# Patient Record
Sex: MALE | Race: WHITE | Employment: OTHER | URBAN - METROPOLITAN AREA
[De-identification: names, ages, dates, MRNs, and addresses within clinical notes are randomized per-mention and may not be internally consistent; named-entity substitution may affect disease eponyms.]

---

## 2021-01-26 ENCOUNTER — BMR PREADMISSION ASSESSMENT (OUTPATIENT)
Dept: ADMISSIONS | Facility: REHABILITATION | Age: 71
End: 2021-01-26

## 2021-01-28 LAB
EXTERNAL HEMATOCRIT: 35 %
EXTERNAL HEMOGLOBIN: 11.7 G/DL
EXTERNAL PLATELET COUNT: 223 K/ΜL
EXTERNAL WBC: 10.9 G/DL

## 2021-02-01 PROBLEM — G93.41 ACUTE METABOLIC ENCEPHALOPATHY: Status: ACTIVE | Noted: 2021-01-26

## 2021-02-01 PROBLEM — R74.01 TRANSAMINITIS: Status: ACTIVE | Noted: 2021-01-05

## 2021-02-01 PROBLEM — R09.02 HYPOXIA: Status: ACTIVE | Noted: 2021-01-05

## 2021-02-01 PROBLEM — U07.1 PNEUMONIA DUE TO COVID-19 VIRUS: Status: ACTIVE | Noted: 2021-01-16

## 2021-02-01 PROBLEM — J12.82 PNEUMONIA DUE TO COVID-19 VIRUS: Status: ACTIVE | Noted: 2021-01-16

## 2021-02-01 PROBLEM — J80 ARDS (ADULT RESPIRATORY DISTRESS SYNDROME) (CMS/HCC): Status: ACTIVE | Noted: 2021-01-16

## 2021-02-01 LAB
BUN SERPL-MCNC: 14 MG/DL
CHLORIDE SERPL-SCNC: 101 MEQ/L
CO2 SERPL-SCNC: 27 MEQ/L
CREAT SERPL-MCNC: 0.7 MG/DL
GLUCOSE SERPL-MCNC: 101 MG/DL
POTASSIUM SERPL-SCNC: 4 MEQ/L
SODIUM SERPL-SCNC: 134 MEQ/L

## 2021-02-01 RX ORDER — VANCOMYCIN HYDROCHLORIDE 1.25 G/25ML
1.25 INJECTION, POWDER, LYOPHILIZED, FOR SOLUTION INTRAVENOUS EVERY 12 HOURS
COMMUNITY
End: 2021-02-01 | Stop reason: ALTCHOICE

## 2021-02-01 RX ORDER — ASPIRIN 81 MG/1
81 TABLET ORAL DAILY
COMMUNITY

## 2021-02-01 RX ORDER — DOCUSATE SODIUM 100 MG/1
100 CAPSULE, LIQUID FILLED ORAL 2 TIMES DAILY
COMMUNITY

## 2021-02-01 RX ORDER — CEFEPIME HYDROCHLORIDE 1 G/50ML
1 INJECTION, SOLUTION INTRAVENOUS
COMMUNITY
End: 2021-02-01 | Stop reason: ALTCHOICE

## 2021-02-01 RX ORDER — AMLODIPINE BESYLATE 10 MG/1
10 TABLET ORAL DAILY
COMMUNITY
End: 2021-02-24 | Stop reason: HOSPADM

## 2021-02-01 RX ORDER — FAMOTIDINE 20 MG/1
20 TABLET, FILM COATED ORAL 2 TIMES DAILY
COMMUNITY

## 2021-02-01 RX ORDER — HEPARIN SODIUM 5000 [USP'U]/ML
5000 INJECTION, SOLUTION INTRAVENOUS; SUBCUTANEOUS 3 TIMES DAILY
COMMUNITY
End: 2021-02-24 | Stop reason: HOSPADM

## 2021-02-01 NOTE — HOSPITAL COURSE
Patient assessed using modified protocols established during the COVID19 pandemic.  70 y.o. male, I PTA, with PMH of HTN, HLD, and CAD, admitted 1/5/2021 for COVID-19 PNA. Worsening resp status intubated on 1/9- extubated 1/16 to OptiFlow. Transition to [facility] on 1/23.Eval by nephrology while in ICU due to ERNIE likely secondary to ATN. Creatinine has been improving with excellent urine output. Received Decadron and Remdesivir. Dobhoff tube was placed due to dysphagia. New fever of 102.8 1/20 and 1/21. ID was consulted. Bld cx's negative. CT scan of the chest abdomen pelvis was done with results showing increasing pulmonary infiltrates/groundglass opacities and consolidations in his bilateral lower lobes. Started empirically on cefepime and vancomycin to cover hospital-acquired pneumonia. Pt had increased lethargy on 1/22. Pt removed DHT 1/25; required higher O2 afterward - possible aspiration during the process. 1/26 patient continued to have intermittent confusion; ?post ICU delirium. Neurology is following, MRI brain was unremarkable. EEG showed mild slowing but no seizure activity. D/c'd abx after 5 days d/t lethargy. Repeat VFSS performed- now on soft diet with NTL.   Pt is AA&Ox3 with periods of confusion. Tolerates soft diet with NTL, voiding in urinal, O2 @ 2-3L via nc. Participating in therapies with marked improvement, rec's for acute rehab prior to home with SO.    2/4 updates:  Pt with tachycardia 2/1, likely d/t mild dehydration as pt does not like thickened liquids. Gave gentle IVF's overnight, stable now. Will need SLP at [facility] to eval for fluid upgrade. Sitting EOB for 5 minutes with close S, transfers ModAx1-2. VSS, afebrile. O2 @ 2-3L via nc, sat'ing 94%.

## 2021-02-01 NOTE — BMR PREADMISSION NOTE
[facility]  Preadmission Assessment    Patient Name: Denis Green  YOB: 1950    Referral Date: 01/26/21  Evaluation Date: 02/01/21  Referring Facility Admission Date: 01/05/21  Referring Facility: [facility]   ReferringProvider:  [email or web address]    Reason for Referral: Denis Green is a 70 y.o. male whose primary indication for inpatient rehabilitation is Debility.   Pertinent History of Current Functional Problem:  Patient assessed using modified protocols established during the COVID19 pandemic.  70 y.o. male, I PTA, with PMH of HTN, HLD, and CAD, admitted 1/5/2021 for COVID-19 PNA. Worsening resp status intubated on 1/9- extubated 1/16 to OptiFlow. Transition to [facility]NC on 1/23.Eval by nephrology while in ICU due to ERNIE likely secondary to ATN. Creatinine has been improving with excellent urine output. Received Decadron and Remdesivir. Dobhoff tube was placed due to dysphagia. New fever of 102.8 1/20 and 1/21. ID was consulted. Bld cx's negative. CT scan of the chest abdomen pelvis was done with results showing increasing pulmonary infiltrates/groundglass opacities and consolidations in his bilateral lower lobes. Started empirically on cefepime and vancomycin to cover hospital-acquired pneumonia. Pt had increased lethargy on 1/22. Pt removed DHT 1/25; required higher O2 afterward - possible aspiration during the process. 1/26 patient continued to have intermittent confusion; ?post ICU delirium. Neurology is following, MRI brain was unremarkable. EEG showed mild slowing but no seizure activity. D/c'd abx after 5 days d/t lethargy. Repeat VFSS performed- now on soft diet with NTL.   Pt is AA&Ox3 with periods of confusion. Tolerates soft diet with NTL, voiding in urinal, O2 @ 2-3L via nc. Participating in therapies with marked improvement, rec's for acute rehab prior to home with SO.          Active Medical Conditions:  Patient Active Problem List   Diagnosis    • Acute metabolic encephalopathy   • ARDS (adult respiratory distress syndrome) (CMS/HCC)   • Coronary artery disease involving native coronary artery of native heart without angina pectoris   • Hypoxia   • Pneumonia due to COVID-19 virus   • Transaminitis       Active Medications:  Active Medications   Medication Sig Dispense Refill   • amLODIPine (NORVASC) 10 mg tablet Take 10 mg by mouth daily.     • aspirin 81 mg enteric coated tablet Take 81 mg by mouth daily.     • docusate sodium (COLACE) 100 mg capsule Take 100 mg by mouth 2 (two) times a day.     • famotidine (PEPCID) 20 mg tablet Take 20 mg by mouth 2 (two) times a day.     • heparin sodium,porcine (heparin, porcine,) 5,000 unit/mL syringe Inject 5,000 Units under the skin 3 (three) times a day.     • metoprolol succinate XL (TOPROL-XL) 12.5 mg Take 12.5 mg by mouth every 12 (twelve) hours.     No medication comments found.    HISTORY:    Past Medical History:   Diagnosis Date   • Coronary artery disease    • Hypertension    • Lipid disorder      No past surgical history on file.  Tobacco Use as of 1/26/2021     Smoking Status Smoking Start Date Smoking Quit Date Packs/Day Years Used    Never Assessed -- -- -- --    Types Comments Smokeless Tobacco Status Smokeless Tobacco Quit Date Source    -- -- Unknown -- --            Socioeconomic as of 1/26/2021     Marital Status Spouse Name Number of Children Years Education Education Level Preferred Language Ethnicity Race Source    Single -- -- -- -- -- Unknown White Provider    Financial Resource Strain Food Insecurity: Worry Food Insecurity: Inability Transportation Needs: Medical Transportation Needs: Non-medical    -- -- -- -- --             Allergies  Allergies   Allergen Reactions   • Atorvastatin      Other reaction(s): Unknown Reaction            Premorbid Functional Status:   Ambulation: independent  Transferring: independent  Toileting: independent  Bathing: independent  Dressing:  independent  Eating: independent  Communication: understands/communicates without difficulty  Swallowing: swallows foods/liquids without difficulty  Baseline Diet/Method of Nutritional Intake: thin liquids;regular solids  Past History of Dysphagia: No  Assistive Device/Animal Currently Used at Home: none  Prior Level of Function Comment: I PTA, 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           Living Environment:  Lives With: significant other  Living Arrangements: house  Number of Stairs, Main Entrance: 3  Number of Stairs, Within Home, Primary: 12 (12+2 to primary B&B-NO pr on 1st floor)      TEST RESULTS:     Chemistry (Up to last 3 results from the past 720 hours)      02/01    Sodium       134       Potassium       4.0       BUN       14       Creatinine       0.7       Glucose       101       CO2       27       Chloride       101         Hepatic (Up to last 3 results from the past 720 hours)    None      Metabolic (Up to last 3 results from the past 720 hours)    None      Hematologic (Up to last 3 results from the past 720 hours)      01/28    WBC       10.9       Hemoglobin       11.7       Hematocrit       35       Platelets       223         Other (Up to last 3 results from the past 720 hours)    None         Diagnostics  Diagnostics: CT, Doppler, Other  CT Study Details: CT chest with contrast  CT Date: 01/21/21  CT Result: Diffuse bilateral groundglass infiltrate throughout the lungs withsuperimposed dense consolidation at the lung bases with air bronchogramssuggestive of pneumonia, interval progression since 1/7/2021.. No pleuraleffusion.  Doppler Study Details: Inocencio LE dopplers  Doppler Date: 01/13/21  Doppler Result: No evidence of DVT in either femoral/popliteal system.  MRI Study Details: MRI brain wo contrast  MRI Date: 01/28/21  MRI Result: No acute hemorrhage, mass or ischemia identified. Pansinus mucosaldisease with small amount of fluid in the mastoid air cells bilaterally.  Other Study Details: Fluoroscopy  "esophagram speech  Other Date: 01/28/21  Other Result: Abnormal swallowing study characterized by transient penetration andsilent aspiration.    ASSESSMENT:       Vitals: Temp:  [36.7 °C (98.1 °F)] 36.7 °C (98.1 °F)  Heart Rate:  [85] 85  BP: (125)/(73) 125/73         Lines/Drains/Airways:             Risk for Clinical Complications:  Constipation: Moderate  DVT: Moderate  Falls: Moderate         Precautions:  Existing Precautions/Restrictions: fall;aspiration;oxygen therapy device and L/min;modified diet           Current Diet:   Diet: nectar thick liquids, soft solids         Current Functional Status:  Preadmission Current Function     Row Name 02/01/21 1500       Cognition/Psychosocial    Orientation Status (Cognition)  oriented to;person;place    Follows Commands (Cognition)  follows one step commands;over 90% accuracy    Cognitive Function (Cognitive)  safety deficit    Safety Deficit (Cognitive)  insight into deficits/self awareness    Comment, Cognition  periods of confusion \"wifty\"        Motor Speech    Speech Intelligibility (Motor Speech)  WFL       Auditory Comprehension    Follows Commands (Auditory Comprehension)  WFL;1-step command    1 Step, Follows Commands (Auditory Comprehension)  intact;over 90% accuracy    Yes/No Questions (Auditory Comprehension)  WFL;simple/factual questions;biographical/personal questions    Simple/Factual Questions (Auditory Comprehension)  over 90% accuracy       Verbal Expression    Automatic Speech (Verbal Expression)  WFL       Swallowing Recommendations    Diet Consistency Recommendations  nectar thick liquids;soft solids    Comment, Swallowing Recommendations  asp precautions       Basic Activities of Daily Living (BADLs)    Basic Activities of Daily Living  bathing;upper body dressing;lower body dressing       Upper Body Dressing    [place]  moderate assist (50-74% patient effort)       Lower Body Dressing    [place]  maximum assist (25-49% patient effort) "       Bathing    [place]  maximum assist (25-49% patient effort)       Bed to Chair Transfer    [place], Bed to Chair  moderate assist (50-74% patient effort);2 person assist    Assistive Device  walker, front-wheeled       Chair to Bed Transfer    [place], Chair to Bed  moderate assist (50-74% patient effort);2 person assist    Assistive Device  walker, front-wheeled       Sit to Stand Transfer    [place], Sit to Stand Transfer  moderate assist (50-74% patient effort)       Stand to Sit Transfer    [place], Stand to Sit Transfer  moderate assist (50-74% patient effort)       Gait Training    [place], Gait  moderate assist (50-74% patient effort);2 person assist    Assistive Device  walker, front-wheeled    Distance in Feet  5 feet    Gait Pattern Utilized  step-to    Comment  foot block       Stairs Training    [place], Stairs  not tested               Support System:   Designated Primary Caregiver: ANASTASIA Cortez [phone]  Availability, Primary Caregiver: available part time, coverage needed (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, 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 night)  Health, Primary Caregiver: no health issues  Physical Limitations, Primary Caregiver: no physical limitations;can supervise activity and provide some assistance  Health Advocacy: caregiver advocates for patient's health;participates in care planning/decision making;self-advocacy for health;family advocates for patient's health  Caregiver Engagement: advocates for the patient;active learner related to care delivery           Patient/Family Goals:   Patient's Goals For Discharge: take care of myself at home;return to all previous roles/activities;return home  Family Goals For Discharge: patient able to provide self-care with only supervision;patient able to return to all previous activities/roles;patient able to provide self-care independently           Educational Background:      RECOMMENDATIONS / PLAN:       Special Needs:  Spiritual,  Cultural Beliefs, Yazidism Practices, Values that Affect Care: no           Plan:  Identified Referral Needs: occupational therapy, physical therapy, speech language pathology, neuropsychologist, medical consultative services, respiratory therapy  OT Frequency: 5-7 times per week  OT Intensity: 1 hour  PT Frequency: 5-7 times per week  PT Intensity: 1 hour  SLP Frequency: 5-7 times per week  SLP Intensity: 1 hour  Impairments to be addressed: communication, mobility, motor dysfunction, safety, self-care, other (see comments)(swallow)    Medical Necessity Admission Criteria: abnormal labs, orthostasis/unstable blood pressure, unstable blood sugar  Therapy Intensity: Requires, can tolerate and will benefit from 3 hours of therapy at least 5 days per week  Projected Length of Stay (days): 15 days  Patient is willing to participate in rehab program: yes         Expected Level of Function at Discharge:  Expected Functional Improvement: communication;mobility;motor dysfunction;safety;self-care;other (see comments) (swallow)  Self-Care: Setup or clean-up assistance  Sphincter Control: Setup or clean-up assistance  Transfers: Setup or clean-up assistance  Locomotion: Supervision or touching assistance  Communication: Independent  Social Cognition: Independent           Post-Discharge Needs:  Concerns to be Addressed: care coordination/care conferences;discharge planning concerns;caregiver training needed  Anticipated Discharge Disposition: home with home health services;home with assistance  Type of Home Care Services: home PT;home OT;nursing  Equipment Needed After Discharge: other (see comments) (TBD)  Current Discharge Risk: physical impairment

## 2021-02-03 LAB
BUN SERPL-MCNC: 15 MG/DL
CHLORIDE SERPL-SCNC: 102 MEQ/L
CO2 SERPL-SCNC: 26 MEQ/L
CREAT SERPL-MCNC: 0.7 MG/DL
EXTERNAL HEMATOCRIT: 34 %
EXTERNAL HEMOGLOBIN: 11.2 G/DL
EXTERNAL PLATELET COUNT: 275 K/ΜL
EXTERNAL WBC: 15.1 G/DL
GLUCOSE SERPL-MCNC: 106 MG/DL
POTASSIUM SERPL-SCNC: 4 MEQ/L
SODIUM SERPL-SCNC: 135 MEQ/L

## 2021-02-04 ENCOUNTER — HOSPITAL ENCOUNTER (INPATIENT)
Facility: REHABILITATION | Age: 71
LOS: 20 days | Discharge: HOME HEALTH | DRG: 948 | End: 2021-02-24
Attending: PHYSICAL MEDICINE & REHABILITATION | Admitting: PHYSICAL MEDICINE & REHABILITATION
Payer: COMMERCIAL

## 2021-02-04 VITALS
SYSTOLIC BLOOD PRESSURE: 107 MMHG | HEART RATE: 74 BPM | HEIGHT: 68 IN | OXYGEN SATURATION: 92 % | WEIGHT: 185.19 LBS | DIASTOLIC BLOOD PRESSURE: 64 MMHG | TEMPERATURE: 98.1 F | BODY MASS INDEX: 28.07 KG/M2

## 2021-02-04 DIAGNOSIS — R13.10 DYSPHAGIA, UNSPECIFIED TYPE: Primary | ICD-10-CM

## 2021-02-04 DIAGNOSIS — F43.22 ADJUSTMENT DISORDER WITH ANXIETY: ICD-10-CM

## 2021-02-04 PROCEDURE — 87086 URINE CULTURE/COLONY COUNT: CPT | Performed by: INTERNAL MEDICINE

## 2021-02-04 PROCEDURE — 81003 URINALYSIS AUTO W/O SCOPE: CPT | Performed by: PHYSICAL MEDICINE & REHABILITATION

## 2021-02-04 PROCEDURE — 12800000 HC ROOM AND CARE SEMIPRIVATE REHAB

## 2021-02-04 PROCEDURE — 63700000 HC SELF-ADMINISTRABLE DRUG: Performed by: PHYSICAL MEDICINE & REHABILITATION

## 2021-02-04 PROCEDURE — 63600000 HC DRUGS/DETAIL CODE: Performed by: PHYSICAL MEDICINE & REHABILITATION

## 2021-02-04 RX ORDER — ALBUTEROL SULFATE 90 UG/1
2 INHALANT RESPIRATORY (INHALATION) EVERY 6 HOURS PRN
Status: DISCONTINUED | OUTPATIENT
Start: 2021-02-04 | End: 2021-02-15

## 2021-02-04 RX ORDER — POLYETHYLENE GLYCOL 3350 17 G/17G
17 POWDER, FOR SOLUTION ORAL DAILY PRN
Status: DISCONTINUED | OUTPATIENT
Start: 2021-02-04 | End: 2021-02-24 | Stop reason: HOSPADM

## 2021-02-04 RX ORDER — SENNOSIDES 8.6 MG/1
2 TABLET ORAL
Status: DISCONTINUED | OUTPATIENT
Start: 2021-02-05 | End: 2021-02-05

## 2021-02-04 RX ORDER — ACETAMINOPHEN 325 MG/1
650 TABLET ORAL EVERY 4 HOURS PRN
Status: DISCONTINUED | OUTPATIENT
Start: 2021-02-04 | End: 2021-02-24 | Stop reason: HOSPADM

## 2021-02-04 RX ORDER — IBUPROFEN 200 MG
16-32 TABLET ORAL AS NEEDED
Status: DISCONTINUED | OUTPATIENT
Start: 2021-02-04 | End: 2021-02-05

## 2021-02-04 RX ORDER — DEXTROSE 40 %
15-30 GEL (GRAM) ORAL AS NEEDED
Status: DISCONTINUED | OUTPATIENT
Start: 2021-02-04 | End: 2021-02-05

## 2021-02-04 RX ORDER — AMLODIPINE BESYLATE 10 MG/1
10 TABLET ORAL DAILY
Status: DISCONTINUED | OUTPATIENT
Start: 2021-02-05 | End: 2021-02-05

## 2021-02-04 RX ORDER — DEXTROSE 50 % IN WATER (D50W) INTRAVENOUS SYRINGE
25 AS NEEDED
Status: DISCONTINUED | OUTPATIENT
Start: 2021-02-04 | End: 2021-02-05

## 2021-02-04 RX ORDER — GEMFIBROZIL 600 MG/1
600 TABLET, FILM COATED ORAL
Status: DISCONTINUED | OUTPATIENT
Start: 2021-02-05 | End: 2021-02-24 | Stop reason: HOSPADM

## 2021-02-04 RX ORDER — ASPIRIN 81 MG/1
81 TABLET ORAL DAILY
Status: DISCONTINUED | OUTPATIENT
Start: 2021-02-05 | End: 2021-02-24 | Stop reason: HOSPADM

## 2021-02-04 RX ORDER — DOCUSATE SODIUM 100 MG/1
100 CAPSULE, LIQUID FILLED ORAL 2 TIMES DAILY
Status: DISCONTINUED | OUTPATIENT
Start: 2021-02-04 | End: 2021-02-05

## 2021-02-04 RX ORDER — HEPARIN SODIUM 5000 [USP'U]/ML
5000 INJECTION, SOLUTION INTRAVENOUS; SUBCUTANEOUS EVERY 8 HOURS
Status: COMPLETED | OUTPATIENT
Start: 2021-02-04 | End: 2021-02-11

## 2021-02-04 RX ADMIN — METOPROLOL TARTRATE 12.5 MG: 25 TABLET, FILM COATED ORAL at 21:57

## 2021-02-04 RX ADMIN — HEPARIN SODIUM 5000 UNITS: 5000 INJECTION INTRAVENOUS; SUBCUTANEOUS at 21:57

## 2021-02-04 RX ADMIN — DOCUSATE SODIUM 100 MG: 100 CAPSULE, LIQUID FILLED ORAL at 21:57

## 2021-02-04 RX ADMIN — ACETAMINOPHEN 650 MG: 325 TABLET, FILM COATED ORAL at 22:23

## 2021-02-04 RX ADMIN — POLYETHYLENE GLYCOL 3350 17 G: 17 POWDER, FOR SOLUTION ORAL at 21:57

## 2021-02-04 NOTE — BMR PREADMISSION NOTE
[facility]    Patient Name: Denis Green  YOB: 1950    Referral Date: 01/26/21  Evaluation Date: 02/04/21  Referring Facility Admission Date: 01/05/21  Referring Facility[facility]   ReferringProvider:  mian escobedoshaniqua    Reason for Referral: Denis Green is a 70 y.o. male whose primary indication for inpatient rehabilitation is Debility.   Pertinent History of Current Functional Problem:  Patient assessed using modified protocols established during the COVID19 pandemic.  70 y.o. male, I PTA, with PMH of HTN, HLD, and CAD, admitted 1/5/2021 for COVID-19 PNA. Worsening resp status intubated on 1/9- extubated 1/16 to [facility]. Transition to [facility] on 1/23.Eval by nephrology while in ICU due to ERNIE likely secondary to ATN. Creatinine has been improving with excellent urine output. Received Decadron and Remdesivir. Dobhoff tube was placed due to dysphagia. New fever of 102.8 1/20 and 1/21. ID was consulted. Bld cx's negative. CT scan of the chest abdomen pelvis was done with results showing increasing pulmonary infiltrates/groundglass opacities and consolidations in his bilateral lower lobes. Started empirically on cefepime and vancomycin to cover hospital-acquired pneumonia. Pt had increased lethargy on 1/22. Pt removed DHT 1/25; required higher O2 afterward - possible aspiration during the process. 1/26 patient continued to have intermittent confusion; ?post ICU delirium. Neurology is following, MRI brain was unremarkable. EEG showed mild slowing but no seizure activity. D/c'd abx after 5 days d/t lethargy. Repeat VFSS performed- now on soft diet with NTL.   Pt is AA&Ox3 with periods of confusion. Tolerates soft diet with NTL, voiding in urinal, O2 @ 2-3L via nc. Participating in therapies with marked improvement, rec's for acute rehab prior to home with SO.    2/4 updates:  Pt with tachycardia 2/1, likely d/t mild dehydration as pt  does not like thickened liquids. Gave gentle IVF's overnight, stable now. Will need SLP at BMR to eval for fluid upgrade. Sitting EOB for 5 minutes with close S, transfers ModAx1-2. VSS, afebrile. O2 @ 2-3L via nc, sat'ing 94%.            Active Medical Conditions:  Patient Active Problem List   Diagnosis   • Acute metabolic encephalopathy   • ARDS (adult respiratory distress syndrome) (CMS/HCC)   • Coronary artery disease involving native coronary artery of native heart without angina pectoris   • Hypoxia   • Pneumonia due to COVID-19 virus   • Transaminitis       Active Medications:  Active Medications   Medication Sig Dispense Refill   • amLODIPine (NORVASC) 10 mg tablet Take 10 mg by mouth daily.     • aspirin 81 mg enteric coated tablet Take 81 mg by mouth daily.     • docusate sodium (COLACE) 100 mg capsule Take 100 mg by mouth 2 (two) times a day.     • famotidine (PEPCID) 20 mg tablet Take 20 mg by mouth 2 (two) times a day.     • heparin sodium,porcine (heparin, porcine,) 5,000 unit/mL syringe Inject 5,000 Units under the skin 3 (three) times a day.     • metoprolol succinate XL (TOPROL-XL) 12.5 mg Take 12.5 mg by mouth every 12 (twelve) hours.     No medication comments found.    HISTORY:    Past Medical History:   Diagnosis Date   • Coronary artery disease    • Hypertension    • Lipid disorder      No past surgical history on file.  Tobacco Use as of 1/26/2021     Smoking Status Smoking Start Date Smoking Quit Date Packs/Day Years Used    Never Assessed -- -- -- --    Types Comments Smokeless Tobacco Status Smokeless Tobacco Quit Date Source    -- -- Unknown -- --            Socioeconomic as of 1/26/2021     Marital Status Spouse Name Number of Children Years Education Education Level Preferred Language Ethnicity Race Source    Single -- -- -- -- Unknown Unknown White Provider    Financial Resource Strain Food Insecurity: Worry Food Insecurity: Inability Transportation Needs: Medical Transportation Needs:  Non-medical    -- -- -- -- --             Allergies  Allergies   Allergen Reactions   • Atorvastatin      Other reaction(s): Unknown Reaction            Premorbid Functional Status:   Ambulation: independent  Transferring: independent  Toileting: independent  Bathing: independent  Dressing: independent  Eating: independent  Communication: understands/communicates without difficulty  Swallowing: swallows foods/liquids without difficulty  Baseline Diet/Method of Nutritional Intake: thin liquids;regular solids  Past History of Dysphagia: No  Assistive Device/Animal Currently Used at Home: none  Prior Level of Function Comment: I PTA, 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           Living Environment:  Lives With: significant other  Living Arrangements: house  Number of Stairs, Main Entrance: 3  Number of Stairs, Within Home, Primary: 12 (12+2 to primary B&B-NO pr on 1st floor)      TEST RESULTS:     Chemistry (Up to last 3 results from the past 720 hours)      02/01 02/03    Sodium       134          135       Potassium       4.0          4.0       BUN       14          15       Creatinine       0.7          0.7       Glucose       101          106       CO2       27          26       Chloride       101          102         Hepatic (Up to last 3 results from the past 720 hours)    None      Metabolic (Up to last 3 results from the past 720 hours)    None      Hematologic (Up to last 3 results from the past 720 hours)      01/28 02/03    WBC       10.9          15.1       Hemoglobin       11.7          11.2       Hematocrit       35          34       Platelets       223          275         Other (Up to last 3 results from the past 720 hours)    None         Diagnostics  Diagnostics: CT, Doppler, Other  CT Study Details: CT chest with contrast  CT Date: 01/21/21  CT Result: Diffuse bilateral groundglass infiltrate throughout the lungs withsuperimposed dense consolidation at the lung bases with air bronchogramssuggestive of pneumonia,  "interval progression since 1/7/2021.. No pleuraleffusion.  Doppler Study Details: Bilat LE dopplers  Doppler Date: 01/13/21  Doppler Result: No evidence of DVT in either femoral/popliteal system.  MRI Study Details: MRI brain wo contrast  MRI Date: 01/28/21  MRI Result: No acute hemorrhage, mass or ischemia identified. Pansinus mucosaldisease with small amount of fluid in the mastoid air cells bilaterally.  Other Study Details: Fluoroscopy esophagram speech  Other Date: 01/28/21  Other Result: Abnormal swallowing study characterized by transient penetration andsilent aspiration.    ASSESSMENT:       Vitals: Temp:  [36.7 °C (98.1 °F)] 36.7 °C (98.1 °F)  Heart Rate:  [74] 74  BP: (107)/(64) 107/64         Lines/Drains/Airways:             Risk for Clinical Complications:  Constipation: Moderate  DVT: Moderate  Falls: Moderate         Precautions:  No data recorded         Current Diet:   Diet: nectar thick liquids, soft solids         Current Functional Status:  Preadmission Current Function     Row Name 02/01/21 1500       Cognition/Psychosocial    Orientation Status (Cognition)  oriented to;person;place    Follows Commands (Cognition)  follows one step commands;over 90% accuracy    Cognitive Function (Cognitive)  safety deficit    Safety Deficit (Cognitive)  insight into deficits/self awareness    Comment, Cognition  periods of confusion \"wifty\"        Motor Speech    Speech Intelligibility (Motor Speech)  WFL       Auditory Comprehension    Follows Commands (Auditory Comprehension)  WFL;1-step command    1 Step, Follows Commands (Auditory Comprehension)  intact;over 90% accuracy    Yes/No Questions (Auditory Comprehension)  WFL;simple/factual questions;biographical/personal questions    Simple/Factual Questions (Auditory Comprehension)  over 90% accuracy       Verbal Expression    Automatic Speech (Verbal Expression)  WFL       Swallowing Recommendations    Diet Consistency Recommendations  nectar thick " liquids;soft solids    Comment, Swallowing Recommendations  asp precautions       Basic Activities of Daily Living (BADLs)    Basic Activities of Daily Living  bathing;upper body dressing;lower body dressing       Upper Body Dressing    [place]  moderate assist (50-74% patient effort)       Lower Body Dressing    [place]  maximum assist (25-49% patient effort)       Bathing    [place]  maximum assist (25-49% patient effort)       Bed to Chair Transfer    [place], Bed to Chair  moderate assist (50-74% patient effort);2 person assist    Assistive Device  walker, front-wheeled       Chair to Bed Transfer    [place], Chair to Bed  moderate assist (50-74% patient effort);2 person assist    Assistive Device  walker, front-wheeled       Sit to Stand Transfer    [place], Sit to Stand Transfer  moderate assist (50-74% patient effort)       Stand to Sit Transfer    [place], Stand to Sit Transfer  moderate assist (50-74% patient effort)       Gait Training    [place], Gait  moderate assist (50-74% patient effort);2 person assist    Assistive Device  walker, front-wheeled    Distance in Feet  5 feet    Gait Pattern Utilized  step-to    Comment  foot block       Stairs Training    [place], Stairs  not tested    Row Name 02/03/21 0812       Cognition/Psychosocial    Orientation Status (Cognition)  oriented to;person    Follows Commands (Cognition)  follows one step commands;over 90% accuracy    Cognitive Function (Cognitive)  safety deficit    Safety Deficit (Cognitive)  insight into deficits/self awareness    Comment, Cognition  some confusion       Motor Speech    Speech Intelligibility (Motor Speech)  WFL       Auditory Comprehension    Follows Commands (Auditory Comprehension)  WFL;1-step command    1 Step, Follows Commands (Auditory Comprehension)  over 90% accuracy    Yes/No Questions (Auditory Comprehension)  WFL;simple/factual questions;biographical/personal questions     Simple/Factual Questions (Auditory Comprehension)  over 90% accuracy       Verbal Expression    Automatic Speech (Verbal Expression)  WFL       Swallowing Recommendations    Diet Consistency Recommendations  nectar thick liquids;soft solids       Upper Body Dressing    [place]  moderate assist (50-74% patient effort)       Bathing    [place]  maximum assist (25-49% patient effort)       Bed Mobility    [place], Supine to Sit  minimum assist (75% or more patient effort)    Assistive Device (Bed Mobility)  bed rails       Bed to Chair Transfer    [place], Bed to Chair  moderate assist (50-74% patient effort);2 person assist    Assistive Device  walker, front-wheeled       Chair to Bed Transfer    [place], Chair to Bed  moderate assist (50-74% patient effort);2 person assist    Assistive Device  walker, front-wheeled       Balance    Comment, Balance  EOB sitting 5 min with Close S               Support System:   Designated Primary Caregiver: ANASTASIA Cortez [phone]  Availability, Primary Caregiver: available part time, coverage needed (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, 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 night)  Health, Primary Caregiver: no health issues  Physical Limitations, Primary Caregiver: no physical limitations;can supervise activity and provide some assistance  Health Advocacy: caregiver advocates for patient's health;participates in care planning/decision making;self-advocacy for health;family advocates for patient's health  Caregiver Engagement: advocates for the patient;active learner related to care delivery           Patient/Family Goals:   Patient's Goals For Discharge: take care of myself at home;return to all previous roles/activities;return home  Family Goals For Discharge: patient able to provide self-care with only supervision;patient able to return to all previous activities/roles;patient able to provide self-care independently           Educational Background:      RECOMMENDATIONS / PLAN:        Special Needs:  Spiritual, Cultural Beliefs, Amish Practices, Values that Affect Care: no           Plan:  Identified Referral Needs: occupational therapy, physical therapy, speech language pathology, neuropsychologist, medical consultative services, respiratory therapy  OT Frequency: 5-7 times per week  OT Intensity: 1 hour  PT Frequency: 5-7 times per week  PT Intensity: 1 hour  SLP Frequency: 5-7 times per week  SLP Intensity: 1 hour  Impairments to be addressed: communication, mobility, motor dysfunction, safety, self-care, other (see comments)(swallow)    Medical Necessity Admission Criteria: abnormal labs, orthostasis/unstable blood pressure, unstable blood sugar  Therapy Intensity: Requires, can tolerate and will benefit from 3 hours of therapy at least 5 days per week  Projected Length of Stay (days): 15 days  Patient is willing to participate in rehab program: yes         Expected Level of Function at Discharge:  Expected Functional Improvement: communication;mobility;motor dysfunction;safety;self-care;other (see comments) (swallow)  Self-Care: Setup or clean-up assistance  Sphincter Control: Setup or clean-up assistance  Transfers: Setup or clean-up assistance  Locomotion: Supervision or touching assistance  Communication: Independent  Social Cognition: Independent           Post-Discharge Needs:  Concerns to be Addressed: care coordination/care conferences;discharge planning concerns;caregiver training needed  Anticipated Discharge Disposition: home with home health services;home with assistance  Type of Home Care Services: home PT;home OT;nursing  Equipment Needed After Discharge: other (see comments) (TBD)  Current Discharge Risk: physical impairment

## 2021-02-05 ENCOUNTER — APPOINTMENT (INPATIENT)
Dept: SPEECH THERAPY | Facility: REHABILITATION | Age: 71
DRG: 948 | End: 2021-02-05
Payer: COMMERCIAL

## 2021-02-05 ENCOUNTER — APPOINTMENT (OUTPATIENT)
Dept: PSYCHOLOGY | Facility: CLINIC | Age: 71
End: 2021-02-05
Payer: COMMERCIAL

## 2021-02-05 ENCOUNTER — APPOINTMENT (INPATIENT)
Dept: OCCUPATIONAL THERAPY | Facility: REHABILITATION | Age: 71
DRG: 948 | End: 2021-02-05
Payer: COMMERCIAL

## 2021-02-05 ENCOUNTER — APPOINTMENT (INPATIENT)
Dept: PHYSICAL THERAPY | Facility: REHABILITATION | Age: 71
DRG: 948 | End: 2021-02-05
Payer: COMMERCIAL

## 2021-02-05 ENCOUNTER — APPOINTMENT (INPATIENT)
Dept: RADIOLOGY | Facility: REHABILITATION | Age: 71
DRG: 948 | End: 2021-02-05
Attending: INTERNAL MEDICINE
Payer: COMMERCIAL

## 2021-02-05 PROBLEM — J98.11 ATELECTASIS: Status: ACTIVE | Noted: 2021-02-05

## 2021-02-05 PROBLEM — E78.5 DYSLIPIDEMIA: Status: ACTIVE | Noted: 2021-02-05

## 2021-02-05 PROBLEM — I10 ESSENTIAL HYPERTENSION: Status: ACTIVE | Noted: 2021-02-05

## 2021-02-05 PROBLEM — R53.81 PHYSICAL DECONDITIONING: Status: ACTIVE | Noted: 2021-02-05

## 2021-02-05 LAB
ALBUMIN SERPL-MCNC: 2.8 G/DL (ref 3.4–5)
ALP SERPL-CCNC: 124 IU/L (ref 35–126)
ALT SERPL-CCNC: 59 IU/L (ref 16–63)
ANION GAP SERPL CALC-SCNC: 12 MEQ/L (ref 3–15)
AST SERPL-CCNC: 20 IU/L (ref 15–41)
BACTERIA URNS QL MICRO: ABNORMAL /HPF
BASOPHILS # BLD: 0.08 K/UL (ref 0.01–0.1)
BASOPHILS NFR BLD: 1 %
BILIRUB DIRECT SERPL-MCNC: 0.2 MG/DL
BILIRUB SERPL-MCNC: 0.7 MG/DL (ref 0.3–1.2)
BILIRUB UR QL STRIP.AUTO: NEGATIVE MG/DL
BNP SERPL-MCNC: 25 PG/ML
BUN SERPL-MCNC: 15 MG/DL (ref 8–20)
CALCIUM SERPL-MCNC: 8.8 MG/DL (ref 8.9–10.3)
CAOX CRY URNS QL MICRO: ABNORMAL /HPF
CHLORIDE SERPL-SCNC: 97 MEQ/L (ref 98–109)
CHOLEST SERPL-MCNC: 269 MG/DL
CLARITY UR REFRACT.AUTO: ABNORMAL
CO2 SERPL-SCNC: 25 MEQ/L (ref 22–32)
COLOR UR AUTO: YELLOW
CREAT SERPL-MCNC: 0.6 MG/DL (ref 0.8–1.3)
DIFFERENTIAL METHOD BLD: ABNORMAL
EOSINOPHIL # BLD: 0.62 K/UL (ref 0.04–0.54)
EOSINOPHIL NFR BLD: 7.6 %
ERYTHROCYTE [DISTWIDTH] IN BLOOD BY AUTOMATED COUNT: 14 % (ref 11.6–14.4)
GFR SERPL CREATININE-BSD FRML MDRD: >60 ML/MIN/1.73M*2
GLUCOSE SERPL-MCNC: 96 MG/DL (ref 70–99)
GLUCOSE UR STRIP.AUTO-MCNC: NEGATIVE MG/DL
HCT VFR BLDCO AUTO: 37.4 % (ref 40.1–51)
HDLC SERPL-MCNC: 30 MG/DL
HDLC SERPL: 9 {RATIO}
HGB BLD-MCNC: 12 G/DL (ref 13.7–17.5)
HGB UR QL STRIP.AUTO: NEGATIVE
HYALINE CASTS #/AREA URNS LPF: ABNORMAL /LPF
IMM GRANULOCYTES # BLD AUTO: 0.11 K/UL (ref 0–0.08)
IMM GRANULOCYTES NFR BLD AUTO: 1.3 %
KETONES UR STRIP.AUTO-MCNC: NEGATIVE MG/DL
LDLC SERPL CALC-MCNC: 185 MG/DL
LEUKOCYTE ESTERASE UR QL STRIP.AUTO: 1
LYMPHOCYTES # BLD: 1.81 K/UL (ref 1.2–3.5)
LYMPHOCYTES NFR BLD: 22.2 %
MAGNESIUM SERPL-MCNC: 2 MG/DL (ref 1.8–2.5)
MCH RBC QN AUTO: 30.8 PG (ref 28–33.2)
MCHC RBC AUTO-ENTMCNC: 32.1 G/DL (ref 32.2–36.5)
MCV RBC AUTO: 95.9 FL (ref 83–98)
MONOCYTES # BLD: 0.99 K/UL (ref 0.3–1)
MONOCYTES NFR BLD: 12.1 %
NEUTROPHILS # BLD: 4.54 K/UL (ref 1.7–7)
NEUTS SEG NFR BLD: 55.8 %
NITRITE UR QL STRIP.AUTO: NEGATIVE
NONHDLC SERPL-MCNC: 239 MG/DL
NRBC BLD-RTO: 0 %
PDW BLD AUTO: 12 FL (ref 9.4–12.4)
PH UR STRIP.AUTO: 6 [PH]
PLATELET # BLD AUTO: 296 K/UL (ref 150–350)
POTASSIUM SERPL-SCNC: 4.1 MEQ/L (ref 3.6–5.1)
PROT SERPL-MCNC: 6.8 G/DL (ref 6–8.2)
PROT UR QL STRIP.AUTO: NEGATIVE
RBC # BLD AUTO: 3.9 M/UL (ref 4.5–5.8)
RBC #/AREA URNS HPF: ABNORMAL /HPF
SODIUM SERPL-SCNC: 134 MEQ/L (ref 136–144)
SP GR UR REFRACT.AUTO: 1.02
SQUAMOUS URNS QL MICRO: ABNORMAL /HPF
TRIGL SERPL-MCNC: 269 MG/DL (ref 30–149)
UROBILINOGEN UR STRIP-ACNC: 1 EU/DL
WBC # BLD AUTO: 8.15 K/UL (ref 3.8–10.5)
WBC #/AREA URNS HPF: ABNORMAL /HPF

## 2021-02-05 PROCEDURE — 97167 OT EVAL HIGH COMPLEX 60 MIN: CPT | Mod: GO

## 2021-02-05 PROCEDURE — 83880 ASSAY OF NATRIURETIC PEPTIDE: CPT | Performed by: INTERNAL MEDICINE

## 2021-02-05 PROCEDURE — 63700000 HC SELF-ADMINISTRABLE DRUG: Performed by: PHYSICAL MEDICINE & REHABILITATION

## 2021-02-05 PROCEDURE — 63600000 HC DRUGS/DETAIL CODE: Performed by: PHYSICAL MEDICINE & REHABILITATION

## 2021-02-05 PROCEDURE — 36415 COLL VENOUS BLD VENIPUNCTURE: CPT | Performed by: PHYSICAL MEDICINE & REHABILITATION

## 2021-02-05 PROCEDURE — 71046 X-RAY EXAM CHEST 2 VIEWS: CPT

## 2021-02-05 PROCEDURE — 80076 HEPATIC FUNCTION PANEL: CPT | Performed by: PHYSICAL MEDICINE & REHABILITATION

## 2021-02-05 PROCEDURE — 92610 EVALUATE SWALLOWING FUNCTION: CPT | Mod: GN

## 2021-02-05 PROCEDURE — 97530 THERAPEUTIC ACTIVITIES: CPT | Mod: GP

## 2021-02-05 PROCEDURE — 97163 PT EVAL HIGH COMPLEX 45 MIN: CPT

## 2021-02-05 PROCEDURE — 97116 GAIT TRAINING THERAPY: CPT | Mod: GP

## 2021-02-05 PROCEDURE — 83735 ASSAY OF MAGNESIUM: CPT | Performed by: INTERNAL MEDICINE

## 2021-02-05 PROCEDURE — 80048 BASIC METABOLIC PNL TOTAL CA: CPT | Performed by: PHYSICAL MEDICINE & REHABILITATION

## 2021-02-05 PROCEDURE — 63700000 HC SELF-ADMINISTRABLE DRUG: Performed by: INTERNAL MEDICINE

## 2021-02-05 PROCEDURE — 85025 COMPLETE CBC W/AUTO DIFF WBC: CPT | Performed by: PHYSICAL MEDICINE & REHABILITATION

## 2021-02-05 PROCEDURE — 80061 LIPID PANEL: CPT | Performed by: INTERNAL MEDICINE

## 2021-02-05 PROCEDURE — 90791 PSYCH DIAGNOSTIC EVALUATION: CPT | Performed by: PSYCHOLOGIST

## 2021-02-05 PROCEDURE — 12800000 HC ROOM AND CARE SEMIPRIVATE REHAB

## 2021-02-05 RX ORDER — LANOLIN ALCOHOL/MO/W.PET/CERES
400 CREAM (GRAM) TOPICAL 2 TIMES DAILY
Status: DISCONTINUED | OUTPATIENT
Start: 2021-02-05 | End: 2021-02-24 | Stop reason: HOSPADM

## 2021-02-05 RX ORDER — FUROSEMIDE 20 MG/1
20 TABLET ORAL
Status: DISCONTINUED | OUTPATIENT
Start: 2021-02-05 | End: 2021-02-08

## 2021-02-05 RX ORDER — SENNOSIDES 8.6 MG/1
3 TABLET ORAL
Status: DISCONTINUED | OUTPATIENT
Start: 2021-02-06 | End: 2021-02-24 | Stop reason: HOSPADM

## 2021-02-05 RX ORDER — ALBUTEROL SULFATE 90 UG/1
2 INHALANT RESPIRATORY (INHALATION) EVERY 6 HOURS
Status: DISCONTINUED | OUTPATIENT
Start: 2021-02-05 | End: 2021-02-15

## 2021-02-05 RX ORDER — DOCUSATE SODIUM 100 MG/1
100 CAPSULE, LIQUID FILLED ORAL 3 TIMES DAILY
Status: DISCONTINUED | OUTPATIENT
Start: 2021-02-05 | End: 2021-02-24 | Stop reason: HOSPADM

## 2021-02-05 RX ORDER — IBUPROFEN/PSEUDOEPHEDRINE HCL 200MG-30MG
3 TABLET ORAL NIGHTLY
Status: DISCONTINUED | OUTPATIENT
Start: 2021-02-05 | End: 2021-02-24 | Stop reason: HOSPADM

## 2021-02-05 RX ORDER — ADHESIVE BANDAGE
30 BANDAGE TOPICAL 2 TIMES DAILY PRN
Status: DISCONTINUED | OUTPATIENT
Start: 2021-02-05 | End: 2021-02-24 | Stop reason: HOSPADM

## 2021-02-05 RX ORDER — ALUMINUM HYDROXIDE, MAGNESIUM HYDROXIDE, AND SIMETHICONE 1200; 120; 1200 MG/30ML; MG/30ML; MG/30ML
30 SUSPENSION ORAL EVERY 6 HOURS PRN
Status: DISCONTINUED | OUTPATIENT
Start: 2021-02-05 | End: 2021-02-24 | Stop reason: HOSPADM

## 2021-02-05 RX ORDER — GUAIFENESIN 600 MG/1
600 TABLET, EXTENDED RELEASE ORAL 2 TIMES DAILY
Status: DISCONTINUED | OUTPATIENT
Start: 2021-02-05 | End: 2021-02-24 | Stop reason: HOSPADM

## 2021-02-05 RX ORDER — AMLODIPINE BESYLATE 5 MG/1
5 TABLET ORAL EVERY 12 HOURS
Status: DISCONTINUED | OUTPATIENT
Start: 2021-02-06 | End: 2021-02-12

## 2021-02-05 RX ORDER — BISACODYL 10 MG/1
10 SUPPOSITORY RECTAL DAILY PRN
Status: DISCONTINUED | OUTPATIENT
Start: 2021-02-05 | End: 2021-02-24 | Stop reason: HOSPADM

## 2021-02-05 RX ADMIN — ASPIRIN 81 MG: 81 TABLET ORAL at 09:02

## 2021-02-05 RX ADMIN — FUROSEMIDE 20 MG: 20 TABLET ORAL at 13:17

## 2021-02-05 RX ADMIN — METOPROLOL TARTRATE 12.5 MG: 25 TABLET, FILM COATED ORAL at 09:02

## 2021-02-05 RX ADMIN — Medication 400 MG: at 12:10

## 2021-02-05 RX ADMIN — ALBUTEROL SULFATE 2 PUFF: 90 AEROSOL, METERED RESPIRATORY (INHALATION) at 12:45

## 2021-02-05 RX ADMIN — Medication 400 MG: at 23:03

## 2021-02-05 RX ADMIN — GEMFIBROZIL 600 MG: 600 TABLET ORAL at 09:01

## 2021-02-05 RX ADMIN — BISACODYL 10 MG: 10 SUPPOSITORY RECTAL at 18:44

## 2021-02-05 RX ADMIN — ALBUTEROL SULFATE 2 PUFF: 90 AEROSOL, METERED RESPIRATORY (INHALATION) at 23:04

## 2021-02-05 RX ADMIN — SENNOSIDES 2 TABLET: 8.6 TABLET, FILM COATED ORAL at 12:08

## 2021-02-05 RX ADMIN — ALBUTEROL SULFATE 2 PUFF: 90 AEROSOL, METERED RESPIRATORY (INHALATION) at 16:58

## 2021-02-05 RX ADMIN — GUAIFENESIN 600 MG: 600 TABLET ORAL at 23:02

## 2021-02-05 RX ADMIN — DOCUSATE SODIUM 100 MG: 100 CAPSULE, LIQUID FILLED ORAL at 09:02

## 2021-02-05 RX ADMIN — GUAIFENESIN 600 MG: 600 TABLET ORAL at 09:19

## 2021-02-05 RX ADMIN — GEMFIBROZIL 600 MG: 600 TABLET ORAL at 16:57

## 2021-02-05 RX ADMIN — HEPARIN SODIUM 5000 UNITS: 5000 INJECTION INTRAVENOUS; SUBCUTANEOUS at 23:02

## 2021-02-05 RX ADMIN — HEPARIN SODIUM 5000 UNITS: 5000 INJECTION INTRAVENOUS; SUBCUTANEOUS at 05:45

## 2021-02-05 RX ADMIN — DOCUSATE SODIUM 100 MG: 100 CAPSULE, LIQUID FILLED ORAL at 23:03

## 2021-02-05 RX ADMIN — METOPROLOL TARTRATE 12.5 MG: 25 TABLET, FILM COATED ORAL at 23:04

## 2021-02-05 RX ADMIN — AMLODIPINE BESYLATE 10 MG: 10 TABLET ORAL at 09:03

## 2021-02-05 RX ADMIN — Medication 3 MG: at 23:03

## 2021-02-05 RX ADMIN — HEPARIN SODIUM 5000 UNITS: 5000 INJECTION INTRAVENOUS; SUBCUTANEOUS at 13:18

## 2021-02-05 RX ADMIN — ACETAMINOPHEN 650 MG: 325 TABLET, FILM COATED ORAL at 23:03

## 2021-02-05 ASSESSMENT — COGNITIVE AND FUNCTIONAL STATUS - GENERAL
MOOD: HOPEFUL;MOTIVATED
APPEARANCE: WELL GROOMED
THOUGHT_CONTENT: APPROPRIATE
AFFECT: WFL
ORIENTATION: FULLY ORIENTED
SPEECH: REGULAR
RECENT MEMORY: WNL
CONCENTRATION: IMPAIRED, MILD
DELUSIONS: NONE OR AGE APPROPRIATE
PSYCHOMOTOR FUNCTIONING: FATIGUED
APPETITE: NO CHANGE
THOUGHT_PROCESS: WNL
AFFECT: FULL RANGE
IMPULSE CONTROL: INTACT
PERCEPTUAL FUNCTION: NORMAL
LIBIDO: NON-CONTRIBUTORY
SLEEP_WAKE_CYCLE: NO CHANGE
ATTENTION: IMPAIRED, MILD
AROUSAL LEVEL: ALERT
REMOTE MEMORY: WNL
EYE_CONTACT: WNL
EST. PREMORBID INTELLIGENCE: AVERAGE
INSIGHT: INTACT

## 2021-02-05 ASSESSMENT — MINI MENTAL STATE EXAM
WHAT STATE [OR PROVINCE] ARE WE IN?: 1-->CORRECT
WHAT YEAR IS THIS?: 1-->CORRECT
WHAT IS THE NAME OF THIS BUILDING [IN FACILITY]?/WHAT IS THE STREET ADDRESS OF THIS HOUSE [IN HOME]?: 1-->CORRECT
WHAT DAY OF THE WEEK IS THIS?: 1-->CORRECT
WHICH SEASON IS THIS?: 1-->CORRECT
NOW WHAT WERE THE THREE OBJECTS I ASKED YOU TO REMEMBER?: 3-->ALL CORRECT
SAY: PUT THE PAPER DOWN ON THE FLOOR, SCORE IF PAPER IS PLACED BACK ON FLOOR: 3-->ALL CORRECT
WHAT CITY/TOWN ARE WE IN?: 1-->CORRECT
SAY:  READ THE WORDS ON THE PAGE AND THEN DO WHAT IT SAYS.  THEN HAND THE PERSON
THE SHEET WITH CLOSE YOUR EYES ON IT.  IF THE SUBJECT READS AND DOES NOT CLOSE THEIR
EYES, REPEAT UP TO THREE TIMES.  SCORE ONLY IF SUBJECT CLOSES EYES.: 1-->ABLE TO PERFORM
WHAT IS TODAY'S DATE?: 1-->CORRECT
WHAT IS THE NAME OF THIS BUILDING [IN FACILITY]?/WHAT IS THE STREET ADDRESS OF THIS HOUSE [IN HOME]?: 0-->INCORRECT
SPELL THE WORD WORLD. NOW SPELL IT BACKWARDS.: 2-->DL
NOW WHAT WERE THE THREE OBJECTS I ASKED YOU TO REMEMBER?: 3-->ALL CORRECT
PLEASE NAME 2 OBJECTS? (EX. PEN, WATCH): 2-->NAMES THEM BOTH OBJECTS
WHAT MONTH IS THIS?: 1-->CORRECT

## 2021-02-05 NOTE — PLAN OF CARE
Problem: Skin Injury Risk Increased  Goal: Skin Health and Integrity  Outcome: Progressing  Intervention: Promote and Optimize Oral Intake  Flowsheets (Taken 2/5/2021 1157)  Oral Nutrition Promotion:   calorie dense foods provided   calorie dense liquids provided   nutritional therapy counseling provided     Nutrition Interventions/ Recommendations:   1. Diet/Liquid consistency per SLP  2. Will send vanilla Magic Cup BID and nectar juice x2 on all trays  3. Encouraged nutrition for healing and recovery  4. Provided soft/nectars alternative foods list  5. Please obtain admission weight  6. Will monitor po intake, weight, labs and tolerance of supplement

## 2021-02-05 NOTE — PLAN OF CARE
Problem: Rehabilitation (IRF) Plan of Care  Goal: Plan of Care Review  Outcome: Progressing  Flowsheets (Taken 2/5/2021 4366)  Plan of Care Reviewed With: patient  IRF Plan of Care Review: progress ongoing, continue  Outcome Summary: OT IE complete and POC established. Pt. grossly mod A for spongebathing, min A for UB Dressing, D for LB dressing, CL S for oral care. toilet and shower transfer TBA 2* safety considerations. Pt. c dyspnea c minimal effort, SpO2 de sat to 88% upon exertion c increased time to recover. MD and nrsg aware. Pt. would benefit from IP OT to improve performance in BADL tasks and functional transfers, improve activity tolerance/endurance, and improve overall quality of life.

## 2021-02-05 NOTE — H&P
Patient Name: Denis Green  MRN: [id number]  : 1950  Admission Date: 2021    Chief Complaint:    Dysfunction in ambulation, transfers and self-care status post deconditioning, severe Covid 19 pneumonia, ventilator-dependent respiratory failure, dysphagia, multiple medical problems.  (Patient was admitted to [facility] late in the evening on 21. Records reviewed on 21. Patient evaluated on 21 at about 12:45 PM.  Full H&P done on 21. Plan of care discussed with patient.  Discussed with nurse.)    History of Present Illness:    Mr. Denis Green is a 70-year-old right-handed white male with chronic conditions significant for coronary artery disease, hypertension, hyperlipidemia who was admitted to [facility] on 21 due to Covid 19 pneumonia and worsening respiratory status.  He required intubation on 21 and had ventilator-dependent respiratory failure.  He was treated with Decadron and Remdesivir.  He had dysphagia requiring Dobbhoff tube feeds.  He was extubated on 21 to OptiFlow.  On 21 he was transitioned to high flow nasal cannula.  Hospital course was significant for acute renal insufficiency likely secondary to acute tubular necrosis and he was followed by nephrology.  He had subsequent improvement in renal function.  He also had new fever of 102.8°F on 21 and 21.  He was followed by infectious disease.  Blood cultures were negative. CT scan of the chest abdomen pelvis was done with results showing increasing pulmonary infiltrates/groundglass opacities and consolidations in his bilateral lower lobes.  He was treated empirically with Cefepime and Vancomycin to cover hospital-acquired pneumonia.  He was noted to be lethargic on 21.  Subsequently patient removed Dobbhoff tube on 21 and there is question of possible aspiration during that process.  He continued to have intermittent confusion on 21 was felt to be  possible post ICU delirium.  He was followed by neurology.  MRI brain was unremarkable.  EEG showed mild slowing but no seizure activity was noted.  Antibiotics were discontinued after a 5 day course due to lethargy.  Repeat video swallow study was performed for dysphagia and patient was put on a soft diet with nectar thick liquids. On 2/3/21, hemoglobin was 11.2, WBC count 15.1, platelets were 275, BUN was 15, and creatinine was 0.7.  He has been needing assistance for mobility, transfers, self-care postoperatively. I have reviewed the preadmission screening and concur with that information and there are no significant changes from patient’s preadmission screening. He is transferred to [facility] on 2/4/21 for further rehabilitation care.      Past Medical History:    Past Medical History:   Diagnosis Date   • Coronary artery disease    • Hypertension    • Lipid disorder        Past Surgical History:    No past surgical history on file.    Medications:    Current Facility-Administered Medications   Medication Dose Route Frequency   • acetaminophen  650 mg oral q4h PRN   • albuterol HFA  2 puff inhalation q6h PRN   • albuterol HFA  2 puff inhalation q6h   • alum-mag hydroxide-simeth  30 mL oral q6h PRN   • [START ON 2/6/2021] amLODIPine  5 mg oral q12h EBER   • aspirin  81 mg oral Daily   • docusate sodium  100 mg oral BID   • furosemide  20 mg oral Daily (1p)   • gemfibroziL  600 mg oral BID AC   • guaiFENesin  600 mg oral BID   • heparin (porcine)  5,000 Units subcutaneous q8h EBER   • magnesium hydroxide  30 mL oral 2x daily PRN   • magnesium oxide  400 mg oral BID   • melatonin  3 mg oral Nightly   • metoprolol tartrate  12.5 mg oral BID   • polyethylene glycol  17 g oral Daily PRN   • senna  2 tablet oral Daily before lunch       Allergies:    Allergies   Allergen Reactions   • Atorvastatin      Other reaction(s): Unknown Reaction       Family History:    No family history on file.    Social  "History:    The patient is  and lives with his significant other in a 2 level home.  3 steps to enter, bedroom and bathroom on the second level, full flight of stairs to reach up there. 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  He denies smoking, drinks alcohol socially and denies using recreational drugs.    Functional History:    The patient is a right-hand dominant person. He was independent in ADLs and ambulation prior to his hospitalization.    Review Of Systems:    A complete and comprehensive review of systems was performed. The patient denies any chest pain, shortness of breath. He uses glasses. He does not remember when his last bowel movement was.  He reports he is able to void.  We will monitor post void residuals.  He reports some pain in his left eye.  He denies history of any glaucoma.  His left eye does not appear red or tearing at this time.  Otherwise, a complete and comprehensive review of systems is negative.    Physical Examination:    Blood pressure 119/65, pulse (!) 103, temperature 36.6 °C (97.9 °F), temperature source Oral, resp. rate 18, height 1.727 m (5' 8\"), SpO2 (!) 88 %.    Body mass index is 28.16 kg/m².    General Appearance: Not in acute distress  Head/Ear/Nose/Throat: Normocephalic; Atraumatic.  On oxygen by nasal cannula.  Eye: EOMI; PERRL.   Neck: No JVD; No Bruits.   Respiratory: Decreased breath sounds at bases.   Cardiovascular: RRR; Normal S1, S2.   Gastrointestinal: Soft; NT; +BS.   Extremities: Bilateral lower extremity edema noted.    Musculoskeletal: Functional active range of motion in both upper and lower extremities.    Neurological: Awake alert oriented to person, had some difficulty naming the place and correct date.. Speech is fluent. Cranial nerve examination does not reveal any gross facial asymmetry. Strength testing about 4/5 strength in both upper extremities.  Bilateral hip flexion is 3+/5.  Bilateral quadriceps are " 4/5 with the thighs supported.  Bilateral ankle dorsi and plantar flexion are 4/5.  He is grossly able to localize touch and position sense in great toes.  Deep tendon reflexes are hypoactive and symmetric bilaterally.  Plantars are flexor.  Coordination is functional upper extremities.    Behavior/Emotional: Appropriate; Cooperative.   Skin: No obvious rashes noted.        Assessment and Plan:    ASSESSMENT PLAN:  1. 70-year-old right-handed white male with chronic conditions significant for coronary artery disease, hypertension, hyperlipidemia who was admitted to [facility] on 1/5/21 due to Covid 19 pneumonia and worsening respiratory status.  He required intubation on 1/9/21 and had ventilator-dependent respiratory failure.  He was treated with Decadron and Remdesivir.  He had dysphagia requiring Dobbhoff tube feeds.  He was extubated on 1/16/21 to OptiFlow.  On 1/23/21 he was transitioned to high flow nasal cannula.  Hospital course was significant for acute renal insufficiency likely secondary to acute tubular necrosis and he was followed by nephrology.  He had subsequent improvement in renal function.  He also had new fever of 102.8°F on 1/20/21 and 1/21/21.  He was followed by infectious disease.  Blood cultures were negative. CT scan of the chest abdomen pelvis was done with results showing increasing pulmonary infiltrates/groundglass opacities and consolidations in his bilateral lower lobes.  He was treated empirically with Cefepime and Vancomycin to cover hospital-acquired pneumonia.  He was noted to be lethargic on 1/22/21.  Subsequently patient removed Dobbhoff tube on 1/25/21 and there is question of possible aspiration during that process.  He continued to have intermittent confusion on 1/26/21 was felt to be possible post ICU delirium.  He was followed by neurology.  MRI brain was unremarkable.  EEG showed mild slowing but no seizure activity was noted.  Antibiotics were discontinued after  a 5 day course due to lethargy.  Repeat video swallow study was performed for dysphagia and patient was put on a soft diet with nectar thick liquids. On 2/3/21, hemoglobin was 11.2, WBC count 15.1, platelets were 275, BUN was 15, and creatinine was 0.7.  He has been needing assistance for mobility, transfers, self-care postoperatively. He is transferred to [facility] on 2/4/21 for further rehabilitation care.      2. DVT prophylaxis - on subcutaneous Heparin.  On SCDs.    3.  Deconditioning - recent severe Covid 19 pneumonia, ventilator-dependent respiratory failure, dysphagia, multiple medical problems - He had ventilator-dependent respiratory failure.  He was treated with Decadron and Remdesivir.  Continue PT, OT, speech, psychology.  Follow falls precautions, cardiac precautions, aspiration precautions.    4. GI - On Colace, Senokot.  On PRN MiraLAX, MOM, Maalox.     5.  - voiding.  Denies dysuria.  Monitor post void residuals.    6.  Pulmonary - recent severe Covid 19 pneumonia, ventilator-dependent respiratory failure - on Ventolin HFA.  On Mucinex.    7. Pain - on PRN Tylenol.    8. Hematology - monitor hemoglobin, platelets.     9. CVS - history of coronary artery disease and hypertension.  On Norvasc.  On Lopressor.  On Aspirin.  On Lasix.  On Magnesium oxide.    10.  Hyperlipidemia - on Lopid.    11.  Insomnia - on Melatonin.    12.  Dysphagia -on soft diet with nectar thick liquids.  Free water protocol with ice chips per speech therapy.  Speech therapy and nutrition following.    13. Rehabilitation medicine - Continue comprehensive rehabilitation care. Continue PT, OT, speech, psychology.  We will follow falls precautions, cardiac precautions, monitor pulse oximetry in therapy and follow aspiration precautions.      14. Reviewed labs today.    15. Consultations - Dr. Bishop will be consulted from internal medicine standpoint.  Nutrition will be consulted.  Will consult psychology.  Dermal  defense will be consulted.    Estimated Length Of Stay:    Will be about 15 days, and will be adjusted in team meetings depending upon functional status and progress in therapy.     Goals Of This Stay:    Goals of this stay would be to increase his strength; improve his endurance; maximize his independence in transfers, ambulation, self care, keeping him weight-bearing as tolerated on both lower extremities; evaluate the need for assistive devices and adaptive equipment; do patient and family education; do caregiver training as appropriate; monitor him medically and try to control his pain.       Post Admission Physician Evaluation:    Denis Green is admitted to [facility] for comprehensive inpatient rehabilitation for Debility status post deconditioning, severe Covid 19 pneumonia, ventilator-dependent respiratory failure, dysphagia, multiple medical problems with functional deficits in communication;mobility;motor dysfunction;safety;self-care;other (see comments) (swallow). Patient is receiving the following services: occupational therapy;physical therapy;speech language pathology;neuropsychologist;medical consultative services;respiratory therapy, rehabilitation nursing care, recreation therapy, case management evaluation.    Active medical management is required for   Patient Active Problem List   Diagnosis   • Acute metabolic encephalopathy   • ARDS (adult respiratory distress syndrome) (CMS/HCC)   • Coronary artery disease involving native coronary artery of native heart without angina pectoris   • Hypoxia   • Pneumonia due to COVID-19 virus   • Transaminitis   • Essential hypertension   • Dyslipidemia   • Atelectasis       Premorbid Function  Dominant Hand: right  Ambulation: independent  Transferring: independent  Toileting: independent  Bathing: independent  Dressing: independent  Eating: independent  Communication: understands/communicates without difficulty  Swallowing:  swallows foods/liquids without difficulty  Baseline Diet/Method of Nutritional Intake: thin liquids;regular solids  Past History of Dysphagia: No  Assistive Device/Animal Currently Used at Home: none  Prior Level of Function Comment: Wife is a nurse, has 2 sons that live close by       Current Function  Gait  [place], Gait: moderate assist (50-74% patient effort);2 person assist  Assistive Device: walker, front-wheeled  Comment: foot block    Stairs  [place], Stairs: not tested    Wheelchair     Transfers  [place], Roll Left: minimum assist (75% or more patient effort);verbal cues (for trunk)  Verbal Cues (Roll Left): hand placement;safety;technique  [place], Roll Right: minimum assist (75% or more patient effort);verbal cues (for trunk)  Verbal Cues (Roll Right): hand placement;safety;technique  [place], Supine to Sit: minimum assist (75% or more patient effort) (for trunk)  [place], Sit to Supine: minimum assist (75% or more patient effort) (for trunk and LE's)  Assistive Device (Bed Mobility): bed rails;head of bed elevated (HOB at 30 degrees)  [place], Bed to Chair: moderate assist (50-74% patient effort);2 person assist  Assistive Device: walker, front-wheeled  [place], Chair to Bed: moderate assist (50-74% patient effort);2 person assist  Assistive Device: walker, front-wheeled  [place], Sit to Stand Transfer: moderate assist (50-74% patient effort)  Comment: maintain std 10 sec's with ModAx1  [place], Stand to Sit Transfer: moderate assist (50-74% patient effort)       Self Care  [place] Assistance: pulls shirt over head/around back  [place]: dependent (less than 25% patient effort)  Self-Performance: chest;left arm;right arm;abdomen;front perineal area  Adaptive Equipment: none  Setup Assistance: obtain supplies  [place]: moderate assist (50-74% patient effort)  Adaptive Equipment: urinal  Setup Assistance: change pad/brief  Adaptive Equipment:  suction brush    Cognition  Affect/Mental Status (Cognitive): WFL  Orientation Status (Cognition): oriented x 4  Follows Commands (Cognition): follows three step commands;over 90% accuracy  Cognitive Function (Cognitive): memory deficit  Memory Deficit (Cognitive): short-term memory  Comment, Short Term Memory: BIMS: 15/15, 3/3 immediate recall, 3/3 orientation, 3/3 delayed recall  Safety Deficit (Cognitive): insight into deficits/self awareness  Comment, Cognition: some confusion    Communication  Speech Intelligibility (Motor Speech): WFL  Follows Commands (Auditory Comprehension): WFL;1-step command  Yes/No Questions (Auditory Comprehension): WFL;simple/factual questions;biographical/personal questions  Simple/Factual Questions (Auditory Comprehension): over 90% accuracy    Swallow  Diet Consistency Recommendations: nectar thick liquids;soft solids  Comment, Swallowing Recommendations: asp precautions      Risk for Complications  Constipation: Moderate  DVT: Moderate  Falls: Moderate      Expected Level of Function  Expected Functional Improvement: communication;mobility;motor dysfunction;safety;self-care;other (see comments) (swallow)  Self-Care: Setup or clean-up assistance  Sphincter Control: Setup or clean-up assistance  Transfers: Setup or clean-up assistance  Locomotion: Supervision or touching assistance  Communication: Independent  Social Cognition: Independent      Anticipated Discharge Plan  Anticipated Discharge Disposition: home with home health services;home with assistance  Type of Home Care Services: home PT;home OT;nursing      I have reviewed the pre-admission screening and there are no relevant changes.    Expected length of stay: 15 days          Isael Lang MD  2/5/2021

## 2021-02-05 NOTE — PROGRESS NOTES
PMR Holding Note  Full and formal H&P to be completed by primary team.    Admitting Diagnosis: Pneumonia due to COVID-19 virus [U07.1, J12.82]    Patient is a 70 y.o. male with history of CAD s/p stenting who was admitted to [facility] on 1/5/2020 with pneumonia 2/2 COVID-19. His hx course was c/b acute metabolic encephalopathy, ARDS, hypoxia and transaminitis.    At the time of my evaluation, the patient feels fatigued and has not been out of bed in quite some time so he feels subjectively weak. He endorses constipation and incomplete bladder emptying. He denies any fever/ chills, headache, chest pain, cough, nausea, diarrhea, dysuria, pain, new weakness or numbness/ tingling.    Review of Systems  As per HPI, otherwise 12 pt. review of systems is negative.    Objective     Vital Signs for the last 24 hours:  Temp:  [36.6 °C (97.8 °F)-36.6 °C (97.9 °F)] 36.6 °C (97.9 °F)  Heart Rate:  [82-86] 82  Resp:  [18-20] 18  BP: (130)/(68) 130/68    Physical Exam  General: awake, alert, no acute distress  HEENT: PERRL, EOMI, MMM, throat clear; on 2L O2  CV: RRR, +S1/S2, no murmurs  Pulm: no acute respiratory distress, no wheezing  GI: soft, non-tender, non-distended, normoactive bowel sounds, no rebound/ guarding/ rigidity  Extremities: 1+ LE edema, no calf tenderness  Neurologic: A&O x 3 (person, place, time), CN 2-12 grossly intact  - Motor: strength 5/5 in the ankle DFs, PFs bilaterally      Assessment/Plan   - Medications reconciled, diet ordered via BMR order set.  - Case d/w patient and nursing.  - Patient is in STABLE condition tonight and will start therapies tomorrow     Omari Spain MD

## 2021-02-05 NOTE — PROGRESS NOTES
02/05/21 1300   General Information   Preferred Language eng   Referring Facility Type acute care facility   Name of Referring Facility   ([facility])   Admission Diagnosis (Referring Facility)   (covid PNA)   Admission Date at Referring Facility 01/05/21   Contact Information   Permission Granted to Share Info With family/designee  (pts contact is his daughter, Dana Daily)   Contact Information Comments   (pts contact is dtr, Dana Daily)   Advance Directives (for Healthcare)   Does patient have advance directive? No   Patient does not have Advance Directive Patient/Family declines further information   Living Environment   Lives With spouse;child(mindy), adult  (adult son lives in home occasionally)   Name(s) of Who Lives With Patient Dana   Current Living Arrangements home/apartment/condo   Able to Return to Prior Arrangements yes   Employment/   Employment Status other (see comments)  (pt is a farmer)   Employment/ Comments no   Financial/Legal   Source of Income other (see comments)  (pt works for himself as a farmern)   Values/Beliefs   Spiritual, Cultural Beliefs, Scientology Practices, Values that Affect Care yes   Values/Beliefs Comment Episcopal   Discharge Needs Assessment (IRF)   Concerns to be Addressed discharge planning   Patient/Family Anticipates Transition to home with help/services   Patient/Family Anticipated Services at Transition home health care   Transportation Concerns car, none   Transportation Anticipated family or friend will provide   Outpatient/Agency/Support Group Needs homecare agency   Anticipated Discharge Disposition home with home health services   Offered/Gave Vendor List yes   Patient's Choice of Community Agency(s)   (wife chose [facility])   Final Note   Final Note   (Rehab process and role of CM explained to wife.)   Spke w/ pts wifeDana.  Explained team process and role of cm.  Provided phone numbers for w/e visits & nurses  station.  Wife is a pediatric RN

## 2021-02-05 NOTE — PLAN OF CARE
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     Problem: Rehabilitation (IRF) Plan of Care  Goal: Plan of Care Review  Outcome: Progressing  Flowsheets (Taken 2/5/2021 1445)  Plan of Care Reviewed With: patient  IRF Plan of Care Review: progress ongoing, continue  Outcome Summary: PT eval completed. Rx initiated.  2 people for txfs: SPT with mod/ max A x 1 and min A x 1 without AD. Amb with PT only

## 2021-02-05 NOTE — PROGRESS NOTES
Patient: Denis Green  Location: [facility] [address]  MRN: [id number]  Today's date: 2/5/2021    History of Present Illness  Denis LOVE is a 70 y.o. male admitted on 2/4/2021 with Pneumonia due to COVID-19 virus. Principal problem is No Principal Problem: There is no principal problem currently on the Problem List. Please update the Problem List and refresh..   Pt admitted to outside hospital on 1/5/2021 for COVID-19 PNA. Worsening resp status intubated on 1/9- extubated 1/16 to [facility]. Transition to [facility] on 1/23.Eval by nephrology while in ICU due to ERNIE likely secondary to ATN. Creatinine has been improving with excellent urine output. Received Decadron and Remdesivir. Dobhoff tube was placed due to dysphagia. New fever of 102.8 1/20 and 1/21. ID was consulted. Bld cx's negative. CT scan of the chest abdomen pelvis was done with results showing increasing pulmonary infiltrates/groundglass opacities and consolidations in his bilateral lower lobes. Started empirically on cefepime and vancomycin to cover hospital-acquired pneumonia. Pt had increased lethargy on 1/22. Pt removed DHT 1/25; required higher O2 afterward - possible aspiration during the process. 1/26 patient continued to have intermittent confusion; ?post ICU delirium. Neurology is following, MRI brain was unremarkable. EEG showed mild slowing but no seizure activity. D/c'd abx after 5 days d/t lethargy.       Past Medical History  Denis LOVE has a past medical history of Coronary artery disease, Hypertension, and Lipid disorder.    PT Vitals    Date/Time Pulse HR Source SpO2 Pt Activity O2 Therapy O2 Del Method O2 Flow Rate BP BP Location BP Method Pt Position Observations Who   02/05/21 1030 85 Monitor 95 % At rest Supplemental oxygen Nasal cannula 3 L/min 137/72 Left upper arm Automatic Lying -- LDR   02/05/21 1115 103 Monitor 88 % Walking Supplemental oxygen Nasal cannula 3 L/min 119/65 Left upper arm Automatic Sitting  Recovery to 91% after 30 seconds with vc's for pursed lip breathing.  LDR      PT Pain    Date/Time Pain Type Pref Pain Scale Rating: Rest Rating: Activity Who   02/05/21 1030 Pain Assessment word (verbal rating pain scale) 0 - no pain -- LDR   02/05/21 1115 Pain Reassessment;Post Activity word (verbal rating pain scale) -- 0 - no pain LDR          Prior Living Environment      Most Recent Value   Lives With  spouse, child(mindy), adult  (Pended)  [adult son lives in home occasionally]   Living Arrangements  house   Home Accessibility  stairs to enter home (Group), stairs within home (Group)   Living Environment Comment  Lives with wife and intermittently with one son.  [bed and bath on 2nd ,  no powder room on first]   Number of Stairs, Main Entrance  1   Stair Railings, Main Entrance  none   Location, Patient Bedroom  second floor, must negotiate stairs to access   Location, Bathroom  second floor, must negotiate stairs to access   Bathroom Access Comment  Pt. reports has tub shower and stall shower, uses tub shower primarily, has grab bar in shower, has stanard height toilet c wall on both sides   Number of Stairs, Within Home, Primary  12   Surface of Stairs, Within Home, Primary  hardwood   Stair Railings, Within Home, Primary  railings on both sides of stairs          Prior Level of Function      Most Recent Value   Dominant Hand  right   Ambulation  independent   Transferring  independent   Toileting  independent   Bathing  independent   Dressing  independent   Eating  independent   Communication  understands/communicates without difficulty   Swallowing  swallows foods/liquids without difficulty   Baseline Diet/Method of Nutritional Intake  regular solids, thin liquids   Past History of Dysphagia  No previous reports    Prior Level of Function Comment  Pt is a cattle/,  previously IND for all IADLs.    Assistive Device/Animal Currently Used at Home  none           02/05/21 1030   Time Calculation    Start Time 1030   Stop Time 1115   Time Calculation (min) 45 min   Session Details   Document Type initial evaluation   Mode of Treatment physical therapy;individual therapy   General Information   Patient Profile Reviewed? yes   General Observations of Patient Ple[place], cooperative; recieved supine in bed.   Existing Precautions/Restrictions aspiration;modified diet;oxygen therapy device and L/min;fall   Prior Level of Function   Dominant Hand right   Ambulation independent   Transferring independent   Toileting independent   Bathing independent   Dressing independent   Eating independent   Prior Level of Function Comment  Independent with amb and ADL's; [place]. Wife is a nurse, has 2 sons that live close by    Home Use of Assistive/Adaptive Equipment   Assistive Device/Animal Currently Used at Home none   Equipment Brought to Hospital none   Living Environment   Living Arrangements house   Home Accessibility stairs to enter home (Group);stairs within home (Group)   Living Environment Comment Lives with wife and intermittently with one son.   (bed and bath on 2nd ; no powder room on first)   Home Main Entrance   Number of Stairs, Main Entrance 1   Stair Railings, Main Entrance none   Stairs Within Home, Primary   Number of Stairs, Within Home, Primary 12   Surface of Stairs, Within Home, Primary hardwood   Stair Railings, Within Home, Primary railings on both sides of stairs   Primary Bathroom Access   Location, Bathroom second floor, must negotiate stairs to access   Bathroom Access Comment Pt. reports has tub shower and stall shower, uses tub shower primarily, has grab bar in shower, has stanard height toilet c wall on both sides   Patient Bedroom Access   Location, Patient Bedroom second floor, must negotiate stairs to access   Cognition/Psychosocial   Affect/Mental Status (Cognitive) WFL   Orientation Status (Cognition) oriented x 4   Follows Commands (Cognition) follows three step commands;over 90% accuracy    Sensory   Additional Documentation Sensory Assessment (Somatosensory) (Group)   Sensory Assessment (Somatosensory)   Sensory Assessment (Somatosensory) left LE;right LE   Left LE Sensory Assessment light touch awareness;light touch localization;proprioception;intact  (proprioception assessed at ankle)   Right LE Sensory Assessment light touch awareness;light touch localization;proprioception;intact  (Proprioception assessed at ankle.)   Range of Motion (ROM)   Range of Motion left lower extremity;right lower extremity   Range of Motion Comprehensive   Comprehensive Range of Motion ROM is WFL  (B LE's)   Strength (Manual Muscle Testing)   Strength (Manual Muscle Testing) left lower extremity strength deficit;right lower extremity strength deficit   Left Lower Extremity Strength hip;knee;ankle   Hip, Left (Strength) flex/ ext 3-/5, abd/ add 2+/5, IR/ ER 3/5   Knee, Left (Strength) flex/ ext 4-/5   Ankle, Left (Strength) DF 3/5;PF 3+/5, EV/ IV 3/5   Right Lower Extremity Strength hip;knee;ankle   Hip, Right (Strength) flex/ ext 3-/5, abd/ add 2+/5, IR/ ER 3/5   Knee, Right (Strength) flex/ ext 4-/5   Ankle, Right (Strength) DF/ PF 3+/5; IV/ EV 3/5   Bed Mobility   [place], Roll Left minimum assist (75% or more patient effort);verbal cues  (for trunk)   Verbal Cues (Roll Left) hand placement;safety;technique   [place], Roll Right minimum assist (75% or more patient effort);verbal cues  (for trunk)   Verbal Cues (Roll Right) hand placement;safety;technique   [place], Supine to Sit minimum assist (75% or more patient effort)  (for trunk)   [place], Sit to Supine minimum assist (75% or more patient effort)  (for trunk and LE's)   Assistive Device (Bed Mobility) bed rails;head of bed elevated  (HOB at 30 degrees)   Comment (Bed Mobility) + SOLIS   Transfers   Transfers stand pivot transfer;car transfer   Bed to Chair Transfer   [place], Bed to Chair dependent (less than 25% patient effort);2 person  assist;verbal cues   Verbal Cues hand placement;preparatory posture;safety;technique   Assistive Device wheelchair   Comment  SPT; mod A x 1 for B hip/ knee ext , L/ R WS and steadying at pelvis; min A of 2nd person for safety; anterior approach of therapist.   (+ SOLIS)   Chair to Bed Transfer   [place], Chair to Bed dependent (less than 25% patient effort);2 person assist;verbal cues   Verbal Cues hand placement;preparatory posture;safety;technique   Assistive Device wheelchair   Comment  SPT; mod A x 1 for B hip/ knee ext , L/ R WS and steadying at pelvis; min A of 2nd person for safety; anterior approach of therapist.   (+ SOLIS)   Sit to Stand Transfer   [place], Sit to Stand Transfer dependent (less than 25% patient effort);2 person assist;verbal cues   Verbal Cues hand placement;preparatory posture;safety;technique   Assistive Device other (see comments)  (from bed)   Comment mod A x 1 for B hip/ knee ext and steadying at pelvis; min A of 2nd person for safety/ balance ; from bed  (side by side approach ; from bed to scale)   Stand to Sit Transfer   [place], Stand to Sit Transfer dependent (less than 25% patient effort);2 person assist;verbal cues   Verbal Cues hand placement;preparatory posture;safety;technique   Assistive Device other (see comments)  (to bed)   Comment mod A x 1 for eccentric control with lowering; close S of 2nd person for safety  (from scale to bed; side by side approach)   Stand Pivot Transfer   [place], Stand Pivot/Stand Step Transfer dependent (less than 25% patient effort);2 person assist;verbal cues   Assistive Device wheelchair   Comment mod A x 1 for B hip/ knee ext , L/ R WS and steadying at pelvis; min A of 2nd person for safety; anterior approach of therapist.   (+ SOLIS)   Verbal Cues hand placement;preparatory posture;safety;technique   Car Transfer   [place], Car Transfer unable to assess;safety considerations   Comment Unsafe for assessment on eval.     Gait Training   [place], Gait unable to assess;safety considerations   Advanced Gait Activity curb negotiation;rough/uneven surfaces;sloped surfaces   [place], Picking Up Object not tested  (Unsafe for assessment on eval. )   Comment Unsafe for assessment on eval without intervention of RW. See subsequent documentation.    Curb Negotiation (Mobility)   [place] unable to assess;safety considerations   Comment Unsafe for assessment on eval.    Rough/Uneven Surface Gait Skills (Mobility)   [place] unable to assess;safety considerations   Comment Unsafe for assessment on eval.    Sloped Surface Gait Skills (Mobility)   [place] unable to assess;safety considerations   Comment Unsafe for assessment on eval.    Stairs Training   [place], Stairs unable to assess;safety considerations   Comment Unsafe for assessment on eval.    Wheelchair Mobility/Management   Wheelchair Type tilt in space   Functional Mobility Training wheelchair mobility skills, all   [place], All Mobility dependent (less than 25% patient effort)   Management Activities parts (all) management   Parts Management dependent (less than 25% patient effort)   Safety Issues, Functional Mobility   Impairments Affecting Function (Mobility) endurance/activity tolerance;shortness of breath;strength;balance   Balance   Balance Assessment sitting static balance;standing static balance;standing dynamic balance;sitting dynamic balance   Static Sitting Balance WFL;supported;sitting, edge of bed   Dynamic Sitting Balance mild impairment;supported;sitting, edge of bed   Static Standing Balance severe impairment;supported;standing  (with B UE's on RW)   Dynamic Standing Balance unable to perform activity   Comment, Balance + SOLIS; + anxiety with standing   Motor Skills   Motor Skills coordination;functional endurance;muscle tone;postural deviations   Coordination gross motor deficit;bilateral;lower extremity;minimal  impairment;dysdiadochokinesia;heel to shin   Functional Endurance poor  (+ SOLIS with minimal exertion)   Muscle Tone WNL   Postural Deviations   Comment, Postural Deviations Without significant postural deviation.    IRF PT Goals   Bed Mobility Goal Selection (PT-IRF) bed mobility, PT goal 1;bed mobility, PT goal 2   Transfer Goal Selection (PT-IRF) transfers, PT goal 1;transfers, PT goal 2   Gait (Walking Locomotion) Goal Selection (PT-IRF) gait, PT goal 1;gait, PT goal 2   Bed Mobility Goal 1   Activity/Assistive Device rolling to left;rolling to right;sit to supine/supine to sit   [place] supervision required   Time Frame short-term goal (STG);1 week   Bed Mobility Goal 2   Activity/Assistive Device rolling to left;rolling to right;sit to supine/supine to sit   [place] modified independence   Time Frame long-term goal (LTG);21 days or less   Transfer Goal 1   Activity/Assistive Device bed-to-chair/chair-to-bed;sit-to-stand/stand-to-sit;stand pivot;walker, front-wheeled   [place] moderate assist (50-74% patient effort);verbal cues required   Time Frame short-term goal (STG);1 week   Transfer Goal 2   Activity/Assistive Device sit-to-stand/stand-to-sit;bed-to-chair/chair-to-bed;stand pivot;walker, front-wheeled   [place] supervision required   Time Frame long-term goal (LTG);21 days or less   Gait/Walking Locomotion Goal 1   Activity/Assistive Device gait (walking locomotion);decrease fall risk;increase endurance/gait distance;walker, front-wheeled   Distance 15 feet   [place] moderate assist (50-74% patient effort)  (and close S of 2nd person for safety)   Time Frame short-term goal (STG);1 week   Gait/Walking Locomotion Goal 2   Activity/Assistive Device gait (walking locomotion);decrease asymmetrical patterns;decrease fall risk;diminish gait deviation;improve balance and speed;increase endurance/gait distance;turning, left;turning, right;walker, front-wheeled   Distance 100 feet    [place] supervision required   Time Frame long-term goal (LTG);21 days or less   Patient/Family Goals   Patient's Goals For Discharge return home;return to all previous roles/activities   Therapy Assessment/Plan (PT)   Rehab Potential/Prognosis (PT) good, to achieve stated therapy goals   Frequency of Treatment (PT) 5-7 times per week;60-90 minutes per day   Estimated Duration of Therapy/Length of Stay (PT) other (see comments)  (TBD at initial team conference. )   Planned Therapy Interventions (PT) balance training;bed mobility training;gait training;home exercise program;patient/family education;stair training;strengthening;transfer training;wheelchair management/propulsion training   Comment, Therapy Assessment/Plan (PT)  Pt is a 69 y/o male initially admitted to an outside facility on 1/5. Dx'd with Covid 19 PNA. Intubated 1/9. Extubated 1/16. Hospital complications to include hospital aquired PNA, increased B pulmonary infiltrates requiring Abx , lethargy and confusion. Pt presenting with decreased activity tolerance/ SOLIS and O2 desat with minimal activity, decreased balance, decreased B LE strength ( proximal weaker than distal). Functionally, these deficits/ findings are resulting in decreased independence with bed mobility skills, txfs, amb, elevation and an increased falls risk. Pt is functioning well below baseline status and will benefit from skilled PT in this acute rehab setting in order to promote increased functional independence towards baseline status with treatment addressing the aforementioned deficits.                       Education provided this session. See the Patient Education summary report for full details.    IRF PT Goals      Most Recent Value   Bed Mobility Goal 1   Activity/Assistive Device  rolling to left, rolling to right, sit to supine/supine to sit at 02/05/2021 1030   [place]  supervision required at 02/05/2021 1030   Time Frame  short-term goal (STG), 1 week at  02/05/2021 1030   Bed Mobility Goal 2   Activity/Assistive Device  rolling to left, rolling to right, sit to supine/supine to sit at 02/05/2021 1030   [place]  modified independence at 02/05/2021 1030   Time Frame  long-term goal (LTG), 21 days or less at 02/05/2021 1030   Transfer Goal 1   Activity/Assistive Device  bed-to-chair/chair-to-bed, sit-to-stand/stand-to-sit, stand pivot, walker, front-wheeled at 02/05/2021 1030   [place]  moderate assist (50-74% patient effort), verbal cues required at 02/05/2021 1030   Time Frame  short-term goal (STG), 1 week at 02/05/2021 1030   Transfer Goal 2   Activity/Assistive Device  sit-to-stand/stand-to-sit, bed-to-chair/chair-to-bed, stand pivot, walker, front-wheeled at 02/05/2021 1030   [place]  supervision required at 02/05/2021 1030   Time Frame  long-term goal (LTG), 21 days or less at 02/05/2021 1030   Gait/Walking Locomotion Goal 1   Activity/Assistive Device  gait (walking locomotion), decrease fall risk, increase endurance/gait distance, walker, front-wheeled at 02/05/2021 1030   Distance  15 feet at 02/05/2021 1030   [place]  moderate assist (50-74% patient effort) [and close S of 2nd person for safety] at 02/05/2021 1030   Time Frame  short-term goal (STG), 1 week at 02/05/2021 1030   Gait/Walking Locomotion Goal 2   Activity/Assistive Device  gait (walking locomotion), decrease asymmetrical patterns, decrease fall risk, diminish gait deviation, improve balance and speed, increase endurance/gait distance, turning, left, turning, right, walker, front-wheeled at 02/05/2021 1030   Distance  100 feet at 02/05/2021 1030   [place]  supervision required at 02/05/2021 1030   Time Frame  long-term goal (LTG), 21 days or less at 02/05/2021 1030

## 2021-02-05 NOTE — PROGRESS NOTES
Patient: Denis Green  Location: [facility] [address]  MRN: [id number]  Today's date: 2/5/2021    History of Present Illness  Denis LOVE is a 70 y.o. male admitted on 2/4/2021 with Pneumonia due to COVID-19 virus. Principal problem is No Principal Problem: There is no principal problem currently on the Problem List. Please update the Problem List and refresh..   Pt admitted to outside hospital on 1/5/2021 for COVID-19 PNA. Worsening resp status intubated on 1/9- extubated 1/16 to OptiFlow. Transition to HFNC on 1/23.Eval by nephrology while in ICU due to ERNIE likely secondary to ATN. Creatinine has been improving with excellent urine output. Received Decadron and Remdesivir. Dobhoff tube was placed due to dysphagia. New fever of 102.8 1/20 and 1/21. ID was consulted. Bld cx's negative. CT scan of the chest abdomen pelvis was done with results showing increasing pulmonary infiltrates/groundglass opacities and consolidations in his bilateral lower lobes. Started empirically on cefepime and vancomycin to cover hospital-acquired pneumonia. Pt had increased lethargy on 1/22. Pt removed DHT 1/25; required higher O2 afterward - possible aspiration during the process. 1/26 patient continued to have intermittent confusion; ?post ICU delirium. Neurology is following, MRI brain was unremarkable. EEG showed mild slowing but no seizure activity. D/c'd abx after 5 days d/t lethargy.       Past Medical History  Denis LOVE has a past medical history of Coronary artery disease, Hypertension, and Lipid disorder.    OT Vitals    Date/Time Pulse SpO2 Pt Activity O2 Therapy O2 Del Method O2 Flow Rate BP BP Location BP Method Pt Position Observations [facility]   02/05/21 0727 79 93 % At rest Supplemental oxygen Nasal cannula 3 L/min 111/61 Left upper arm Automatic Lying -- AM   02/05/21 0755 -- 93 % At rest Supplemental oxygen Nasal cannula 3 L/min -- -- -- -- -- AM   02/05/21 0813 81 92 % was 86% post stand, slow  recovery to 92% Other (Comment) post stand Supplemental oxygen Nasal cannula 3 L/min 143/69 Left upper arm Automatic Lying post stand AM      OT Pain    Date/Time Pain Type Pref Pain Scale Rating: Rest Rating: Activity Who   02/05/21 0727 Pain Assessment word (verbal rating pain scale) 0 - no pain 0 - no pain AM   02/05/21 0813 Pain Reassessment word (verbal rating pain scale) 0 - no pain 0 - no pain AM          Prior Living Environment      Most Recent Value   Lives With  spouse, child(mindy), adult [adult son lives in home occasionally]   Living Arrangements  house   Home Accessibility  stairs to enter home (Group), stairs within home (Group)   Living Environment Comment  Lives with wife and intermittently with one son.  [bed and bath on 2nd ,  no powder room on first]   Number of Stairs, Main Entrance  1   Stair Railings, Main Entrance  none   Location, Patient Bedroom  second floor, must negotiate stairs to access   Location, Bathroom  second floor, must negotiate stairs to access   Bathroom Access Comment  Pt. reports has tub shower and stall shower, uses tub shower primarily, has grab bar in shower, has stanard height toilet c wall on both sides   Number of Stairs, Within Home, Primary  12   Surface of Stairs, Within Home, Primary  hardwood   Stair Railings, Within Home, Primary  railings on both sides of stairs          Prior Level of Function      Most Recent Value   Dominant Hand  right   Ambulation  independent   Transferring  independent   Toileting  independent   Bathing  independent   Dressing  independent   Eating  independent   Communication  understands/communicates without difficulty   Swallowing  swallows foods/liquids without difficulty   Baseline Diet/Method of Nutritional Intake  regular solids, thin liquids   Past History of Dysphagia  No previous reports    Prior Level of Function Comment  Pt is a cattle/,  previously IND for all IADLs.    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 received supine in bed, pleasant/cooperative, MD present performing daily assessment. Pt. SpO2 desat to approc. 88%, increased time to recover c 3L O2, MD and nrsg aware. Reinforcement of use of incentive spirometer for improved O2 sats     Existing Precautions/Restrictions  aspiration;modified diet;fall;oxygen therapy device and L/min        Coping Strategies    Counseling (Coping Strategies)  active listening utilized;self-care encouraged        Cognition/Psychosocial    Cognitive Function (Cognitive)  memory deficit     Memory Deficit (Cognitive)  short-term memory     Comment, Short Term Memory  BIMS: 15/15, 3/3 immediate recall, 3/3 orientation, 3/3 delayed recall        Vision Assessment/Intervention    Visual Impairment/Limitations  corrective lenses full time     Impact of Vision Impairment on Function  Pt. reports no change in vision        Sensory Assessment (Somatosensory)    Sensory Assessment (Somatosensory)  left UE;right UE     Left UE Sensory Assessment  light touch awareness;light touch localization;proprioception;intact     Right UE Sensory Assessment  light touch awareness;light touch localization;proprioception;intact        Range of Motion Comprehensive    Comprehensive Range of Motion  --    B UE ROM WFL within ADL context       Strength (Manual Muscle Testing)    Strength (Manual Muscle Testing) "  --    B UE MMT TBA 2* time constraints       Bed Mobility    Comment (Bed Mobility)  min A log roll L and R         Sit to Stand Transfer    [place], Sit to Stand Transfer  maximum assist (25-49% patient effort)     Verbal Cues  safety     Assistive Device  none     Comment  modified stand for CM up over hips, recommend 2 person A for safety        Stand to Sit Transfer    [place], Stand to Sit Transfer  maximum assist (25-49% patient effort)     Verbal Cues  safety     Assistive Device  none     Comment  from modified stand for CM up over hips, recommend 2 person A for safety        Toilet Transfer    [place], Toilet Transfer  unable to assess     Comment  unsafe at time of IE, anticipate DME needs for safety        Shower Transfer    [place], Shower Transfer  unable to assess     Comment  unsafe at time of IE, anticipate DME needs for safety        Balance    Comment, Balance  OT: sitiing EOB approx 5 mins at Cl S prior to attempt to stand        Motor Skills    Motor Skills  coordination;functional endurance     Coordination  9 Hole Peg Test of Fine Motor Coordination Results     Functional Endurance  poor, pt. c dyspnea c minimal effort, SpO2 desat to 88% c minimal effort on 3L. increased time to recover, pt. SpO2 monitored t/o session, 92-93% for duration. nrsg and MD aware        Bathing    Self-Performance  chest;left arm;right arm;abdomen;front perineal area     [place] Assistance  left upper leg;right upper leg;left lower leg, including foot;right lower leg, including foot;buttocks     [place]  moderate assist (50-74% patient effort)     Position  sitting up in bed     Setup Assistance  obtain supplies     Adaptive Equipment  none     Comment  pt. completes spongebathing sitting up in bed c A to wash buttocks vis side lying to L and A to wash B LE and feet. noted area of pink on sacral area, MD present and aware, Nrsn notified        Upper Body Dressing    Tasks  don;pull over  garment     Self-Performance  threads left arm, shirt;threads right arm, shirt;pulls shirt down/adjusts     [place] Assistance  pulls shirt over head/around back     [place]  minimum assist (75% or more patient effort)     Position  sitting up in bed     Adaptive Equipment  none     Comment  pt. threads R/L UE into [place] shirt, A to pull overhead, pt. able to pull shirt down in back        Lower Body Dressing    Tasks  don;underwear;pants/bottoms;socks     [place] Assistance  threads left leg, underpants;threads right leg, underpants;pulls underpants up or down;threads left leg, pants/shorts;threads right leg, pants/shorts;pulls pants/shorts up or down;dons/doffs left sock;dons/doffs right sock     [place]  dependent (less than 25% patient effort)     Position  sitting up in bed;unsupported sitting;unsupported standing     Adaptive Equipment  none     [place], Footwear  dependent (less than 25% patient effort)     Comment  Pt. requires total A to place brief in side lying to L and R. Sitting EOB at Cl S, pt. requires A to thread R/L LE into pants. Attempt to stand for CM up, comphaleighes modified stand. Pt. initates CM up over hips, requires A for throughness. Noted dyspnea c SpO2 at 88% once returned to supine. pt. performing <25%        Grooming    Adaptive Equipment  suction brush     [place], Oral Hygiene  close supervision     Comment  pt. completes oral care c suction toothbrush c suction occluded c tape at Cl S level        Toileting    Comment  Pt. c no toileting needs at time of IE. Anticipate A x 2 for safety        Community Mobility (IADLs)    Community Mobility  pre-driving assessment        Pre-Driving Assessment    Cognition Issues  --    MoCA 8.1 TBA       Therapy Assessment/Plan (OT)    OT Diagnosis  [place], post covid     Rehab Potential/Prognosis (OT)  good, to achieve stated therapy goals     Frequency of Treatment (OT)  5-7 times per week;60-90 minutes per day     Estimated Length of  Stay  3 weeks     Problem List (OT)  problems related to;balance;cognition;coordination;mobility;postural control;strength     Activity Limitations Related to Problem List  unable to ambulate safely;unable to transfer safely;BADLs not performed adequately or safely;IADLs not performed adequately or safely;community activities not performed adequately or safely;home management activity not performed adequately or safely     Planned Therapy Interventions (OT)  activity tolerance training;adaptive equipment training;BADL retraining;functional balance retraining;IADL retraining;occupation/activity based interventions;ROM/therapeutic exercise;strengthening exercise;transfer/mobility retraining     Comment, Therapy Assessment/Plan (OT)  Pt admitted to outside hospital on 1/5/2021 for COVID-19 PNA. Worsening resp status intubated on 1/9- extubated 1/16 to OptiFlow. Transition to HFNC on 1/23.Eval by nephrology while in ICU due to ERNIE likely secondary to ATN. Creatinine has been improving with excellent urine output. Received Decadron and Remdesivir. Dobhoff tube was placed due to dysphagia. New fever of 102.8 1/20 and 1/21. ID was consulted. Bld cx's negative. CT scan of the chest abdomen pelvis was done with results showing increasing pulmonary infiltrates/groundglass opacities and consolidations in his bilateral lower lobes. Started empirically on cefepime and vancomycin to cover hospital-acquired pneumonia. Pt had increased lethargy on 1/22. Pt removed DHT 1/25; required higher O2 afterward - possible aspiration during the process. 1/26 patient continued to have intermittent confusion; ?post ICU delirium. Neurology is following, MRI brain was unremarkable. EEG showed mild slowing but no seizure activity. D/c'd abx after 5 days d/t lethargy. OT IE complete and POC established. Pt. grossly mod A for spongebathing, min A for UB Dressing, D for LB dressing, CL S for oral care. toilet and shower transfer TBA 2* safety  considerations. Pt. c dyspnea c minimal effort, SpO2 de sat to 88% upon exertion c increased time to recover. MD and nrsg aware. Pt. would benefit from IP OT to improve performance in BADL tasks and functional transfers, improve activity tolerance/endurance, and improve overall quality of life.                       Education provided this session. See the Patient Education summary report for full details.    IRF OT Goals      Most Recent Value   Transfer Goal 1   Activity/Assistive Device  toilet at 02/05/2021 0726   Time Frame  short-term goal (STG), 5 - 7 days at 02/05/2021 0726   Strategies/Barriers  TBA at 02/05/2021 0726   Transfer Goal 2   Activity/Assistive Device  toilet at 02/05/2021 0726   Time Frame  long-term goal (LTG), 3 weeks at 02/05/2021 0726   Transfer Goal 3   Activity/Assistive Device  shower at 02/05/2021 0726   Time Frame  short-term goal (STG), 5 - 7 days at 02/05/2021 0726   Strategies/Barriers  TBA at 02/05/2021 0726   Transfer Goal 4   Activity/Assistive Device  shower at 02/05/2021 0726   Time Frame  long-term goal (LTG), 3 weeks at 02/05/2021 0726   Strategies/Barriers  TBA at 02/05/2021 0726   Bathing Goal 1   Activity/Assistive Device  bathing skills, all at 02/05/2021 0726   [place]  minimum assist (75% or more patient effort) at 02/05/2021 0726   Time Frame  short-term goal (STG), 5 - 7 days at 02/05/2021 0726   Bathing Goal 2   Activity/Assistive Device  bathing skills, all at 02/05/2021 0726   [place]  supervision required at 02/05/2021 0726   Time Frame  long-term goal (LTG), 3 weeks at 02/05/2021 0726   Strategies/Barriers  DME TBD at 02/05/2021 0726   UB Dressing Goal 1   Activity/Assistive Device  upper body dressing at 02/05/2021 0726   [place]  supervision required [CL S] at 02/05/2021 0726   Time Frame  short-term goal (STG), 5 - 7 days at 02/05/2021 0726   UB Dressing Goal 2   Activity/Assistive Device  upper body dressing at 02/05/2021 0726   [place]   modified independence at 02/05/2021 0726   Time Frame  long-term goal (LTG), 3 weeks at 02/05/2021 0726   LB Dressing Goal 1   Activity/Assistive Device  lower body dressing at 02/05/2021 0726   [place]  maximum assist (25-49% patient effort) at 02/05/2021 0726   Time Frame  short-term goal (STG), 5 - 7 days at 02/05/2021 0726   LB Dressing Goal 2   Activity/Assistive Device  lower body dressing at 02/05/2021 0726   [place]  supervision required at 02/05/2021 0726   Time Frame  long-term goal (LTG), 3 weeks at 02/05/2021 0726   Grooming Goal 1   Activity/Assistive Device  grooming skills, all at 02/05/2021 0726   [place]  supervision required at 02/05/2021 0726   Time Frame  short-term goal (STG), 5 - 7 days at 02/05/2021 0726   Grooming Goal 2   Activity/Assistive Device  grooming skills, all at 02/05/2021 0726   [place]  modified independence at 02/05/2021 0726   Time Frame  long-term goal (LTG), 3 weeks at 02/05/2021 0726   Toileting Goal 1   Activity/Assistive Device  toileting skills, all at 02/05/2021 0726   Time Frame  short-term goal (STG), 5 - 7 days at 02/05/2021 0726   Strategies/Barriers  TBA at 02/05/2021 0726   Toileting Goal 2   Activity/Assistive Device  toileting skills, all at 02/05/2021 0726   Time Frame  long-term goal (LTG), 3 weeks at 02/05/2021 0726   Strategies/Barriers  TBA at 02/05/2021 0726

## 2021-02-05 NOTE — PROGRESS NOTES
Patient: Denis Green  Location: [facility] [address]  MRN: [id number]  Today's date: 2/5/2021    History of Present Illness  Denis LOVE is a 70 y.o. male admitted on 2/4/2021 with Pneumonia due to COVID-19 virus. Principal problem is No Principal Problem: There is no principal problem currently on the Problem List. Please update the Problem List and refresh..   Pt admitted to outside hospital on 1/5/2021 for COVID-19 PNA. Worsening resp status intubated on 1/9- extubated 1/16 to OptiFlow. Transition to HFNC on 1/23.Eval by nephrology while in ICU due to ERNIE likely secondary to ATN. Creatinine has been improving with excellent urine output. Received Decadron and Remdesivir. Dobhoff tube was placed due to dysphagia. New fever of 102.8 1/20 and 1/21. ID was consulted. Bld cx's negative. CT scan of the chest abdomen pelvis was done with results showing increasing pulmonary infiltrates/groundglass opacities and consolidations in his bilateral lower lobes. Started empirically on cefepime and vancomycin to cover hospital-acquired pneumonia. Pt had increased lethargy on 1/22. Pt removed DHT 1/25; required higher O2 afterward - possible aspiration during the process. 1/26 patient continued to have intermittent confusion; ?post ICU delirium. Neurology is following, MRI brain was unremarkable. EEG showed mild slowing but no seizure activity. D/c'd abx after 5 days d/t lethargy.       Past Medical History  Denis LOVE has a past medical history of Coronary artery disease, Hypertension, and Lipid disorder.    PT Vitals    Date/Time Pulse HR Source SpO2 Pt Activity O2 Therapy O2 Del Method O2 Flow Rate BP BP Location BP Method Pt Position Observations [facility]   02/05/21 1115 103 Monitor 88 % Walking Supplemental oxygen Nasal cannula 3 L/min 119/65 Left upper arm Automatic Sitting Recovery to 91% after 30 seconds with vc's for pursed lip breathing.  LDR      PT Pain    Date/Time Pain Type Pref Pain Scale Rating:  Activity Who   02/05/21 1115 Pain Reassessment;Post Activity word (verbal rating pain scale) 0 - no pain LDR          Prior Living Environment      Most Recent Value   Lives With  spouse, child(mindy), adult [adult son lives in home occasionally]   Living Arrangements  house   Home Accessibility  stairs to enter home (Group), stairs within home (Group)   Living Environment Comment  Lives with wife and intermittently with one son.  [bed and bath on 2nd ,  no powder room on first]   Number of Stairs, Main Entrance  1   Stair Railings, Main Entrance  none   Location, Patient Bedroom  second floor, must negotiate stairs to access   Location, Bathroom  second floor, must negotiate stairs to access   Bathroom Access Comment  Pt. reports has tub shower and stall shower, uses tub shower primarily, has grab bar in shower, has stanard height toilet c wall on both sides   Number of Stairs, Within Home, Primary  12   Surface of Stairs, Within Home, Primary  hardwood   Stair Railings, Within Home, Primary  railings on both sides of stairs          Prior Level of Function      Most Recent Value   Dominant Hand  right   Ambulation  independent   Transferring  independent   Toileting  independent   Bathing  independent   Dressing  independent   Eating  independent   Communication  understands/communicates without difficulty   Swallowing  swallows foods/liquids without difficulty   Baseline Diet/Method of Nutritional Intake  regular solids, thin liquids   Past History of Dysphagia  No previous reports    Prior Level of Function Comment  Pt is a cattle/,  previously IND for all IADLs.    Assistive Device/Animal Currently Used at Home  none           02/05/21 1115   Time Calculation   Start Time 1115   Stop Time 1200   Time Calculation (min) 45 min   Session Details   Document Type daily treatment/progress note   Mode of Treatment physical therapy;individual therapy   General Information   Patient Profile Reviewed? yes    General Observations of Patient pleasant , cooperative, motivated to improve functional independence, anxious   Existing Precautions/Restrictions aspiration;fall;modified diet;oxygen therapy device and L/min   Sit to Stand Transfer   [place], Sit to Stand Transfer dependent (less than 25% patient effort);2 person assist;verbal cues   Verbal Cues hand placement;preparatory posture;safety;technique;proper use of assistive device   Assistive Device walker, front-wheeled   Comment mod A x 2 for B hip / knee ext and steadying at pelvis ; from w/c   Stand to Sit Transfer   [place], Stand to Sit Transfer dependent (less than 25% patient effort);2 person assist;verbal cues   Verbal Cues hand placement;safety;technique   Assistive Device walker, front-wheeled   Comment mod A x 2 for eccentric control with lowering; to w/c   Gait Training   [place], Gait dependent (less than 25% patient effort);2 person assist   Assistive Device walker, front-wheeled   Distance in Feet 5 feet  (x 2 reps)   Gait Pattern Utilized step-through   Deviations/Abnormal Patterns (Gait) bilateral deviations;base of support, narrow;gait speed decreased;step length decreased   Bilateral Gait Deviations heel strike decreased  (decreased B hip ext at mid to terminal stance)   Comment mod A x 2 for B hip/ knee ext, steadying at pelvis and RW mgmt.   (+ SOLIS; O2 desat to 88%; 30 seconds to recover to 91%)   Wheelchair Mobility/Management   Comment, Functional Mobility padding added to foot rests for improved posture.    Lower Extremity (Therapeutic Exercise)   Exercise Position/Type (LE Therapeutic Exercise) seated   General Exercise (LE Therapeutic Exercise) bilateral;LAQ (long arc quad)   Reps and Sets (LE Therapeutic Exercise) 10 reps x 1 set    Comment (LE Therapeutic Exercise) vc's for effective technique.    Daily Progress Summary (PT)   Symptoms Noted During/After Treatment fatigue;shortness of breath   Progress Toward Functional  Goals (PT) progressing toward functional goals as expected   Daily Outcome Statement (PT) PT eval completed. Rx initated. Pt requiring the intervention of a RW for amb and increased safety with txfs. SOLIS with minimal exertion and requires vc's for pursed lip breathing. Need to continue with POC as outlined to include sitting and standing balance activities, standing and sitting tolerance progression, open chain LE therex .   Recommendations Need to continue with POC as outlined to include sitting and standing balance activities, standing and sitting tolerance progression, open chain LE therex . Suggest w/c follow of 3rd person for gait training. Monitor VS closely.                         IRF PT Goals      Most Recent Value   Bed Mobility Goal 1   Activity/Assistive Device  rolling to left, rolling to right, sit to supine/supine to sit at 02/05/2021 1030   [place]  supervision required at 02/05/2021 1030   Time Frame  short-term goal (STG), 1 week at 02/05/2021 1030   Bed Mobility Goal 2   Activity/Assistive Device  rolling to left, rolling to right, sit to supine/supine to sit at 02/05/2021 1030   [place]  modified independence at 02/05/2021 1030   Time Frame  long-term goal (LTG), 21 days or less at 02/05/2021 1030   Transfer Goal 1   Activity/Assistive Device  bed-to-chair/chair-to-bed, sit-to-stand/stand-to-sit, stand pivot, walker, front-wheeled at 02/05/2021 1030   [place]  moderate assist (50-74% patient effort), verbal cues required at 02/05/2021 1030   Time Frame  short-term goal (STG), 1 week at 02/05/2021 1030   Transfer Goal 2   Activity/Assistive Device  sit-to-stand/stand-to-sit, bed-to-chair/chair-to-bed, stand pivot, walker, front-wheeled at 02/05/2021 1030   [place]  supervision required at 02/05/2021 1030   Time Frame  long-term goal (LTG), 21 days or less at 02/05/2021 1030   Gait/Walking Locomotion Goal 1   Activity/Assistive Device  gait (walking locomotion), decrease fall  risk, increase endurance/gait distance, walker, front-wheeled at 02/05/2021 1030   Distance  15 feet at 02/05/2021 1030   [place]  moderate assist (50-74% patient effort) [and close S of 2nd person for safety] at 02/05/2021 1030   Time Frame  short-term goal (STG), 1 week at 02/05/2021 1030   Gait/Walking Locomotion Goal 2   Activity/Assistive Device  gait (walking locomotion), decrease asymmetrical patterns, decrease fall risk, diminish gait deviation, improve balance and speed, increase endurance/gait distance, turning, left, turning, right, walker, front-wheeled at 02/05/2021 1030   Distance  100 feet at 02/05/2021 1030   [place]  supervision required at 02/05/2021 1030   Time Frame  long-term goal (LTG), 21 days or less at 02/05/2021 1030

## 2021-02-05 NOTE — NURSING NOTE
"PT reports patient c/o Left eye pain. In discussion with patient and therapist, pt states pain in left eye \"socket\" and when he moves his gaze left to right, discomfort level goes from 0 to 10/10. He reports he felt it yesterday and thought it was a migrane. He also reports sensation of a \"white light\". Pupils equal and reactive to light . /72 hr 85/m PULSE OX 95% ON 3L O2 NC. Pt supine in bed at this time  "

## 2021-02-05 NOTE — PROGRESS NOTES
Patient: Denis Green  Location: [facility] [address]  MRN: [id number]  Today's date: 2/5/2021    History of Present Illness  Denis LOVE is a 70 y.o. male admitted on 2/4/2021 with Pneumonia due to COVID-19 virus. Principal problem is No Principal Problem: There is no principal problem currently on the Problem List. Please update the Problem List and refresh..   Pt admitted to outside hospital on 1/5/2021 for COVID-19 PNA. Worsening resp status intubated on 1/9- extubated 1/16 to OptiFlow. Transition to HFNC on 1/23.Eval by nephrology while in ICU due to ERNIE likely secondary to ATN. Creatinine has been improving with excellent urine output. Received Decadron and Remdesivir. Dobhoff tube was placed due to dysphagia. New fever of 102.8 1/20 and 1/21. ID was consulted. Bld cx's negative. CT scan of the chest abdomen pelvis was done with results showing increasing pulmonary infiltrates/groundglass opacities and consolidations in his bilateral lower lobes. Started empirically on cefepime and vancomycin to cover hospital-acquired pneumonia. Pt had increased lethargy on 1/22. Pt removed DHT 1/25; required higher O2 afterward - possible aspiration during the process. 1/26 patient continued to have intermittent confusion; ?post ICU delirium. Neurology is following, MRI brain was unremarkable. EEG showed mild slowing but no seizure activity. D/c'd abx after 5 days d/t lethargy.       Past Medical History  Denis LOVE has a past medical history of Coronary artery disease, Hypertension, and Lipid disorder.    SLP Pain    Date/Time Pain Type Pref Pain Scale Side Location Rating: Activity Who   02/05/21 1202 Pain Assessment word (verbal rating pain scale) Left eye 2 - mild pain RS   02/05/21 1229 Pain Reassessment word (verbal rating pain scale) Left eye 2 - mild pain RS          Prior Living Environment      Most Recent Value   Lives With  spouse, child(mindy), adult [adult son lives in home occasionally]    Living Arrangements  house   Home Accessibility  stairs to enter home (Group), stairs within home (Group)   Living Environment Comment  Lives with wife and intermittently with one son.  [bed and bath on 2nd ,  no powder room on first]   Number of Stairs, Main Entrance  1   Stair Railings, Main Entrance  none   Location, Patient Bedroom  second floor, must negotiate stairs to access   Location, Bathroom  second floor, must negotiate stairs to access   Bathroom Access Comment  Pt. reports has tub shower and stall shower, uses tub shower primarily, has grab bar in shower, has stanard height toilet c wall on both sides   Number of Stairs, Within Home, Primary  12   Surface of Stairs, Within Home, Primary  hardwood   Stair Railings, Within Home, Primary  railings on both sides of stairs          Prior Level of Function      Most Recent Value   Dominant Hand  right   Ambulation  independent   Transferring  independent   Toileting  independent   Bathing  independent   Dressing  independent   Eating  independent   Communication  understands/communicates without difficulty   Swallowing  swallows foods/liquids without difficulty   Baseline Diet/Method of Nutritional Intake  regular solids, thin liquids   Past History of Dysphagia  No previous reports    Prior Level of Function Comment  Pt is a cattle/,  previously IND for all IADLs.    Assistive Device/Animal Currently Used at Home  none          IRF SLP Evaluation and Treatment - 02/05/21 1202        Time Calculation    Start Time  1200     Stop Time  1230     Time Calculation (min)  30 min        Session Details    Document Type  initial evaluation     Mode of Treatment  speech language pathology;individual therapy        General Information    Patient Profile Reviewed?  yes     General Observations of Patient  Pleasant and cooperative, pt received in handoff      Existing Precautions/Restrictions  aspiration;fall;modified diet    soft/NTL       Orientation Log     City  1-->multiple choice, phonemic cuing     Kind of Place  3-->spontaneous/free recall     Name of Hospital  3-->spontaneous/free recall     Month  3-->spontaneous/free recall     Date  2-->logical cuing     Year  3-->spontaneous/free recall     Day of Week  2-->logical cuing     Clock Time  3-->spontaneous/free recall     Etiology/Event  3-->spontaneous/free recall     Pathology Deficits  3-->spontaneous/free recall     Total Score  26        Hearing Assessment    Hearing Status  WFL        Vision Assessment/Intervention    Visual Impairment/Limitations  corrective lenses full time        Oral Motor    Dentition (Oral Motor)  significant number of missing teeth        Facial Symmetry (Oral Motor)    Facial Symmetry (Oral Motor)  WNL        Lip Function (Oral Motor)    Lip Range of Motion (Oral Motor)  retraction impairment;protrusion impairment     Protrusion, Lip Range of Motion  bilateral;minimal impairment     Retraction, Lip Range of Motion  bilateral;minimal impairment     Lip Coordination (Oral Motor)  bilateral;minimal impairment     Comment, Lip Function (Oral Motor)  Reduced labial movement bilaterally, does not appear to effect swallow function or speech         Tongue Function (Oral Motor)    Tongue ROM (Oral Motor)  WNL     Tongue Strength (Oral Motor)  WNL     Tongue Coordination/Speed (Oral Motor)  WNL        Jaw Function (Oral Motor)    Jaw Function (Oral Motor)  WNL        Cough/Swallow/Gag Reflex (Oral Motor)    Soft Palate/Velum (Oral Motor)  --    WFL       Vocal Quality/Secretion Management (Oral Motor)    Vocal Quality (Oral Motor)  breathy        General Swallowing Observations    Current Diet/Method of Nutritional Intake (General Swallowing Observations, NIS)  soft solids;nectar thick liquids     Respiratory Support  nasal cannula    3L O2       Food and Liquid Trials (NIS)    Patient Positioning  upright in wheelchair     Oral Intake/Feeding Performance  independent/appropriate  self-feeding skills     Food Consistencies Evaluated  soft solids     Soft Solids  intact;patient controlled amounts     Comment, Soft Solids  Violeta, rice, green beans      Liquid Consistencies Evaluated  thin liquids;nectar thick liquids     Thin Liquids  single cup sips     Comment, Thin Liquids  No overt s/s of aspiration, however VFSS completed 1/28/21 indicated silent aspiration of thin liquids, unable to r/o at this time.      Nectar Thick Liquids  intact;patient-controlled amounts;multiple consecutive cup sips     Comment, Nectar Thick Liquids  No overt s/s of aspiration      Oral Preparatory Phase of Swallow  WFL     Oral Phase of Swallow  WFL     Comment, Oral Phase  Good rotary mastication, adequate oral clearance with minimal oral residuals      Pharyngeal Phase of Swallow  multiple swallows per bolus;uncoordinated swallow;decreased laryngeal elevation     Comment, Pharyngeal Phase  No overt s/s of aspiration      Esophageal Phase of Swallow  no clinical symptoms     Comment  Recommend Free Water Protocol for ice chips, distant SPV.         Swallowing Recommendations    Additional Swallow Evaluation Recommended  Videofluoroscopic Swallowing Examination (Row)     Recommend VFSS (Comment)  Prior to advancement to thin liquids to r/o aspiration      Monitoring/Assistance Required  requires occasional supervision during eating and drinking     Strategies to Enhance Eating/Swallowing  allow adequate time for eating;alternate food and liquid swallows;upright sitting position for eating     Diet Consistency Recommendations  soft solids;nectar thick liquids     Recommended Feeding/Eating Techniques  alternate between small bites and sips of food/liquid;small sips/bites     Mode of Delivery Recommendations  small bolus size;slow rate of intake     Medication Administration Recommendations  Per pt preference, whole with NTL     Comment, Swallowing Recommendations  Free Water Protocol with ice chips         Functional Communication Measures    FCM: Swallowing  5-->Level 5        Patient/Family Goals (SLP)    Patient's Goals For Discharge  return to baseline diet    drink regular water        Therapy Assessment/Plan (SLP)    Swallowing Diagnosis  mild;moderate;oropharyngeal phase dysphagia     Dysphagia Characteristics  Mildly reduced hyolaryngeal elevation/excursion, multiple swallows per bolus, and known aspiration of thin liquids as seen in VFSS 1/28/21, completed at the acute hospital      Speech Pathology Potential/Prognosis  good, to achieve stated therapy goals     Therapy Frequency (SLP)  5-7 times per week;30 minutes per day     Estimated Duration of Therapy/Length of Stay (SLP)  3 weeks     Planned Therapy Interventions (SLP)  Pt will benefit from skilled ST to safely advance diet as apporpriate and to further assess cognitive-communication deficits      Comment, Therapy Assessment/Plan (SLP)  ST will complete formal cognitive evaluation next session.                   Education provided this session. See the Patient Education summary report for full details.    IRF SLP Goals      Most Recent Value   Oral Nutrition/Hydration Goal 1   Activity  effective/safe/independent, use of swallowing techniques [regular/NTL, no overt s/s of aspiration ] at 02/05/2021 1202   Time Frame  short-term goal (STG), 1 week at 02/05/2021 1202   Oral Nutrition/Hydration Goal 2   Activity  effective/safe/independent, use of swallowing techniques [regular/thin, no overt s/s of aspiration ] at 02/05/2021 1202   Time Frame  long-term goal (LTG), 3 weeks at 02/05/2021 1202

## 2021-02-05 NOTE — CONSULTS
Nutrition Note  [address]    Clinical Course: Patient is a 70 y.o. male who was admitted on 2/4/2021 with a diagnosis of Pneumonia due to COVID-19 virus [U07.1, J12.82].     Nutrition Interventions/ Recommendations:   1. Diet/Liquid consistency per SLP  2. Will send vanilla Magic Cup BID and nectar juice x2 on all trays  3. Encouraged nutrition for healing and recovery  4. Provided soft/nectars alternative foods list  5. Please obtain admission weight  6. Will monitor po intake, weight, labs and tolerance of supplement  At nutrition risk level 2    Past Medical History:   Diagnosis Date   • Coronary artery disease    • Hypertension    • Lipid disorder      No past surgical history on file.         Dietary Orders   (From admission, onward)             Start     Ordered    02/04/21 2122  Adult Diet Soft Texture; Nectar Thick Liquids  Diet effective now     Question Answer Comment   Diet Texture Soft Texture    Fluid Consistency: Nectar Thick Liquids        02/04/21 2123                Reason for Assessment  Reason For Assessment: diagnosis/disease state, identified at risk by screening criteria  Diagnosis: infection/sepsis  Identified At Risk by Screening Criteria: difficulty chewing/swallowing, reduced oral intake over the last month, unintentional loss of 10 lbs or more in the past 2 mos    [facility] Nutrition Screen Tool  Has patient lost weight without trying?: 0-->No  If yes,how much weight has been lost?: 0-->Patient has not lost weight  Has patient been eating poorly due to decreased appetite?: 0-->No  MST Nutrition Screen Score: 0    Nutrition/Diet History  Typical Food/Fluid Intake: Regular diet at home  Diet Prior to Admission: Soft/Nectars  Appetite Prior to Admission: Fair-25-50%  Intake (%): 50%  Food Preferences: Loves meat, dislikes nectar liquids  Cultural/Yazidism Preferences: None  Meal/Snack Patterns: Often only eats dinner  Supplemental Drinks/Foods/Additives: Will send trial of Magic Cup  Typical  "Activity Patterns: vigorous intensity(Farmer)  Functional Status: able to prepare meals, able to purchase food, ambulatory  Food Allergies: (NKFA)  Factors Affecting Nutritional Intake: dry mouth, early satiety  Tube feeding/TPN Prior to Admission?: Tube feeding-yes    Physical Findings  Overall Physical Appearance: loss of muscle mass, loss of subcutaneous fat, other (see comments)(mild temporal wasting, wearing O2)  Gastrointestinal: constipation  Last Bowel Movement: (unsure)  Oral/Mouth Cavity: (WDL)  Skin: edema(1+ B/L LE edema)    [id number] Physical Appearance  Overall Physical Appearance: loss of muscle mass, loss of subcutaneous fat, other (see comments)(mild temporal wasting, wearing O2)  Gastrointestinal: constipation  Last Bowel Movement: (unsure)  Oral/Mouth Cavity: (WDL)  Skin: edema(1+ B/L LE edema)    Nutrition Order  Nutrition Order Review: meets nutritional requirements  Nutrition Order Comments: Soft/Nectars    Anthropometrics  Height: 172.7 cm (5' 8\")    Wt Readings from Last 3 Encounters:   02/01/21 84 kg (185 lb 3 oz)       Weights (last 7 days)     None        Current Weight  Weight Method: (Needs Admission weight)    Ideal Body Weight (IBW)  Ideal Body Weight (IBW) (kg): 70.89    Usual Body Weight (UBW)  Usual Body Weight: 93 kg (205 lb)  % of Usual Body Weight Assessment: 85-95% - mild deficit  Weight Loss: unintentional  Time Frame: 1 Month(~20# (10%))    Body Mass Index (BMI)  BMI Assessment: BMI 25-29.9: overweight    Labs/Procedures/Meds  Lab Results Reviewed: reviewed, pertinent  Lab Results Comments: 2/5 Na 134L, BUN 15 (WNL)    CMP Results       02/05/21 02/03/21 02/01/21                    0544            135 134         K 4.1 4.0 4.0         Cl 97 102 101         CO2 25 26 27         Glucose 96 106 101         BUN 15 15 14         Creatinine 0.6 0.7 0.7         Calcium 8.8 -- --         Anion Gap 12 -- --         AST 20 -- --         ALT 59 -- --         Albumin 2.8 -- --         " EGFR >60.0 -- --                     Lab Results   Component Value Date    ALT 59 02/05/2021    AST 20 02/05/2021    ALKPHOS 124 02/05/2021    BILITOT 0.7 02/05/2021     No results found for: HMJSIHCF95  Lab Results   Component Value Date    CALCIUM 8.8 (L) 02/05/2021     Lab Results   Component Value Date    WBC 8.15 02/05/2021    HGB 12.0 (L) 02/05/2021    HCT 37.4 (L) 02/05/2021    MCV 95.9 02/05/2021     02/05/2021     No results found for: IRON, TIBC, FERRITIN  Lab Results   Component Value Date    CHOL 269 (H) 02/05/2021     Lab Results   Component Value Date    HDL 30 (L) 02/05/2021     Lab Results   Component Value Date    LDLCALC 185 (H) 02/05/2021     Lab Results   Component Value Date    TRIG 269 (H) 02/05/2021     No results found for: CHOLHDL  Glucose Results     No lab values to display.        • albuterol HFA  2 puff inhalation q6h   • [START ON 2/6/2021] amLODIPine  5 mg oral q12h EBER   • aspirin  81 mg oral Daily   • docusate sodium  100 mg oral BID   • furosemide  20 mg oral Daily (1p)   • gemfibroziL  600 mg oral BID AC   • guaiFENesin  600 mg oral BID   • heparin (porcine)  5,000 Units subcutaneous q8h EBER   • magnesium oxide  400 mg oral BID   • melatonin  3 mg oral Nightly   • metoprolol tartrate  12.5 mg oral BID   • senna  2 tablet oral Daily before lunch     Medications  Pertinent Medications Reviewed: reviewed, pertinent  Pertinent Medications Comments: colace    Estimated/Assessed Needs  Additional Documentation: Calorie Requirements (Group), Fluid Requirements (Group), Protein Requirements (Group)    Calorie Requirements  Estimated kCal Needs: Actual Body Weight  Estimated Calorie Need Method: kcal/kg  Calorie/kg Recommended: 22-25  Calorie Recommendations: 2958-0641    Protein Requirements  Recommended Dosing Weight (Estimated Protein Needs): Actual Body Weight  Est Protein Requirement Amount (gms/kg): (1.2-1.3g/kg)  Protein Recommendations: (101-109)    Fluid Requirements  Fluid  Recommendation (mL): 20-25ml  Recommended Fluid Needs Dosing Weight: Actual Body Weight  Fluid Requirements (mL/day): (5374-0411)  Estimated Fluid Requirement Method: RDA Method    PES  Statement: PES Statement  Nutrition Diagnosis: Unintended Weight Loss  Related To:: Decreased ability to consume sufficient energy  As Evidenced By:: 10% weight loss x 1 month  Nutritional Needs Met?: No                                 Clinical Comments:     Patient is reporting early satiety, states he felt full after eating a bowl of oatmeal today.  He dislikes the nectar liquids, but he knows he needs to drink and requested extra juice on each tray (states he drinks 3 quarts of water/day at home).  May need to monitor fluid intake if upgraded to thin liquids as sodium level was low this am.  He has lost ~ 10# over the past month. He agreed to try vanilla Magic Cups for extra protein/kcals.     Goals:  1. Adequate po intake to meet > 75% of nutritional needs  2. Adequate hydration per BUN  3. Lytes/labs WNL    Discussed with: Patient    Date: 02/05/21  Signature: Marilin Salas RD

## 2021-02-05 NOTE — HOSPITAL COURSE
History of Present Illness  Denis LOVE is a 70 y.o. male admitted on 2/4/2021 with Pneumonia due to COVID-19 virus. Principal problem is No Principal Problem: There is no principal problem currently on the Problem List. Please update the Problem List and refresh..   Pt admitted to outside hospital on 1/5/2021 for COVID-19 PNA. Worsening resp status intubated on 1/9- extubated 1/16 to OptiFlow. Transition to HFNC on 1/23.Eval by nephrology while in ICU due to ERNIE likely secondary to ATN. Creatinine has been improving with excellent urine output. Received Decadron and Remdesivir. Dobhoff tube was placed due to dysphagia. New fever of 102.8 1/20 and 1/21. ID was consulted. Bld cx's negative. CT scan of the chest abdomen pelvis was done with results showing increasing pulmonary infiltrates/groundglass opacities and consolidations in his bilateral lower lobes. Started empirically on cefepime and vancomycin to cover hospital-acquired pneumonia. Pt had increased lethargy on 1/22. Pt removed DHT 1/25; required higher O2 afterward - possible aspiration during the process. 1/26 patient continued to have intermittent confusion; ?post ICU delirium. Neurology is following, MRI brain was unremarkable. EEG showed mild slowing but no seizure activity. D/c'd abx after 5 days d/t lethargy.       Past Medical History  Denis LOVE has a past medical history of Coronary artery disease, Hypertension, and Lipid disorder.

## 2021-02-05 NOTE — CONSULTS
Consult Note    Reason For Referral: Medical comanagements  Referring Provider: Isael Roberto MD   Outside records reviewed.    CC: S/p severe covid-19 PNA complicated by VDRF, dysphagia, ERNIE, bacterial PNA, and  hospital delirium, physical deconditioning with ADL and ambulatory dysfunction.      70 y.o. male, I PTA, with PMH of HTN, HLD, and CAD, admitted to [facility] on 1/5/2021 for severeCOVID-19 PNA. Worsening resp status intubated on 1/9- extubated 1/16 to OptiFlow. Transition to [facility] on 1/23.Eval by nephrology while in ICU due to ERNIE likely secondary to ATN. Creatinine has been improving with excellent urine output. Received Decadron and Remdesivir. Dobhoff tube was placed due to dysphagia. New fever of 102.8 on 1/20 and 1/21. ID was consulted. Bld cx's negative. CT scan of the chest abdomen pelvis was done with results showing increasing pulmonary infiltrates/groundglass opacities and consolidations in his bilateral lower lobes. Started empirically on cefepime and vancomycin to cover hospital-acquired pneumonia. Pt had increased lethargy on 1/22. Pt removed DHT 1/25; required higher O2 afterward - possible aspiration during the process. 1/26 patient continued to have intermittent confusion; ?post ICU delirium. Neurology is following, MRI brain was unremarkable. EEG showed mild slowing but no seizure activity. D/c'd abx after 5 days d/t lethargy. Repeat VFSS performed- now on soft nectar thick liquid diet .  Pt is AA&Ox3 with periods of confusion. Tolerates soft diet with NTL, voiding in urinal, O2 @ 2-3L via nc. Participating in therapies with marked improvement, functionally, he has residual ADL and ambulatory dysfunction, rec's for acute rehab prior to home with SO. Transferred to [facility] on 2/4/2021.     Denies nausea, vomiting, abdominal pain or discomfort, dysuria, cough/sputum, running nose, sore throat, chest pain, palpitation, SOB or orthopnea, dizziness or LH,  HA.    Tolerating Diet: soft nectar thick  liquid diet       Allergies:    Allergies   Allergen Reactions   • Atorvastatin      Other reaction(s): Unknown Reaction       Current medication list:  Current Facility-Administered Medications   Medication Dose Route Frequency Provider Last Rate Last Admin   • acetaminophen (TYLENOL) tablet 650 mg  650 mg oral q4h PRN Omari Spain MD   650 mg at 02/04/21 2223   • albuterol HFA (VENTOLIN HFA) 90 mcg/actuation inhaler 2 puff  2 puff inhalation q6h PRN Omari Spain MD       • albuterol HFA (VENTOLIN HFA) 90 mcg/actuation inhaler 2 puff  2 puff inhalation q6h Laure Bishop MD       • alum-mag hydroxide-simeth (MAALOX) 200-200-20 mg/5 mL suspension 30 mL  30 mL oral q6h PRN Laure Bishop MD       • [START ON 2/6/2021] amLODIPine (NORVASC) tablet 5 mg  5 mg oral q12h [facility] Laure Bishop MD       • aspirin enteric coated tablet 81 mg  81 mg oral Daily Omari Spain MD   81 mg at 02/05/21 0902   • docusate sodium (COLACE) capsule 100 mg  100 mg oral BID Omari Spain MD   100 mg at 02/05/21 0902   • gemfibroziL (LOPID) tablet 600 mg  600 mg oral BID AC Omari Spain MD   600 mg at 02/05/21 0901   • guaiFENesin (MUCINEX) 12 hr ER tablet 600 mg  600 mg oral BID Laure Bishop MD       • heparin (porcine) 5,000 unit/mL injection 5,000 Units  5,000 Units subcutaneous q8h [facility] Omari Spain MD   5,000 Units at 02/05/21 0545   • magnesium hydroxide (M.O.M.) 400 mg/5 mL suspension 30 mL  30 mL oral 2x daily PRN Laure Bishop MD       • magnesium oxide (MAG-OX) tablet 400 mg  400 mg oral BID Laure Bishop MD       • melatonin ODT 3 mg  3 mg oral Nightly Laure Bishop MD       • metoprolol tartrate (LOPRESSOR) split tablet 12.5 mg  12.5 mg oral BID Omari Spain MD   12.5 mg at 02/05/21 0902   • polyethylene glycol (MIRALAX) 17 gram packet 17 g  17 g oral Daily PRN Omari Spain MD   17 g at 02/04/21 5103   • senna (SENOKOT) tablet 2 tablet  2 tablet oral Daily before lunch Omari Spain MD           Past  "Medical History:   Past Medical History:   Diagnosis Date   • Coronary artery disease    • Hypertension    • Lipid disorder        Past Surgical History:   No past surgical history on file.    Social History:   Social History     Socioeconomic History   • Marital status:      Spouse name: Not on file   • Number of children: Not on file   • Years of education: Not on file   • Highest education level: Not on file   Occupational History   • Not on file   Social Needs   • Financial resource strain: Not on file   • Food insecurity     Worry: Not on file     Inability: Not on file   • Transportation needs     Medical: Not on file     Non-medical: Not on file   Tobacco Use   • Smoking status: Not on file   Substance and Sexual Activity   • Alcohol use: Not on file   • Drug use: Not on file   • Sexual activity: Not on file   Lifestyle   • Physical activity     Days per week: Not on file     Minutes per session: Not on file   • Stress: Not on file   Relationships   • Social connections     Talks on phone: Not on file     Gets together: Not on file     Attends Holiness service: Not on file     Active member of club or organization: Not on file     Attends meetings of clubs or organizations: Not on file     Relationship status: Not on file   • Intimate partner violence     Fear of current or ex partner: Not on file     Emotionally abused: Not on file     Physically abused: Not on file     Forced sexual activity: Not on file   Other Topics Concern   • Not on file   Social History Narrative   • Not on file       Family History:   Reviewed, not contributory to his current HPI.    ROS  Complete Review of Systems:  All other systems reviewed and not remarkable except as noted in the HPI.    Vital signs:  Visit Vitals  BP (!) 143/69 (BP Location: Left upper arm, Patient Position: Lying)   Pulse 81   Temp 36.6 °C (97.9 °F) (Oral)   Resp 18   Ht 1.727 m (5' 8\")   SpO2 92% Comment: was 86% post stand, slow recovery to 92% "   BMI 28.16 kg/m²       Physical Examination:  Head/Ear/Nose/Throat: normocephalic; atraumatic; moisture mouth mm, no oropharyngeal thrush noted.   Eyes: anicteric sclera, EOMI; PERRL.   Neck : supple, no JVD, no carotid bruits appeciated.   Respiratory: no evidence of labored breathing, lung sounds a little coarse, mild bibasilar diminished BS, no remarkable w/r/c.   Cardiovascular: RRR; normal S1, S2; no m/r/g; no S3 or S4.   Gastrointestinal: soft; NT; BS normal; mildly distended; no CVAT b/l.   Genitourinary: no lim.   Extremities : no c/c/e .   Neurological: AO x 3, fluent speeches, following commands, CNS II-XII grossly intact; no focal neurologic deficits.   Behavior/Emotional: in NAD, appropriate; cooperative.   Skin: clean, dry and intact.    Labs:  Lab Results   Component Value Date    WBC 8.15 02/05/2021    HGB 12.0 (L) 02/05/2021    HCT 37.4 (L) 02/05/2021    MCV 95.9 02/05/2021     02/05/2021     Lab Results   Component Value Date    GLUCOSE 96 02/05/2021    CALCIUM 8.8 (L) 02/05/2021     (L) 02/05/2021    K 4.1 02/05/2021    CO2 25 02/05/2021    CL 97 (L) 02/05/2021    BUN 15 02/05/2021    CREATININE 0.6 (L) 02/05/2021       Imaging  OSH imaging study reports reviewed      Assessment    CC: S/p severe covid-19 PNA complicated by VDRF, dysphagia, ERNIE, bacterial PNA, and  hospital delirium, physical deconditioning with ADL and ambulatory dysfunction.      70 y.o. male, I PTA, with PMH of HTN, HLD, and CAD, admitted to [facility] on 1/5/2021 for severeCOVID-19 PNA. Worsening resp status intubated on 1/9- extubated 1/16 to OptiFlow. Transition to [facility] on 1/23.Eval by nephrology while in ICU due to ERNIE likely secondary to ATN. Creatinine has been improving with excellent urine output. Received Decadron and Remdesivir. Dobhoff tube was placed due to dysphagia. New fever of 102.8 on 1/20 and 1/21. ID was consulted. Bld cx's negative. CT scan of the chest abdomen pelvis was done with results showing  increasing pulmonary infiltrates/groundglass opacities and consolidations in his bilateral lower lobes. Started empirically on cefepime and vancomycin to cover hospital-acquired pneumonia. Pt had increased lethargy on 1/22. Pt removed DHT 1/25; required higher O2 afterward - possible aspiration during the process. 1/26 patient continued to have intermittent confusion; ?post ICU delirium. Neurology is following, MRI brain was unremarkable. EEG showed mild slowing but no seizure activity. D/c'd abx after 5 days d/t lethargy. Repeat VFSS performed- now on soft nectar thick liquid diet .  Pt is AA&Ox3 with periods of confusion. Tolerates soft diet with NTL, voiding in urinal, O2 @ 2-3L via nc. Participating in therapies with marked improvement, functionally, he has residual ADL and ambulatory dysfunction, rec's for acute rehab prior to home with SO. Transferred to [facility] on 2/4/2021.     1.S/p severe covid-19 PNA complicated by VDRF, dysphagia, ENRIE, bacterial PNA, and  hospital delirium, physical deconditioning with ADL and ambulatory dysfunction : inpt acute rehab, gait and balancing training, SLP therapy, nutrition support,  Fall/aspiration precaution, medical management, DVT prophylaxis, dermal defense, f/u with PCP after inpt rehab.    2. Pulm-S/p severe covid-19 PNA complicated by VDRF, s/p possible bacteria PNA,  hypoxemia with mild exertion, atelectasis: monitor SO2, prn NC O2, keep SO2>92%, incentive spirometry, bronchodilator inhaler, mucolytic agent, pulm toileting. Check CXR.     3. Psych-s/p hospital acute delirium, intermittent confusion: his metal status has much improved, monitor his mood and mentation, help his orientation, psychology CBT, avoid unnecessary sedatives, SW support.     4. GI-on soft nectar thick liquid diet, constipation:SLP evaluation to advance his diet as tolerated; provide constipation bowel regimen, keep adequate hydration, timed toilet visits.    5. DVT prophy: SCD, early ambulation, SQ  heparin, check LE venous DUS to r/o DVT.      6. HTN, dyslipidemia: cont home HTN meds with holding parameter, orthostatic hypotension precaution, monitor BP . On gemfibrozil, f/u with his PCP.     7. Renal-s/p ERNIE recovered, electrolytes imbalance: monitor renal function and volume status, avoid nephrotoxic agents and low BP,  monitor and keep electrolytes balance.    8. - urinary retention: timed voiding trial, monitor PVR with prn cic, check UA/UCX.    Billing code: [id number]  Diagnoses:  Patient Active Problem List   Diagnosis   • Acute metabolic encephalopathy   • ARDS (adult respiratory distress syndrome) (CMS/HCC)   • Coronary artery disease involving native coronary artery of native heart without angina pectoris   • Hypoxia   • Pneumonia due to COVID-19 virus   • Transaminitis   • Essential hypertension   • Dyslipidemia   • Atelectasis           Laure Bishop MD  2/5/2021

## 2021-02-05 NOTE — PLAN OF CARE
Problem: Rehabilitation (IRF) Plan of Care  Goal: Plan of Care Review  Outcome: Progressing  Flowsheets (Taken 2/5/2021 0504)  Plan of Care Reviewed With: patient  IRF Plan of Care Review: progress ongoing, continue  Outcome Summary:   Assumed patient care at 2300,denies pain   on 3 liters of O2   appeared to sleep most of the night   patient safety and prevention maintained   call bell within reach   Plan of Care Review  IRF Plan of Care Review: progress ongoing, continue  Plan of Care Reviewed With: patient  Outcome Summary: Assumed patient care at 2300,denies pain; on 3 liters of O2; appeared to sleep most of the night; patient safety and prevention maintained; call bell within reach

## 2021-02-05 NOTE — PLAN OF CARE
Problem: Rehabilitation (IRF) Plan of Care  Goal: Plan of Care Review  Flowsheets (Taken 2/5/2021 6171)  Plan of Care Reviewed With: patient  IRF Plan of Care Review: progress ongoing, continue  Outcome Summary: Clinical Swallow evaluation completed, pt will benefit from skilled ST to safely advance diet and further assess cognitive-communication

## 2021-02-06 ENCOUNTER — APPOINTMENT (INPATIENT)
Dept: OCCUPATIONAL THERAPY | Facility: REHABILITATION | Age: 71
DRG: 948 | End: 2021-02-06
Payer: COMMERCIAL

## 2021-02-06 ENCOUNTER — APPOINTMENT (INPATIENT)
Dept: PHYSICAL THERAPY | Facility: REHABILITATION | Age: 71
DRG: 948 | End: 2021-02-06
Payer: COMMERCIAL

## 2021-02-06 LAB
BACTERIA UR CULT: NORMAL
GLUCOSE BLD-MCNC: 102 MG/DL (ref 70–99)
GLUCOSE BLD-MCNC: 115 MG/DL (ref 70–99)
POCT TEST: ABNORMAL
POCT TEST: ABNORMAL

## 2021-02-06 PROCEDURE — 63700000 HC SELF-ADMINISTRABLE DRUG: Performed by: INTERNAL MEDICINE

## 2021-02-06 PROCEDURE — 97112 NEUROMUSCULAR REEDUCATION: CPT | Mod: GP

## 2021-02-06 PROCEDURE — 63700000 HC SELF-ADMINISTRABLE DRUG: Performed by: PHYSICAL MEDICINE & REHABILITATION

## 2021-02-06 PROCEDURE — 12800000 HC ROOM AND CARE SEMIPRIVATE REHAB

## 2021-02-06 PROCEDURE — 92523 SPEECH SOUND LANG COMPREHEN: CPT | Mod: GN

## 2021-02-06 PROCEDURE — 97116 GAIT TRAINING THERAPY: CPT | Mod: GP

## 2021-02-06 PROCEDURE — 63600000 HC DRUGS/DETAIL CODE: Performed by: PHYSICAL MEDICINE & REHABILITATION

## 2021-02-06 PROCEDURE — 97530 THERAPEUTIC ACTIVITIES: CPT | Mod: GP

## 2021-02-06 PROCEDURE — 97530 THERAPEUTIC ACTIVITIES: CPT | Mod: GO

## 2021-02-06 PROCEDURE — 97110 THERAPEUTIC EXERCISES: CPT | Mod: GP

## 2021-02-06 RX ADMIN — METOPROLOL TARTRATE 12.5 MG: 25 TABLET, FILM COATED ORAL at 20:01

## 2021-02-06 RX ADMIN — GEMFIBROZIL 600 MG: 600 TABLET ORAL at 07:35

## 2021-02-06 RX ADMIN — DOCUSATE SODIUM 100 MG: 100 CAPSULE, LIQUID FILLED ORAL at 20:01

## 2021-02-06 RX ADMIN — GUAIFENESIN 600 MG: 600 TABLET ORAL at 20:02

## 2021-02-06 RX ADMIN — ACETAMINOPHEN 650 MG: 325 TABLET, FILM COATED ORAL at 09:14

## 2021-02-06 RX ADMIN — ASPIRIN 81 MG: 81 TABLET ORAL at 09:11

## 2021-02-06 RX ADMIN — ALBUTEROL SULFATE 2 PUFF: 90 AEROSOL, METERED RESPIRATORY (INHALATION) at 17:40

## 2021-02-06 RX ADMIN — HEPARIN SODIUM 5000 UNITS: 5000 INJECTION INTRAVENOUS; SUBCUTANEOUS at 22:21

## 2021-02-06 RX ADMIN — AMLODIPINE BESYLATE 5 MG: 5 TABLET ORAL at 09:10

## 2021-02-06 RX ADMIN — FUROSEMIDE 20 MG: 20 TABLET ORAL at 12:41

## 2021-02-06 RX ADMIN — HEPARIN SODIUM 5000 UNITS: 5000 INJECTION INTRAVENOUS; SUBCUTANEOUS at 14:58

## 2021-02-06 RX ADMIN — HEPARIN SODIUM 5000 UNITS: 5000 INJECTION INTRAVENOUS; SUBCUTANEOUS at 07:35

## 2021-02-06 RX ADMIN — ALBUTEROL SULFATE 2 PUFF: 90 AEROSOL, METERED RESPIRATORY (INHALATION) at 12:41

## 2021-02-06 RX ADMIN — AMLODIPINE BESYLATE 5 MG: 5 TABLET ORAL at 20:02

## 2021-02-06 RX ADMIN — ACETAMINOPHEN 650 MG: 325 TABLET, FILM COATED ORAL at 20:01

## 2021-02-06 RX ADMIN — ALBUTEROL SULFATE 2 PUFF: 90 AEROSOL, METERED RESPIRATORY (INHALATION) at 07:35

## 2021-02-06 RX ADMIN — GUAIFENESIN 600 MG: 600 TABLET ORAL at 09:11

## 2021-02-06 RX ADMIN — DOCUSATE SODIUM 100 MG: 100 CAPSULE, LIQUID FILLED ORAL at 17:46

## 2021-02-06 RX ADMIN — Medication 400 MG: at 09:10

## 2021-02-06 RX ADMIN — METOPROLOL TARTRATE 12.5 MG: 25 TABLET, FILM COATED ORAL at 09:10

## 2021-02-06 RX ADMIN — GEMFIBROZIL 600 MG: 600 TABLET ORAL at 17:47

## 2021-02-06 RX ADMIN — Medication 3 MG: at 20:02

## 2021-02-06 RX ADMIN — DOCUSATE SODIUM 100 MG: 100 CAPSULE, LIQUID FILLED ORAL at 09:11

## 2021-02-06 RX ADMIN — SENNOSIDES 3 TABLET: 8.6 TABLET, FILM COATED ORAL at 12:41

## 2021-02-06 RX ADMIN — Medication 400 MG: at 20:02

## 2021-02-06 ASSESSMENT — COGNITIVE AND FUNCTIONAL STATUS - GENERAL: AFFECT: WFL

## 2021-02-06 NOTE — PROGRESS NOTES
Patient: Denis Green  Location: [facility] [address]  MRN: [id number]  Today's date: 2/6/2021    History of Present Illness  Denis LOVE is a 70 y.o. male admitted on 2/4/2021 with Pneumonia due to COVID-19 virus. Principal problem is No Principal Problem: There is no principal problem currently on the Problem List. Please update the Problem List and refresh..   Pt admitted to outside hospital on 1/5/2021 for COVID-19 PNA. Worsening resp status intubated on 1/9- extubated 1/16 to OptiFlow. Transition to [facility] on 1/23.Eval by nephrology while in ICU due to ERNIE likely secondary to ATN. Creatinine has been improving with excellent urine output. Received Decadron and Remdesivir. Dobhoff tube was placed due to dysphagia. New fever of 102.8 1/20 and 1/21. ID was consulted. Bld cx's negative. CT scan of the chest abdomen pelvis was done with results showing increasing pulmonary infiltrates/groundglass opacities and consolidations in his bilateral lower lobes. Started empirically on cefepime and vancomycin to cover hospital-acquired pneumonia. Pt had increased lethargy on 1/22. Pt removed DHT 1/25; required higher O2 afterward - possible aspiration during the process. 1/26 patient continued to have intermittent confusion; ?post ICU delirium. Neurology is following, MRI brain was unremarkable. EEG showed mild slowing but no seizure activity. D/c'd abx after 5 days d/t lethargy.       Past Medical History  Denis LOVE has a past medical history of Coronary artery disease, Hypertension, and Lipid disorder.    OT Vitals    Date/Time Pulse HR Source Resp SpO2 Pt Activity O2 Therapy O2 Del Method O2 Flow Rate BP BP Location BP Method Pt Position [facility]   02/06/21 1008 78 Monitor -- 94 % At rest Supplemental oxygen Nasal cannula 3 L/min 116/74 Right upper arm Automatic Sitting SM      OT Pain    Date/Time Pain Type Pref Pain Scale Side Location Rating: Rest Rating: Rest [facility]   02/06/21 1008 Pain Assessment word  "(verbal rating pain scale) Left wrist -- 2 - mild pain SM          Prior Living Environment      Most Recent Value   Lives With  spouse, child(mindy), adult [adult son lives in home occasionally]   Living Arrangements  house   Home Accessibility  stairs to enter home (Group), stairs within home (Group)   Living Environment Comment  Lives with wife and intermittently with one son.  [bed and bath on 2nd ,  no powder room on first]   Number of Stairs, Main Entrance  1   Stair Railings, Main Entrance  none   Location, Patient Bedroom  second floor, must negotiate stairs to access   Location, Bathroom  second floor, must negotiate stairs to access   Bathroom Access Comment  Pt. reports has tub shower and stall shower, uses tub shower primarily, has grab bar in shower, has stanard height toilet c wall on both sides   Number of Stairs, Within Home, Primary  12   Surface of Stairs, Within Home, Primary  hardwood   Stair Railings, Within Home, Primary  railings on both sides of stairs          Prior Level of Function      Most Recent Value   Dominant Hand  right   Ambulation  independent   Transferring  independent   Toileting  independent   Bathing  independent   Dressing  independent   Eating  independent   Communication  understands/communicates without difficulty   Swallowing  swallows foods/liquids without difficulty   Baseline Diet/Method of Nutritional Intake  regular solids, thin liquids   Past History of Dysphagia  No previous reports    Prior Level of Function Comment  Pt is a cattle/,  previously IND for all IADLs.    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         Strength (Manual Muscle Testing)    Strength (Manual Muscle Testing)  left upper extremity strength deficit;right upper extremity strength deficit     Left Upper Extremity Strength  shoulder     Shoulder, Left (Strength)  flex/ext 4-/5, abd/add 3+/5     Right Upper Extremity Strength  shoulder     Shoulder, Right (Strength)  flex/ext 4-/5, abd/add 4-/5        Hand  Strength Testing    Left Hand, Setting 2  10#     Comment, Left Hand  10#, multiple trials limited by L wrist pain     Right Hand, Setting 2  22#, 21#, 20#     Comment, Right Hand  21#     Left Hand: Tip (Pincer) Pinch Strength  7#     Left Hand: Lateral (Key) Pinch Strength  8#     Left Hand: Three Point (Chandan) Pinch Strength  7#     Comment, Left Hand  Limited by pain in L dorsal wrist.     Right Hand: Tip (Pincer) Pinch Strength  6#, 8#, 9#     Right Hand: Lateral (Key) Pinch Strength  10#, 10#, 10#     Right Hand: Three Point (Chandan) Pinch Strength  8#, 8#, 8#     Comment, Right Hand  Multiple trials in L hand limited by pain in wrist.         Strength Comprehensive (MMT)    Comprehensive MMT Assessment  hand strength deficit     Hand Strength Assessment  hand strength (manual muscle testing);hand  strength        Toilet Transfer    Transfer Technique  stand pivot     [place], Toilet Transfer  dependent (less than 25% patient effort);2 person assist;verbal cues     Verbal Cues  hand placement;preparatory posture;safety;technique     Assistive Device  commode, 3-in-1;grab bars/safety frame     Comment  A x 2 for safety. Trial transfer attempt, no toileting needs present. SPT Max A anterior x 1, touching/steadying  posteriorly x 1.         Balance    Comment, Balance  Pt seated on commode over toilet for trial toilet transfer c Cl S for safety and BUE for support on safety frame.         Motor Skills    Results, 9 Hole Peg Test of Fine Motor Coordination  Nine Hole Peg Test: RUE, 39.87 seconds; LUE, 34.36 seconds. Box and Blocks (1 min): RUE 28, LUE 29     Functional Endurance  --        Daily Progress Summary (OT)    Daily Outcome Statement (OT)  Pt participated in session c focus of functional outcome measures, MMT/strength testing and toilet transfer. Pt c strength deficits noted in BUE, L shoulder deficits>R shoulder, R hand deficits>L hand. Pt required Max A x1 anterior, and touching/steadying A x 1 posterior during trial toilet transfer for safety considerations. Recommend continued OT per POC.                            IRF OT Goals      Most Recent Value   Transfer Goal 1   Activity/Assistive Device  toilet at 02/05/2021 0726   Time Frame  short-term goal (STG), 5 - 7 days at 02/05/2021 0726   Strategies/Barriers  TBA at 02/05/2021 0726   Transfer Goal 2   Activity/Assistive Device  toilet at 02/05/2021 0726   Time Frame  long-term goal (LTG), 3 weeks at 02/05/2021 0726   Transfer Goal 3   Activity/Assistive Device  shower at 02/05/2021 0726   Time Frame  short-term goal (STG), 5 - 7 days at 02/05/2021 0726   Strategies/Barriers  TBA at 02/05/2021 0726   Transfer Goal 4   Activity/Assistive Device  shower at 02/05/2021 0726   Time Frame  long-term goal (LTG), 3 weeks at 02/05/2021 0726   Strategies/Barriers  TBA at 02/05/2021 0726   Bathing Goal 1   Activity/Assistive Device  bathing skills, all at 02/05/2021 0726   [place]  minimum assist (75% or more patient effort) at 02/05/2021 0726   Time Frame  short-term goal (STG), 5 - 7 days at 02/05/2021 0726   Bathing Goal 2   Activity/Assistive Device  bathing skills, all at 02/05/2021 0726   [place]  supervision required at 02/05/2021 0726   Time Frame  long-term  goal (LTG), 3 weeks at 02/05/2021 0726   Strategies/Barriers  DME TBD at 02/05/2021 0726   UB Dressing Goal 1   Activity/Assistive Device  upper body dressing at 02/05/2021 0726   [place]  supervision required [CL S] at 02/05/2021 0726   Time Frame  short-term goal (STG), 5 - 7 days at 02/05/2021 0726   UB Dressing Goal 2   Activity/Assistive Device  upper body dressing at 02/05/2021 0726   [place]  modified independence at 02/05/2021 0726   Time Frame  long-term goal (LTG), 3 weeks at 02/05/2021 0726   LB Dressing Goal 1   Activity/Assistive Device  lower body dressing at 02/05/2021 0726   [place]  maximum assist (25-49% patient effort) at 02/05/2021 0726   Time Frame  short-term goal (STG), 5 - 7 days at 02/05/2021 0726   LB Dressing Goal 2   Activity/Assistive Device  lower body dressing at 02/05/2021 0726   [place]  supervision required at 02/05/2021 0726   Time Frame  long-term goal (LTG), 3 weeks at 02/05/2021 0726   Grooming Goal 1   Activity/Assistive Device  grooming skills, all at 02/05/2021 0726   [place]  supervision required at 02/05/2021 0726   Time Frame  short-term goal (STG), 5 - 7 days at 02/05/2021 0726   Grooming Goal 2   Activity/Assistive Device  grooming skills, all at 02/05/2021 0726   [place]  modified independence at 02/05/2021 0726   Time Frame  long-term goal (LTG), 3 weeks at 02/05/2021 0726   Toileting Goal 1   Activity/Assistive Device  toileting skills, all at 02/05/2021 0726   Time Frame  short-term goal (STG), 5 - 7 days at 02/05/2021 0726   Strategies/Barriers  TBA at 02/05/2021 0726   Toileting Goal 2   Activity/Assistive Device  toileting skills, all at 02/05/2021 0726   Time Frame  long-term goal (LTG), 3 weeks at 02/05/2021 0726   Strategies/Barriers  TBA at 02/05/2021 0726

## 2021-02-06 NOTE — PROGRESS NOTES
Patient: Denis Green  Location: [facility] [address]  MRN: [id number]  Today's date: 2/6/2021    History of Present Illness  Denis LOVE is a 70 y.o. male admitted on 2/4/2021 with Pneumonia due to COVID-19 virus. Principal problem is No Principal Problem: There is no principal problem currently on the Problem List. Please update the Problem List and refresh..   Pt admitted to outside hospital on 1/5/2021 for COVID-19 PNA. Worsening resp status intubated on 1/9- extubated 1/16 to OptiFlow. Transition to [facility]NC on 1/23.Eval by nephrology while in ICU due to ERNIE likely secondary to ATN. Creatinine has been improving with excellent urine output. Received Decadron and Remdesivir. Dobhoff tube was placed due to dysphagia. New fever of 102.8 1/20 and 1/21. ID was consulted. Bld cx's negative. CT scan of the chest abdomen pelvis was done with results showing increasing pulmonary infiltrates/groundglass opacities and consolidations in his bilateral lower lobes. Started empirically on cefepime and vancomycin to cover hospital-acquired pneumonia. Pt had increased lethargy on 1/22. Pt removed DHT 1/25; required higher O2 afterward - possible aspiration during the process. 1/26 patient continued to have intermittent confusion; ?post ICU delirium. Neurology is following, MRI brain was unremarkable. EEG showed mild slowing but no seizure activity. D/c'd abx after 5 days d/t lethargy.       Past Medical History  Denis LOVE has a past medical history of Coronary artery disease, Hypertension, and Lipid disorder.    SLP Pain    Date/Time Pain Type Pref Pain Scale Acc Pain Level [facility]   02/06/21 1405 Pain Assessment number (Numeric Rating Pain Scale) 4 PKB   02/06/21 1455 Pain Reassessment number (Numeric Rating Pain Scale) 4 PKB          Prior Living Environment      Most Recent Value   Lives With  spouse, child(mindy), adult [adult son lives in home occasionally]   Living Arrangements  house   Home Accessibility   stairs to enter home (Group), stairs within home (Group)   Living Environment Comment  Lives with wife and intermittently with one son.  [bed and bath on 2nd ,  no powder room on first]   Number of Stairs, Main Entrance  1   Stair Railings, Main Entrance  none   Location, Patient Bedroom  second floor, must negotiate stairs to access   Location, Bathroom  second floor, must negotiate stairs to access   Bathroom Access Comment  Pt. reports has tub shower and stall shower, uses tub shower primarily, has grab bar in shower, has stanard height toilet c wall on both sides   Number of Stairs, Within Home, Primary  12   Surface of Stairs, Within Home, Primary  hardwood   Stair Railings, Within Home, Primary  railings on both sides of stairs          Prior Level of Function      Most Recent Value   Dominant Hand  right   Ambulation  independent   Transferring  independent   Toileting  independent   Bathing  independent   Dressing  independent   Eating  independent   Communication  understands/communicates without difficulty   Swallowing  swallows foods/liquids without difficulty   Baseline Diet/Method of Nutritional Intake  regular solids, thin liquids   Past History of Dysphagia  No previous reports    Prior Level of Function Comment  Pt is a cattle/,  previously IND for all IADLs.    Assistive Device/Animal Currently Used at Home  none          IRF SLP Evaluation and Treatment - 02/06/21 1405        Time Calculation    Start Time  1400     Stop Time  1500     Time Calculation (min)  60 min        Session Details    Document Type  initial evaluation     Mode of Treatment  individual therapy;speech language pathology        General Information    Patient Profile Reviewed?  yes     General Observations of Patient  Pt received in his room, laying down in his bed. Pt raised his bed to an upright position. Pleasant/cooperative.        Cognition/Psychosocial    Affect/Mental Status (Cognitive)  WFL     Cognitive  Function (Cognitive)  attention deficit;executive function deficit;memory deficit     Attention Deficit (Cognitive)  minimal deficit;distractible in noisy environment;focused/sustained attention     Comment, Attention  Pt mildly distracted by noise in the hallway and his tv.     Executive Function Deficit (Cognition)  moderate deficit;abstract thinking;organization/sequencing;problem solving/reasoning     Comment, Executive Function  Moderate impairment of abstract verbal reasoning for category exclusion c 2/4 correct, word deduction c 2/4 correct and convergent reasoning c 4/5 correct. Fx. calculations: 2/8 correct. Pt was able to locate and demonstrate use of his call bell; he correctly named 3 reasons for its' use. He appeared aware of his safety limitations.     Memory Deficit (Cognitive)  short-term memory;recall, recent events;working memory     Comment, Short Term Memory  Pt was able to recall 0/3 words after a 5 min delay. He had difficulty recalling recent events. Digit span forward was correct up to 5 numbers and digit span backwards was correct up to 3 numbers.        Orientation Log    City  1-->multiple choice, phonemic cuing     Kind of Place  3-->spontaneous/free recall     Name of Hospital  3-->spontaneous/free recall     Month  3-->spontaneous/free recall     Date  1-->multiple choice, phonemic cuing     Year  3-->spontaneous/free recall     Day of Week  3-->spontaneous/free recall     Clock Time  3-->spontaneous/free recall     Etiology/Event  3-->spontaneous/free recall     Pathology Deficits  3-->spontaneous/free recall     Total Score  26        Hearing Assessment    Hearing Status  WFL        Vision Assessment/Intervention    Visual Impairment/Limitations  corrective lenses full time        Motor Speech    Speech Intelligibility (Motor Speech)  WNL     Articulation (Motor Speech)  WNL     Speech Fluency (Motor Speech)  WNL     Vocal Loudness (Motor Speech)  WNL     Breath Support (Motor Speech)  " intact     Rate/Prosody (Motor Speech)  WNL     Resonance (Motor Speech)  WNL     Comment, Motor Speech Assessment  WNL        Auditory Comprehension    Follows Commands (Auditory Comprehension)  WNL;3-step commands;2-step commands;1-step command     3 Step, Follows Commands (Auditory Comprehension)  intact     2 Step, Follows Commands (Auditory Comprehension)  intact     1 Step, Follows Commands (Auditory Comprehension)  intact     Yes/No Questions (Auditory Comprehension)  WNL;complex questions;simple/factual questions;biographical/personal questions     Complex Questions (Auditory Comprehension)  intact     Simple/Factual Questions (Auditory Comprehension)  intact     Biographical/Personal Questions (Auditory Comprehension)  intact     Paragraph Comprehension (Auditory Comprehension)  impaired    4/5 for 3 sent.P; 3/5 for 4 sentence paragraph.    Comment, Assessment (Auditory Comprehension)  Mild impairment of A/C for short paragraph comprehension probably affected by impaired memory.        Verbal Expression    Automatic Speech (Verbal Expression)  WNL     Confrontational Naming (Verbal Expression)  WNL     Responsive Naming (Verbal Expression)  WNL     Word Finding Skills (Verbal Expression)  generative naming     Generative Naming, Word Finding (Verbal Expression)  impaired;other (see comments)    Pt named 14/15 animals/min;10/15 fruits/min;6 \"m:\"words/min.    Repetition Skills (Verbal Expression)  WNL     Narrative Speech (Verbal Expression)  WNL     Conversational Speech (Verbal Expression)  WNL     Comment, Assesment (Verbal Expression)  Mild-mod impairment of verbal expression for divergent naming. Further assessment of WF should be performed.        Pragmatic Language    Nonverbal Skills (Pragmatic Language)  WFL     Verbal Skills (Pragmatic Language)  WNL     Comment, Assessment (Pragmatic Language)  WNL        Reading Comprehension    Comprehension Level (Reading Comprehension)  sentence level tasks  "    Sentence Level, Comprehension Level (Reading Comprehension)  impaired;minimal impairment;other (see comments)    Following conditional written directions: 8/10 correct.    Comment, Assessment (Reading Comprehension)  Further assessment of R/C to be performed including [employer] and paragraph level R/C.        Written Language    Comment, Assessment (Written Language)  TBA        Functional Communication Measures    FCM: Memory  3-->Level 3     FCM: Problem Solving  4-->Level 4     FCM: Spoken Language, Comprehension  6-->Level 6        Patient/Family Goals (SLP)    Patient's Goals For Discharge  return to all previous roles/activities        Therapy Assessment/Plan (SLP)    SLP Diagnosis  Mild-mod cognitive communication impairment.     Speech Pathology Potential/Prognosis  good, to achieve stated therapy goals     Therapy Frequency (SLP)  5-7 times per week;30 minutes per day     Estimated Duration of Therapy/Length of Stay (SLP)  3 weeks     Problem List (SLP)  Moderate WF impairmenet, impaired STM/WM, problem solving, reasoning, abstract thinking.     Planned Therapy Interventions (SLP)  Mod level VE tasks (divergent, convergent naming, add to a category, sentence completion, recall of novel information and WM tasks.     Comment, Therapy Assessment/Plan (SLP)  Evaluation initiated and POC established. Complete assessment of R/C for [employer] and paragraph level reading, written language expression. CLQT assessment.                       IRF SLP Goals      Most Recent Value   Verbal Expression Goal 1   Verbal Expression Goal 1  STG: pt will complete mod level verbal expression tasks x 90% c min cues. at 02/06/2021 1405   Time Frame  short-term goal (STG), 1 week at 02/06/2021 1405   Verbal Expression Goal 2   Verbal Expression Goal 2  LTG: pt will complete complex/abstract verbal expression tasks x 90% c independent use of WF strategies. at 02/06/2021 1405   Time Frame  long-term goal (LTG),  3 weeks at 02/06/2021 1405   Oral Nutrition/Hydration Goal 1   Activity  effective/safe/independent, use of swallowing techniques [regular/NTL, no overt s/s of aspiration ] at 02/05/2021 1202   Time Frame  short-term goal (STG), 1 week at 02/05/2021 1202   Oral Nutrition/Hydration Goal 2   Activity  effective/safe/independent, use of swallowing techniques [regular/thin, no overt s/s of aspiration ] at 02/05/2021 1202   Time Frame  long-term goal (LTG), 3 weeks at 02/05/2021 1202   Executive Function Goal 1   Activity  complete, abstract thinking tasks, executive function tasks, organization/sequencing tasks, problem solving/reasoning tasks, other (see comments) [STG,  simple-mod level tasks.] at 02/06/2021 1405   Nobles/Accuracy  with minimum, verbal cues/redirection, with additional processing time, with repetition of directions, with 90% accuracy at 02/06/2021 1405   Time Frame  1 week at 02/06/2021 1405   Executive Function Goal 2   Activity  complete, abstract thinking tasks, exhibit impulse control, organization/sequencing tasks, problem solving/reasoning tasks, other (see comments) [LTG] at 02/06/2021 1405   Nobles/Accuracy  independently, with 90% accuracy, other (see comments) [mod-complex tasks.] at 02/06/2021 1405   Time Frame  3 weeks at 02/06/2021 1405   Memory Goal 1   Activity  short-term memory tasks, immediate recall tasks, working memory tasks, recall recent events, other (see comments) [STG,  simple-mod level tasks.] at 02/06/2021 1405   Nobles/Accuracy  minimal cues for use of strategies, with 90% accuracy at 02/06/2021 1405   Time Frame  short-term goal (STG), 1 week at 02/06/2021 1405   Memory Goal 2   Activity  short-term memory tasks, immediate recall tasks, working memory tasks, recall recent events, other (see comments) [LTG,  mod complex information.] at 02/06/2021 1405   Nobles/Accuracy  independent use of environmental cues/strategies, with 90% accuracy at  02/06/2021 1400   Time Frame  long-term goal (LTG), 3 weeks at 02/06/2021 1401

## 2021-02-06 NOTE — PROGRESS NOTES
Subjective    Patient seen and examined on rounds.  Chart reviewed.  Events overnight noted.  History reviewed briefly with patient.    CC:  Deficits in mobility, transfers, self-care status post deconditioning, severe Covid 19 pneumonia, ventilator-dependent respiratory failure, dysphagia, multiple medical problems.    HPI:  Mr. Denis Green is a 70-year-old right-handed white male with chronic conditions significant for coronary artery disease, hypertension, hyperlipidemia who was admitted to [facility] on 1/5/21 due to Covid 19 pneumonia and worsening respiratory status.  He required intubation on 1/9/21 and had ventilator-dependent respiratory failure.  He was treated with Decadron and Remdesivir.  He had dysphagia requiring Dobbhoff tube feeds.  He was extubated on 1/16/21 to OptiFlow.  On 1/23/21 he was transitioned to high flow nasal cannula.  Hospital course was significant for acute renal insufficiency likely secondary to acute tubular necrosis and he was followed by nephrology.  He had subsequent improvement in renal function.  He also had new fever of 102.8°F on 1/20/21 and 1/21/21.  He was followed by infectious disease.  Blood cultures were negative. CT scan of the chest abdomen pelvis was done with results showing increasing pulmonary infiltrates/groundglass opacities and consolidations in his bilateral lower lobes.  He was treated empirically with Cefepime and Vancomycin to cover hospital-acquired pneumonia.  He was noted to be lethargic on 1/22/21.  Subsequently patient removed Dobbhoff tube on 1/25/21 and there is question of possible aspiration during that process.  He continued to have intermittent confusion on 1/26/21 was felt to be possible post ICU delirium.  He was followed by neurology.  MRI brain was unremarkable.  EEG showed mild slowing but no seizure activity was noted.  Antibiotics were discontinued after a 5 day course due to lethargy.  Repeat video swallow study was  "performed for dysphagia and patient was put on a soft diet with nectar thick liquids. On 2/3/21, hemoglobin was 11.2, WBC count 15.1, platelets were 275, BUN was 15, and creatinine was 0.7.  He has been needing assistance for mobility, transfers, self-care postoperatively. He is transferred to [facility] on 2/4/21 for further rehabilitation care.      SUBJ: Tolerating therapies per endurance.  Discussed with speech therapy.  Free water protocol ice chips was started.  Denies coughing with meals.    ROS: Denies chest pain or shortness of breath. Other ROS negative. Past, family, social history is unchanged.    Physical Exam      Blood pressure (!) 116/57, pulse 79, temperature (!) 35.4 °C (95.7 °F), temperature source Oral, resp. rate 18, height 1.727 m (5' 8\"), weight 73.3 kg (161 lb 8 oz), SpO2 95 %.  Body mass index is 24.56 kg/m².    General Appearance: Not in acute distress  Head/Ear/Nose/Throat: Normocephalic; Atraumatic.  On oxygen by nasal cannula.  Eye: EOMI; PERRL.   Neck: No JVD; No Bruits.   Respiratory: Decreased breath sounds at bases.   Cardiovascular: RRR; Normal S1, S2.   Gastrointestinal: Soft; NT; +BS.   Extremities: Bilateral lower extremity edema noted.   Musculoskeletal: Functional active range of motion in both upper and lower extremities.    Neurological: Awake alert oriented to person, had some difficulty naming the place and correct date.. Speech is fluent. Cranial nerve examination does not reveal any gross facial asymmetry. Strength testing about 4/5 strength in both upper extremities.  Bilateral hip flexion is 3+/5.  Bilateral quadriceps are 4/5 with the thighs supported.  Bilateral ankle dorsi and plantar flexion are 4/5.  He is grossly able to localize touch and position sense in great toes.  Deep tendon reflexes are hypoactive and symmetric bilaterally.  Plantars are flexor.  Coordination is functional upper extremities.    Behavior/Emotional: Appropriate; Cooperative. "   Skin: No obvious rashes noted.        Current Facility-Administered Medications:   •  acetaminophen (TYLENOL) tablet 650 mg, 650 mg, oral, q4h PRN, Omari Spain MD, 650 mg at 02/06/21 0914  •  albuterol HFA (VENTOLIN HFA) 90 mcg/actuation inhaler 2 puff, 2 puff, inhalation, q6h PRN, Omari Spain MD  •  albuterol HFA (VENTOLIN HFA) 90 mcg/actuation inhaler 2 puff, 2 puff, inhalation, q6h, Laure Bishop MD, 2 puff at 02/06/21 1241  •  alum-mag hydroxide-simeth (MAALOX) 200-200-20 mg/5 mL suspension 30 mL, 30 mL, oral, q6h PRN, Laure Bishop MD  •  amLODIPine (NORVASC) tablet 5 mg, 5 mg, oral, q12h EBER, Laure Bishop MD, 5 mg at 02/06/21 0910  •  aspirin enteric coated tablet 81 mg, 81 mg, oral, Daily, Omari Spain MD, 81 mg at 02/06/21 0911  •  bisacodyL (DULCOLAX) 10 mg suppository 10 mg, 10 mg, rectal, Daily PRN, Isael Lang MD, 10 mg at 02/05/21 1844  •  docusate sodium (COLACE) capsule 100 mg, 100 mg, oral, TID, Isael Lang MD, 100 mg at 02/06/21 0911  •  furosemide (LASIX) tablet 20 mg, 20 mg, oral, Daily (1p), Laure Bishop MD, 20 mg at 02/06/21 1241  •  gemfibroziL (LOPID) tablet 600 mg, 600 mg, oral, BID AC, Omari Spain MD, 600 mg at 02/06/21 0735  •  guaiFENesin (MUCINEX) 12 hr ER tablet 600 mg, 600 mg, oral, BID, Laure Bishop MD, 600 mg at 02/06/21 0911  •  heparin (porcine) 5,000 unit/mL injection 5,000 Units, 5,000 Units, subcutaneous, q8h [facility], Omari Spain MD, 5,000 Units at 02/06/21 1458  •  magnesium hydroxide (M.O.M.) 400 mg/5 mL suspension 30 mL, 30 mL, oral, 2x daily PRN, Laure Bishop MD  •  magnesium oxide (MAG-OX) tablet 400 mg, 400 mg, oral, BID, Laure Bishop MD, 400 mg at 02/06/21 0910  •  melatonin ODT 3 mg, 3 mg, oral, Nightly, Laure Bishop MD, 3 mg at 02/05/21 2303  •  metoprolol tartrate (LOPRESSOR) split tablet 12.5 mg, 12.5 mg, oral, BID, Omari Spain MD, 12.5 mg at 02/06/21 0910  •  polyethylene glycol (MIRALAX) 17 gram packet 17 g, 17 g,  oral, Daily PRN, Omari Spain MD, 17 g at 02/04/21 2157  •  senna (SENOKOT) tablet 3 tablet, 3 tablet, oral, Daily before lunch, Isael Lang MD, 3 tablet at 02/06/21 1241       Labs / Radiology    Lab Results   Component Value Date    WBC 8.15 02/05/2021    HGB 12.0 (L) 02/05/2021    HCT 37.4 (L) 02/05/2021    MCV 95.9 02/05/2021     02/05/2021     Lab Results   Component Value Date    GLUCOSE 96 02/05/2021    CALCIUM 8.8 (L) 02/05/2021     (L) 02/05/2021    K 4.1 02/05/2021    CO2 25 02/05/2021    CL 97 (L) 02/05/2021    BUN 15 02/05/2021    CREATININE 0.6 (L) 02/05/2021       Assessment and Plan    ASSESSMENT PLAN:  1. 70-year-old right-handed white male with chronic conditions significant for coronary artery disease, hypertension, hyperlipidemia who was admitted to [facility] on 1/5/21 due to Covid 19 pneumonia and worsening respiratory status.  He required intubation on 1/9/21 and had ventilator-dependent respiratory failure.  He was treated with Decadron and Remdesivir.  He had dysphagia requiring Dobbhoff tube feeds.  He was extubated on 1/16/21 to OptiFlow.  On 1/23/21 he was transitioned to high flow nasal cannula.  Hospital course was significant for acute renal insufficiency likely secondary to acute tubular necrosis and he was followed by nephrology.  He had subsequent improvement in renal function.  He also had new fever of 102.8°F on 1/20/21 and 1/21/21.  He was followed by infectious disease.  Blood cultures were negative. CT scan of the chest abdomen pelvis was done with results showing increasing pulmonary infiltrates/groundglass opacities and consolidations in his bilateral lower lobes.  He was treated empirically with Cefepime and Vancomycin to cover hospital-acquired pneumonia.  He was noted to be lethargic on 1/22/21.  Subsequently patient removed Dobbhoff tube on 1/25/21 and there is question of possible aspiration during that process.  He continued to have  intermittent confusion on 1/26/21 was felt to be possible post ICU delirium.  He was followed by neurology.  MRI brain was unremarkable.  EEG showed mild slowing but no seizure activity was noted.  Antibiotics were discontinued after a 5 day course due to lethargy.  Repeat video swallow study was performed for dysphagia and patient was put on a soft diet with nectar thick liquids. On 2/3/21, hemoglobin was 11.2, WBC count 15.1, platelets were 275, BUN was 15, and creatinine was 0.7.  He has been needing assistance for mobility, transfers, self-care postoperatively. He is transferred to [facility] on 2/4/21 for further rehabilitation care.       2. DVT prophylaxis - on subcutaneous Heparin.  On SCDs.  Platelets 296 on 2/5/2021.     3.  Deconditioning - recent severe Covid 19 pneumonia, ventilator-dependent respiratory failure, dysphagia, multiple medical problems - He had ventilator-dependent respiratory failure.  He was treated with Decadron and Remdesivir.  Continue PT, OT, speech, psychology.  Follow falls precautions, cardiac precautions, aspiration precautions.     4. GI - On Colace, Senokot.  On PRN MiraLAX, MOM, Maalox.      5.  - voiding.  Denies dysuria.  Monitor post void residuals.     6.  Pulmonary - recent severe Covid 19 pneumonia, ventilator-dependent respiratory failure - on Ventolin HFA.  On Mucinex.     7. Pain - on PRN Tylenol.     8. Hematology -hemoglobin 12.0, WBC 8.15 on 2/5/2021.     9. CVS - history of coronary artery disease and hypertension.  On Norvasc.  On Lopressor.  On Aspirin.  On Lasix.  On Magnesium oxide.     10.  Hyperlipidemia - on Lopid.     11.  Insomnia - on Melatonin.     12.  Dysphagia -on soft diet with nectar thick liquids.  Free water protocol with ice chips per speech therapy.  Speech therapy and nutrition following.     13. Rehabilitation medicine - Continue comprehensive rehabilitation care. Continue PT, OT, speech, psychology.  We will follow falls  precautions, cardiac precautions, monitor pulse oximetry in therapy and follow aspiration precautions.  Tolerating therapies per endurance.  Discussed with speech therapy.  Free water protocol ice chips was started.  Denies coughing with meals.     14. Reviewed labs today.  BUN 15, creatinine 0.6 on 2/5/2021.           Isael Lang MD  2/6/2021

## 2021-02-06 NOTE — CONSULTS
Clinical Nutrition Note    Clinical Comments:  Pt seen and evaluated on 2/5/2021. See below for RD's treatment plan:    Nutrition Interventions/ Recommendations:   1. Diet/Liquid consistency per SLP  2. Will send vanilla Magic Cup BID and nectar juice x2 on all trays  3. Encouraged nutrition for healing and recovery  4. Provided soft/nectars alternative foods list  5. Please obtain admission weight  6. Will monitor po intake, weight, labs and tolerance of supplement      Date: 02/06/21  Signature: LAURIE Molina

## 2021-02-06 NOTE — PLAN OF CARE
Problem: Rehabilitation (IRF) Plan of Care  Goal: Plan of Care Review  Flowsheets (Taken 2/6/2021 1407)  Plan of Care Reviewed With: patient  IRF Plan of Care Review: progress ongoing, continue  Outcome Summary: Pt participated in session c focus of functional outcome measures, MMT/strength testing and toilet transfer. Pt c strength deficits noted in BUE, L shoulder deficits>R shoulder, R hand deficits>L hand. Pt required Max A x1 anterior, and touching/steadying A x 1 posterior during trial toilet transfer for safety considerations. Recommend continued OT per POC.

## 2021-02-06 NOTE — PLAN OF CARE
Problem: Rehabilitation (IRF) Plan of Care  Goal: Plan of Care Review  Flowsheets (Taken 2/6/2021 9969)  Plan of Care Reviewed With: patient  IRF Plan of Care Review: progress ongoing, continue  Outcome Summary:   Improved tolerance for activity this session, but requires frequent rest breaks due to fatigue   able to perform SPT with Mod A x 1 and sit - stand tx with RW with Min A x 1

## 2021-02-06 NOTE — PLAN OF CARE
Plan of Care Review  IRF Plan of Care Review: progress ongoing, continue  Plan of Care Reviewed With: patient  Outcome Summary: pt aaox3. pt constipated, but able to have bm. pt max x2 to commode. pt frustated at beginning of shift. vss. call cantrell in reach. rn will continue to monitor.

## 2021-02-07 ENCOUNTER — APPOINTMENT (INPATIENT)
Dept: PHYSICAL THERAPY | Facility: REHABILITATION | Age: 71
DRG: 948 | End: 2021-02-07
Payer: COMMERCIAL

## 2021-02-07 ENCOUNTER — APPOINTMENT (INPATIENT)
Dept: OCCUPATIONAL THERAPY | Facility: REHABILITATION | Age: 71
DRG: 948 | End: 2021-02-07
Payer: COMMERCIAL

## 2021-02-07 PROCEDURE — 97530 THERAPEUTIC ACTIVITIES: CPT | Mod: GO

## 2021-02-07 PROCEDURE — 97530 THERAPEUTIC ACTIVITIES: CPT | Mod: GP

## 2021-02-07 PROCEDURE — 63700000 HC SELF-ADMINISTRABLE DRUG: Performed by: PHYSICAL MEDICINE & REHABILITATION

## 2021-02-07 PROCEDURE — 63600000 HC DRUGS/DETAIL CODE: Performed by: PHYSICAL MEDICINE & REHABILITATION

## 2021-02-07 PROCEDURE — 97112 NEUROMUSCULAR REEDUCATION: CPT | Mod: GP

## 2021-02-07 PROCEDURE — 12800000 HC ROOM AND CARE SEMIPRIVATE REHAB

## 2021-02-07 PROCEDURE — 63700000 HC SELF-ADMINISTRABLE DRUG: Performed by: INTERNAL MEDICINE

## 2021-02-07 RX ORDER — DICLOFENAC SODIUM 10 MG/G
2 GEL TOPICAL
Status: DISCONTINUED | OUTPATIENT
Start: 2021-02-07 | End: 2021-02-08

## 2021-02-07 RX ORDER — ACETAMINOPHEN 325 MG/1
650 TABLET ORAL
Status: COMPLETED | OUTPATIENT
Start: 2021-02-07 | End: 2021-02-14

## 2021-02-07 RX ORDER — ROSUVASTATIN CALCIUM 5 MG/1
5 TABLET, COATED ORAL
Status: DISCONTINUED | OUTPATIENT
Start: 2021-02-07 | End: 2021-02-11

## 2021-02-07 RX ADMIN — GUAIFENESIN 600 MG: 600 TABLET ORAL at 20:55

## 2021-02-07 RX ADMIN — AMLODIPINE BESYLATE 5 MG: 5 TABLET ORAL at 09:11

## 2021-02-07 RX ADMIN — ALBUTEROL SULFATE 2 PUFF: 90 AEROSOL, METERED RESPIRATORY (INHALATION) at 06:13

## 2021-02-07 RX ADMIN — Medication 400 MG: at 20:57

## 2021-02-07 RX ADMIN — HEPARIN SODIUM 5000 UNITS: 5000 INJECTION INTRAVENOUS; SUBCUTANEOUS at 20:58

## 2021-02-07 RX ADMIN — GUAIFENESIN 600 MG: 600 TABLET ORAL at 09:11

## 2021-02-07 RX ADMIN — METOPROLOL TARTRATE 12.5 MG: 25 TABLET, FILM COATED ORAL at 20:55

## 2021-02-07 RX ADMIN — DOCUSATE SODIUM 100 MG: 100 CAPSULE, LIQUID FILLED ORAL at 20:57

## 2021-02-07 RX ADMIN — HEPARIN SODIUM 5000 UNITS: 5000 INJECTION INTRAVENOUS; SUBCUTANEOUS at 13:19

## 2021-02-07 RX ADMIN — ASPIRIN 81 MG: 81 TABLET ORAL at 09:11

## 2021-02-07 RX ADMIN — ACETAMINOPHEN 650 MG: 325 TABLET, FILM COATED ORAL at 17:56

## 2021-02-07 RX ADMIN — Medication 3 MG: at 20:55

## 2021-02-07 RX ADMIN — METOPROLOL TARTRATE 12.5 MG: 25 TABLET, FILM COATED ORAL at 09:11

## 2021-02-07 RX ADMIN — HEPARIN SODIUM 5000 UNITS: 5000 INJECTION INTRAVENOUS; SUBCUTANEOUS at 06:12

## 2021-02-07 RX ADMIN — ALBUTEROL SULFATE 2 PUFF: 90 AEROSOL, METERED RESPIRATORY (INHALATION) at 18:01

## 2021-02-07 RX ADMIN — GEMFIBROZIL 600 MG: 600 TABLET ORAL at 09:12

## 2021-02-07 RX ADMIN — DICLOFENAC SODIUM 2 G: 10 GEL TOPICAL at 21:05

## 2021-02-07 RX ADMIN — ALBUTEROL SULFATE 2 PUFF: 90 AEROSOL, METERED RESPIRATORY (INHALATION) at 00:43

## 2021-02-07 RX ADMIN — ACETAMINOPHEN 650 MG: 325 TABLET, FILM COATED ORAL at 20:56

## 2021-02-07 RX ADMIN — ALBUTEROL SULFATE 2 PUFF: 90 AEROSOL, METERED RESPIRATORY (INHALATION) at 13:22

## 2021-02-07 RX ADMIN — DOCUSATE SODIUM 100 MG: 100 CAPSULE, LIQUID FILLED ORAL at 09:12

## 2021-02-07 RX ADMIN — Medication 400 MG: at 09:11

## 2021-02-07 RX ADMIN — GEMFIBROZIL 600 MG: 600 TABLET ORAL at 15:29

## 2021-02-07 RX ADMIN — FUROSEMIDE 20 MG: 20 TABLET ORAL at 13:19

## 2021-02-07 RX ADMIN — ROSUVASTATIN CALCIUM 5 MG: 5 TABLET, FILM COATED ORAL at 17:56

## 2021-02-07 RX ADMIN — DOCUSATE SODIUM 100 MG: 100 CAPSULE, LIQUID FILLED ORAL at 15:29

## 2021-02-07 NOTE — PLAN OF CARE
Problem: Rehabilitation (IRF) Plan of Care  Goal: Plan of Care Review  Outcome: Progressing  Flowsheets (Taken 2/7/2021 0642)  Plan of Care Reviewed With: patient  IRF Plan of Care Review: progress ongoing, continue  Outcome Summary: Received pt. at 2300. He c/o left wrist but refused PRN tylenol at this time. He is cont of bladder. No BM overnight. Slept well.   Plan of Care Review  IRF Plan of Care Review: progress ongoing, continue  Plan of Care Reviewed With: patient  Outcome Summary: Received pt. at 2300. He c/o left wrist but refused PRN tylenol at this time. He is cont of bladder. No BM overnight. Slept well.

## 2021-02-07 NOTE — PLAN OF CARE
Problem: Rehabilitation (IRF) Plan of Care  Goal: Plan of Care Review  Outcome: Progressing  Flowsheets (Taken 2/6/2021 1957)  Plan of Care Reviewed With: patient  IRF Plan of Care Review: progress ongoing, continue  Outcome Summary:   Received pt in bed oriented x3   relaxed. Reported right wrist pain   reported relief from tylenol. Assist x2 with transfers   gait unsteady. No respiratory distress noted. Aspiration precautions maintained   takes pills whole in applesauce. Cont of B&B   urinal at bedside. Safety measures in place.

## 2021-02-07 NOTE — NURSING NOTE
"Pt c/o of 10/10 pain to left wrist. Tylenol given for pain, tender to touch and mild swelling. No redness noted. Pt states he \"hit his wrist on the bed a couple of nights ago.\" Will notify on coming nurse to reevaluate in the morning. Pt resting comfortably in bed after tylenol. Will continue to monitor.  "

## 2021-02-07 NOTE — PROGRESS NOTES
Patient: Denis Green  Location: [facility] [address]  MRN: [id number]  Today's date: 2/7/2021    History of Present Illness  Denis LOVE is a 70 y.o. male admitted on 2/4/2021 with Pneumonia due to COVID-19 virus. Principal problem is No Principal Problem: There is no principal problem currently on the Problem List. Please update the Problem List and refresh..   Pt admitted to outside hospital on 1/5/2021 for COVID-19 PNA. Worsening resp status intubated on 1/9- extubated 1/16 to OptiFlow. Transition to [facility] on 1/23.Eval by nephrology while in ICU due to ERNIE likely secondary to ATN. Creatinine has been improving with excellent urine output. Received Decadron and Remdesivir. Dobhoff tube was placed due to dysphagia. New fever of 102.8 1/20 and 1/21. ID was consulted. Bld cx's negative. CT scan of the chest abdomen pelvis was done with results showing increasing pulmonary infiltrates/groundglass opacities and consolidations in his bilateral lower lobes. Started empirically on cefepime and vancomycin to cover hospital-acquired pneumonia. Pt had increased lethargy on 1/22. Pt removed DHT 1/25; required higher O2 afterward - possible aspiration during the process. 1/26 patient continued to have intermittent confusion; ?post ICU delirium. Neurology is following, MRI brain was unremarkable. EEG showed mild slowing but no seizure activity. D/c'd abx after 5 days d/t lethargy.       Past Medical History  Denis LOVE has a past medical history of Coronary artery disease, Hypertension, and Lipid disorder.    PT Vitals    Date/Time Pulse SpO2 Pt Activity O2 Therapy O2 Del Method O2 Flow Rate BP BP Location BP Method Pt Position [facility]   02/07/21 1400 77 96 % At rest Supplemental oxygen Nasal cannula 1 L/min 120/70 Right upper arm Automatic Sitting [facility]   02/07/21 1428 78 97 % -- -- -- -- 159/77 -- -- -- [facility]      PT Pain    Date/Time Pain Type Pref Pain Scale Orientation Location Rating: Rest Rating: Activity  [facility]   02/07/21 1400 Pain Assessment number (Numeric Rating Pain Scale) lower back 5 5 [facility]   02/07/21 1428 Pain Reassessment number (Numeric Rating Pain Scale) lower back 6 6 [facility]          Prior Living Environment      Most Recent Value   Lives With  spouse, child(mindy), adult [adult son lives in home occasionally]   Living Arrangements  house   Home Accessibility  stairs to enter home (Group), stairs within home (Group)   Living Environment Comment  Lives with wife and intermittently with one son.  [bed and bath on 2nd ,  no powder room on first]   Number of Stairs, Main Entrance  1   Stair Railings, Main Entrance  none   Location, Patient Bedroom  second floor, must negotiate stairs to access   Location, Bathroom  second floor, must negotiate stairs to access   Bathroom Access Comment  Pt. reports has tub shower and stall shower, uses tub shower primarily, has grab bar in shower, has stanard height toilet c wall on both sides   Number of Stairs, Within Home, Primary  12   Surface of Stairs, Within Home, Primary  hardwood   Stair Railings, Within Home, Primary  railings on both sides of stairs          Prior Level of Function      Most Recent Value   Dominant Hand  right   Ambulation  independent   Transferring  independent   Toileting  independent   Bathing  independent   Dressing  independent   Eating  independent   Communication  understands/communicates without difficulty   Swallowing  swallows foods/liquids without difficulty   Baseline Diet/Method of Nutritional Intake  regular solids, thin liquids   Past History of Dysphagia  No previous reports    Prior Level of Function Comment  Pt is a cattle/,  previously IND for all IADLs.    Assistive Device/Animal Currently Used at Home  none          IRF PT Evaluation and Treatment - 02/07/21 1400        Time Calculation    Start Time  1400     Stop Time  1430     Time Calculation (min)  30 min        Session Details    Document Type  daily treatment/progress  "note     Mode of Treatment  individual therapy;physical therapy        General Information    Patient Profile Reviewed?  yes     General Observations of Patient  Pt received reclined in wheelchair, +ID +O2 via nasal cannula in NAD        Sit to Stand Transfer    Bryan, Sit to Stand Transfer  minimum assist (75% or more patient effort)     Verbal Cues  hand placement;technique     Assistive Device  --    no AD    Comment  from w/c blocked practice x10 with min A for facilitation of anterior trunk lean and lifting assistance        Stand to Sit Transfer    Bryan, Stand to Sit Transfer  minimum assist (75% or more patient effort)     Verbal Cues  hand placement;safety;technique     Assistive Device  none     Comment  to w/c with min A for eccentric control        Stand Pivot Transfer    Bryan, Stand Pivot/Stand Step Transfer  moderate assist (50-74% patient effort)     Verbal Cues  hand placement;safety;technique     Assistive Device  none     Comment  w/c>bed with mod A  for lifting assistance and pivoting assistance         Lower Extremity (Therapeutic Exercise)    Comment (LE Therapeutic Exercise)  Blocked practice sit<>Stand x10 focusing on endurance. Toe taps to 6\" step x5 each B with mod A for stabilty with no AD. Seated rest breaks required in between trials with repetitions limited due to increased fatigue        Daily Progress Summary (PT)    Symptoms Noted During/After Treatment  fatigue;increased pain     Progress Toward Functional Goals (PT)  progressing toward functional goals as expected     Daily Outcome Statement (PT)  Pt tolerated treatment fairly and demonstrated good participation and motivation throughout session. Session focused on endurance training and LE strengthening however pt continues to require frequent seated rest breaks with increased tmie required to complete functinoal mobility tasks. SpO2 throughout session >90%. Of note, pt with increased pain in L wrist therefore " transfers wtih RW deferred at this point in time. Pt also with increased fatigue and pain in lower back limiting standing/ambulatory activities. Pt would continue to benefit from skilled PT in order to improve the above listed deficits, increase LE strength, improve activity tolerance and increase independence in order to return to prior level of function                             IRF PT Goals      Most Recent Value   Bed Mobility Goal 1   Activity/Assistive Device  rolling to left, rolling to right, sit to supine/supine to sit at 02/05/2021 1030   Hazard  supervision required at 02/05/2021 1030   Time Frame  short-term goal (STG), 1 week at 02/05/2021 1030   Bed Mobility Goal 2   Activity/Assistive Device  rolling to left, rolling to right, sit to supine/supine to sit at 02/05/2021 1030   Hazard  modified independence at 02/05/2021 1030   Time Frame  long-term goal (LTG), 21 days or less at 02/05/2021 1030   Transfer Goal 1   Activity/Assistive Device  bed-to-chair/chair-to-bed, sit-to-stand/stand-to-sit, stand pivot, walker, front-wheeled at 02/05/2021 1030   Hazard  moderate assist (50-74% patient effort), verbal cues required at 02/05/2021 1030   Time Frame  short-term goal (STG), 1 week at 02/05/2021 1030   Transfer Goal 2   Activity/Assistive Device  sit-to-stand/stand-to-sit, bed-to-chair/chair-to-bed, stand pivot, walker, front-wheeled at 02/05/2021 1030   Hazard  supervision required at 02/05/2021 1030   Time Frame  long-term goal (LTG), 21 days or less at 02/05/2021 1030   Gait/Walking Locomotion Goal 1   Activity/Assistive Device  gait (walking locomotion), decrease fall risk, increase endurance/gait distance, walker, front-wheeled at 02/05/2021 1030   Distance  15 feet at 02/05/2021 1030   Hazard  moderate assist (50-74% patient effort) [and close S of 2nd person for safety] at 02/05/2021 1030   Time Frame  short-term goal (STG), 1 week at 02/05/2021 1030   Gait/Walking  Locomotion Goal 2   Activity/Assistive Device  gait (walking locomotion), decrease asymmetrical patterns, decrease fall risk, diminish gait deviation, improve balance and speed, increase endurance/gait distance, turning, left, turning, right, walker, front-wheeled at 02/05/2021 1030   Distance  100 feet at 02/05/2021 1030   Bland  supervision required at 02/05/2021 1030   Time Frame  long-term goal (LTG), 21 days or less at 02/05/2021 1030

## 2021-02-07 NOTE — PLAN OF CARE
Session limited due to increased back pain and L wrist pain. Able to complete blocked practice transfers and LE therex activities

## 2021-02-07 NOTE — PROGRESS NOTES
"   [facility] Progress Note          Patient was seen and examined at bedside.    Subjective:   1. Left wrist pain,  tender to touch and mild swelling, no redness noted. Pt states he \"hit his wrist on the bed a couple of nights ago.\" prn pain regimen ;  2. Mixed significant dyslipidemia: LCL-C 185, HDL-C 30, , h/o HTN, HLD, and CAD, only gemfibrozil is far from adequate, labeled Lipitor allergy, still worth retrial with hydrophilic formula Crestor from low dose, which is absolutely necessary and also guideline recommended;     3. His respiratory status and SO2 are fine.   CXR 2/5/2021:IMPRESSION:  Bilateral airspace opacities most prominent in the left upper lobe and left  lower lobe likely representing COVID19 pneumonia.    Denies nausea, vomiting, abdominal pain or discomfort, dysuria, cough/sputum, running nose, sore throat, chest pain, palpitation, SOB or orthopnea, dizziness or LH,  HA.           Objective   Vital signs in last 24 hours:  Temp:  [36.3 °C (97.4 °F)-36.8 °C (98.2 °F)] 36.3 °C (97.4 °F)  Heart Rate:  [79-95] 87  Resp:  [18] 18  BP: (109-120)/(57-65) 109/64    No intake or output data in the 24 hours ending 02/07/21 1058  Intake/Output this shift:  No intake/output data recorded.   Review of Systems:  All other systems reviewed and negative except as noted in the HPI.   Objective      Labs  reviewed his labs thoroughly   Lab Results   Component Value Date    WBC 8.15 02/05/2021    HGB 12.0 (L) 02/05/2021    HCT 37.4 (L) 02/05/2021    MCV 95.9 02/05/2021     02/05/2021     Lab Results   Component Value Date    GLUCOSE 96 02/05/2021    CALCIUM 8.8 (L) 02/05/2021     (L) 02/05/2021    K 4.1 02/05/2021    CO2 25 02/05/2021    CL 97 (L) 02/05/2021    BUN 15 02/05/2021    CREATININE 0.6 (L) 02/05/2021       Imaging  OSH imaging study reports reviewed:    CXR 2/5/2021:  IMPRESSION:  Bilateral airspace opacities most prominent in the left upper lobe and left  lower " lobe likely representing COVID19 pneumonia.         Full Code    Physical Exam:  Head/Ear/Nose/Throat: normocephalic; atraumatic; moisture mouth mm, no oropharyngeal thrush noted.   Eyes: anicteric sclera, EOMI; PERRL.   Neck : supple, no JVD, no carotid bruits appeciated.   Respiratory: no evidence of labored breathing, lung sounds a little coarse, mild bibasilar diminished BS, no remarkable w/r/c.   Cardiovascular: RRR; normal S1, S2; no m/r/g; no S3 or S4.   Gastrointestinal: soft; NT; BS normal; mildly distended; no CVAT b/l.   Genitourinary: no lim.   Extremities : no c/c/e .   Neurological: AO x 3, fluent speeches, following commands, CNS II-XII grossly intact; no focal neurologic deficits.   Behavior/Emotional: in NAD, appropriate; cooperative.   Skin: clean, dry and intact.     Plan of care was discussed with patient, RN, and PMR attending.     Assessment     CC: S/p severe covid-19 PNA complicated by VDRF, dysphagia, ERNIE, bacterial PNA, and  hospital delirium, physical deconditioning with ADL and ambulatory dysfunction.       70 y.o. male, I PTA, with PMH of HTN, HLD, and CAD, admitted to [facility] on 1/5/2021 for severeCOVID-19 PNA. Worsening resp status intubated on 1/9- extubated 1/16 to OptiFlow. Transition to [facility] on 1/23.Eval by nephrology while in ICU due to ERNIE likely secondary to ATN. Creatinine has been improving with excellent urine output. Received Decadron and Remdesivir. Dobhoff tube was placed due to dysphagia. New fever of 102.8 on 1/20 and 1/21. ID was consulted. Bld cx's negative. CT scan of the chest abdomen pelvis was done with results showing increasing pulmonary infiltrates/groundglass opacities and consolidations in his bilateral lower lobes. Started empirically on cefepime and vancomycin to cover hospital-acquired pneumonia. Pt had increased lethargy on 1/22. Pt removed DHT 1/25; required higher O2 afterward - possible aspiration during the process. 1/26 patient continued to have  intermittent confusion; ?post ICU delirium. Neurology is following, MRI brain was unremarkable. EEG showed mild slowing but no seizure activity. D/c'd abx after 5 days d/t lethargy. Repeat VFSS performed- now on soft nectar thick liquid diet .  Pt is AA&Ox3 with periods of confusion. Tolerates soft diet with NTL, voiding in urinal, O2 @ 2-3L via nc. Participating in therapies with marked improvement, functionally, he has residual ADL and ambulatory dysfunction, rec's for acute rehab prior to home with SO. Transferred to [facility] on 2/4/2021.      1.S/p severe covid-19 PNA complicated by VDRF, dysphagia, ERNIE, bacterial PNA, and  hospital delirium, physical deconditioning with ADL and ambulatory dysfunction : inpt acute rehab, gait and balancing training, SLP therapy, nutrition support,  Fall/aspiration precaution, medical management, DVT prophylaxis, dermal defense, f/u with PCP after inpt rehab.     2. Pulm-S/p severe covid-19 PNA complicated by VDRF, s/p possible bacteria PNA,  hypoxemia with mild exertion, atelectasis: monitor SO2, prn NC O2, keep SO2>92%, incentive spirometry, bronchodilator inhaler, mucolytic agent, pulm toileting. Check CXR.      3. Psych-s/p hospital acute delirium, intermittent confusion: his metal status has much improved, monitor his mood and mentation, help his orientation, psychology CBT, avoid unnecessary sedatives, SW support.      4. GI-on soft nectar thick liquid diet, constipation:SLP evaluation to advance his diet as tolerated; provide constipation bowel regimen, keep adequate hydration, timed toilet visits.     5. DVT prophy: SCD, early ambulation, SQ heparin, check LE venous DUS to r/o DVT.       6. HTN, dyslipidemia: cont home HTN meds with holding parameter, orthostatic hypotension precaution, monitor BP . On gemfibrozil, f/u with his PCP.      Mixed significant dyslipidemia: LCL-C 185, HDL-C 30, , h/o HTN, HLD, and CAD, only gemfibrozil is far from adequate, labeled Lipitor  allergy, still worth retrial with hydrophilic formula Crestor from low dose, which is absolutely necessary and also guideline recommended    7. Renal-s/p ERNIE recovered, electrolytes imbalance: monitor renal function and volume status, avoid nephrotoxic agents and low BP,  monitor and keep electrolytes balance.     8. - urinary retention: timed voiding trial, monitor PVR with prn cic, check UA/UCX.     Billing code: [id number]  Diagnoses:  Patient Active Problem List   Diagnosis   • Acute metabolic encephalopathy   • ARDS (adult respiratory distress syndrome) (CMS/HCC)   • Coronary artery disease involving native coronary artery of native heart without angina pectoris   • Hypoxia   • Pneumonia due to COVID-19 virus   • Transaminitis   • Essential hypertension   • Dyslipidemia   • Atelectasis   • Physical deconditioning   complicated case with multiple comorbidities as mentioned in subjective section, spent 35 min to manage the case, >50% of the time consulting the patient about current medical condition, existing comorbidities, new findings/concerns and care/management plan.       Laure Bishop MD  2/7/2021

## 2021-02-07 NOTE — PLAN OF CARE
Problem: Rehabilitation (IRF) Plan of Care  Goal: Plan of Care Review  Flowsheets  Taken 2/7/2021 1438 by Taniya Mckeon OT  Plan of Care Reviewed With: patient  Outcome Summary: session focused on MOCA, pt reporting 10/10 back pain however per RN is refusing pain meds. remains tilted in w/c during session. scores 18/30 on MOCA, greatest difficulty with visuospatial/executive functioning. continue with POC

## 2021-02-07 NOTE — PLAN OF CARE
Individualized Plan of Care    Denis Green is admitted to [facility] for comprehensive inpatient rehabilitation for Debility with functional deficits in communication;mobility;motor dysfunction;safety;self-care;other (see comments) (swallow). Patient is receiving the following services: occupational therapy;physical therapy;speech language pathology;neuropsychologist;medical consultative services;respiratory therapy.    Frequency of Treatment (OT): 5-7 times per week, 60-90 minutes per day  Frequency of Treatment (PT): 5-7 times per week, 60-90 minutes per day  Therapy Frequency (SLP): 5-7 times per week, 30 minutes per day    Active medical management is required for   Patient Active Problem List   Diagnosis   • Acute metabolic encephalopathy   • ARDS (adult respiratory distress syndrome) (CMS/HCC)   • Coronary artery disease involving native coronary artery of native heart without angina pectoris   • Hypoxia   • Pneumonia due to COVID-19 virus   • Transaminitis   • Essential hypertension   • Dyslipidemia   • Atelectasis   • Physical deconditioning       Risk for Complications  Constipation: Moderate  DVT: Moderate  Falls: Moderate      The patient's medical prognosis is good to achieve the stated goals below.    Expected Level of Function  Expected Functional Improvement: communication;mobility;motor dysfunction;safety;self-care;other (see comments) (swallow)  Self-Care: Setup or clean-up assistance  Sphincter Control: Setup or clean-up assistance  Transfers: Setup or clean-up assistance  Locomotion: Supervision or touching assistance  Communication: Independent  Social Cognition: Independent      Goals  IRF PT Goals      Most Recent Value   Bed Mobility Goal 1   Activity/Assistive Device  rolling to left, rolling to right, sit to supine/supine to sit at 02/05/2021 1030   [place]  supervision required at 02/05/2021 1030   Time Frame  short-term goal (STG), 1 week at 02/05/2021  1030   Bed Mobility Goal 2   Activity/Assistive Device  rolling to left, rolling to right, sit to supine/supine to sit at 02/05/2021 1030   [place]  modified independence at 02/05/2021 1030   Time Frame  long-term goal (LTG), 21 days or less at 02/05/2021 1030   Transfer Goal 1   Activity/Assistive Device  bed-to-chair/chair-to-bed, sit-to-stand/stand-to-sit, stand pivot, walker, front-wheeled at 02/05/2021 1030   [place]  moderate assist (50-74% patient effort), verbal cues required at 02/05/2021 1030   Time Frame  short-term goal (STG), 1 week at 02/05/2021 1030   Transfer Goal 2   Activity/Assistive Device  sit-to-stand/stand-to-sit, bed-to-chair/chair-to-bed, stand pivot, walker, front-wheeled at 02/05/2021 1030   [place]  supervision required at 02/05/2021 1030   Time Frame  long-term goal (LTG), 21 days or less at 02/05/2021 1030   Gait/Walking Locomotion Goal 1   Activity/Assistive Device  gait (walking locomotion), decrease fall risk, increase endurance/gait distance, walker, front-wheeled at 02/05/2021 1030   Distance  15 feet at 02/05/2021 1030   [place]  moderate assist (50-74% patient effort) [and close S of 2nd person for safety] at 02/05/2021 1030   Time Frame  short-term goal (STG), 1 week at 02/05/2021 1030   Gait/Walking Locomotion Goal 2   Activity/Assistive Device  gait (walking locomotion), decrease asymmetrical patterns, decrease fall risk, diminish gait deviation, improve balance and speed, increase endurance/gait distance, turning, left, turning, right, walker, front-wheeled at 02/05/2021 1030   Distance  100 feet at 02/05/2021 1030   [place]  supervision required at 02/05/2021 1030   Time Frame  long-term goal (LTG), 21 days or less at 02/05/2021 1030        IRF OT Goals      Most Recent Value   Transfer Goal 1   Activity/Assistive Device  toilet at 02/05/2021 0726   Time Frame  short-term goal (STG), 5 - 7 days at 02/05/2021 0726   Strategies/Barriers  TBA at  02/05/2021 0726   Transfer Goal 2   Activity/Assistive Device  toilet at 02/05/2021 0726   Time Frame  long-term goal (LTG), 3 weeks at 02/05/2021 0726   Transfer Goal 3   Activity/Assistive Device  shower at 02/05/2021 0726   Time Frame  short-term goal (STG), 5 - 7 days at 02/05/2021 0726   Strategies/Barriers  TBA at 02/05/2021 0726   Transfer Goal 4   Activity/Assistive Device  shower at 02/05/2021 0726   Time Frame  long-term goal (LTG), 3 weeks at 02/05/2021 0726   Strategies/Barriers  TBA at 02/05/2021 0726   Bathing Goal 1   Activity/Assistive Device  bathing skills, all at 02/05/2021 0726   [place]  minimum assist (75% or more patient effort) at 02/05/2021 0726   Time Frame  short-term goal (STG), 5 - 7 days at 02/05/2021 0726   Bathing Goal 2   Activity/Assistive Device  bathing skills, all at 02/05/2021 0726   [place]  supervision required at 02/05/2021 0726   Time Frame  long-term goal (LTG), 3 weeks at 02/05/2021 0726   Strategies/Barriers  DME TBD at 02/05/2021 0726   UB Dressing Goal 1   Activity/Assistive Device  upper body dressing at 02/05/2021 0726   [place]  supervision required [CL S] at 02/05/2021 0726   Time Frame  short-term goal (STG), 5 - 7 days at 02/05/2021 0726   UB Dressing Goal 2   Activity/Assistive Device  upper body dressing at 02/05/2021 0726   [place]  modified independence at 02/05/2021 0726   Time Frame  long-term goal (LTG), 3 weeks at 02/05/2021 0726   LB Dressing Goal 1   Activity/Assistive Device  lower body dressing at 02/05/2021 0726   [place]  maximum assist (25-49% patient effort) at 02/05/2021 0726   Time Frame  short-term goal (STG), 5 - 7 days at 02/05/2021 0726   LB Dressing Goal 2   Activity/Assistive Device  lower body dressing at 02/05/2021 0726   [place]  supervision required at 02/05/2021 0726   Time Frame  long-term goal (LTG), 3 weeks at 02/05/2021 0726   Grooming Goal 1   Activity/Assistive Device  grooming skills, all at  02/05/2021 0726   [place]  supervision required at 02/05/2021 0726   Time Frame  short-term goal (STG), 5 - 7 days at 02/05/2021 0726   Grooming Goal 2   Activity/Assistive Device  grooming skills, all at 02/05/2021 0726   [place]  modified independence at 02/05/2021 0726   Time Frame  long-term goal (LTG), 3 weeks at 02/05/2021 0726   Toileting Goal 1   Activity/Assistive Device  toileting skills, all at 02/05/2021 0726   Time Frame  short-term goal (STG), 5 - 7 days at 02/05/2021 0726   Strategies/Barriers  TBA at 02/05/2021 0726   Toileting Goal 2   Activity/Assistive Device  toileting skills, all at 02/05/2021 0726   Time Frame  long-term goal (LTG), 3 weeks at 02/05/2021 0726   Strategies/Barriers  TBA at 02/05/2021 0726        IRF SLP Goals      Most Recent Value   Verbal Expression Goal 1   Verbal Expression Goal 1  STG: pt will complete mod level verbal expression tasks x 90% c min cues. at 02/06/2021 1405   Time Frame  short-term goal (STG), 1 week at 02/06/2021 1405   Verbal Expression Goal 2   Verbal Expression Goal 2  LTG: pt will complete complex/abstract verbal expression tasks x 90% c independent use of WF strategies. at 02/06/2021 1405   Time Frame  long-term goal (LTG), 3 weeks at 02/06/2021 1405   Oral Nutrition/Hydration Goal 1   Activity  effective/safe/independent, use of swallowing techniques [regular/NTL, no overt s/s of aspiration ] at 02/05/2021 1202   Time Frame  short-term goal (STG), 1 week at 02/05/2021 1202   Oral Nutrition/Hydration Goal 2   Activity  effective/safe/independent, use of swallowing techniques [regular/thin, no overt s/s of aspiration ] at 02/05/2021 1202   Time Frame  long-term goal (LTG), 3 weeks at 02/05/2021 1202   Executive Function Goal 1   Activity  complete, abstract thinking tasks, executive function tasks, organization/sequencing tasks, problem solving/reasoning tasks, other (see comments) [STG,  simple-mod level tasks.] at 02/06/2021 2348    [place]/Accuracy  with minimum, verbal cues/redirection, with additional processing time, with repetition of directions, with 90% accuracy at 02/06/2021 1405   Time Frame  1 week at 02/06/2021 1405   Executive Function Goal 2   Activity  complete, abstract thinking tasks, exhibit impulse control, organization/sequencing tasks, problem solving/reasoning tasks, other (see comments) [LTG] at 02/06/2021 1405   [place]/Accuracy  independently, with 90% accuracy, other (see comments) [mod-complex tasks.] at 02/06/2021 1405   Time Frame  3 weeks at 02/06/2021 1405   Memory Goal 1   Activity  short-term memory tasks, immediate recall tasks, working memory tasks, recall recent events, other (see comments) [STG,  simple-mod level tasks.] at 02/06/2021 1405   [place]/Accuracy  minimal cues for use of strategies, with 90% accuracy at 02/06/2021 1405   Time Frame  short-term goal (STG), 1 week at 02/06/2021 1405   Memory Goal 2   Activity  short-term memory tasks, immediate recall tasks, working memory tasks, recall recent events, other (see comments) [LTG,  mod complex information.] at 02/06/2021 1405   [place]/Accuracy  independent use of environmental cues/strategies, with 90% accuracy at 02/06/2021 1405   Time Frame  long-term goal (LTG), 3 weeks at 02/06/2021 1405          Anticipated Discharge Plan  Anticipated Discharge Disposition: home with home health services  Type of Home Care Services: home PT;home OT;nursing      Expected length of stay: 15 days

## 2021-02-07 NOTE — PROGRESS NOTES
Patient: Denis Green  Location: [facility] [address]  MRN: [id number]  Today's date: 2/7/2021    History of Present Illness  Denis LOVE is a 70 y.o. male admitted on 2/4/2021 with Pneumonia due to COVID-19 virus. Principal problem is No Principal Problem: There is no principal problem currently on the Problem List. Please update the Problem List and refresh..   Pt admitted to outside hospital on 1/5/2021 for COVID-19 PNA. Worsening resp status intubated on 1/9- extubated 1/16 to [facility]. Transition to [facility] on 1/23.Eval by nephrology while in ICU due to ERNIE likely secondary to ATN. Creatinine has been improving with excellent urine output. Received Decadron and Remdesivir. Dobhoff tube was placed due to dysphagia. New fever of 102.8 1/20 and 1/21. ID was consulted. Bld cx's negative. CT scan of the chest abdomen pelvis was done with results showing increasing pulmonary infiltrates/groundglass opacities and consolidations in his bilateral lower lobes. Started empirically on cefepime and vancomycin to cover hospital-acquired pneumonia. Pt had increased lethargy on 1/22. Pt removed DHT 1/25; required higher O2 afterward - possible aspiration during the process. 1/26 patient continued to have intermittent confusion; ?post ICU delirium. Neurology is following, MRI brain was unremarkable. EEG showed mild slowing but no seizure activity. D/c'd abx after 5 days d/t lethargy.       Past Medical History  Denis LOVE has a past medical history of Coronary artery disease, Hypertension, and Lipid disorder.    OT Vitals    Date/Time Pulse HR Source SpO2 Pt Activity O2 Therapy O2 Del Method O2 Flow Rate BP BP Location BP Method Pt Position Observations [facility]   02/07/21 1335 80 Monitor 98 % At rest Supplemental oxygen Nasal cannula 3 L/min 127/79 Left upper arm Automatic Sitting per RN pt refusing pain meds despite reporting 10/10 pain VK   02/07/21 1356 78 Monitor -- -- -- -- -- 126/77 Left upper arm Automatic  Sitting --    02/07/21 1400 77 -- 96 % At rest Supplemental oxygen Nasal cannula 1 L/min 120/70 Right upper arm Automatic Sitting -- [facility]      OT Pain    Date/Time Pain Type Pref Pain Scale Side Orientation Location Rating: Rest Rating: Activity [facility]   02/07/21 1335 Pain Assessment number (Numeric Rating Pain Scale) Bilateral lower back 10 10    02/07/21 1356 Pain Reassessment number (Numeric Rating Pain Scale) Bilateral lower back 10 10    02/07/21 1400 Pain Assessment number (Numeric Rating Pain Scale) -- lower back 5 5 [facility]          Prior Living Environment      Most Recent Value   Lives With  spouse, child(mindy), adult [adult son lives in home occasionally]   Living Arrangements  house   Home Accessibility  stairs to enter home (Group), stairs within home (Group)   Living Environment Comment  Lives with wife and intermittently with one son.  [bed and bath on 2nd ,  no powder room on first]   Number of Stairs, Main Entrance  1   Stair Railings, Main Entrance  none   Location, Patient Bedroom  second floor, must negotiate stairs to access   Location, Bathroom  second floor, must negotiate stairs to access   Bathroom Access Comment  Pt. reports has tub shower and stall shower, uses tub shower primarily, has grab bar in shower, has stanard height toilet c wall on both sides   Number of Stairs, Within Home, Primary  12   Surface of Stairs, Within Home, Primary  hardwood   Stair Railings, Within Home, Primary  railings on both sides of stairs          Prior Level of Function      Most Recent Value   Dominant Hand  right   Ambulation  independent   Transferring  independent   Toileting  independent   Bathing  independent   Dressing  independent   Eating  independent   Communication  understands/communicates without difficulty   Swallowing  swallows foods/liquids without difficulty   Baseline Diet/Method of Nutritional Intake  regular solids, thin liquids   Past History of Dysphagia  No previous reports    Prior Level  "of Function Comment  Pt is a cattle/,  previously IND for all IADLs.    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, (+) , enjoys farming and being outside   Patient Goals  \"I want to get back up walking, get stronger, and be self sufficient\"          IRF OT Evaluation and Treatment - 02/07/21 1335        Time Calculation    Start Time  1330     Stop Time  1400     Time Calculation (min)  30 min        Session Details    Document Type  daily treatment/progress note     Mode of Treatment  occupational therapy;individual therapy        General Information    General Observations of Patient  pt received seated in w/c, reporting 10/10 back pain        Community Mobility (IADLs)    Community Mobility  pre-driving assessment        Pre-Driving Assessment    Cognition Issues  MOCA 8.1: pt completes MOCA 8.1, scores 18/30 on MOCA without extra point 2/2 >12 years education. pt scores 1/5 visuospatial/executive fx, 3/3 naming, 4/6 attention, 2/3 language, 1/2 abstraction 4/5 delayed recall with MIS of 13/15, 3/6 orientation.         Daily Progress Summary (OT)    Symptoms Noted During/After Treatment  fatigue;increased pain     Progress Toward Functional Goals (OT)  progressing toward functional goals as expected     Daily Outcome Statement (OT)  session focused on MOCA, pt reporting 10/10 back pain however per RN is refusing pain meds. remains tilted in w/c during session. scores 18/30 on MOCA, greatest difficulty with visuospatial/executive functioning. continue with POC                            IRF OT Goals      Most Recent Value   Transfer Goal 1   Activity/Assistive Device  toilet at 02/05/2021 0726   Time Frame  short-term goal (STG), 5 - 7 days at 02/05/2021 0726   Strategies/Barriers  TBA at 02/05/2021 0726   Transfer Goal 2   Activity/Assistive Device  toilet at " 02/05/2021 0726   Time Frame  long-term goal (LTG), 3 weeks at 02/05/2021 0726   Transfer Goal 3   Activity/Assistive Device  shower at 02/05/2021 0726   Time Frame  short-term goal (STG), 5 - 7 days at 02/05/2021 0726   Strategies/Barriers  TBA at 02/05/2021 0726   Transfer Goal 4   Activity/Assistive Device  shower at 02/05/2021 0726   Time Frame  long-term goal (LTG), 3 weeks at 02/05/2021 0726   Strategies/Barriers  TBA at 02/05/2021 0726   Bathing Goal 1   Activity/Assistive Device  bathing skills, all at 02/05/2021 0726   [place]  minimum assist (75% or more patient effort) at 02/05/2021 0726   Time Frame  short-term goal (STG), 5 - 7 days at 02/05/2021 0726   Bathing Goal 2   Activity/Assistive Device  bathing skills, all at 02/05/2021 0726   [place]  supervision required at 02/05/2021 0726   Time Frame  long-term goal (LTG), 3 weeks at 02/05/2021 0726   Strategies/Barriers  DME TBD at 02/05/2021 0726   UB Dressing Goal 1   Activity/Assistive Device  upper body dressing at 02/05/2021 0726   [place]  supervision required [CL S] at 02/05/2021 0726   Time Frame  short-term goal (STG), 5 - 7 days at 02/05/2021 0726   UB Dressing Goal 2   Activity/Assistive Device  upper body dressing at 02/05/2021 0726   [place]  modified independence at 02/05/2021 0726   Time Frame  long-term goal (LTG), 3 weeks at 02/05/2021 0726   LB Dressing Goal 1   Activity/Assistive Device  lower body dressing at 02/05/2021 0726   [place]  maximum assist (25-49% patient effort) at 02/05/2021 0726   Time Frame  short-term goal (STG), 5 - 7 days at 02/05/2021 0726   LB Dressing Goal 2   Activity/Assistive Device  lower body dressing at 02/05/2021 0726   [place]  supervision required at 02/05/2021 0726   Time Frame  long-term goal (LTG), 3 weeks at 02/05/2021 0726   Grooming Goal 1   Activity/Assistive Device  grooming skills, all at 02/05/2021 0726   [place]  supervision required at 02/05/2021 0726   Time  Frame  short-term goal (STG), 5 - 7 days at 02/05/2021 0726   Grooming Goal 2   Activity/Assistive Device  grooming skills, all at 02/05/2021 0726   [place]  modified independence at 02/05/2021 0726   Time Frame  long-term goal (LTG), 3 weeks at 02/05/2021 0726   Toileting Goal 1   Activity/Assistive Device  toileting skills, all at 02/05/2021 0726   Time Frame  short-term goal (STG), 5 - 7 days at 02/05/2021 0726   Strategies/Barriers  TBA at 02/05/2021 0726   Toileting Goal 2   Activity/Assistive Device  toileting skills, all at 02/05/2021 0726   Time Frame  long-term goal (LTG), 3 weeks at 02/05/2021 0726   Strategies/Barriers  TBA at 02/05/2021 0726

## 2021-02-08 ENCOUNTER — APPOINTMENT (INPATIENT)
Dept: OCCUPATIONAL THERAPY | Facility: REHABILITATION | Age: 71
DRG: 948 | End: 2021-02-08
Payer: COMMERCIAL

## 2021-02-08 ENCOUNTER — APPOINTMENT (INPATIENT)
Dept: SPEECH THERAPY | Facility: REHABILITATION | Age: 71
DRG: 948 | End: 2021-02-08
Payer: COMMERCIAL

## 2021-02-08 ENCOUNTER — APPOINTMENT (INPATIENT)
Dept: RADIOLOGY | Facility: REHABILITATION | Age: 71
DRG: 948 | End: 2021-02-08
Attending: PHYSICAL MEDICINE & REHABILITATION
Payer: COMMERCIAL

## 2021-02-08 ENCOUNTER — APPOINTMENT (INPATIENT)
Dept: WOUND CARE | Facility: REHABILITATION | Age: 71
DRG: 948 | End: 2021-02-08
Payer: COMMERCIAL

## 2021-02-08 ENCOUNTER — APPOINTMENT (INPATIENT)
Dept: PHYSICAL THERAPY | Facility: REHABILITATION | Age: 71
DRG: 948 | End: 2021-02-08
Payer: COMMERCIAL

## 2021-02-08 LAB
ANION GAP SERPL CALC-SCNC: 14 MEQ/L (ref 3–15)
BASOPHILS # BLD: 0.07 K/UL (ref 0.01–0.1)
BASOPHILS NFR BLD: 0.9 %
BUN SERPL-MCNC: 16 MG/DL (ref 8–20)
CALCIUM SERPL-MCNC: 8.9 MG/DL (ref 8.9–10.3)
CHLORIDE SERPL-SCNC: 94 MEQ/L (ref 98–109)
CO2 SERPL-SCNC: 25 MEQ/L (ref 22–32)
CREAT SERPL-MCNC: 0.6 MG/DL (ref 0.8–1.3)
DIFFERENTIAL METHOD BLD: ABNORMAL
EOSINOPHIL # BLD: 0.51 K/UL (ref 0.04–0.54)
EOSINOPHIL NFR BLD: 6.9 %
ERYTHROCYTE [DISTWIDTH] IN BLOOD BY AUTOMATED COUNT: 14 % (ref 11.6–14.4)
GFR SERPL CREATININE-BSD FRML MDRD: >60 ML/MIN/1.73M*2
GLUCOSE SERPL-MCNC: 98 MG/DL (ref 70–99)
HCT VFR BLDCO AUTO: 34.7 % (ref 40.1–51)
HGB BLD-MCNC: 11 G/DL (ref 13.7–17.5)
IMM GRANULOCYTES # BLD AUTO: 0.25 K/UL (ref 0–0.08)
IMM GRANULOCYTES NFR BLD AUTO: 3.4 %
LYMPHOCYTES # BLD: 2.23 K/UL (ref 1.2–3.5)
LYMPHOCYTES NFR BLD: 30.1 %
MCH RBC QN AUTO: 30.6 PG (ref 28–33.2)
MCHC RBC AUTO-ENTMCNC: 31.7 G/DL (ref 32.2–36.5)
MCV RBC AUTO: 96.7 FL (ref 83–98)
MONOCYTES # BLD: 0.96 K/UL (ref 0.3–1)
MONOCYTES NFR BLD: 12.9 %
NEUTROPHILS # BLD: 3.4 K/UL (ref 1.7–7)
NEUTS SEG NFR BLD: 45.8 %
NRBC BLD-RTO: 0 %
PDW BLD AUTO: 11.6 FL (ref 9.4–12.4)
PLATELET # BLD AUTO: 353 K/UL (ref 150–350)
POTASSIUM SERPL-SCNC: 4 MEQ/L (ref 3.6–5.1)
RBC # BLD AUTO: 3.59 M/UL (ref 4.5–5.8)
SODIUM SERPL-SCNC: 133 MEQ/L (ref 136–144)
WBC # BLD AUTO: 7.42 K/UL (ref 3.8–10.5)

## 2021-02-08 PROCEDURE — 92526 ORAL FUNCTION THERAPY: CPT | Mod: GN

## 2021-02-08 PROCEDURE — 63700000 HC SELF-ADMINISTRABLE DRUG: Performed by: PHYSICAL MEDICINE & REHABILITATION

## 2021-02-08 PROCEDURE — 97530 THERAPEUTIC ACTIVITIES: CPT | Mod: GO,59

## 2021-02-08 PROCEDURE — 97535 SELF CARE MNGMENT TRAINING: CPT | Mod: GO

## 2021-02-08 PROCEDURE — 97110 THERAPEUTIC EXERCISES: CPT | Mod: GO,59

## 2021-02-08 PROCEDURE — 97530 THERAPEUTIC ACTIVITIES: CPT | Mod: GP,59

## 2021-02-08 PROCEDURE — 82310 ASSAY OF CALCIUM: CPT | Performed by: PHYSICAL MEDICINE & REHABILITATION

## 2021-02-08 PROCEDURE — 85025 COMPLETE CBC W/AUTO DIFF WBC: CPT | Performed by: PHYSICAL MEDICINE & REHABILITATION

## 2021-02-08 PROCEDURE — 97116 GAIT TRAINING THERAPY: CPT | Mod: GP

## 2021-02-08 PROCEDURE — 73100 X-RAY EXAM OF WRIST: CPT | Mod: LT

## 2021-02-08 PROCEDURE — 92507 TX SP LANG VOICE COMM INDIV: CPT | Mod: GN

## 2021-02-08 PROCEDURE — 36415 COLL VENOUS BLD VENIPUNCTURE: CPT | Performed by: PHYSICAL MEDICINE & REHABILITATION

## 2021-02-08 PROCEDURE — 97110 THERAPEUTIC EXERCISES: CPT | Mod: GP,59

## 2021-02-08 PROCEDURE — 63600000 HC DRUGS/DETAIL CODE: Performed by: PHYSICAL MEDICINE & REHABILITATION

## 2021-02-08 PROCEDURE — 12800000 HC ROOM AND CARE SEMIPRIVATE REHAB

## 2021-02-08 PROCEDURE — 63700000 HC SELF-ADMINISTRABLE DRUG: Performed by: INTERNAL MEDICINE

## 2021-02-08 RX ORDER — FUROSEMIDE 20 MG/1
20 TABLET ORAL
Status: DISCONTINUED | OUTPATIENT
Start: 2021-02-08 | End: 2021-02-24 | Stop reason: HOSPADM

## 2021-02-08 RX ORDER — DICLOFENAC SODIUM 10 MG/G
2 GEL TOPICAL 3 TIMES DAILY
Status: ACTIVE | OUTPATIENT
Start: 2021-02-08 | End: 2021-02-14

## 2021-02-08 RX ADMIN — METOPROLOL TARTRATE 12.5 MG: 25 TABLET, FILM COATED ORAL at 08:33

## 2021-02-08 RX ADMIN — DICLOFENAC SODIUM 2 G: 10 GEL TOPICAL at 20:07

## 2021-02-08 RX ADMIN — Medication 400 MG: at 20:05

## 2021-02-08 RX ADMIN — ACETAMINOPHEN 650 MG: 325 TABLET, FILM COATED ORAL at 08:35

## 2021-02-08 RX ADMIN — GEMFIBROZIL 600 MG: 600 TABLET ORAL at 08:33

## 2021-02-08 RX ADMIN — ALBUTEROL SULFATE 2 PUFF: 90 AEROSOL, METERED RESPIRATORY (INHALATION) at 05:04

## 2021-02-08 RX ADMIN — SENNOSIDES 3 TABLET: 8.6 TABLET, FILM COATED ORAL at 11:27

## 2021-02-08 RX ADMIN — GUAIFENESIN 600 MG: 600 TABLET ORAL at 20:05

## 2021-02-08 RX ADMIN — ACETAMINOPHEN 650 MG: 325 TABLET, FILM COATED ORAL at 20:06

## 2021-02-08 RX ADMIN — DOCUSATE SODIUM 100 MG: 100 CAPSULE, LIQUID FILLED ORAL at 08:34

## 2021-02-08 RX ADMIN — Medication 400 MG: at 08:33

## 2021-02-08 RX ADMIN — DICLOFENAC SODIUM 2 G: 10 GEL TOPICAL at 16:33

## 2021-02-08 RX ADMIN — DOCUSATE SODIUM 100 MG: 100 CAPSULE, LIQUID FILLED ORAL at 16:33

## 2021-02-08 RX ADMIN — ALBUTEROL SULFATE 2 PUFF: 90 AEROSOL, METERED RESPIRATORY (INHALATION) at 17:22

## 2021-02-08 RX ADMIN — ACETAMINOPHEN 650 MG: 325 TABLET, FILM COATED ORAL at 11:28

## 2021-02-08 RX ADMIN — METOPROLOL TARTRATE 12.5 MG: 25 TABLET, FILM COATED ORAL at 20:06

## 2021-02-08 RX ADMIN — ALBUTEROL SULFATE 2 PUFF: 90 AEROSOL, METERED RESPIRATORY (INHALATION) at 11:27

## 2021-02-08 RX ADMIN — DICLOFENAC SODIUM 2 G: 10 GEL TOPICAL at 08:35

## 2021-02-08 RX ADMIN — FUROSEMIDE 20 MG: 20 TABLET ORAL at 16:33

## 2021-02-08 RX ADMIN — HEPARIN SODIUM 5000 UNITS: 5000 INJECTION INTRAVENOUS; SUBCUTANEOUS at 05:04

## 2021-02-08 RX ADMIN — DOCUSATE SODIUM 100 MG: 100 CAPSULE, LIQUID FILLED ORAL at 20:06

## 2021-02-08 RX ADMIN — GUAIFENESIN 600 MG: 600 TABLET ORAL at 08:34

## 2021-02-08 RX ADMIN — ASPIRIN 81 MG: 81 TABLET ORAL at 08:34

## 2021-02-08 RX ADMIN — Medication 3 MG: at 20:06

## 2021-02-08 RX ADMIN — HEPARIN SODIUM 5000 UNITS: 5000 INJECTION INTRAVENOUS; SUBCUTANEOUS at 13:53

## 2021-02-08 RX ADMIN — FUROSEMIDE 20 MG: 20 TABLET ORAL at 11:27

## 2021-02-08 RX ADMIN — AMLODIPINE BESYLATE 5 MG: 5 TABLET ORAL at 08:42

## 2021-02-08 RX ADMIN — ACETAMINOPHEN 650 MG: 325 TABLET, FILM COATED ORAL at 16:34

## 2021-02-08 RX ADMIN — GEMFIBROZIL 600 MG: 600 TABLET ORAL at 16:33

## 2021-02-08 RX ADMIN — HEPARIN SODIUM 5000 UNITS: 5000 INJECTION INTRAVENOUS; SUBCUTANEOUS at 20:06

## 2021-02-08 NOTE — PROGRESS NOTES
Patient: Denis Green  Location: [facility] [address]  MRN: [id number]  Today's date: 2/8/2021    History of Present Illness  Denis LOVE is a 70 y.o. male admitted on 2/4/2021 with Pneumonia due to COVID-19 virus. Principal problem is No Principal Problem: There is no principal problem currently on the Problem List. Please update the Problem List and refresh..   Pt admitted to outside hospital on 1/5/2021 for COVID-19 PNA. Worsening resp status intubated on 1/9- extubated 1/16 to OptiFlow. Transition to [facility]NC on 1/23.Eval by nephrology while in ICU due to ERNIE likely secondary to ATN. Creatinine has been improving with excellent urine output. Received Decadron and Remdesivir. Dobhoff tube was placed due to dysphagia. New fever of 102.8 1/20 and 1/21. ID was consulted. Bld cx's negative. CT scan of the chest abdomen pelvis was done with results showing increasing pulmonary infiltrates/groundglass opacities and consolidations in his bilateral lower lobes. Started empirically on cefepime and vancomycin to cover hospital-acquired pneumonia. Pt had increased lethargy on 1/22. Pt removed DHT 1/25; required higher O2 afterward - possible aspiration during the process. 1/26 patient continued to have intermittent confusion; ?post ICU delirium. Neurology is following, MRI brain was unremarkable. EEG showed mild slowing but no seizure activity. D/c'd abx after 5 days d/t lethargy.       Past Medical History  Denis LOVE has a past medical history of Coronary artery disease, Hypertension, and Lipid disorder.    SLP Pain    Date/Time Pain Type Pref Pain Scale Location Rating: Rest Who   02/08/21 0906 Pain Assessment number (Numeric Rating Pain Scale) back 5 RS   02/08/21 0927 Pain Reassessment number (Numeric Rating Pain Scale) back 4 RS          Prior Living Environment      Most Recent Value   Lives With  spouse, child(mindy), adult [adult son lives in home occasionally]   Living Arrangements  house   Home  Accessibility  stairs to enter home (Group), stairs within home (Group)   Living Environment Comment  Lives with wife and intermittently with one son.  [bed and bath on 2nd ,  no powder room on first]   Number of Stairs, Main Entrance  1   Stair Railings, Main Entrance  none   Location, Patient Bedroom  second floor, must negotiate stairs to access   Location, Bathroom  second floor, must negotiate stairs to access   Bathroom Access Comment  Pt. reports has tub shower and stall shower, uses tub shower primarily, has grab bar in shower, has stanard height toilet c wall on both sides   Number of Stairs, Within Home, Primary  12   Surface of Stairs, Within Home, Primary  hardwood   Stair Railings, Within Home, Primary  railings on both sides of stairs          Prior Level of Function      Most Recent Value   Dominant Hand  right   Ambulation  independent   Transferring  independent   Toileting  independent   Bathing  independent   Dressing  independent   Eating  independent   Communication  understands/communicates without difficulty   Swallowing  swallows foods/liquids without difficulty   Baseline Diet/Method of Nutritional Intake  regular solids, thin liquids   Past History of Dysphagia  No previous reports    Prior Level of Function Comment  Pt is a cattle/,  previously IND for all IADLs.    Assistive Device/Animal Currently Used at Home  none          IRF SLP Evaluation and Treatment - 02/08/21 0906        Time Calculation    Start Time  0900     Stop Time  0930     Time Calculation (min)  30 min        Session Details    Document Type  daily treatment/progress note     Mode of Treatment  speech language pathology;individual therapy        General Information    General Observations of Patient  Pleasant and cooperative, pt received. in his room and taken to therapy office         Cognition/Psychosocial    Comment, Executive Function  Reviewed cognitive goals following evaluation, limited insight or  awareness of change in status since hospitalization, pt reports he is close to his baseline.        Swallowing Intervention    Dysphagia/Swallowing Interventions  compensatory swallowing strategies     Comment, Swallowing Interventions  Oral care completed at session initiation, 2L O2 present via nasal cannula. Thin liquid trials administered via cup edge, Pt was IND with use of small single sips over 6-8oz. No overt s/s of aspiration, althought known silent aspiration present on VFSS completed 1/26/21.         Daily Progress Summary (SLP)    Daily Outcome Statement (SLP)  Strategy use for safe swallowing of thin liqiuds. Discussed advancement of Free Water Protocol to include cup sips of thin water, pending MD approval. ST will continue to observe pt with thin liquid and recommend VFSS prior to advancement of liquids.                        IRF SLP Goals      Most Recent Value   Verbal Expression Goal 1   Verbal Expression Goal 1  STG: pt will complete mod level verbal expression tasks x 90% c min cues. at 02/06/2021 1405   Time Frame  short-term goal (STG), 1 week at 02/06/2021 1405   Verbal Expression Goal 2   Verbal Expression Goal 2  LTG: pt will complete complex/abstract verbal expression tasks x 90% c independent use of WF strategies. at 02/06/2021 1405   Time Frame  long-term goal (LTG), 3 weeks at 02/06/2021 1405   Oral Nutrition/Hydration Goal 1   Activity  effective/safe/independent, use of swallowing techniques [regular/NTL, no overt s/s of aspiration ] at 02/05/2021 1202   Time Frame  short-term goal (STG), 1 week at 02/05/2021 1202   Oral Nutrition/Hydration Goal 2   Activity  effective/safe/independent, use of swallowing techniques [regular/thin, no overt s/s of aspiration ] at 02/05/2021 1202   Time Frame  long-term goal (LTG), 3 weeks at 02/05/2021 1202   Executive Function Goal 1   Activity  complete, abstract thinking tasks, executive function tasks, organization/sequencing tasks, problem  solving/reasoning tasks, other (see comments) [STG,  simple-mod level tasks.] at 02/06/2021 1405   Newton/Accuracy  with minimum, verbal cues/redirection, with additional processing time, with repetition of directions, with 90% accuracy at 02/06/2021 1405   Time Frame  1 week at 02/06/2021 1405   Executive Function Goal 2   Activity  complete, abstract thinking tasks, exhibit impulse control, organization/sequencing tasks, problem solving/reasoning tasks, other (see comments) [LTG] at 02/06/2021 1405   Newton/Accuracy  independently, with 90% accuracy, other (see comments) [mod-complex tasks.] at 02/06/2021 1405   Time Frame  3 weeks at 02/06/2021 1405   Memory Goal 1   Activity  short-term memory tasks, immediate recall tasks, working memory tasks, recall recent events, other (see comments) [STG,  simple-mod level tasks.] at 02/06/2021 1405   Newton/Accuracy  minimal cues for use of strategies, with 90% accuracy at 02/06/2021 1405   Time Frame  short-term goal (STG), 1 week at 02/06/2021 1405   Memory Goal 2   Activity  short-term memory tasks, immediate recall tasks, working memory tasks, recall recent events, other (see comments) [LTG,  mod complex information.] at 02/06/2021 1405   Newton/Accuracy  independent use of environmental cues/strategies, with 90% accuracy at 02/06/2021 1405   Time Frame  long-term goal (LTG), 3 weeks at 02/06/2021 1405

## 2021-02-08 NOTE — PLAN OF CARE
Problem: Rehabilitation (IRF) Plan of Care  Goal: Plan of Care Review  Outcome: Progressing  Flowsheets (Taken 2/8/2021 0449)  Plan of Care Reviewed With: patient  IRF Plan of Care Review: progress ongoing, continue  Outcome Summary: Pt. A&O. He c/o pain to left wrist relieved with topical voltaren and tylenol. He refused midnight dose of ventolin. No s/s of respiratory distress. Spo2 96% on 2L. Slept well.

## 2021-02-08 NOTE — NURSING NOTE
"Pt refused 6pm crestor today. He states he has a cardiologist who he will check with. He reports he had a \"reaction to statins\" and does not want crestor   "

## 2021-02-08 NOTE — PROGRESS NOTES
Subjective    Patient seen and examined on rounds.  Chart reviewed.  Events overnight noted.  History reviewed briefly with patient.     CC:  Deficits in mobility, transfers, self-care status post deconditioning, severe Covid 19 pneumonia, ventilator-dependent respiratory failure, dysphagia, multiple medical problems.     HPI:  Mr. Denis Green is a 70-year-old right-handed white male with chronic conditions significant for coronary artery disease, hypertension, hyperlipidemia who was admitted to [facility] on 1/5/21 due to Covid 19 pneumonia and worsening respiratory status.  He required intubation on 1/9/21 and had ventilator-dependent respiratory failure.  He was treated with Decadron and Remdesivir.  He had dysphagia requiring Dobbhoff tube feeds.  He was extubated on 1/16/21 to OptiFlow.  On 1/23/21 he was transitioned to high flow nasal cannula.  Hospital course was significant for acute renal insufficiency likely secondary to acute tubular necrosis and he was followed by nephrology.  He had subsequent improvement in renal function.  He also had new fever of 102.8°F on 1/20/21 and 1/21/21.  He was followed by infectious disease.  Blood cultures were negative. CT scan of the chest abdomen pelvis was done with results showing increasing pulmonary infiltrates/groundglass opacities and consolidations in his bilateral lower lobes.  He was treated empirically with Cefepime and Vancomycin to cover hospital-acquired pneumonia.  He was noted to be lethargic on 1/22/21.  Subsequently patient removed Dobbhoff tube on 1/25/21 and there is question of possible aspiration during that process.  He continued to have intermittent confusion on 1/26/21 was felt to be possible post ICU delirium.  He was followed by neurology.  MRI brain was unremarkable.  EEG showed mild slowing but no seizure activity was noted.  Antibiotics were discontinued after a 5 day course due to lethargy.  Repeat video swallow study was  "performed for dysphagia and patient was put on a soft diet with nectar thick liquids. On 2/3/21, hemoglobin was 11.2, WBC count 15.1, platelets were 275, BUN was 15, and creatinine was 0.7.  He has been needing assistance for mobility, transfers, self-care postoperatively. He is transferred to [facility] on 2/4/21 for further rehabilitation care.       SUBJ: He reported some pain in left wrist.  He is not sure if he accidentally banged the left wrist against the side rail of the bed.  Denies history of gout.  Left wrist does not appear warm or tender to touch.  We will check x-rays of left wrist.  K pad ordered.  We will also order scheduled Tylenol for 7 days.  Discussed with patient and with nurse Jennifer.     ROS: Denies chest pain or shortness of breath. Other ROS negative. Past, family, social history is unchanged.    Physical Exam      Blood pressure 109/70, pulse 85, temperature 36.5 °C (97.7 °F), temperature source Oral, resp. rate 18, height 1.727 m (5' 8\"), weight 73.3 kg (161 lb 8 oz), SpO2 95 %.  Body mass index is 24.56 kg/m².    General Appearance: Not in acute distress  Head/Ear/Nose/Throat: Normocephalic; Atraumatic.  On oxygen by nasal cannula.  Eye: EOMI; PERRL.   Neck: No JVD; No Bruits.   Respiratory: Decreased breath sounds at bases.   Cardiovascular: RRR; Normal S1, S2.   Gastrointestinal: Soft; NT; +BS.   Extremities: Bilateral lower extremity edema noted.   Musculoskeletal: Functional active range of motion in both upper and lower extremities.    Neurological: Awake alert oriented to person, had some difficulty naming the place and correct date.. Speech is fluent. Cranial nerve examination does not reveal any gross facial asymmetry. Strength testing about 4/5 strength in both upper extremities.  Bilateral hip flexion is 3+/5.  Bilateral quadriceps are 4/5 with the thighs supported.  Bilateral ankle dorsi and plantar flexion are 4/5.  He is grossly able to localize touch and " position sense in great toes.  Deep tendon reflexes are hypoactive and symmetric bilaterally.  Plantars are flexor.  Coordination is functional upper extremities.  Neurologically unchanged.  Behavior/Emotional: Appropriate; Cooperative.   Skin: No obvious rashes noted.        Current Facility-Administered Medications:   •  acetaminophen (TYLENOL) tablet 650 mg, 650 mg, oral, q4h PRN, Omari Spain MD, 650 mg at 02/06/21 2001  •  acetaminophen (TYLENOL) tablet 650 mg, 650 mg, oral, With meals & nightly, Isael Lang MD, 650 mg at 02/07/21 1756  •  albuterol HFA (VENTOLIN HFA) 90 mcg/actuation inhaler 2 puff, 2 puff, inhalation, q6h PRN, Omari Spain MD  •  albuterol HFA (VENTOLIN HFA) 90 mcg/actuation inhaler 2 puff, 2 puff, inhalation, q6h, Laure Bishop MD, 2 puff at 02/07/21 1801  •  alum-mag hydroxide-simeth (MAALOX) 200-200-20 mg/5 mL suspension 30 mL, 30 mL, oral, q6h PRN, Laure Bishop MD  •  amLODIPine (NORVASC) tablet 5 mg, 5 mg, oral, q12h EBER, Laure Bishop MD, 5 mg at 02/07/21 0911  •  aspirin enteric coated tablet 81 mg, 81 mg, oral, Daily, Omari Spain MD, 81 mg at 02/07/21 0911  •  bisacodyL (DULCOLAX) 10 mg suppository 10 mg, 10 mg, rectal, Daily PRN, Isael Lang MD, 10 mg at 02/05/21 1844  •  diclofenac sodium (VOLTAREN) 1 % topical gel 2 g, 2 g, Topical, BID (9a, 10p), Laure Bishop MD  •  docusate sodium (COLACE) capsule 100 mg, 100 mg, oral, TID, Isael Lang MD, 100 mg at 02/07/21 1529  •  furosemide (LASIX) tablet 20 mg, 20 mg, oral, Daily (1p), Laure Bishop MD, 20 mg at 02/07/21 1319  •  gemfibroziL (LOPID) tablet 600 mg, 600 mg, oral, BID , Omari Spain MD, 600 mg at 02/07/21 1529  •  guaiFENesin (MUCINEX) 12 hr ER tablet 600 mg, 600 mg, oral, BID, Laure Bishop MD, 600 mg at 02/07/21 0911  •  heparin (porcine) 5,000 unit/mL injection 5,000 Units, 5,000 Units, subcutaneous, q8h [facility] CA[facility]Jud[facility] Anton, MD, 5,000 Units at 02/07/21 1319  •  magnesium  hydroxide (M.O.M.) 400 mg/5 mL suspension 30 mL, 30 mL, oral, 2x daily PRN, Laure Bishop MD  •  magnesium oxide (MAG-OX) tablet 400 mg, 400 mg, oral, BID, Laure Bishop MD, 400 mg at 02/07/21 0911  •  melatonin ODT 3 mg, 3 mg, oral, Nightly, Laure Bishop MD, 3 mg at 02/06/21 2002  •  metoprolol tartrate (LOPRESSOR) split tablet 12.5 mg, 12.5 mg, oral, BID, Omari Spain MD, 12.5 mg at 02/07/21 0911  •  polyethylene glycol (MIRALAX) 17 gram packet 17 g, 17 g, oral, Daily PRN, Omari Spani MD, 17 g at 02/04/21 2157  •  rosuvastatin (CRESTOR) tablet 5 mg, 5 mg, oral, Daily (6p), Laure Bishop MD, 5 mg at 02/07/21 1756  •  senna (SENOKOT) tablet 3 tablet, 3 tablet, oral, Daily before lunch, Isael Lang MD, 3 tablet at 02/06/21 1241       Labs / Radiology    Lab Results   Component Value Date    WBC 8.15 02/05/2021    HGB 12.0 (L) 02/05/2021    HCT 37.4 (L) 02/05/2021    MCV 95.9 02/05/2021     02/05/2021     Lab Results   Component Value Date    GLUCOSE 96 02/05/2021    CALCIUM 8.8 (L) 02/05/2021     (L) 02/05/2021    K 4.1 02/05/2021    CO2 25 02/05/2021    CL 97 (L) 02/05/2021    BUN 15 02/05/2021    CREATININE 0.6 (L) 02/05/2021       Assessment and Plan    ASSESSMENT PLAN:  1. 70-year-old right-handed white male with chronic conditions significant for coronary artery disease, hypertension, hyperlipidemia who was admitted to [facility] on 1/5/21 due to Covid 19 pneumonia and worsening respiratory status.  He required intubation on 1/9/21 and had ventilator-dependent respiratory failure.  He was treated with Decadron and Remdesivir.  He had dysphagia requiring Dobbhoff tube feeds.  He was extubated on 1/16/21 to OptiFlow.  On 1/23/21 he was transitioned to high flow nasal cannula.  Hospital course was significant for acute renal insufficiency likely secondary to acute tubular necrosis and he was followed by nephrology.  He had subsequent improvement in renal function.  He  also had new fever of 102.8°F on 1/20/21 and 1/21/21.  He was followed by infectious disease.  Blood cultures were negative. CT scan of the chest abdomen pelvis was done with results showing increasing pulmonary infiltrates/groundglass opacities and consolidations in his bilateral lower lobes.  He was treated empirically with Cefepime and Vancomycin to cover hospital-acquired pneumonia.  He was noted to be lethargic on 1/22/21.  Subsequently patient removed Dobbhoff tube on 1/25/21 and there is question of possible aspiration during that process.  He continued to have intermittent confusion on 1/26/21 was felt to be possible post ICU delirium.  He was followed by neurology.  MRI brain was unremarkable.  EEG showed mild slowing but no seizure activity was noted.  Antibiotics were discontinued after a 5 day course due to lethargy.  Repeat video swallow study was performed for dysphagia and patient was put on a soft diet with nectar thick liquids. On 2/3/21, hemoglobin was 11.2, WBC count 15.1, platelets were 275, BUN was 15, and creatinine was 0.7.  He has been needing assistance for mobility, transfers, self-care postoperatively. He is transferred to [place] [facility] on 2/4/21 for further rehabilitation care.       2. DVT prophylaxis - on subcutaneous Heparin.  On SCDs.  Platelets 296 on 2/5/2021.     3.  Deconditioning - recent severe Covid 19 pneumonia, ventilator-dependent respiratory failure, dysphagia, multiple medical problems - He had ventilator-dependent respiratory failure.  He was treated with Decadron and Remdesivir.  Continue PT, OT, speech, psychology.  Follow falls precautions, cardiac precautions, aspiration precautions.     4. GI - On Colace, Senokot.  On PRN MiraLAX, MOM, Maalox.      5.  - voiding.  Denies dysuria.  Monitor post void residuals.     6.  Pulmonary - recent severe Covid 19 pneumonia, ventilator-dependent respiratory failure - on Ventolin HFA.  On Mucinex.     7. Pain - on PRN  Tylenol.     8. Hematology -hemoglobin 12.0, WBC 8.15 on 2/5/2021.     9. CVS - history of coronary artery disease and hypertension.  On Norvasc.  On Lopressor.  On Aspirin.  On Lasix.  On Magnesium oxide.     10.  Hyperlipidemia - on Lopid.     11.  Insomnia - on Melatonin.     12.  Dysphagia -on soft diet with nectar thick liquids.  Free water protocol with ice chips per speech therapy.  Speech therapy and nutrition following.     13. Rehabilitation medicine - Continue comprehensive rehabilitation care. Continue PT, OT, speech, psychology.  We will follow falls precautions, cardiac precautions, monitor pulse oximetry in therapy and follow aspiration precautions.  Tolerating therapies per endurance.  Discussed with speech therapy.  Free water protocol ice chips was started.  Denies coughing with meals.He reported some pain in left wrist.  He is not sure if he accidentally banged the left wrist against the side rail of the bed.  Denies history of gout.  Left wrist does not appear warm or tender to touch.  We will check x-rays of left wrist.  K pad ordered.  We will also order scheduled Tylenol for 7 days.  Discussed with patient and with nurse Rodriguez.     14. Reviewed labs today.  BUN 15, creatinine 0.6 on 2/5/2021.           Isael Lang MD  2/7/2021

## 2021-02-08 NOTE — PLAN OF CARE
Problem: Rehabilitation (IRF) Plan of Care  Goal: Plan of Care Review  Flowsheets (Taken 2/8/2021 6130)  Plan of Care Reviewed With: patient  IRF Plan of Care Review: progress ongoing, continue  Outcome Summary: Patient ambulated 25' with RW and Mod Ax1 with 2nd person for O2 tank management and WC follow.

## 2021-02-08 NOTE — PROGRESS NOTES
Patient: Denis Green  [facility] [address]  MRN: [id number]  Today's date: 2/8/2021    Room modified for easier access to personal belongings. Head rest adjusted to promoted cervical alignment. Education in spirometer use, improving standing balance and tolerance but continues to be limited by general weakness and safety deficit during functional tasks    Prior Living Environment      Most Recent Value   Lives With  spouse, child(mindy), adult [adult son lives in home occasionally]   Living Arrangements  house   Home Accessibility  stairs to enter home (Group), stairs within home (Group)   Living Environment Comment  Lives with wife and intermittently with one son.  [bed and bath on 2nd ,  no powder room on first]   Number of Stairs, Main Entrance  1   Stair Railings, Main Entrance  none   Location, Patient Bedroom  second floor, must negotiate stairs to access   Location, Bathroom  second floor, must negotiate stairs to access   Bathroom Access Comment  Pt. reports has tub shower and stall shower, uses tub shower primarily, has grab bar in shower, has stanard height toilet c wall on both sides   Number of Stairs, Within Home, Primary  12   Surface of Stairs, Within Home, Primary  hardwood   Stair Railings, Within Home, Primary  railings on both sides of stairs          Prior Level of Function      Most Recent Value   Dominant Hand  right   Ambulation  independent   Transferring  independent   Toileting  independent   Bathing  independent   Dressing  independent   Eating  independent   Communication  understands/communicates without difficulty   Swallowing  swallows foods/liquids without difficulty   Baseline Diet/Method of Nutritional Intake  regular solids, thin liquids   Past History of Dysphagia  No previous reports    Prior Level of Function Comment  Pt is a cattle/,  previously IND for all IADLs.    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, (+) , enjoys farming and being outside   Patient Goals  \"I want to get back up walking, get stronger, and be self sufficient\"          IRF OT Evaluation and Treatment - 02/08/21 1038        Time Calculation    Start Time  1030     Stop Time  1200     Time Calculation (min)  90 min        Session Details    Document Type  daily treatment/progress note     Mode of Treatment  occupational therapy;individual therapy        General Information    General Observations of Patient  Pt received reclined in w/c in clinic        Cognition/Psychosocial    Comment, Cognition  Pt motivated, cooperative, asking appropriate questions, able to follow required directions, good historian        Safety Issues, Functional Mobility    Comment, Safety Issues/Impairments (Mobility)  good safety awareness  during standing activity, able to advocate for self when in need of a rest break.        Balance    Balance Assessment  sitting static balance;sitting dynamic balance;sit to stand dynamic balance;standing static balance     Static Sitting Balance  WFL;supported;sitting in chair     Dynamic Sitting Balance  mild impairment     Sit to Stand Dynamic Balance  mild impairment;supported;standing     Static Standing Balance  mild impairment;supported;standing     Comment, Balance  Pt stood x3 x 1 min with UE support on elevated bedrail. OT instructing pt in benefit of good postural control, pt w mild forward flexion through trunk but able to self correct with guidance.         Motor Skills    Motor Skills  coordination;functional endurance;postural control;postural deviations     Coordination  fine motor deficit;gross motor deficit;bilateral;upper extremity    able to use both hands functionally    Functional Endurance  (F-) becomes short of breath during simple ADL tasks, requires lots of rest breaks, pt is working on " expanding/improving  lung volumn with instruction in use of spirometer. After several trials, pt able to demonstrated correct use. OT recommending pt use 10x/hr during the day        Upper Extremity (Therapeutic Exercise)    Exercise Position/Type (UE Therapeutic Exercise)  seated;supine;AROM (active range of motion);active stretching     Range of Motion Exercises (Upper Extremity Therapeutic Exercise)  right;shoulder flexion/extension;shoulder abduction/adduction;elbow flexion/extension;wrist flexion/extension     Reps and Sets (Upper Extremity Therapeutic Exercise)  Issued orange theraband with modified handle to protect thenar region. Pt instructed in proper use then patient demonstrated correct form when completing 1 set of 5 each in shld flex, abld, chest press, elb ext. Pt was instructed to use this RUE exercise as often as able when not in therapy. OT will issue LUE theraband when cleared by doctor due to LUE/wrist pain. Scheduled for x-ray at noon.     Comment (UE Therapeutic Exercise)  ROM good in both UE with exception of L painful wrist but pt is limited by general weakness.        Daily Progress Summary (OT)    Symptoms Noted During/After Treatment  fatigue     Progress Toward Functional Goals (OT)  progressing toward functional goals as expected     Daily Outcome Statement (OT)  Room modified for easier access to personal belongings. Head rest adjusted to promoted cervical alignment. Education in spirometer use, improving standing balance and tolerance but continues to be limited by general weakness and safety deficit during functional tasks     Barriers to Overall Progress (OT)  alance, general weakness     Recommendations  continue POC, assess shower tomorrow am.                       Education provided this session. See the Patient Education summary report for full details.    IRF OT Goals      Most Recent Value   Transfer Goal 1   Activity/Assistive Device  toilet at 02/05/2021 6251   Time Frame   short-term goal (STG), 5 - 7 days at 02/05/2021 0726   Strategies/Barriers  TBA at 02/05/2021 0726   Transfer Goal 2   Activity/Assistive Device  toilet at 02/05/2021 0726   Time Frame  long-term goal (LTG), 3 weeks at 02/05/2021 0726   Transfer Goal 3   Activity/Assistive Device  shower at 02/05/2021 0726   Time Frame  short-term goal (STG), 5 - 7 days at 02/05/2021 0726   Strategies/Barriers  TBA at 02/05/2021 0726   Transfer Goal 4   Activity/Assistive Device  shower at 02/05/2021 0726   Time Frame  long-term goal (LTG), 3 weeks at 02/05/2021 0726   Strategies/Barriers  TBA at 02/05/2021 0726   Bathing Goal 1   Activity/Assistive Device  bathing skills, all at 02/05/2021 0726   [place]  minimum assist (75% or more patient effort) at 02/05/2021 0726   Time Frame  short-term goal (STG), 5 - 7 days at 02/05/2021 0726   Bathing Goal 2   Activity/Assistive Device  bathing skills, all at 02/05/2021 0726   [place]  supervision required at 02/05/2021 0726   Time Frame  long-term goal (LTG), 3 weeks at 02/05/2021 0726   Strategies/Barriers  DME TBD at 02/05/2021 0726   UB Dressing Goal 1   Activity/Assistive Device  upper body dressing at 02/05/2021 0726   [place]  supervision required [CL S] at 02/05/2021 0726   Time Frame  short-term goal (STG), 5 - 7 days at 02/05/2021 0726   UB Dressing Goal 2   Activity/Assistive Device  upper body dressing at 02/05/2021 0726   [place]  modified independence at 02/05/2021 0726   Time Frame  long-term goal (LTG), 3 weeks at 02/05/2021 0726   LB Dressing Goal 1   Activity/Assistive Device  lower body dressing at 02/05/2021 0726   [place]  maximum assist (25-49% patient effort) at 02/05/2021 0726   Time Frame  short-term goal (STG), 5 - 7 days at 02/05/2021 0726   LB Dressing Goal 2   Activity/Assistive Device  lower body dressing at 02/05/2021 0726   [place]  supervision required at 02/05/2021 0726   Time Frame  long-term goal (LTG), 3 weeks at 02/05/2021  0726   Grooming Goal 1   Activity/Assistive Device  grooming skills, all at 02/05/2021 0726   [place]  supervision required at 02/05/2021 0726   Time Frame  short-term goal (STG), 5 - 7 days at 02/05/2021 0726   Grooming Goal 2   Activity/Assistive Device  grooming skills, all at 02/05/2021 0726   [place]  modified independence at 02/05/2021 0726   Time Frame  long-term goal (LTG), 3 weeks at 02/05/2021 0726   Toileting Goal 1   Activity/Assistive Device  toileting skills, all at 02/05/2021 0726   Time Frame  short-term goal (STG), 5 - 7 days at 02/05/2021 0726   Strategies/Barriers  TBA at 02/05/2021 0726   Toileting Goal 2   Activity/Assistive Device  toileting skills, all at 02/05/2021 0726   Time Frame  long-term goal (LTG), 3 weeks at 02/05/2021 0726   Strategies/Barriers  TBA at 02/05/2021 0726

## 2021-02-08 NOTE — PLAN OF CARE
"Plan of Care Review  IRF Plan of Care Review: progress ongoing, continue  Plan of Care Reviewed With: patient  Outcome Summary: Awake and alert, appears forgetful at times. Took all medications whole with NTL per request as patient did not want them with applesauce. Patient c/o 10/10 L wrist pain stating he went to bed last evening and must of hit his L wrist on the side rail on his bed overnight and wants an x-ray. This RN informing MD, verbal order given for voltaren gel 1% 2 g topically to L wrist BID, patient informed. PMR notified later during rounds and placing order for x-ray tomorrow. Patient unable to get timeline when injury occurred but under the belief it occurred last evening. Patient requesting shower, shower provided. Patient trial on RA as patient's O2 saturation 98% when O2 cannula was not properly in nose but patient does desat to 87% when sleeping it appears. Patient wants to constantly be in bed whenever not in therapy and needing to be in w/c, encouraged to get OOB for meals but patient resistant. Education provided re: being OOB, use of incentive to decrease risk of PNA, patient denies ever having PNA. Patient also only wants Ice chips when he is having meals or being given medication. Patient informed he can have ice chips per his FWP when these tasks are completed but not along with. Offered oral care multiple times and patient declined and stating, \"I don't care nevermind\". Patient informed he could not have ice chips without oral care. MD aware and recommending trialing offering of FWP after oral care around [id number] so it will not occur around meals. When offered at 1600 patient declined. Patient also declined dinner this evening. Had window visit. Able to make needs known.  "

## 2021-02-08 NOTE — PROGRESS NOTES
Patient: Denis Green  Location: [facility] [address]  MRN: [id number]  Today's date: 2/8/2021    History of Present Illness  Denis LOVE is a 70 y.o. male admitted on 2/4/2021 with Pneumonia due to COVID-19 virus. Principal problem is No Principal Problem: There is no principal problem currently on the Problem List. Please update the Problem List and refresh..   Pt admitted to outside hospital on 1/5/2021 for COVID-19 PNA. Worsening resp status intubated on 1/9- extubated 1/16 to [facility]. Transition to [facility] on 1/23.Eval by nephrology while in ICU due to ERNIE likely secondary to ATN. Creatinine has been improving with excellent urine output. Received Decadron and Remdesivir. Dobhoff tube was placed due to dysphagia. New fever of 102.8 1/20 and 1/21. ID was consulted. Bld cx's negative. CT scan of the chest abdomen pelvis was done with results showing increasing pulmonary infiltrates/groundglass opacities and consolidations in his bilateral lower lobes. Started empirically on cefepime and vancomycin to cover hospital-acquired pneumonia. Pt had increased lethargy on 1/22. Pt removed DHT 1/25; required higher O2 afterward - possible aspiration during the process. 1/26 patient continued to have intermittent confusion; ?post ICU delirium. Neurology is following, MRI brain was unremarkable. EEG showed mild slowing but no seizure activity. D/c'd abx after 5 days d/t lethargy.       Past Medical History  Denis LOVE has a past medical history of Coronary artery disease, Hypertension, and Lipid disorder.    PT Vitals    Date/Time Pulse HR Source SpO2 Pt Activity O2 Del Method O2 Flow Rate BP BP Location BP Method Pt Position Observations [facility]   02/08/21 1407 75 Monitor 97 % -- Nasal cannula 3 L/min 119/71 Left upper arm Automatic Sitting -- [facility]   02/08/21 1431 88 Monitor 95 % -- Nasal cannula 3 L/min -- -- -- -- Post repeated sit <> stand task [facility]   02/08/21 1441 -- -- 93 % Walking Nasal cannula 3 L/min  -- -- -- -- -- CINDY      PT Pain    Date/Time Pain Type Rating: Activity Who   02/08/21 1407 Pain Assessment 0 CINDY   02/08/21 1441 Pain Reassessment 0 CINDY          Prior Living Environment      Most Recent Value   Lives With  spouse, child(mindy), adult [adult son lives in home occasionally]   Living Arrangements  house   Home Accessibility  stairs to enter home (Group), stairs within home (Group)   Living Environment Comment  Lives with wife and intermittently with one son.  [bed and bath on 2nd ,  no powder room on first]   Number of Stairs, Main Entrance  1   Stair Railings, Main Entrance  none   Location, Patient Bedroom  second floor, must negotiate stairs to access   Location, Bathroom  second floor, must negotiate stairs to access   Bathroom Access Comment  Pt. reports has tub shower and stall shower, uses tub shower primarily, has grab bar in shower, has stanard height toilet c wall on both sides   Number of Stairs, Within Home, Primary  12   Surface of Stairs, Within Home, Primary  hardwood   Stair Railings, Within Home, Primary  railings on both sides of stairs          Prior Level of Function      Most Recent Value   Dominant Hand  right   Ambulation  independent   Transferring  independent   Toileting  independent   Bathing  independent   Dressing  independent   Eating  independent   Communication  understands/communicates without difficulty   Swallowing  swallows foods/liquids without difficulty   Baseline Diet/Method of Nutritional Intake  regular solids, thin liquids   Past History of Dysphagia  No previous reports    Prior Level of Function Comment  Pt is a cattle/,  previously IND for all IADLs.    Assistive Device/Animal Currently Used at Home  none          IRF PT Evaluation and Treatment - 02/08/21 1408        Time Calculation    Start Time  1400     Stop Time  1500     Time Calculation (min)  60 min        Session Details    Document Type  daily treatment/progress note     Mode of  Treatment  individual therapy;physical therapy        Bed Mobility    [place], Sit to Supine  minimum assist (75% or more patient effort)     Comment (Bed Mobility)  HOB flat without use of bedrails        Transfers    Transfers  stand pivot transfer        Sit to Stand Transfer    [place], Sit to Stand Transfer  minimum assist (75% or more patient effort)     Verbal Cues  safety;technique     Assistive Device  walker, front-wheeled     Comment  from WC to RW, Min A for B hip extension to rise        Stand to Sit Transfer    [place], Stand to Sit Transfer  minimum assist (75% or more patient effort)     Verbal Cues  safety;technique     Assistive Device  walker, front-wheeled     Comment  to WC, Min A for controlled lowering to WC         Gait Training    [place], Gait  dependent (less than 25% patient effort)     Assistive Device  walker, front-wheeled     Distance in Feet  30 feet     Gait Pattern Utilized  step-through     Deviations/Abnormal Patterns (Gait)  base of support, narrow;ataxic;bilateral deviations;gait speed decreased;step length decreased     Bilateral Gait Deviations  heel strike decreased     Comment  30' (1x) and 25' (1x) with Mod A x1 for ws and postural contorl posteriorly, 2nd person for close WC follow/O2 tank management. Narrow DAO and ataxic movements in B LEs.         Balance    Comment, Balance  Blocked practice sit <> stand from WC to RW. Min A for B hip extension to stand. Cues for press to stand with at least single UE on each trial. Performed 2x5 reps.         Lower Extremity (Therapeutic Exercise)    Exercise Position/Type (LE Therapeutic Exercise)  seated     General Exercise (LE Therapeutic Exercise)  bilateral     Reps and Sets (LE Therapeutic Exercise)  2x15 reps     Comment (LE Therapeutic Exercise)  Seated therex warm-up:         Daily Progress Summary (PT)    Daily Outcome Statement (PT)  Patient was able to significantly improve ambulation distance durign  session. Pt will continue to require close WC follow for all distances at this time. Plan to trial elevations at next session. Cont to progress activity as tolerated.                                IRF PT Goals      Most Recent Value   Bed Mobility Goal 1   Activity/Assistive Device  rolling to left, rolling to right, sit to supine/supine to sit at 02/05/2021 1030   [place]  supervision required at 02/05/2021 1030   Time Frame  short-term goal (STG), 1 week at 02/05/2021 1030   Bed Mobility Goal 2   Activity/Assistive Device  rolling to left, rolling to right, sit to supine/supine to sit at 02/05/2021 1030   [place]  modified independence at 02/05/2021 1030   Time Frame  long-term goal (LTG), 21 days or less at 02/05/2021 1030   Transfer Goal 1   Activity/Assistive Device  bed-to-chair/chair-to-bed, sit-to-stand/stand-to-sit, stand pivot, walker, front-wheeled at 02/05/2021 1030   [place]  moderate assist (50-74% patient effort), verbal cues required at 02/05/2021 1030   Time Frame  short-term goal (STG), 1 week at 02/05/2021 1030   Transfer Goal 2   Activity/Assistive Device  sit-to-stand/stand-to-sit, bed-to-chair/chair-to-bed, stand pivot, walker, front-wheeled at 02/05/2021 1030   [place]  supervision required at 02/05/2021 1030   Time Frame  long-term goal (LTG), 21 days or less at 02/05/2021 1030   Gait/Walking Locomotion Goal 1   Activity/Assistive Device  gait (walking locomotion), decrease fall risk, increase endurance/gait distance, walker, front-wheeled at 02/05/2021 1030   Distance  15 feet at 02/05/2021 1030   [place]  moderate assist (50-74% patient effort) [and close S of 2nd person for safety] at 02/05/2021 1030   Time Frame  short-term goal (STG), 1 week at 02/05/2021 1030   Gait/Walking Locomotion Goal 2   Activity/Assistive Device  gait (walking locomotion), decrease asymmetrical patterns, decrease fall risk, diminish gait deviation, improve balance and speed, increase  endurance/gait distance, turning, left, turning, right, walker, front-wheeled at 02/05/2021 1030   Distance  100 feet at 02/05/2021 1030   [place]  supervision required at 02/05/2021 1030   Time Frame  long-term goal (LTG), 21 days or less at 02/05/2021 1030

## 2021-02-08 NOTE — PLAN OF CARE
Problem: Rehabilitation (IRF) Plan of Care  Goal: Plan of Care Review  Flowsheets (Taken 2/8/2021 5588)  Plan of Care Reviewed With: patient  IRF Plan of Care Review: progress ongoing, continue  Outcome Summary: Strategy use for safe swallowing of thin liqiuds. Discussed advancement of Free Water Protocol to include cup sips of thin water, pending MD approval.

## 2021-02-08 NOTE — CONSULTS
Wound Ostomy Continence Note    Subjective    HPI Patient is a 70 y.o. male who was admitted on 2/4/2021 with a diagnosis of Pneumonia due to COVID-19 virus [U07.1, J12.82]  Physical deconditioning [R53.81].    Problem list Problem List:   Patient Active Problem List   Diagnosis   • Acute metabolic encephalopathy   • ARDS (adult respiratory distress syndrome) (CMS/HCC)   • Coronary artery disease involving native coronary artery of native heart without angina pectoris   • Hypoxia   • Pneumonia due to COVID-19 virus   • Transaminitis   • Essential hypertension   • Dyslipidemia   • Atelectasis   • Physical deconditioning      PMH/PSH Medical History:   Past Medical History:   Diagnosis Date   • Coronary artery disease    • Hypertension    • Lipid disorder      Surgical History: No past surgical history on file.   Assessment and Recommendation         Seen for skin assessment. PI prevention education given. Foam dressing to sacrum, history of pilonidal cyst surgery, scar noted. Admits to pain at times to his tailbone. Suggest: turn q 2 hours in bed, weight shift in wheelchair, q 30 minutes, offload heels from bed. Foam to sacrum, assess daily, change weekly. WOCN consult is completed. Please order a new consult if an additional skin assessment is indicated.     Date: 02/08/21  Signature: Hiwot Rojas RN

## 2021-02-08 NOTE — PROGRESS NOTES
"[facility] Progress Note          Patient was seen and examined at bedside.    Subjective:   2/7/2021:  1. Left wrist pain,  tender to touch and mild swelling, no redness noted. Pt states he \"hit his wrist on the bed a couple of nights ago.\" prn pain regimen ;  2. Mixed significant dyslipidemia: LCL-C 185, HDL-C 30, , h/o HTN, HLD, and CAD, only gemfibrozil is far from adequate, labeled Lipitor allergy, still worth retrial with hydrophilic formula Crestor from low dose, which is absolutely necessary and also guideline recommended;     3. His respiratory status and SO2 are fine.   CXR 2/5/2021:IMPRESSION:  Bilateral airspace opacities most prominent in the left upper lobe and left  lower lobe likely representing COVID19 pneumonia.    Denies nausea, vomiting, abdominal pain or discomfort, dysuria, cough/sputum, running nose, sore throat, chest pain, palpitation, SOB or orthopnea, dizziness or LH,  HA.    2/8/2021:  1. Left wrist pain with focal mild swelling, no erythema, no significant tenderness, he related to recent injury, provide topical Voltaren jell;  2. B/l lungs quite a few crackles/rales,   CXR 2/5/2021:IMPRESSION:  Bilateral airspace opacities most prominent in the left upper lobe and left  lower lobe likely representing COVID19 pneumonia.  Increase po lasix to 20 mg bid for pulm edema, cont albuterol inhaler with mucolytic agents, encourage incentive spirometry use;   3. Last BM on 2/5/2021, his BS is fine, cont current bowel regimen, talked with RN about his care plan.  Reviewed his am labs, benign       Objective   Vital signs in last 24 hours:  Temp:  [36.5 °C (97.7 °F)-36.8 °C (98.2 °F)] 36.8 °C (98.2 °F)  Heart Rate:  [62-85] 80  Resp:  [18-20] 20  BP: (109-159)/(67-79) 147/71      Intake/Output Summary (Last 24 hours) at 2/8/2021 1102  Last data filed at 2/8/2021 0420  Gross per 24 hour   Intake --   Output 200 ml   Net -200 ml     Intake/Output this shift:  No " intake/output data recorded.   Review of Systems:  All other systems reviewed and negative except as noted in the HPI.   Objective      Labs  reviewed his labs thoroughly   Lab Results   Component Value Date    WBC 7.42 02/08/2021    HGB 11.0 (L) 02/08/2021    HCT 34.7 (L) 02/08/2021    MCV 96.7 02/08/2021     (H) 02/08/2021     Lab Results   Component Value Date    GLUCOSE 98 02/08/2021    CALCIUM 8.9 02/08/2021     (L) 02/08/2021    K 4.0 02/08/2021    CO2 25 02/08/2021    CL 94 (L) 02/08/2021    BUN 16 02/08/2021    CREATININE 0.6 (L) 02/08/2021       Imaging  OSH imaging study reports reviewed:    CXR 2/5/2021:  IMPRESSION:  Bilateral airspace opacities most prominent in the left upper lobe and left  lower lobe likely representing COVID19 pneumonia.         Full Code    Physical Exam:  Head/Ear/Nose/Throat: normocephalic; atraumatic; moisture mouth mm, no oropharyngeal thrush noted.   Eyes: anicteric sclera, EOMI; PERRL.   Neck : supple, no JVD, no carotid bruits appeciated.   Respiratory: no evidence of labored breathing, lung sounds a little coarse, mild bibasilar diminished BS, no remarkable w/r/c.   Cardiovascular: RRR; normal S1, S2; no m/r/g; no S3 or S4.   Gastrointestinal: soft; NT; BS normal; mildly distended; no CVAT b/l.   Genitourinary: no lim.   Extremities : no c/c/e .   Neurological: AO x 3, fluent speeches, following commands, CNS II-XII grossly intact; no focal neurologic deficits.   Behavior/Emotional: in NAD, appropriate; cooperative.   Skin: clean, dry and intact.     Plan of care was discussed with patient, RN, and PMR attending.     Assessment     CC: S/p severe covid-19 PNA complicated by VDRF, dysphagia, ERNIE, bacterial PNA, and  hospital delirium, physical deconditioning with ADL and ambulatory dysfunction.       70 y.o. male, I PTA, with PMH of HTN, HLD, and CAD, admitted to [facility] on 1/5/2021 for severeCOVID-19 PNA. Worsening resp status intubated on 1/9- extubated 1/16 to  [facility]. Transition to [facility] on 1/23.Eval by nephrology while in ICU due to ERNIE likely secondary to ATN. Creatinine has been improving with excellent urine output. Received Decadron and Remdesivir. Dobhoff tube was placed due to dysphagia. New fever of 102.8 on 1/20 and 1/21. ID was consulted. Bld cx's negative. CT scan of the chest abdomen pelvis was done with results showing increasing pulmonary infiltrates/groundglass opacities and consolidations in his bilateral lower lobes. Started empirically on cefepime and vancomycin to cover hospital-acquired pneumonia. Pt had increased lethargy on 1/22. Pt removed DHT 1/25; required higher O2 afterward - possible aspiration during the process. 1/26 patient continued to have intermittent confusion; ?post ICU delirium. Neurology is following, MRI brain was unremarkable. EEG showed mild slowing but no seizure activity. D/c'd abx after 5 days d/t lethargy. Repeat VFSS performed- now on soft nectar thick liquid diet .  Pt is AA&Ox3 with periods of confusion. Tolerates soft diet with NTL, voiding in urinal, O2 @ 2-3L via nc. Participating in therapies with marked improvement, functionally, he has residual ADL and ambulatory dysfunction, rec's for acute rehab prior to home with SO. Transferred to [facility] on 2/4/2021.      1.S/p severe covid-19 PNA complicated by VDRF, dysphagia, ERNIE, bacterial PNA, and  hospital delirium, physical deconditioning with ADL and ambulatory dysfunction : inpt acute rehab, gait and balancing training, SLP therapy, nutrition support,  Fall/aspiration precaution, medical management, DVT prophylaxis, dermal defense, f/u with PCP after inpt rehab.     2. Pulm-S/p severe covid-19 PNA complicated by VDRF, s/p possible bacteria PNA,  hypoxemia with mild exertion, atelectasis: monitor SO2, prn NC O2, keep SO2>92%, incentive spirometry, bronchodilator inhaler, mucolytic agent, pulm toileting. Check CXR.      3. Psych-s/p hospital acute delirium, intermittent  confusion: his metal status has much improved, monitor his mood and mentation, help his orientation, psychology CBT, avoid unnecessary sedatives,  support.      4. GI-on soft nectar thick liquid diet, constipation:SLP evaluation to advance his diet as tolerated; provide constipation bowel regimen, keep adequate hydration, timed toilet visits.     5. DVT prophy: SCD, early ambulation, SQ heparin, check LE venous DUS to r/o DVT.       6. HTN, dyslipidemia: cont home HTN meds with holding parameter, orthostatic hypotension precaution, monitor BP . On gemfibrozil, f/u with his PCP.      Mixed significant dyslipidemia: LCL-C 185, HDL-C 30, , h/o HTN, HLD, and CAD, only gemfibrozil is far from adequate, labeled Lipitor allergy, still worth retrial with hydrophilic formula Crestor from low dose, which is absolutely necessary and also guideline recommended    7. Renal-s/p ERNIE recovered, electrolytes imbalance: monitor renal function and volume status, avoid nephrotoxic agents and low BP,  monitor and keep electrolytes balance.     8. - urinary retention: timed voiding trial, monitor PVR with prn cic, check UA/UCX.     Billing code: [id number]  Diagnoses:  Patient Active Problem List   Diagnosis   • Acute metabolic encephalopathy   • ARDS (adult respiratory distress syndrome) (CMS/HCC)   • Coronary artery disease involving native coronary artery of native heart without angina pectoris   • Hypoxia   • Pneumonia due to COVID-19 virus   • Transaminitis   • Essential hypertension   • Dyslipidemia   • Atelectasis   • Physical deconditioning   complicated case with multiple comorbidities as mentioned in subjective section, spent 35 min to manage the case, >50% of the time consulting the patient about current medical condition, existing comorbidities, new findings/concerns and care/management plan.       Laure Bishop MD  2/8/2021

## 2021-02-08 NOTE — PROGRESS NOTES
Inpatient Psychology Initial Intake    Duration:  30 minutes    Denis Green, : 1950, a 70 y.o. male, for initial evaluation visit to discuss Adjustment to Disability.    HPI: No chief complaint on file.         Past Medical History:   Diagnosis Date   • Coronary artery disease    • Hypertension    • Lipid disorder      No past surgical history on file.  No family history on file.  Social History     Socioeconomic History   • Marital status:      Spouse name: Dana   • Number of children: 2   • Years of education: 12   • Highest education level: High school graduate   Occupational History   • Occupation: Owned pet stores/farmer   Social Needs   • Financial resource strain: Not on file   • Food insecurity     Worry: Not on file     Inability: Not on file   • Transportation needs     Medical: Not on file     Non-medical: Not on file   Tobacco Use   • Smoking status: Not on file   Substance and Sexual Activity   • Alcohol use: Not on file   • Drug use: Not on file   • Sexual activity: Not on file   Lifestyle   • Physical activity     Days per week: Not on file     Minutes per session: Not on file   • Stress: Not on file   Relationships   • Social connections     Talks on phone: Not on file     Gets together: Not on file     Attends Protestant service: Not on file     Active member of club or organization: Not on file     Attends meetings of clubs or organizations: Not on file     Relationship status: Not on file   • Intimate partner violence     Fear of current or ex partner: Not on file     Emotionally abused: Not on file     Physically abused: Not on file     Forced sexual activity: Not on file   Other Topics Concern   • Not on file   Social History Narrative    He reports he lives with his wife.  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  He reports he has 2 children who live nearby.  He reports prior to admission he was independent in everything including driving.  He denies  previous psychiatric history.  He denies the use of substances       Current Legal Status:       Number of Arrests:      Previous Mental Health History:   Previous Mental Health Treatment:  N/A  Previous Suicidal Behavior:  N/A  Previous Self-Injurious Behavior:  N/A  Previous Homicidal Behavior:  N/A  Previous Substance Abuse Treatment: N/A    Current Facility-Administered Medications   Medication Dose Route Frequency Provider Last Rate Last Admin   • acetaminophen (TYLENOL) tablet 650 mg  650 mg oral q4h PRN Omari Spain MD   650 mg at 02/06/21 2001   • acetaminophen (TYLENOL) tablet 650 mg  650 mg oral With meals & nightly Isael Lang MD   650 mg at 02/07/21 2056   • albuterol HFA (VENTOLIN HFA) 90 mcg/actuation inhaler 2 puff  2 puff inhalation q6h PRN Omari Spain MD       • albuterol HFA (VENTOLIN HFA) 90 mcg/actuation inhaler 2 puff  2 puff inhalation q6h Laure Bishop MD   2 puff at 02/07/21 1801   • alum-mag hydroxide-simeth (MAALOX) 200-200-20 mg/5 mL suspension 30 mL  30 mL oral q6h PRN Laure Bishop MD       • amLODIPine (NORVASC) tablet 5 mg  5 mg oral q12h EBER Laure Bishop MD   5 mg at 02/07/21 0911   • aspirin enteric coated tablet 81 mg  81 mg oral Daily Omari Spain MD   81 mg at 02/07/21 0911   • bisacodyL (DULCOLAX) 10 mg suppository 10 mg  10 mg rectal Daily PRN Isael Lang MD   10 mg at 02/05/21 1844   • diclofenac sodium (VOLTAREN) 1 % topical gel 2 g  2 g Topical BID (9a, 10p) Laure Bishop MD   2 g at 02/07/21 2105   • docusate sodium (COLACE) capsule 100 mg  100 mg oral TID Isael Lang MD   100 mg at 02/07/21 2057   • furosemide (LASIX) tablet 20 mg  20 mg oral Daily (1p) Laure Bishop MD   20 mg at 02/07/21 1319   • gemfibroziL (LOPID) tablet 600 mg  600 mg oral BID AC Judit, Omari, MD   600 mg at 02/07/21 1529   • guaiFENesin (MUCINEX) 12 hr ER tablet 600 mg  600 mg oral BID Laure Bishop MD   600 mg at 02/07/21 2055   • heparin (porcine) 5,000  unit/mL injection 5,000 Units  5,000 Units subcutaneous q8h [facility] Omari Spain MD   5,000 Units at 02/07/21 2058   • magnesium hydroxide (M.O.M.) 400 mg/5 mL suspension 30 mL  30 mL oral 2x daily PRN Laure Bishop MD       • magnesium oxide (MAG-OX) tablet 400 mg  400 mg oral BID Laure Bishop MD   400 mg at 02/07/21 2057   • melatonin ODT 3 mg  3 mg oral Nightly Laure Bishop MD   3 mg at 02/07/21 2055   • metoprolol tartrate (LOPRESSOR) split tablet 12.5 mg  12.5 mg oral BID Omari Spain MD   12.5 mg at 02/07/21 2055   • polyethylene glycol (MIRALAX) 17 gram packet 17 g  17 g oral Daily PRN Omari Spain MD   17 g at 02/04/21 2157   • rosuvastatin (CRESTOR) tablet 5 mg  5 mg oral Daily (6p) Laure Bishop MD   5 mg at 02/07/21 1756   • senna (SENOKOT) tablet 3 tablet  3 tablet oral Daily before lunch Isael Lang MD   3 tablet at 02/06/21 1241       Current Evaluation:   Mental Status Exam:  Arousal Level: Alert  Appearance: Well Groomed  Speech: Regular  Psychomotor Functioning: Fatigued  Eye Contact: WNL  Est. Premorbid Intelligence: Average  Orientation: Fully oriented  Attention: Impaired, Mild  Concentration: Impaired, Mild  Recent Memory: WNL  Remote Memory: WNL  Thought Content: Appropriate  Thought Process: WNL  Insight: Intact  Perceptual Function: Normal  Delusions: None or age appropriate  Sleeping: No Change  Appetite: No Change  Libido: Non-Contributory  Affect: Full Range  Mood: Hopeful, Motivated    Assessments done this visit:   MMSE=21/27  [place] Suicide Severity Rating Scale:  Not done today    Risk Assessment:   Suicidal Ideation: Not Present  Self Injurious Behavior:  Not Present  Irritability:  Not Present  Homicidal Behavior: Not Present  Estimate of Risk:  None  If risk identified have suicide precautions been implemented? N/A    Interventions  Acceptance & Adjustment  Cognitive Behavior Training  Monitoring of Symptoms  Psychoeducation    Psychoeducation provided  "on:  Other: Deconditioning     Recommendations:   Individual Therapy  25 minutes1 times weekly    Goals    Maximize Adjustment and Acceptance of Limitations         Diagnosis: Adjustment disorder with Anxiety    Diagnostic Impression: Mr. Green is a 66-year-old man who is status post deconditioning secondary to Covid pneumonia.  He is on oxygen.  He does report significant fatigue.  However he reports that the therapist told him he did well in his initial therapy sessions.  He described it as \"very hard.\"  He reports if he asked me I did not think I did so well.  He reports the hardest thing is about his recovery is \"his need for therapy.\"  He reports he tends to be a positive person and denies significant emotional distress.  He reports that he is hopeful and motivated for recovery.  He reports his family is supportive.  He denies a previous psychiatric history.  During interview he provided an accurate history and displays a full range of affect.  He does struggle with fatigue and he recognizes this.  Cognition is is mildly impaired.  He had difficulty with both attention and recall task.  Recall was 1 out of 3 though cues helped.  He reports this is a decline from his previous functioning.  Is felt fatigue likely contributed to difficulty in these areas.  Psychology to follow for support during this inpatient admission.        Leonela Nunes, PSY.D   "

## 2021-02-08 NOTE — PLAN OF CARE
Problem: Rehabilitation (IRF) Plan of Care  Goal: Plan of Care Review  Flowsheets (Taken 2/8/2021 4314)  Plan of Care Reviewed With: patient  IRF Plan of Care Review: progress ongoing, continue  Outcome Summary: Room modified for easier access to personal belongings. Head rest adjusted to promoted cervical alignment. Education in spirometer use, improving standing balance and tolerance but continues to be limited by general weakness and safety deficit during functional tasks

## 2021-02-09 ENCOUNTER — APPOINTMENT (INPATIENT)
Dept: SPEECH THERAPY | Facility: REHABILITATION | Age: 71
DRG: 948 | End: 2021-02-09
Payer: COMMERCIAL

## 2021-02-09 ENCOUNTER — APPOINTMENT (INPATIENT)
Dept: OCCUPATIONAL THERAPY | Facility: REHABILITATION | Age: 71
DRG: 948 | End: 2021-02-09
Payer: COMMERCIAL

## 2021-02-09 ENCOUNTER — APPOINTMENT (INPATIENT)
Dept: PHYSICAL THERAPY | Facility: REHABILITATION | Age: 71
DRG: 948 | End: 2021-02-09
Payer: COMMERCIAL

## 2021-02-09 PROCEDURE — 92526 ORAL FUNCTION THERAPY: CPT | Mod: GN

## 2021-02-09 PROCEDURE — 63700000 HC SELF-ADMINISTRABLE DRUG: Performed by: PHYSICAL MEDICINE & REHABILITATION

## 2021-02-09 PROCEDURE — 97530 THERAPEUTIC ACTIVITIES: CPT | Mod: GP,59

## 2021-02-09 PROCEDURE — 97535 SELF CARE MNGMENT TRAINING: CPT | Mod: GO

## 2021-02-09 PROCEDURE — 97110 THERAPEUTIC EXERCISES: CPT | Mod: GO

## 2021-02-09 PROCEDURE — 63600000 HC DRUGS/DETAIL CODE: Performed by: PHYSICAL MEDICINE & REHABILITATION

## 2021-02-09 PROCEDURE — 97110 THERAPEUTIC EXERCISES: CPT | Mod: GP,59

## 2021-02-09 PROCEDURE — 12800000 HC ROOM AND CARE SEMIPRIVATE REHAB

## 2021-02-09 PROCEDURE — 92507 TX SP LANG VOICE COMM INDIV: CPT | Mod: GN

## 2021-02-09 PROCEDURE — 63700000 HC SELF-ADMINISTRABLE DRUG: Performed by: INTERNAL MEDICINE

## 2021-02-09 RX ADMIN — METOPROLOL TARTRATE 12.5 MG: 25 TABLET, FILM COATED ORAL at 20:12

## 2021-02-09 RX ADMIN — DICLOFENAC SODIUM 2 G: 10 GEL TOPICAL at 20:13

## 2021-02-09 RX ADMIN — Medication 3 MG: at 20:12

## 2021-02-09 RX ADMIN — HEPARIN SODIUM 5000 UNITS: 5000 INJECTION INTRAVENOUS; SUBCUTANEOUS at 06:32

## 2021-02-09 RX ADMIN — GEMFIBROZIL 600 MG: 600 TABLET ORAL at 08:59

## 2021-02-09 RX ADMIN — GEMFIBROZIL 600 MG: 600 TABLET ORAL at 15:54

## 2021-02-09 RX ADMIN — SENNOSIDES 3 TABLET: 8.6 TABLET, FILM COATED ORAL at 12:36

## 2021-02-09 RX ADMIN — DOCUSATE SODIUM 100 MG: 100 CAPSULE, LIQUID FILLED ORAL at 20:13

## 2021-02-09 RX ADMIN — FUROSEMIDE 20 MG: 20 TABLET ORAL at 09:00

## 2021-02-09 RX ADMIN — Medication 400 MG: at 20:13

## 2021-02-09 RX ADMIN — DICLOFENAC SODIUM 2 G: 10 GEL TOPICAL at 09:01

## 2021-02-09 RX ADMIN — MAGNESIUM HYDROXIDE 30 ML: 400 SUSPENSION ORAL at 09:04

## 2021-02-09 RX ADMIN — AMLODIPINE BESYLATE 5 MG: 5 TABLET ORAL at 20:13

## 2021-02-09 RX ADMIN — ALBUTEROL SULFATE 2 PUFF: 90 AEROSOL, METERED RESPIRATORY (INHALATION) at 17:26

## 2021-02-09 RX ADMIN — HEPARIN SODIUM 5000 UNITS: 5000 INJECTION INTRAVENOUS; SUBCUTANEOUS at 13:44

## 2021-02-09 RX ADMIN — ALBUTEROL SULFATE 2 PUFF: 90 AEROSOL, METERED RESPIRATORY (INHALATION) at 12:37

## 2021-02-09 RX ADMIN — HEPARIN SODIUM 5000 UNITS: 5000 INJECTION INTRAVENOUS; SUBCUTANEOUS at 20:13

## 2021-02-09 RX ADMIN — METOPROLOL TARTRATE 12.5 MG: 25 TABLET, FILM COATED ORAL at 08:59

## 2021-02-09 RX ADMIN — FUROSEMIDE 20 MG: 20 TABLET ORAL at 15:54

## 2021-02-09 RX ADMIN — ASPIRIN 81 MG: 81 TABLET ORAL at 09:01

## 2021-02-09 RX ADMIN — GUAIFENESIN 600 MG: 600 TABLET ORAL at 20:12

## 2021-02-09 RX ADMIN — GUAIFENESIN 600 MG: 600 TABLET ORAL at 09:00

## 2021-02-09 RX ADMIN — ACETAMINOPHEN 650 MG: 325 TABLET, FILM COATED ORAL at 09:01

## 2021-02-09 RX ADMIN — DOCUSATE SODIUM 100 MG: 100 CAPSULE, LIQUID FILLED ORAL at 09:00

## 2021-02-09 RX ADMIN — BISACODYL 10 MG: 10 SUPPOSITORY RECTAL at 18:50

## 2021-02-09 RX ADMIN — DOCUSATE SODIUM 100 MG: 100 CAPSULE, LIQUID FILLED ORAL at 15:54

## 2021-02-09 RX ADMIN — ACETAMINOPHEN 650 MG: 325 TABLET, FILM COATED ORAL at 12:37

## 2021-02-09 RX ADMIN — ACETAMINOPHEN 650 MG: 325 TABLET, FILM COATED ORAL at 15:55

## 2021-02-09 RX ADMIN — ACETAMINOPHEN 650 MG: 325 TABLET, FILM COATED ORAL at 20:12

## 2021-02-09 RX ADMIN — DICLOFENAC SODIUM 2 G: 10 GEL TOPICAL at 15:58

## 2021-02-09 RX ADMIN — ALBUTEROL SULFATE 2 PUFF: 90 AEROSOL, METERED RESPIRATORY (INHALATION) at 06:33

## 2021-02-09 RX ADMIN — ALBUTEROL SULFATE 2 PUFF: 90 AEROSOL, METERED RESPIRATORY (INHALATION) at 01:26

## 2021-02-09 RX ADMIN — Medication 400 MG: at 09:00

## 2021-02-09 NOTE — PROGRESS NOTES
Patient: Denis Green  Location: [facility] [address]  MRN: [id number]  Today's date: 2/9/2021    History of Present Illness  Denis LOVE is a 70 y.o. male admitted on 2/4/2021 with Pneumonia due to COVID-19 virus. Principal problem is No Principal Problem: There is no principal problem currently on the Problem List. Please update the Problem List and refresh..   Pt admitted to outside hospital on 1/5/2021 for COVID-19 PNA. Worsening resp status intubated on 1/9- extubated 1/16 to OptiFlow. Transition to [facility]NC on 1/23.Eval by nephrology while in ICU due to ERNIE likely secondary to ATN. Creatinine has been improving with excellent urine output. Received Decadron and Remdesivir. Dobhoff tube was placed due to dysphagia. New fever of 102.8 1/20 and 1/21. ID was consulted. Bld cx's negative. CT scan of the chest abdomen pelvis was done with results showing increasing pulmonary infiltrates/groundglass opacities and consolidations in his bilateral lower lobes. Started empirically on cefepime and vancomycin to cover hospital-acquired pneumonia. Pt had increased lethargy on 1/22. Pt removed DHT 1/25; required higher O2 afterward - possible aspiration during the process. 1/26 patient continued to have intermittent confusion; ?post ICU delirium. Neurology is following, MRI brain was unremarkable. EEG showed mild slowing but no seizure activity. D/c'd abx after 5 days d/t lethargy.       Past Medical History  Denis LOVE has a past medical history of Coronary artery disease, Hypertension, and Lipid disorder.       02/09/21 0808   Pain/Comfort/Sleep   Pain Charting Type Pain Assessment   Preferred Pain Scale number (Numeric Rating Pain Scale)   Pain Rating (0-10): Rest 0   Vital Signs   Heart Rate 89   Heart Rate Source Monitor   SpO2 92 %   Patient Activity At rest   Oxygen Therapy Supplemental oxygen   O2 Delivery Method Nasal cannula   O2 Flow Rate (L/min) 3 L/min   /67   BP Location Right upper arm    BP Method Automatic   Patient Position Sitting        02/09/21 0925   Pain/Comfort/Sleep   Pain Charting Type Pain Reassessment   Preferred Pain Scale number (Numeric Rating Pain Scale)   Pain Rating (0-10): Rest 0   Vital Signs   Heart Rate 94   SpO2 94 %   Patient Activity At rest   Oxygen Therapy Supplemental oxygen   O2 Delivery Method Nasal cannula   O2 Flow Rate (L/min) 2 L/min         Prior Living Environment      Most Recent Value   Lives With  spouse, child(mindy), adult [adult son lives in home occasionally]   Living Arrangements  house   Home Accessibility  stairs to enter home (Group), stairs within home (Group)   Living Environment Comment  Lives with wife and intermittently with one son.  [bed and bath on 2nd ,  no powder room on first]   Number of Stairs, Main Entrance  1   Stair Railings, Main Entrance  none   Location, Patient Bedroom  second floor, must negotiate stairs to access   Location, Bathroom  second floor, must negotiate stairs to access   Bathroom Access Comment  Pt. reports has tub shower and stall shower, uses tub shower primarily, has grab bar in shower, has stanard height toilet c wall on both sides   Number of Stairs, Within Home, Primary  12   Surface of Stairs, Within Home, Primary  hardwood   Stair Railings, Within Home, Primary  railings on both sides of stairs          Prior Level of Function      Most Recent Value   Dominant Hand  right   Ambulation  independent   Transferring  independent   Toileting  independent   Bathing  independent   Dressing  independent   Eating  independent   Communication  understands/communicates without difficulty   Swallowing  swallows foods/liquids without difficulty   Baseline Diet/Method of Nutritional Intake  regular solids, thin liquids   Past History of Dysphagia  No previous reports    Prior Level of Function Comment  Pt is a cattle/,  previously IND for all IADLs.    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, (+) , enjoys farming and being outside   Patient Goals  \"I want to get back up walking, get stronger, and be self sufficient\"          IRF OT Evaluation and Treatment - 02/09/21 0808        Time Calculation    Start Time  0800     Stop Time  0930     Time Calculation (min)  90 min        Session Details    Document Type  daily treatment/progress note     Mode of Treatment  occupational therapy;individual therapy        General Information    General Observations of Patient  Pt received sitting up in bed        Transfers    Transfers  stand pivot transfer;shower transfer     Maintains Weight-Bearing Status (Transfers)  able to maintain weight-bearing status     Comment  With assistance, desaturated during exertion, monitor 02        Stand Pivot Transfer    [place], Stand Pivot/Stand Step Transfer  moderate assist (50-74% patient effort);verbal cues;nonverbal cues (demo/gesture);safety considerations;increased time to complete;1 person assist     Verbal Cues  hand placement;maintaining center of gravity over base of support;preparatory posture;proper use of assistive device;safety;technique     Assistive Device  none     Comment  SPT from bed to w/c, difficulty sustaining postural control, cues for breath support        Shower Transfer    Transfer Technique  stand pivot     [place], Shower Transfer  moderate assist (50-74% patient effort);nonverbal cues (demo/gesture);verbal cues;1 person assist;safety considerations;increased time to complete     Verbal Cues  hand placement;maintaining center of gravity over base of support;preparatory posture;proper use of assistive device;safety;technique     Assistive Device  grab bars/tub rail;shower chair     Comment  Used PVC rolling in shower chair        Bathing    Self-Performance  chest;left arm;right arm;abdomen;front perineal " area;buttocks;left upper leg;right upper leg     [place] Assistance  buttocks;left lower leg, including foot;right lower leg, including foot     [place]  moderate assist (50-74% patient effort);nonverbal cues (demo/gesture);verbal cues;1 person assist;safety considerations;increased time to complete     Position  supported sitting     Setup Assistance  adaptive equipment setup;adjust water temperature/flow;obtain supplies;open containers     Adaptive Equipment  grab bar/tub rail;hand-held shower spray hose;shower chair, rolling     Comment  02 needed during shower to sustain above 90        Upper Body Dressing    Self-Performance  threads left arm, shirt;threads right arm, shirt;pulls shirt over head/around back;pulls shirt down/adjusts     [place] Assistance  threads right arm, shirt;pulls shirt over head/around back;pulls shirt down/adjusts     [place]  minimum assist (75% or more patient effort);nonverbal cues (demo/gesture);verbal cues;1 person assist;safety considerations;increased time to complete     Position  supported sitting     Adaptive Equipment  none     Comment  needed to redirect R arm away from head hole, limited by fatigue        Lower Body Dressing    Self-Performance  pulls pants/shorts up or down     [place] Assistance  threads left leg, pants/shorts;threads right leg, pants/shorts;pulls pants/shorts up or down;dons/doffs left sock;dons/doffs right sock     [place]  maximum assist (25-49% patient effort);nonverbal cues (demo/gesture);verbal cues;1 person assist;safety considerations;increased time to complete     Position  supported sitting;supported standing     Adaptive Equipment  none     [place], Footwear  maximum assist (25-49% patient effort)     Comment  May benefit from dsg stick 2* unsafe to reach distal LE to thread clothing. Pt presenting with decreased activity tolerance        Grooming    Self-Performance  washes, rinses and dries face;washes, rinses and dries  hands;brushes/torres hair     [place]  touching/steadying assist;nonverbal cues (demo/gesture);verbal cues;1 person assist;safety considerations     Position  supported sitting     Setup Assistance  open containers;obtain supplies     Adaptive Equipment  none     Comment  did not perform oral care        Toileting    Comment  Pt did not request toileting        Daily Progress Summary (OT)    Symptoms Noted During/After Treatment  fatigue;shortness of breath     Progress Toward Functional Goals (OT)  progressing toward functional goals as expected     Daily Outcome Statement (OT)  Pt was seen for complete ADL to include shower/bathing, dressing and grooming. Pt requires increased time for all functional tasks with periods of 02 desaturation when exerting self. Pt was always able to recover with 3-5 minutes on 2-3L 02 and will continue to be monitored closely. OT recommending seated tasks only at this time. Good tolerance of light upper body exercises.     Barriers to Overall Progress (OT)  endurance, 02 level, balance, general weakness     Recommendations  continue POC                            IRF OT Goals      Most Recent Value   Transfer Goal 1   Activity/Assistive Device  toilet at 02/05/2021 0726   Time Frame  short-term goal (STG), 5 - 7 days at 02/05/2021 0726   Strategies/Barriers  TBA at 02/05/2021 0726   Transfer Goal 2   Activity/Assistive Device  toilet at 02/05/2021 0726   Time Frame  long-term goal (LTG), 3 weeks at 02/05/2021 0726   Transfer Goal 3   Activity/Assistive Device  shower at 02/05/2021 0726   Time Frame  short-term goal (STG), 5 - 7 days at 02/05/2021 0726   Strategies/Barriers  TBA at 02/05/2021 0726   Transfer Goal 4   Activity/Assistive Device  shower at 02/05/2021 0726   Time Frame  long-term goal (LTG), 3 weeks at 02/05/2021 0726   Strategies/Barriers  TBA at 02/05/2021 0726   Bathing Goal 1   Activity/Assistive Device  bathing skills, all at 02/05/2021 0726   [place]   minimum assist (75% or more patient effort) at 02/05/2021 0726   Time Frame  short-term goal (STG), 5 - 7 days at 02/05/2021 0726   Bathing Goal 2   Activity/Assistive Device  bathing skills, all at 02/05/2021 0726   [place]  supervision required at 02/05/2021 0726   Time Frame  long-term goal (LTG), 3 weeks at 02/05/2021 0726   Strategies/Barriers  DME TBD at 02/05/2021 0726   UB Dressing Goal 1   Activity/Assistive Device  upper body dressing at 02/05/2021 0726   [place]  supervision required [CL S] at 02/05/2021 0726   Time Frame  short-term goal (STG), 5 - 7 days at 02/05/2021 0726   UB Dressing Goal 2   Activity/Assistive Device  upper body dressing at 02/05/2021 0726   [place]  modified independence at 02/05/2021 0726   Time Frame  long-term goal (LTG), 3 weeks at 02/05/2021 0726   LB Dressing Goal 1   Activity/Assistive Device  lower body dressing at 02/05/2021 0726   [place]  maximum assist (25-49% patient effort) at 02/05/2021 0726   Time Frame  short-term goal (STG), 5 - 7 days at 02/05/2021 0726   LB Dressing Goal 2   Activity/Assistive Device  lower body dressing at 02/05/2021 0726   [place]  supervision required at 02/05/2021 0726   Time Frame  long-term goal (LTG), 3 weeks at 02/05/2021 0726   Grooming Goal 1   Activity/Assistive Device  grooming skills, all at 02/05/2021 0726   [place]  supervision required at 02/05/2021 0726   Time Frame  short-term goal (STG), 5 - 7 days at 02/05/2021 0726   Grooming Goal 2   Activity/Assistive Device  grooming skills, all at 02/05/2021 0726   [place]  modified independence at 02/05/2021 0726   Time Frame  long-term goal (LTG), 3 weeks at 02/05/2021 0726   Toileting Goal 1   Activity/Assistive Device  toileting skills, all at 02/05/2021 0726   Time Frame  short-term goal (STG), 5 - 7 days at 02/05/2021 0726   Strategies/Barriers  TBA at 02/05/2021 0726   Toileting Goal 2   Activity/Assistive Device  toileting skills, all at  02/05/2021 0726   Time Frame  long-term goal (LTG), 3 weeks at 02/05/2021 0726   Strategies/Barriers  TBA at 02/05/2021 0726

## 2021-02-09 NOTE — PROGRESS NOTES
Subjective    Patient seen and examined on rounds.  Chart reviewed.  Events overnight noted.  History reviewed briefly with patient.     CC:  Deficits in mobility, transfers, self-care status post deconditioning, severe Covid 19 pneumonia, ventilator-dependent respiratory failure, dysphagia, multiple medical problems.     HPI:  Mr. Denis Green is a 70-year-old right-handed white male with chronic conditions significant for coronary artery disease, hypertension, hyperlipidemia who was admitted to [facility] on 1/5/21 due to Covid 19 pneumonia and worsening respiratory status.  He required intubation on 1/9/21 and had ventilator-dependent respiratory failure.  He was treated with Decadron and Remdesivir.  He had dysphagia requiring Dobbhoff tube feeds.  He was extubated on 1/16/21 to OptiFlow.  On 1/23/21 he was transitioned to high flow nasal cannula.  Hospital course was significant for acute renal insufficiency likely secondary to acute tubular necrosis and he was followed by nephrology.  He had subsequent improvement in renal function.  He also had new fever of 102.8°F on 1/20/21 and 1/21/21.  He was followed by infectious disease.  Blood cultures were negative. CT scan of the chest abdomen pelvis was done with results showing increasing pulmonary infiltrates/groundglass opacities and consolidations in his bilateral lower lobes.  He was treated empirically with Cefepime and Vancomycin to cover hospital-acquired pneumonia.  He was noted to be lethargic on 1/22/21.  Subsequently patient removed Dobbhoff tube on 1/25/21 and there is question of possible aspiration during that process.  He continued to have intermittent confusion on 1/26/21 was felt to be possible post ICU delirium.  He was followed by neurology.  MRI brain was unremarkable.  EEG showed mild slowing but no seizure activity was noted.  Antibiotics were discontinued after a 5 day course due to lethargy.  Repeat video swallow study was  "performed for dysphagia and patient was put on a soft diet with nectar thick liquids. On 2/3/21, hemoglobin was 11.2, WBC count 15.1, platelets were 275, BUN was 15, and creatinine was 0.7.  He has been needing assistance for mobility, transfers, self-care postoperatively. He is transferred to [facility] on 2/4/21 for further rehabilitation care.       SUBJ: Discussed results of wrist x-rays with patient which showed moderate degenerative change particularly about the radial aspect of the wrist.  He reports pain is less today.  He ambulated 25 feet with rolling walker with assistance of 2 people with physical therapy.     ROS: Denies chest pain or shortness of breath. Other ROS negative. Past, family, social history is unchanged.    Physical Exam      Blood pressure 112/71, pulse 78, temperature 36.7 °C (98.1 °F), temperature source Oral, resp. rate 18, height 1.727 m (5' 8\"), weight 73.3 kg (161 lb 8 oz), SpO2 97 %.  Body mass index is 24.56 kg/m².    General Appearance: Not in acute distress  Head/Ear/Nose/Throat: Normocephalic; Atraumatic.  On oxygen by nasal cannula.  Eye: EOMI; PERRL.   Neck: No JVD; No Bruits.   Respiratory: Decreased breath sounds at bases.   Cardiovascular: RRR; Normal S1, S2.   Gastrointestinal: Soft; NT; +BS.   Extremities: Bilateral lower extremity edema noted.   Musculoskeletal: Functional active range of motion in both upper and lower extremities.    Neurological: Awake alert oriented to person, had some difficulty naming the place and correct date.. Speech is fluent. Cranial nerve examination does not reveal any gross facial asymmetry. Strength testing about 4/5 strength in both upper extremities.  Bilateral hip flexion is 3+/5.  Bilateral quadriceps are 4/5 with the thighs supported.  Bilateral ankle dorsi and plantar flexion are 4/5.  He is grossly able to localize touch and position sense in great toes.  Deep tendon reflexes are hypoactive and symmetric bilaterally. "  Plantars are flexor.  Coordination is functional upper extremities.  Neurologically stable.  Behavior/Emotional: Appropriate; Cooperative.   Skin: No obvious rashes noted.        Current Facility-Administered Medications:   •  acetaminophen (TYLENOL) tablet 650 mg, 650 mg, oral, q4h PRN, Omari Spain MD, 650 mg at 02/06/21 2001  •  acetaminophen (TYLENOL) tablet 650 mg, 650 mg, oral, With meals & nightly, Isael Lang MD, 650 mg at 02/08/21 2006  •  albuterol HFA (VENTOLIN HFA) 90 mcg/actuation inhaler 2 puff, 2 puff, inhalation, q6h PRN, Omari Spain MD  •  albuterol HFA (VENTOLIN HFA) 90 mcg/actuation inhaler 2 puff, 2 puff, inhalation, q6h, Laure Bishop MD, 2 puff at 02/08/21 1722  •  alum-mag hydroxide-simeth (MAALOX) 200-200-20 mg/5 mL suspension 30 mL, 30 mL, oral, q6h PRN, Laure Bishop MD  •  amLODIPine (NORVASC) tablet 5 mg, 5 mg, oral, q12h EBER, Laure Bishop MD, 5 mg at 02/08/21 0842  •  aspirin enteric coated tablet 81 mg, 81 mg, oral, Daily, Omari Spain MD, 81 mg at 02/08/21 0834  •  bisacodyL (DULCOLAX) 10 mg suppository 10 mg, 10 mg, rectal, Daily PRN, Isael Lang MD, 10 mg at 02/05/21 1844  •  diclofenac sodium (VOLTAREN) 1 % topical gel 2 g, 2 g, Topical, TID, Laure Bishop MD, 2 g at 02/08/21 2007  •  docusate sodium (COLACE) capsule 100 mg, 100 mg, oral, TID, Isael Lang MD, 100 mg at 02/08/21 2006  •  furosemide (LASIX) tablet 20 mg, 20 mg, oral, BID (am, 4p), Laure Bishop MD, 20 mg at 02/08/21 1633  •  gemfibroziL (LOPID) tablet 600 mg, 600 mg, oral, BID , Omari Spain MD, 600 mg at 02/08/21 1633  •  guaiFENesin (MUCINEX) 12 hr ER tablet 600 mg, 600 mg, oral, BID, Laure Bishop MD, 600 mg at 02/08/21 2005  •  heparin (porcine) 5,000 unit/mL injection 5,000 Units, 5,000 Units, subcutaneous, q8h [facility], Omari Spain MD, 5,000 Units at 02/08/21 2006  •  magnesium hydroxide (M.O.M.) 400 mg/5 mL suspension 30 mL, 30 mL, oral, 2x daily PRN, Laure Bishop,  MD  •  magnesium oxide (MAG-OX) tablet 400 mg, 400 mg, oral, BID, Laure Bishop MD, 400 mg at 02/08/21 2005  •  melatonin ODT 3 mg, 3 mg, oral, Nightly, Laure Bishop MD, 3 mg at 02/08/21 2006  •  metoprolol tartrate (LOPRESSOR) split tablet 12.5 mg, 12.5 mg, oral, BID, Omari Spain MD, 12.5 mg at 02/08/21 2006  •  mouth moisturizer (TOOTHETTE) cream, , mucous membrane, TID, Laure Bishop MD, Given at 02/08/21 2007  •  polyethylene glycol (MIRALAX) 17 gram packet 17 g, 17 g, oral, Daily PRN, Omari Spain MD, 17 g at 02/04/21 2157  •  rosuvastatin (CRESTOR) tablet 5 mg, 5 mg, oral, Daily (6p), Laure Bishop MD, 5 mg at 02/07/21 1756  •  senna (SENOKOT) tablet 3 tablet, 3 tablet, oral, Daily before lunch, Isael Lang MD, 3 tablet at 02/08/21 1127       Labs / Radiology    Lab Results   Component Value Date    WBC 7.42 02/08/2021    HGB 11.0 (L) 02/08/2021    HCT 34.7 (L) 02/08/2021    MCV 96.7 02/08/2021     (H) 02/08/2021     Lab Results   Component Value Date    GLUCOSE 98 02/08/2021    CALCIUM 8.9 02/08/2021     (L) 02/08/2021    K 4.0 02/08/2021    CO2 25 02/08/2021    CL 94 (L) 02/08/2021    BUN 16 02/08/2021    CREATININE 0.6 (L) 02/08/2021       Assessment and Plan    ASSESSMENT PLAN:  1. 70-year-old right-handed white male with chronic conditions significant for coronary artery disease, hypertension, hyperlipidemia who was admitted to [facility] on 1/5/21 due to Covid 19 pneumonia and worsening respiratory status.  He required intubation on 1/9/21 and had ventilator-dependent respiratory failure.  He was treated with Decadron and Remdesivir.  He had dysphagia requiring Dobbhoff tube feeds.  He was extubated on 1/16/21 to OptiFlow.  On 1/23/21 he was transitioned to high flow nasal cannula.  Hospital course was significant for acute renal insufficiency likely secondary to acute tubular necrosis and he was followed by nephrology.  He had subsequent improvement in renal  function.  He also had new fever of 102.8°F on 1/20/21 and 1/21/21.  He was followed by infectious disease.  Blood cultures were negative. CT scan of the chest abdomen pelvis was done with results showing increasing pulmonary infiltrates/groundglass opacities and consolidations in his bilateral lower lobes.  He was treated empirically with Cefepime and Vancomycin to cover hospital-acquired pneumonia.  He was noted to be lethargic on 1/22/21.  Subsequently patient removed Dobbhoff tube on 1/25/21 and there is question of possible aspiration during that process.  He continued to have intermittent confusion on 1/26/21 was felt to be possible post ICU delirium.  He was followed by neurology.  MRI brain was unremarkable.  EEG showed mild slowing but no seizure activity was noted.  Antibiotics were discontinued after a 5 day course due to lethargy.  Repeat video swallow study was performed for dysphagia and patient was put on a soft diet with nectar thick liquids. On 2/3/21, hemoglobin was 11.2, WBC count 15.1, platelets were 275, BUN was 15, and creatinine was 0.7.  He has been needing assistance for mobility, transfers, self-care postoperatively. He is transferred to [facility] on 2/4/21 for further rehabilitation care.       2. DVT prophylaxis - on subcutaneous Heparin.  On SCDs.  Platelets 296 on 2/5/2021.     3.  Deconditioning - recent severe Covid 19 pneumonia, ventilator-dependent respiratory failure, dysphagia, multiple medical problems - He had ventilator-dependent respiratory failure.  He was treated with Decadron and Remdesivir.  Continue PT, OT, speech, psychology.  Follow falls precautions, cardiac precautions, aspiration precautions.     4. GI - On Colace, Senokot.  On PRN MiraLAX, MOM, Maalox.      5.  - voiding.  Denies dysuria.  Monitor post void residuals.     6.  Pulmonary - recent severe Covid 19 pneumonia, ventilator-dependent respiratory failure - on Ventolin HFA.  On Mucinex.     7.  Pain - on PRN Tylenol.     8. Hematology -hemoglobin 12.0, WBC 8.15 on 2/5/2021.     9. CVS - history of coronary artery disease and hypertension.  On Norvasc.  On Lopressor.  On Aspirin.  On Lasix.  On Magnesium oxide.     10.  Hyperlipidemia - on Lopid.     11.  Insomnia - on Melatonin.     12.  Dysphagia -on soft diet with nectar thick liquids.  Free water protocol with ice chips per speech therapy.  Speech therapy and nutrition following.     13. Rehabilitation medicine - Continue comprehensive rehabilitation care. Continue PT, OT, speech, psychology.  We will follow falls precautions, cardiac precautions, monitor pulse oximetry in therapy and follow aspiration precautions.  Tolerating therapies per endurance.  Discussed with speech therapy.  Free water protocol ice chips was started.  Denies coughing with meals.He reported some pain in left wrist.  He is not sure if he accidentally banged the left wrist against the side rail of the bed.  Denies history of gout.  Left wrist does not appear warm or tender to touch.  K pad ordered.  We will also order scheduled Tylenol for 7 days.  Discussed results of wrist x-rays with patient which showed moderate degenerative change particularly about the radial aspect of the wrist.  He reports pain is less today.  He ambulated 25 feet with rolling walker with assistance of 2 people with physical therapy.     14. Reviewed labs today.  BUN 15, creatinine 0.6 on 2/5/2021.           Isael Lang MD  2/8/2021

## 2021-02-09 NOTE — PLAN OF CARE
Problem: Rehabilitation (IRF) Plan of Care  Goal: Plan of Care Review  Flowsheets (Taken 2/9/2021 0823)  Plan of Care Reviewed With: patient  IRF Plan of Care Review: progress ongoing, continue  Outcome Summary: Good participation, pt demosntrated great alternating/selective attention skills to mod level exericses. He verbalzed good understanding to FWP update.

## 2021-02-09 NOTE — PROGRESS NOTES
Patient: Denis Green  Location: [facility] [address]  MRN: [id number]  Today's date: 2/9/2021    History of Present Illness  Denis LOVE is a 70 y.o. male admitted on 2/4/2021 with Pneumonia due to COVID-19 virus. Principal problem is No Principal Problem: There is no principal problem currently on the Problem List. Please update the Problem List and refresh..   Pt admitted to outside hospital on 1/5/2021 for COVID-19 PNA. Worsening resp status intubated on 1/9- extubated 1/16 to OptiFlow. Transition to [facility]NC on 1/23.Eval by nephrology while in ICU due to ERNIE likely secondary to ATN. Creatinine has been improving with excellent urine output. Received Decadron and Remdesivir. Dobhoff tube was placed due to dysphagia. New fever of 102.8 1/20 and 1/21. ID was consulted. Bld cx's negative. CT scan of the chest abdomen pelvis was done with results showing increasing pulmonary infiltrates/groundglass opacities and consolidations in his bilateral lower lobes. Started empirically on cefepime and vancomycin to cover hospital-acquired pneumonia. Pt had increased lethargy on 1/22. Pt removed DHT 1/25; required higher O2 afterward - possible aspiration during the process. 1/26 patient continued to have intermittent confusion; ?post ICU delirium. Neurology is following, MRI brain was unremarkable. EEG showed mild slowing but no seizure activity. D/c'd abx after 5 days d/t lethargy.       Past Medical History  Denis LOVE has a past medical history of Coronary artery disease, Hypertension, and Lipid disorder.    PT Vitals    Date/Time Pulse HR Source SpO2 Pt Activity O2 Therapy O2 Del Method O2 Flow Rate BP BP Location BP Method Pt Position Observations [facility]   02/09/21 0938 98 Monitor 98 % At rest Supplemental oxygen Nasal cannula 3 L/min 120/75 Right upper arm Automatic Sitting -- CINDY   02/09/21 0947 -- -- 95 % Other (Comment) Supplemental oxygen Nasal cannula 2 L/min -- -- -- -- post seated Leah CINDY    02/09/21 0954 103 Monitor 95 % Other (Comment) Supplemental oxygen Nasal cannula 2 L/min 108/60 Right upper arm Automatic Sitting Post standing trial CINDY   02/09/21 0958 99 Monitor 95 % At rest Supplemental oxygen Nasal cannula 2 L/min 131/69 Right upper arm Automatic Sitting -- CINDY      PT Pain    Date/Time Pain Type Rating: Rest Who   02/09/21 0938 Pain Assessment 0 CINDY   02/09/21 0958 Pain Reassessment 0 CINDY          Prior Living Environment      Most Recent Value   Lives With  spouse, child(mindy), adult [adult son lives in home occasionally]   Living Arrangements  house   Home Accessibility  stairs to enter home (Group), stairs within home (Group)   Living Environment Comment  Lives with wife and intermittently with one son.  [bed and bath on 2nd ,  no powder room on first]   Number of Stairs, Main Entrance  1   Stair Railings, Main Entrance  none   Location, Patient Bedroom  second floor, must negotiate stairs to access   Location, Bathroom  second floor, must negotiate stairs to access   Bathroom Access Comment  Pt. reports has tub shower and stall shower, uses tub shower primarily, has grab bar in shower, has stanard height toilet c wall on both sides   Number of Stairs, Within Home, Primary  12   Surface of Stairs, Within Home, Primary  hardwood   Stair Railings, Within Home, Primary  railings on both sides of stairs          Prior Level of Function      Most Recent Value   Dominant Hand  right   Ambulation  independent   Transferring  independent   Toileting  independent   Bathing  independent   Dressing  independent   Eating  independent   Communication  understands/communicates without difficulty   Swallowing  swallows foods/liquids without difficulty   Baseline Diet/Method of Nutritional Intake  regular solids, thin liquids   Past History of Dysphagia  No previous reports    Prior Level of Function Comment  Pt is a cattle/,  previously IND for all IADLs.    Assistive Device/Animal Currently  Used at Home  none          IRF PT Evaluation and Treatment - 02/09/21 0939        Time Calculation    Start Time  0930     Stop Time  1030     Time Calculation (min)  60 min        Session Details    Document Type  daily treatment/progress note     Mode of Treatment  individual therapy;physical therapy        Bed Mobility    [place], Supine to Sit  minimum assist (75% or more patient effort)     [place], Sit to Supine  minimum assist (75% or more patient effort)     Comment (Bed Mobility)  Performed on flat therapy mat        Transfers    Transfers  stand pivot transfer        Sit to Stand Transfer    [place], Sit to Stand Transfer  moderate assist (50-74% patient effort)     Verbal Cues  safety;technique;hand placement     Assistive Device  walker, front-wheeled     Comment  from WC and EOM to // bars or RW. Mod A due to dec hip extensor strength and fatigue today        Stand to Sit Transfer    [place], Stand to Sit Transfer  minimum assist (75% or more patient effort)     Verbal Cues  safety;technique;hand placement     Assistive Device  walker, front-wheeled     Comment  from RW or // bars to WC and RW to EOM         Stand Pivot Transfer    [place], Stand Pivot/Stand Step Transfer  moderate assist (50-74% patient effort)     Verbal Cues  proper use of assistive device;technique;safety     Assistive Device  walker, front-wheeled     Comment  SPT WC <> EOM with Mod A x1 for ws and postural control at hips        Lower Extremity (Therapeutic Exercise)    Exercise Position/Type (LE Therapeutic Exercise)  seated;supine;standing     General Exercise (LE Therapeutic Exercise)  bilateral     Comment (LE Therapeutic Exercise)  1. Seated warm-up: LAQ and alternating hip marches 2x15 reps each LE. 2. Standing: attempted alternating hip marches in // bars, limited by lightheadedness. Unable to complete standing therex at this time. 3. Supine: heel slides on powder board, abduction/adduction on  powder board, ankle pumps, SAQ over bolster 2x15 reps each LE.          Daily Progress Summary (PT)    Daily Outcome Statement (PT)  Patient demonstrated increased lightheadedness and significant fatigue during session. Attempted standing therex, but patient requires consistent and frequent rest breaks due to lightheadedness. Patient benefitted from supine therex. SpO2 maintained >94% on 2 L. Cont to progress activity as tolerated.                                 IRF PT Goals      Most Recent Value   Bed Mobility Goal 1   Activity/Assistive Device  rolling to left, rolling to right, sit to supine/supine to sit at 02/05/2021 1030   [place]  supervision required at 02/05/2021 1030   Time Frame  short-term goal (STG), 1 week at 02/05/2021 1030   Bed Mobility Goal 2   Activity/Assistive Device  rolling to left, rolling to right, sit to supine/supine to sit at 02/05/2021 1030   [place]  modified independence at 02/05/2021 1030   Time Frame  long-term goal (LTG), 21 days or less at 02/05/2021 1030   Transfer Goal 1   Activity/Assistive Device  bed-to-chair/chair-to-bed, sit-to-stand/stand-to-sit, stand pivot, walker, front-wheeled at 02/05/2021 1030   [place]  moderate assist (50-74% patient effort), verbal cues required at 02/05/2021 1030   Time Frame  short-term goal (STG), 1 week at 02/05/2021 1030   Transfer Goal 2   Activity/Assistive Device  sit-to-stand/stand-to-sit, bed-to-chair/chair-to-bed, stand pivot, walker, front-wheeled at 02/05/2021 1030   [place]  supervision required at 02/05/2021 1030   Time Frame  long-term goal (LTG), 21 days or less at 02/05/2021 1030   Gait/Walking Locomotion Goal 1   Activity/Assistive Device  gait (walking locomotion), decrease fall risk, increase endurance/gait distance, walker, front-wheeled at 02/05/2021 1030   Distance  15 feet at 02/05/2021 1030   [place]  moderate assist (50-74% patient effort) [and close S of 2nd person for safety] at 02/05/2021  1030   Time Frame  short-term goal (STG), 1 week at 02/05/2021 1030   Gait/Walking Locomotion Goal 2   Activity/Assistive Device  gait (walking locomotion), decrease asymmetrical patterns, decrease fall risk, diminish gait deviation, improve balance and speed, increase endurance/gait distance, turning, left, turning, right, walker, front-wheeled at 02/05/2021 1030   Distance  100 feet at 02/05/2021 1030   [place]  supervision required at 02/05/2021 1030   Time Frame  long-term goal (LTG), 21 days or less at 02/05/2021 1030

## 2021-02-09 NOTE — PLAN OF CARE
Problem: Rehabilitation (IRF) Plan of Care  Goal: Plan of Care Review  Outcome: Progressing  Flowsheets (Taken 2/9/2021 0448)  Plan of Care Reviewed With: patient  IRF Plan of Care Review: progress ongoing, continue  Outcome Summary:   Assumed patient care at 2300, denies pain   appeared to sleep well   2 liters of O2 on   call bell within reach   Plan of Care Review  IRF Plan of Care Review: progress ongoing, continue  Plan of Care Reviewed With: patient  Outcome Summary: Assumed patient care at 2300, denies pain; appeared to sleep well; 2 liters of O2 on; call bell within reach

## 2021-02-09 NOTE — PROGRESS NOTES
Patient: Denis Green  Location: [facility] [address]  MRN: [id number]  Today's date: 2/9/2021    History of Present Illness  Denis LOVE is a 70 y.o. male admitted on 2/4/2021 with Pneumonia due to COVID-19 virus. Principal problem is No Principal Problem: There is no principal problem currently on the Problem List. Please update the Problem List and refresh..   Pt admitted to outside hospital on 1/5/2021 for COVID-19 PNA. Worsening resp status intubated on 1/9- extubated 1/16 to OptiFlow. Transition to [facility]NC on 1/23.Eval by nephrology while in ICU due to ERNIE likely secondary to ATN. Creatinine has been improving with excellent urine output. Received Decadron and Remdesivir. Dobhoff tube was placed due to dysphagia. New fever of 102.8 1/20 and 1/21. ID was consulted. Bld cx's negative. CT scan of the chest abdomen pelvis was done with results showing increasing pulmonary infiltrates/groundglass opacities and consolidations in his bilateral lower lobes. Started empirically on cefepime and vancomycin to cover hospital-acquired pneumonia. Pt had increased lethargy on 1/22. Pt removed DHT 1/25; required higher O2 afterward - possible aspiration during the process. 1/26 patient continued to have intermittent confusion; ?post ICU delirium. Neurology is following, MRI brain was unremarkable. EEG showed mild slowing but no seizure activity. D/c'd abx after 5 days d/t lethargy.       Past Medical History  Denis LOVE has a past medical history of Coronary artery disease, Hypertension, and Lipid disorder.    SLP Pain    Date/Time Pain Type Pref Pain Scale Orientation Location Rating: Rest Interventions Who   02/09/21 1136 Pain Assessment word (verbal rating pain scale) lower back 2 - mild pain position adjusted RS   02/09/21 1158 Pain Reassessment word (verbal rating pain scale) -- -- 0 - no pain -- RS          Prior Living Environment      Most Recent Value   Lives With  spouse, child(mindy), adult [adult  son lives in home occasionally]   Living Arrangements  house   Home Accessibility  stairs to enter home (Group), stairs within home (Group)   Living Environment Comment  Lives with wife and intermittently with one son.  [bed and bath on 2nd ,  no powder room on first]   Number of Stairs, Main Entrance  1   Stair Railings, Main Entrance  none   Location, Patient Bedroom  second floor, must negotiate stairs to access   Location, Bathroom  second floor, must negotiate stairs to access   Bathroom Access Comment  Pt. reports has tub shower and stall shower, uses tub shower primarily, has grab bar in shower, has stanard height toilet c wall on both sides   Number of Stairs, Within Home, Primary  12   Surface of Stairs, Within Home, Primary  hardwood   Stair Railings, Within Home, Primary  railings on both sides of stairs          Prior Level of Function      Most Recent Value   Dominant Hand  right   Ambulation  independent   Transferring  independent   Toileting  independent   Bathing  independent   Dressing  independent   Eating  independent   Communication  understands/communicates without difficulty   Swallowing  swallows foods/liquids without difficulty   Baseline Diet/Method of Nutritional Intake  regular solids, thin liquids   Past History of Dysphagia  No previous reports    Prior Level of Function Comment  Pt is a cattle/,  previously IND for all IADLs.    Assistive Device/Animal Currently Used at Home  none          IRF SLP Evaluation and Treatment - 02/09/21 1137        Time Calculation    Start Time  1130     Stop Time  1200     Time Calculation (min)  30 min        Session Details    Document Type  daily treatment/progress note     Mode of Treatment  speech language pathology;individual therapy        General Information    General Observations of Patient  Pleasant and cooperative, pt received in his room.         Cognition/Psychosocial    Attention Deficit (Cognitive)  alternating  "attention;selective attention     Comment, Attention  Mod level alternating/selective attention message coding exercise, pt transcribed the first letter of words in a paragraph to the lines below with 100% acc given min initiating cues fading to IND. Timely performance      Executive Function Deficit (Cognition)  information processing     Comment, Executive Function  Functional math calculation, word problems with pictures related to money \"[employer]\" 100% acc I\"ly with use of rounding strategy. Timely performance.         Swallowing Intervention    Comment, Swallowing Interventions  Education re. advancement of FWP to include thin water via cup edge given distant SPV and use of single small sips. Pt verbalized good understanding, signage update in pt's room, and RN notified.         Daily Progress Summary (SLP)    Daily Outcome Statement (SLP)  Good participation, pt demosntrated great alternating/selective attention skills to mod level exericses. He verbalzed good understanding to FWP update. ST will continue to address pharyngeal strengthening exericses.                        IRF SLP Goals      Most Recent Value   Verbal Expression Goal 1   Verbal Expression Goal 1  STG: pt will complete mod level verbal expression tasks x 90% c min cues. at 02/06/2021 1405   Time Frame  short-term goal (STG), 1 week at 02/06/2021 1405   Verbal Expression Goal 2   Verbal Expression Goal 2  LTG: pt will complete complex/abstract verbal expression tasks x 90% c independent use of WF strategies. at 02/06/2021 1405   Time Frame  long-term goal (LTG), 3 weeks at 02/06/2021 1405   Oral Nutrition/Hydration Goal 1   Activity  effective/safe/independent, use of swallowing techniques [regular/NTL, no overt s/s of aspiration ] at 02/05/2021 1202   Time Frame  short-term goal (STG), 1 week at 02/05/2021 1202   Oral Nutrition/Hydration Goal 2   Activity  effective/safe/independent, use of swallowing techniques [regular/thin, no overt " s/s of aspiration ] at 02/05/2021 1202   Time Frame  long-term goal (LTG), 3 weeks at 02/05/2021 1202   Executive Function Goal 1   Activity  complete, abstract thinking tasks, executive function tasks, organization/sequencing tasks, problem solving/reasoning tasks, other (see comments) [STG,  simple-mod level tasks.] at 02/06/2021 1405   [place]/Accuracy  with minimum, verbal cues/redirection, with additional processing time, with repetition of directions, with 90% accuracy at 02/06/2021 1405   Time Frame  1 week at 02/06/2021 1405   Executive Function Goal 2   Activity  complete, abstract thinking tasks, exhibit impulse control, organization/sequencing tasks, problem solving/reasoning tasks, other (see comments) [LTG] at 02/06/2021 1405   [place]/Accuracy  independently, with 90% accuracy, other (see comments) [mod-complex tasks.] at 02/06/2021 1405   Time Frame  3 weeks at 02/06/2021 1405   Memory Goal 1   Activity  short-term memory tasks, immediate recall tasks, working memory tasks, recall recent events, other (see comments) [STG,  simple-mod level tasks.] at 02/06/2021 1405   [place]/Accuracy  minimal cues for use of strategies, with 90% accuracy at 02/06/2021 1405   Time Frame  short-term goal (STG), 1 week at 02/06/2021 1405   Memory Goal 2   Activity  short-term memory tasks, immediate recall tasks, working memory tasks, recall recent events, other (see comments) [LTG,  mod complex information.] at 02/06/2021 1405   [place]/Accuracy  independent use of environmental cues/strategies, with 90% accuracy at 02/06/2021 1405   Time Frame  long-term goal (LTG), 3 weeks at 02/06/2021 1405

## 2021-02-09 NOTE — PLAN OF CARE
Problem: Rehabilitation (IRF) Plan of Care  Goal: Plan of Care Review  Flowsheets (Taken 2/9/2021 1233)  Plan of Care Reviewed With: patient  IRF Plan of Care Review: progress ongoing, continue  Outcome Summary: Pt was seen for complete ADL to include shower/bathing, dressing and grooming. Pt requires increased time for all functional tasks with periods of 02 desaturation when exerting self. Pt was always able to recover with 3-5 minutes on 2-3L 02 and will continue to be monitored closely. OT recommending seated tasks only at this time. Good tolerance of light upper body exercises.

## 2021-02-09 NOTE — PLAN OF CARE
Problem: Rehabilitation (IRF) Plan of Care  Goal: Plan of Care Review  Flowsheets (Taken 2/9/2021 102)  Plan of Care Reviewed With: patient  IRF Plan of Care Review: progress ongoing, continue  Outcome Summary: Patient with limited endurance and fatigue during session. Required frequent rest breaks. Session focused on therex in supine or sitting due to limitations.

## 2021-02-09 NOTE — PROGRESS NOTES
[facility] Internal Medicine Progress Note          Patient was seen and examined at bedside.    Subjective:  2/8/2021:  1. Left wrist pain with focal mild swelling, no erythema, no significant tenderness, he related to recent injury, provide topical Voltaren jell;  2. B/l lungs quite a few crackles/rales,   CXR 2/5/2021:IMPRESSION:  Bilateral airspace opacities most prominent in the left upper lobe and left  lower lobe likely representing COVID19 pneumonia.  Increase po lasix to 20 mg bid for pulm edema, cont albuterol inhaler with mucolytic agents, encourage incentive spirometry use;   3. Last BM on 2/5/2021, his BS is fine, cont current bowel regimen, talked with RN about his care plan.  Reviewed his am labs, benign     2/9/2021:  L wrist Xray 2/8/2021:  IMPRESSION: Moderate degenerative change particularly about the radial aspect of  the wrist.    His left wrist pain and swelling has subsided with topical Voltaren gel.  Clinically, he remains stable, denies new complaints, no O/N events.      Objective   Vital signs in last 24 hours:  Temp:  [36.1 °C (97 °F)-36.7 °C (98.1 °F)] 36.5 °C (97.7 °F)  Heart Rate:  [] 99  Resp:  [18] 18  BP: (102-131)/(55-75) 131/69    No intake or output data in the 24 hours ending 02/09/21 1200  Intake/Output this shift:  No intake/output data recorded.   Review of Systems:  All other systems reviewed and negative except as noted in the HPI.   Objective      Labs  reviewed his labs thoroughly   Lab Results   Component Value Date    WBC 7.42 02/08/2021    HGB 11.0 (L) 02/08/2021    HCT 34.7 (L) 02/08/2021    MCV 96.7 02/08/2021     (H) 02/08/2021     Lab Results   Component Value Date    GLUCOSE 98 02/08/2021    CALCIUM 8.9 02/08/2021     (L) 02/08/2021    K 4.0 02/08/2021    CO2 25 02/08/2021    CL 94 (L) 02/08/2021    BUN 16 02/08/2021    CREATININE 0.6 (L) 02/08/2021       Imaging  OSH imaging study reports reviewed:    CXR  2/5/2021:  IMPRESSION:  Bilateral airspace opacities most prominent in the left upper lobe and left  lower lobe likely representing COVID19 pneumonia.         Full Code    Physical Exam:  Head/Ear/Nose/Throat: normocephalic; atraumatic; moisture mouth mm, no oropharyngeal thrush noted.   Eyes: anicteric sclera, EOMI; PERRL.   Neck : supple, no JVD, no carotid bruits appeciated.   Respiratory: no evidence of labored breathing, lung sounds a little coarse, mild bibasilar diminished BS, no remarkable w/r/c.   Cardiovascular: RRR; normal S1, S2; no m/r/g; no S3 or S4.   Gastrointestinal: soft; NT; BS normal; mildly distended; no CVAT b/l.   Genitourinary: no lim.   Extremities : no c/c/e .   Neurological: AO x 3, fluent speeches, following commands, CNS II-XII grossly intact; no focal neurologic deficits.   Behavior/Emotional: in NAD, appropriate; cooperative.   Skin: clean, dry and intact.     Plan of care was discussed with patient, RN, and PMR attending.     Assessment     CC: S/p severe covid-19 PNA complicated by VDRF, dysphagia, ERNIE, bacterial PNA, and  hospital delirium, physical deconditioning with ADL and ambulatory dysfunction.       70 y.o. male, I PTA, with PMH of HTN, HLD, and CAD, admitted to [facility] on 1/5/2021 for severeCOVID-19 PNA. Worsening resp status intubated on 1/9- extubated 1/16 to OptiFlow. Transition to [facility] on 1/23.Eval by nephrology while in ICU due to ERNIE likely secondary to ATN. Creatinine has been improving with excellent urine output. Received Decadron and Remdesivir. Dobhoff tube was placed due to dysphagia. New fever of 102.8 on 1/20 and 1/21. ID was consulted. Bld cx's negative. CT scan of the chest abdomen pelvis was done with results showing increasing pulmonary infiltrates/groundglass opacities and consolidations in his bilateral lower lobes. Started empirically on cefepime and vancomycin to cover hospital-acquired pneumonia. Pt had increased lethargy on 1/22. Pt removed DHT 1/25;  required higher O2 afterward - possible aspiration during the process. 1/26 patient continued to have intermittent confusion; ?post ICU delirium. Neurology is following, MRI brain was unremarkable. EEG showed mild slowing but no seizure activity. D/c'd abx after 5 days d/t lethargy. Repeat VFSS performed- now on soft nectar thick liquid diet .  Pt is AA&Ox3 with periods of confusion. Tolerates soft diet with NTL, voiding in urinal, O2 @ 2-3L via nc. Participating in therapies with marked improvement, functionally, he has residual ADL and ambulatory dysfunction, rec's for acute rehab prior to home with SO. Transferred to [facility] on 2/4/2021.      1.S/p severe covid-19 PNA complicated by VDRF, dysphagia, ERNIE, bacterial PNA, and  hospital delirium, physical deconditioning with ADL and ambulatory dysfunction : inpt acute rehab, gait and balancing training, SLP therapy, nutrition support,  Fall/aspiration precaution, medical management, DVT prophylaxis, dermal defense, f/u with PCP after inpt rehab.     2. Pulm-S/p severe covid-19 PNA complicated by VDRF, s/p possible bacteria PNA,  hypoxemia with mild exertion, atelectasis: monitor SO2, prn NC O2, keep SO2>92%, incentive spirometry, bronchodilator inhaler, mucolytic agent, pulm toileting. Check CXR.      3. Psych-s/p hospital acute delirium, intermittent confusion: his metal status has much improved, monitor his mood and mentation, help his orientation, psychology CBT, avoid unnecessary sedatives, SW support.      4. GI-on soft nectar thick liquid diet, constipation:SLP evaluation to advance his diet as tolerated; provide constipation bowel regimen, keep adequate hydration, timed toilet visits.     5. DVT prophy: SCD, early ambulation, SQ heparin, check LE venous DUS to r/o DVT.       6. HTN, dyslipidemia: cont home HTN meds with holding parameter, orthostatic hypotension precaution, monitor BP . On gemfibrozil, f/u with his PCP.      Mixed significant dyslipidemia: LCL-C  185, HDL-C 30, , h/o HTN, HLD, and CAD, only gemfibrozil is far from adequate, labeled Lipitor allergy, still worth retrial with hydrophilic formula Crestor from low dose, which is absolutely necessary and also guideline recommended    7. Renal-s/p ERNIE recovered, electrolytes imbalance: monitor renal function and volume status, avoid nephrotoxic agents and low BP,  monitor and keep electrolytes balance.     8. - urinary retention: timed voiding trial, monitor PVR with prn cic, check UA/UCX.     Billing code: [id number]  Diagnoses:  Patient Active Problem List   Diagnosis   • Acute metabolic encephalopathy   • ARDS (adult respiratory distress syndrome) (CMS/HCC)   • Coronary artery disease involving native coronary artery of native heart without angina pectoris   • Hypoxia   • Pneumonia due to COVID-19 virus   • Transaminitis   • Essential hypertension   • Dyslipidemia   • Atelectasis   • Physical deconditioning          Laure Bishop MD  2/9/2021

## 2021-02-10 ENCOUNTER — APPOINTMENT (INPATIENT)
Dept: OCCUPATIONAL THERAPY | Facility: REHABILITATION | Age: 71
DRG: 948 | End: 2021-02-10
Payer: COMMERCIAL

## 2021-02-10 ENCOUNTER — APPOINTMENT (INPATIENT)
Dept: PHYSICAL THERAPY | Facility: REHABILITATION | Age: 71
DRG: 948 | End: 2021-02-10
Payer: COMMERCIAL

## 2021-02-10 ENCOUNTER — APPOINTMENT (INPATIENT)
Dept: SPEECH THERAPY | Facility: REHABILITATION | Age: 71
DRG: 948 | End: 2021-02-10
Payer: COMMERCIAL

## 2021-02-10 PROCEDURE — 12800000 HC ROOM AND CARE SEMIPRIVATE REHAB

## 2021-02-10 PROCEDURE — 63600000 HC DRUGS/DETAIL CODE: Performed by: PHYSICAL MEDICINE & REHABILITATION

## 2021-02-10 PROCEDURE — 97530 THERAPEUTIC ACTIVITIES: CPT | Mod: GP,59

## 2021-02-10 PROCEDURE — 63700000 HC SELF-ADMINISTRABLE DRUG: Performed by: PHYSICAL MEDICINE & REHABILITATION

## 2021-02-10 PROCEDURE — 63700000 HC SELF-ADMINISTRABLE DRUG: Performed by: INTERNAL MEDICINE

## 2021-02-10 PROCEDURE — 97110 THERAPEUTIC EXERCISES: CPT | Mod: GP,59

## 2021-02-10 PROCEDURE — 97116 GAIT TRAINING THERAPY: CPT | Mod: GP

## 2021-02-10 PROCEDURE — 92507 TX SP LANG VOICE COMM INDIV: CPT | Mod: GN

## 2021-02-10 PROCEDURE — 97535 SELF CARE MNGMENT TRAINING: CPT | Mod: GO

## 2021-02-10 RX ADMIN — METOPROLOL TARTRATE 12.5 MG: 25 TABLET, FILM COATED ORAL at 07:44

## 2021-02-10 RX ADMIN — Medication 3 MG: at 21:27

## 2021-02-10 RX ADMIN — SENNOSIDES 3 TABLET: 8.6 TABLET, FILM COATED ORAL at 11:33

## 2021-02-10 RX ADMIN — DICLOFENAC SODIUM 2 G: 10 GEL TOPICAL at 07:48

## 2021-02-10 RX ADMIN — METOPROLOL TARTRATE 12.5 MG: 25 TABLET, FILM COATED ORAL at 21:28

## 2021-02-10 RX ADMIN — DICLOFENAC SODIUM 2 G: 10 GEL TOPICAL at 15:03

## 2021-02-10 RX ADMIN — Medication 400 MG: at 21:28

## 2021-02-10 RX ADMIN — AMLODIPINE BESYLATE 5 MG: 5 TABLET ORAL at 09:03

## 2021-02-10 RX ADMIN — DOCUSATE SODIUM 100 MG: 100 CAPSULE, LIQUID FILLED ORAL at 21:27

## 2021-02-10 RX ADMIN — HEPARIN SODIUM 5000 UNITS: 5000 INJECTION INTRAVENOUS; SUBCUTANEOUS at 06:24

## 2021-02-10 RX ADMIN — GEMFIBROZIL 600 MG: 600 TABLET ORAL at 07:44

## 2021-02-10 RX ADMIN — FUROSEMIDE 20 MG: 20 TABLET ORAL at 15:03

## 2021-02-10 RX ADMIN — HEPARIN SODIUM 5000 UNITS: 5000 INJECTION INTRAVENOUS; SUBCUTANEOUS at 21:28

## 2021-02-10 RX ADMIN — DOCUSATE SODIUM 100 MG: 100 CAPSULE, LIQUID FILLED ORAL at 15:03

## 2021-02-10 RX ADMIN — ASPIRIN 81 MG: 81 TABLET ORAL at 07:44

## 2021-02-10 RX ADMIN — DOCUSATE SODIUM 100 MG: 100 CAPSULE, LIQUID FILLED ORAL at 07:44

## 2021-02-10 RX ADMIN — ALBUTEROL SULFATE 2 PUFF: 90 AEROSOL, METERED RESPIRATORY (INHALATION) at 11:35

## 2021-02-10 RX ADMIN — AMLODIPINE BESYLATE 5 MG: 5 TABLET ORAL at 21:28

## 2021-02-10 RX ADMIN — ACETAMINOPHEN 650 MG: 325 TABLET, FILM COATED ORAL at 21:28

## 2021-02-10 RX ADMIN — Medication 400 MG: at 07:44

## 2021-02-10 RX ADMIN — FUROSEMIDE 20 MG: 20 TABLET ORAL at 07:44

## 2021-02-10 RX ADMIN — HEPARIN SODIUM 5000 UNITS: 5000 INJECTION INTRAVENOUS; SUBCUTANEOUS at 15:03

## 2021-02-10 RX ADMIN — GUAIFENESIN 600 MG: 600 TABLET ORAL at 21:28

## 2021-02-10 RX ADMIN — ACETAMINOPHEN 650 MG: 325 TABLET, FILM COATED ORAL at 11:34

## 2021-02-10 RX ADMIN — ACETAMINOPHEN 650 MG: 325 TABLET, FILM COATED ORAL at 07:45

## 2021-02-10 RX ADMIN — GEMFIBROZIL 600 MG: 600 TABLET ORAL at 16:17

## 2021-02-10 RX ADMIN — ALBUTEROL SULFATE 2 PUFF: 90 AEROSOL, METERED RESPIRATORY (INHALATION) at 06:24

## 2021-02-10 RX ADMIN — ACETAMINOPHEN 650 MG: 325 TABLET, FILM COATED ORAL at 16:17

## 2021-02-10 RX ADMIN — ALBUTEROL SULFATE 2 PUFF: 90 AEROSOL, METERED RESPIRATORY (INHALATION) at 17:48

## 2021-02-10 RX ADMIN — GUAIFENESIN 600 MG: 600 TABLET ORAL at 07:43

## 2021-02-10 NOTE — PROGRESS NOTES
[facility] Progress Note          Patient was seen and examined at bedside.    Subjective:  2/9/2021:  L wrist Xray 2/8/2021:  IMPRESSION: Moderate degenerative change particularly about the radial aspect of  the wrist.    His left wrist pain and swelling has subsided with topical Voltaren gel.  Clinically, he remains stable, denies new complaints, no O/N events.      2/10/2021:  Stable, no new complaints or nursing issues, his PT/OT is progressing well.    Objective   Vital signs in last 24 hours:  Temp:  [36.3 °C (97.3 °F)-36.8 °C (98.3 °F)] 36.4 °C (97.6 °F)  Heart Rate:  [70-80] 70  Resp:  [18] 18  BP: (109-152)/(65-77) 120/73    No intake or output data in the 24 hours ending 02/10/21 1209  Intake/Output this shift:  No intake/output data recorded.   Review of Systems:  All other systems reviewed and negative except as noted in the HPI.   Objective      Labs  reviewed his labs thoroughly   Lab Results   Component Value Date    WBC 7.42 02/08/2021    HGB 11.0 (L) 02/08/2021    HCT 34.7 (L) 02/08/2021    MCV 96.7 02/08/2021     (H) 02/08/2021     Lab Results   Component Value Date    GLUCOSE 98 02/08/2021    CALCIUM 8.9 02/08/2021     (L) 02/08/2021    K 4.0 02/08/2021    CO2 25 02/08/2021    CL 94 (L) 02/08/2021    BUN 16 02/08/2021    CREATININE 0.6 (L) 02/08/2021       Imaging  OSH imaging study reports reviewed:    CXR 2/5/2021:  IMPRESSION:  Bilateral airspace opacities most prominent in the left upper lobe and left  lower lobe likely representing COVID19 pneumonia.         Full Code    Physical Exam:  Head/Ear/Nose/Throat: normocephalic; atraumatic; moisture mouth mm, no oropharyngeal thrush noted.   Eyes: anicteric sclera, EOMI; PERRL.   Neck : supple, no JVD, no carotid bruits appeciated.   Respiratory: no evidence of labored breathing, lung sounds a little coarse, mild bibasilar diminished BS, no remarkable w/r/c.   Cardiovascular: RRR; normal S1, S2; no m/r/g; no  S3 or S4.   Gastrointestinal: soft; NT; BS normal; mildly distended; no CVAT b/l.   Genitourinary: no lim.   Extremities : no c/c/e .   Neurological: AO x 3, fluent speeches, following commands, CNS II-XII grossly intact; no focal neurologic deficits.   Behavior/Emotional: in NAD, appropriate; cooperative.   Skin: clean, dry and intact.     Plan of care was discussed with patient, RN, and PMR attending.     Assessment     CC: S/p severe covid-19 PNA complicated by VDRF, dysphagia, ERNIE, bacterial PNA, and  hospital delirium, physical deconditioning with ADL and ambulatory dysfunction.       70 y.o. male, I PTA, with PMH of HTN, HLD, and CAD, admitted to [facility] on 1/5/2021 for severeCOVID-19 PNA. Worsening resp status intubated on 1/9- extubated 1/16 to OptiFlow. Transition to [facility] on 1/23.Eval by nephrology while in ICU due to ERNIE likely secondary to ATN. Creatinine has been improving with excellent urine output. Received Decadron and Remdesivir. Dobhoff tube was placed due to dysphagia. New fever of 102.8 on 1/20 and 1/21. ID was consulted. Bld cx's negative. CT scan of the chest abdomen pelvis was done with results showing increasing pulmonary infiltrates/groundglass opacities and consolidations in his bilateral lower lobes. Started empirically on cefepime and vancomycin to cover hospital-acquired pneumonia. Pt had increased lethargy on 1/22. Pt removed DHT 1/25; required higher O2 afterward - possible aspiration during the process. 1/26 patient continued to have intermittent confusion; ?post ICU delirium. Neurology is following, MRI brain was unremarkable. EEG showed mild slowing but no seizure activity. D/c'd abx after 5 days d/t lethargy. Repeat VFSS performed- now on soft nectar thick liquid diet .  Pt is AA&Ox3 with periods of confusion. Tolerates soft diet with NTL, voiding in urinal, O2 @ 2-3L via nc. Participating in therapies with marked improvement, functionally, he has residual ADL and ambulatory  dysfunction, rec's for acute rehab prior to home with SO. Transferred to [facility] on 2/4/2021.      1.S/p severe covid-19 PNA complicated by VDRF, dysphagia, ERNIE, bacterial PNA, and  hospital delirium, physical deconditioning with ADL and ambulatory dysfunction : inpt acute rehab, gait and balancing training, SLP therapy, nutrition support,  Fall/aspiration precaution, medical management, DVT prophylaxis, dermal defense, f/u with PCP after inpt rehab.     2. Pulm-S/p severe covid-19 PNA complicated by VDRF, s/p possible bacteria PNA,  hypoxemia with mild exertion, atelectasis: monitor SO2, prn NC O2, keep SO2>92%, incentive spirometry, bronchodilator inhaler, mucolytic agent, pulm toileting. Check CXR.      3. Psych-s/p hospital acute delirium, intermittent confusion: his metal status has much improved, monitor his mood and mentation, help his orientation, psychology CBT, avoid unnecessary sedatives, SW support.      4. GI-on soft nectar thick liquid diet, constipation:SLP evaluation to advance his diet as tolerated; provide constipation bowel regimen, keep adequate hydration, timed toilet visits.     5. DVT prophy: SCD, early ambulation, SQ heparin, check LE venous DUS to r/o DVT.       6. HTN, dyslipidemia: cont home HTN meds with holding parameter, orthostatic hypotension precaution, monitor BP . On gemfibrozil, f/u with his PCP.      Mixed significant dyslipidemia: LCL-C 185, HDL-C 30, , h/o HTN, HLD, and CAD, only gemfibrozil is far from adequate, labeled Lipitor allergy, still worth retrial with hydrophilic formula Crestor from low dose, which is absolutely necessary and also guideline recommended    7. Renal-s/p ERNIE recovered, electrolytes imbalance: monitor renal function and volume status, avoid nephrotoxic agents and low BP,  monitor and keep electrolytes balance.     8. - urinary retention: timed voiding trial, monitor PVR with prn cic, check UA/UCX.     Billing code: [id number]  Diagnoses:  Patient Active  Problem List   Diagnosis   • Acute metabolic encephalopathy   • ARDS (adult respiratory distress syndrome) (CMS/HCC)   • Coronary artery disease involving native coronary artery of native heart without angina pectoris   • Hypoxia   • Pneumonia due to COVID-19 virus   • Transaminitis   • Essential hypertension   • Dyslipidemia   • Atelectasis   • Physical deconditioning          Laure Bishop MD  2/10/2021

## 2021-02-10 NOTE — PLAN OF CARE
Problem: Rehabilitation (IRF) Plan of Care  Goal: Plan of Care Review  Outcome: Progressing  Flowsheets (Taken 2/10/2021 0447)  Plan of Care Reviewed With: patient  IRF Plan of Care Review: progress ongoing, continue  Outcome Summary:   Assumed patient care at 2300, in bed resting, denies pain   2 liters of O2 NC spo2 97%   continent of bowel and bladder   fall prevention/ safety measures maintained, call bell within reach  Appeared to sleep most of the night   Plan of Care Review  IRF Plan of Care Review: progress ongoing, continue  Plan of Care Reviewed With: patient  Outcome Summary: Assumed patient care at 2300, in bed resting, denies pain; 2 liters of O2 NC spo2 97%; contient of bowel and bladder; fall prevention/ safety measures maintained, call bell within reach

## 2021-02-10 NOTE — PROGRESS NOTES
Patient: Denis Green  Location: [facility] [address]  MRN: [id number]  Today's date: 2/10/2021    History of Present Illness  Denis LOVE is a 70 y.o. male admitted on 2/4/2021 with Pneumonia due to COVID-19 virus. Principal problem is No Principal Problem: There is no principal problem currently on the Problem List. Please update the Problem List and refresh..   Pt admitted to outside hospital on 1/5/2021 for COVID-19 PNA. Worsening resp status intubated on 1/9- extubated 1/16 to OptiFlow. Transition to [facility] on 1/23.Eval by nephrology while in ICU due to ERNIE likely secondary to ATN. Creatinine has been improving with excellent urine output. Received Decadron and Remdesivir. Dobhoff tube was placed due to dysphagia. New fever of 102.8 1/20 and 1/21. ID was consulted. Bld cx's negative. CT scan of the chest abdomen pelvis was done with results showing increasing pulmonary infiltrates/groundglass opacities and consolidations in his bilateral lower lobes. Started empirically on cefepime and vancomycin to cover hospital-acquired pneumonia. Pt had increased lethargy on 1/22. Pt removed DHT 1/25; required higher O2 afterward - possible aspiration during the process. 1/26 patient continued to have intermittent confusion; ?post ICU delirium. Neurology is following, MRI brain was unremarkable. EEG showed mild slowing but no seizure activity. D/c'd abx after 5 days d/t lethargy.       Past Medical History  Denis LOVE has a past medical history of Coronary artery disease, Hypertension, and Lipid disorder.    PT Vitals    Date/Time Pulse HR Source Resp SpO2 O2 Therapy BP BP Location BP Method Pt Position Who   02/10/21 1533 79 Monitor -- -- -- 118/62 Right upper arm Automatic Sitting CINDY   02/10/21 1534 80 -- 20 97 % None (Room air) 109/63 -- -- -- JJD   02/10/21 1534 -- -- -- -- -- -- Right upper arm Automatic Sitting CINDY   02/10/21 1557 86 Monitor -- -- -- 132/74 Right upper arm Automatic Sitting CINDY       PT Pain    Date/Time Pain Type Rating: [facility]   02/10/21 1533 Pain Assessment 0 CINDY   02/10/21 1557 Pain Reassessment 0 CINDY          Prior Living Environment      Most Recent Value   Lives With  spouse, child(mindy), adult [adult son lives in home occasionally]   Living Arrangements  house   Home Accessibility  stairs to enter home (Group), stairs within home (Group)   Living Environment Comment  Lives with wife and intermittently with one son.  [bed and bath on 2nd ,  no powder room on first]   Number of Stairs, Main Entrance  1   Stair Railings, Main Entrance  none   Location, Patient Bedroom  second floor, must negotiate stairs to access   Location, Bathroom  second floor, must negotiate stairs to access   Bathroom Access Comment  Pt. reports has tub shower and stall shower, uses tub shower primarily, has grab bar in shower, has stanard height toilet c wall on both sides   Number of Stairs, Within Home, Primary  12   Surface of Stairs, Within Home, Primary  hardwood   Stair Railings, Within Home, Primary  railings on both sides of stairs          Prior Level of Function      Most Recent Value   Dominant Hand  right   Ambulation  independent   Transferring  independent   Toileting  independent   Bathing  independent   Dressing  independent   Eating  independent   Communication  understands/communicates without difficulty   Swallowing  swallows foods/liquids without difficulty   Baseline Diet/Method of Nutritional Intake  regular solids, thin liquids   Past History of Dysphagia  No previous reports    Prior Level of Function Comment  Pt is a cattle/,  previously IND for all IADLs.    Assistive Device/Animal Currently Used at Home  none          IRF PT Evaluation and Treatment - 02/10/21 1532        Time Calculation    Start Time  1530     Stop Time  1600     Time Calculation (min)  30 min        Session Details    Document Type  daily treatment/progress note     Mode of Treatment  individual  therapy;physical therapy        Sit to Stand Transfer    [place]ennett, Sit to Stand Transfer  minimum assist (75% or more patient effort)     Verbal Cues  safety;technique     Assistive Device  walker, front-wheeled     Comment  from WC to RW, Min A for B hip extension to rise        Stand to Sit Transfer    [place]ennett, Stand to Sit Transfer  minimum assist (75% or more patient effort)     Verbal Cues  technique;safety     Assistive Device  walker, front-wheeled     Comment  to WC, Min A for controlled lowering to WC        Gait Training    Bennett, Gait  dependent (less than 25% patient effort)     Assistive Device  walker, front-wheeled     Distance in Feet  40 feet     Gait Pattern Utilized  step-through     Deviations/Abnormal Patterns (Gait)  base of support, narrow;gait speed decreased;step length decreased     Bilateral Gait Deviations  heel strike decreased     Comment  40' with Min-Mod A x1 and 2nd person for WC follow.         Stairs Training    Bennett, Stairs  dependent (less than 25% patient effort)     Assistive Device  railing     Handrail Location  both sides     Number of Stairs  4     Stair Height  6 inches     Ascending Stairs Technique  step-to-step     Descending Stairs Technique  step-to-step     Comment  Mod A x1 + SBA x1 for safety considerations.          Lower Extremity (Therapeutic Exercise)    Exercise Position/Type (LE Therapeutic Exercise)  standing     Reps and Sets (LE Therapeutic Exercise)  3x10 reps     Comment (LE Therapeutic Exercise)  Standing mini-squats with BUE support on RW and Min A for balance.         Daily Progress Summary (PT)    Daily Outcome Statement (PT)  Patient was able to initiate elevation training during session. Patient required 2nd person for safety considerations with elevations on first attempt. Cont to progress as tolerated.                             IRF PT Goals      Most Recent Value   Bed Mobility Goal 1   Activity/Assistive Device  rolling  to left, rolling to right, sit to supine/supine to sit at 02/05/2021 1030   [place]  supervision required at 02/05/2021 1030   Time Frame  short-term goal (STG), 1 week at 02/05/2021 1030   Bed Mobility Goal 2   Activity/Assistive Device  rolling to left, rolling to right, sit to supine/supine to sit at 02/05/2021 1030   [place]  modified independence at 02/05/2021 1030   Time Frame  long-term goal (LTG), 21 days or less at 02/05/2021 1030   Transfer Goal 1   Activity/Assistive Device  bed-to-chair/chair-to-bed, sit-to-stand/stand-to-sit, stand pivot, walker, front-wheeled at 02/05/2021 1030   [place]  moderate assist (50-74% patient effort), verbal cues required at 02/05/2021 1030   Time Frame  short-term goal (STG), 1 week at 02/05/2021 1030   Transfer Goal 2   Activity/Assistive Device  sit-to-stand/stand-to-sit, bed-to-chair/chair-to-bed, stand pivot, walker, front-wheeled at 02/05/2021 1030   [place]  supervision required at 02/05/2021 1030   Time Frame  long-term goal (LTG), 21 days or less at 02/05/2021 1030   Gait/Walking Locomotion Goal 1   Activity/Assistive Device  gait (walking locomotion), decrease fall risk, increase endurance/gait distance, walker, front-wheeled at 02/05/2021 1030   Distance  15 feet at 02/05/2021 1030   [place]  moderate assist (50-74% patient effort) [and close S of 2nd person for safety] at 02/05/2021 1030   Time Frame  short-term goal (STG), 1 week at 02/05/2021 1030   Gait/Walking Locomotion Goal 2   Activity/Assistive Device  gait (walking locomotion), decrease asymmetrical patterns, decrease fall risk, diminish gait deviation, improve balance and speed, increase endurance/gait distance, turning, left, turning, right, walker, front-wheeled at 02/05/2021 1030   Distance  100 feet at 02/05/2021 1030   [place]  supervision required at 02/05/2021 1030   Time Frame  long-term goal (LTG), 21 days or less at 02/05/2021 1031

## 2021-02-10 NOTE — PROGRESS NOTES
Patient: Denis Green  Location: [facility] [address]  MRN: [id number]  Today's date: 2/10/2021    History of Present Illness  Denis LOVE is a 70 y.o. male admitted on 2/4/2021 with Pneumonia due to COVID-19 virus. Principal problem is No Principal Problem: There is no principal problem currently on the Problem List. Please update the Problem List and refresh..   Pt admitted to outside hospital on 1/5/2021 for COVID-19 PNA. Worsening resp status intubated on 1/9- extubated 1/16 to OptiFlow. Transition to [facility]NC on 1/23.Eval by nephrology while in ICU due to ERNIE likely secondary to ATN. Creatinine has been improving with excellent urine output. Received Decadron and Remdesivir. Dobhoff tube was placed due to dysphagia. New fever of 102.8 1/20 and 1/21. ID was consulted. Bld cx's negative. CT scan of the chest abdomen pelvis was done with results showing increasing pulmonary infiltrates/groundglass opacities and consolidations in his bilateral lower lobes. Started empirically on cefepime and vancomycin to cover hospital-acquired pneumonia. Pt had increased lethargy on 1/22. Pt removed DHT 1/25; required higher O2 afterward - possible aspiration during the process. 1/26 patient continued to have intermittent confusion; ?post ICU delirium. Neurology is following, MRI brain was unremarkable. EEG showed mild slowing but no seizure activity. D/c'd abx after 5 days d/t lethargy.       Past Medical History  Denis LOVE has a past medical history of Coronary artery disease, Hypertension, and Lipid disorder.    PT Vitals    Date/Time Pulse HR Source BP BP Location BP Method Pt Position [facility]   02/10/21 1042 70 Monitor 120/73 Right upper arm Automatic Sitting CNIDY      PT Pain    Date/Time Pain Type Side Location Rating: [place] [facility]   02/10/21 1042 Pain Assessment Left foot 2 - mild pain CINDY          Prior Living Environment      Most Recent Value   Lives With  spouse, child(mindy), adult [adult son lives in home  occasionally]   Living Arrangements  house   Home Accessibility  stairs to enter home (Group), stairs within home (Group)   Living Environment Comment  Lives with wife and intermittently with one son.  [bed and bath on 2nd ,  no powder room on first]   Number of Stairs, Main Entrance  1   Stair Railings, Main Entrance  none   Location, Patient Bedroom  second floor, must negotiate stairs to access   Location, Bathroom  second floor, must negotiate stairs to access   Bathroom Access Comment  Pt. reports has tub shower and stall shower, uses tub shower primarily, has grab bar in shower, has stanard height toilet c wall on both sides   Number of Stairs, Within Home, Primary  12   Surface of Stairs, Within Home, Primary  hardwood   Stair Railings, Within Home, Primary  railings on both sides of stairs          Prior Level of Function      Most Recent Value   Dominant Hand  right   Ambulation  independent   Transferring  independent   Toileting  independent   Bathing  independent   Dressing  independent   Eating  independent   Communication  understands/communicates without difficulty   Swallowing  swallows foods/liquids without difficulty   Baseline Diet/Method of Nutritional Intake  regular solids, thin liquids   Past History of Dysphagia  No previous reports    Prior Level of Function Comment  Pt is a cattle/,  previously IND for all IADLs.    Assistive Device/Animal Currently Used at Home  none          IRF PT Evaluation and Treatment - 02/10/21 1054        Time Calculation    Start Time  1030     Stop Time  1130     Time Calculation (min)  60 min        Session Details    Document Type  daily treatment/progress note     Mode of Treatment  individual therapy;physical therapy        Sit to Stand Transfer    [place], Sit to Stand Transfer  minimum assist (75% or more patient effort)     Verbal Cues  safety;technique     Assistive Device  walker, front-wheeled     Comment  from WC to RW, Min A for B hip  "extension to rise        Stand to Sit Transfer    [place], Stand to Sit Transfer  minimum assist (75% or more patient effort)     Verbal Cues  safety;hand placement     Assistive Device  walker, front-wheeled     Comment  to WC, Min A for controlled lowering to WC. Verbal cue to reach back for WC        Gait Training    [place], Gait  dependent (less than 25% patient effort)     Assistive Device  walker, front-wheeled     Distance in Feet  50 feet     Gait Pattern Utilized  step-through     Deviations/Abnormal Patterns (Gait)  base of support, narrow;gait speed decreased;step length decreased     Bilateral Gait Deviations  heel strike decreased     Advanced Gait Activity  10 Meter Walk Test (Self-Selected Velocity)     Comment  50' (1x) and 44' (1x) with RW and Min-Mod A x1 + 2nd person for close WC follow/O2 tank management        10 Meter Walk Test (Self-Selected Velocity)    Trial One: Ten Meter Walk Test (sec)  18.7 seconds     Assistive Device  walker, front-wheeled     Trial One: Gait Speed (m/s)  0.32 m/s        Motor Skills    Motor Control/Coordination Interventions  stepping/walking        Advanced Stepping/Walking Interventions    Stepping/Walking Interventions  box stepping     Box Stepping (Stepping/Walking Interventions)  Alternating tap ups to 6\" block with RW and Min A for ws and postural control. Performed 3x16 reps total with BUE support.         Lower Extremity (Therapeutic Exercise)    Exercise Position/Type (LE Therapeutic Exercise)  seated     General Exercise (LE Therapeutic Exercise)  bilateral     Reps and Sets (LE Therapeutic Exercise)  2x20 reps     Comment (LE Therapeutic Exercise)  Seated hip marches and LAQ        Daily Progress Summary (PT)    Daily Outcome Statement (PT)  Patient's current gait speed (0.32 m/s) demonstrates significantly slower speed than age matched norms. Patient was able to progress ambulation distance on both trials today. Pt continues to require close " WC follow for all ambulation trials. Cont to progress activity as tolerated.                             IRF PT Goals      Most Recent Value   Bed Mobility Goal 1   Activity/Assistive Device  rolling to left, rolling to right, sit to supine/supine to sit at 02/05/2021 1030   [place]  supervision required at 02/05/2021 1030   Time Frame  short-term goal (STG), 1 week at 02/05/2021 1030   Bed Mobility Goal 2   Activity/Assistive Device  rolling to left, rolling to right, sit to supine/supine to sit at 02/05/2021 1030   [place]  modified independence at 02/05/2021 1030   Time Frame  long-term goal (LTG), 21 days or less at 02/05/2021 1030   Transfer Goal 1   Activity/Assistive Device  bed-to-chair/chair-to-bed, sit-to-stand/stand-to-sit, stand pivot, walker, front-wheeled at 02/05/2021 1030   [place]  moderate assist (50-74% patient effort), verbal cues required at 02/05/2021 1030   Time Frame  short-term goal (STG), 1 week at 02/05/2021 1030   Transfer Goal 2   Activity/Assistive Device  sit-to-stand/stand-to-sit, bed-to-chair/chair-to-bed, stand pivot, walker, front-wheeled at 02/05/2021 1030   [place]  supervision required at 02/05/2021 1030   Time Frame  long-term goal (LTG), 21 days or less at 02/05/2021 1030   Gait/Walking Locomotion Goal 1   Activity/Assistive Device  gait (walking locomotion), decrease fall risk, increase endurance/gait distance, walker, front-wheeled at 02/05/2021 1030   Distance  15 feet at 02/05/2021 1030   [place]  moderate assist (50-74% patient effort) [and close S of 2nd person for safety] at 02/05/2021 1030   Time Frame  short-term goal (STG), 1 week at 02/05/2021 1030   Gait/Walking Locomotion Goal 2   Activity/Assistive Device  gait (walking locomotion), decrease asymmetrical patterns, decrease fall risk, diminish gait deviation, improve balance and speed, increase endurance/gait distance, turning, left, turning, right, walker, front-wheeled at 02/05/2021 1030    Distance  100 feet at 02/05/2021 1030   [place]  supervision required at 02/05/2021 1030   Time Frame  long-term goal (LTG), 21 days or less at 02/05/2021 1030

## 2021-02-10 NOTE — PLAN OF CARE
Problem: Rehabilitation (IRF) Plan of Care  Goal: Plan of Care Review  Flowsheets (Taken 2/10/2021 1132)  Plan of Care Reviewed With: patient  IRF Plan of Care Review: progress ongoing, continue  Outcome Summary: Patient ambulated 50' (1x) and 44' (1x) with Min-Mod A x1 + 2nd person for O2 tank management and WC follow.

## 2021-02-10 NOTE — PROGRESS NOTES
Subjective    Patient seen and examined on rounds.  Chart reviewed.  Events overnight noted.  History reviewed briefly with patient.     CC:  Deficits in mobility, transfers, self-care status post deconditioning, severe Covid 19 pneumonia, ventilator-dependent respiratory failure, dysphagia, multiple medical problems.     HPI:  Mr. Denis Green is a 70-year-old right-handed white male with chronic conditions significant for coronary artery disease, hypertension, hyperlipidemia who was admitted to [facility] on 1/5/21 due to Covid 19 pneumonia and worsening respiratory status.  He required intubation on 1/9/21 and had ventilator-dependent respiratory failure.  He was treated with Decadron and Remdesivir.  He had dysphagia requiring Dobbhoff tube feeds.  He was extubated on 1/16/21 to OptiFlow.  On 1/23/21 he was transitioned to high flow nasal cannula.  Hospital course was significant for acute renal insufficiency likely secondary to acute tubular necrosis and he was followed by nephrology.  He had subsequent improvement in renal function.  He also had new fever of 102.8°F on 1/20/21 and 1/21/21.  He was followed by infectious disease.  Blood cultures were negative. CT scan of the chest abdomen pelvis was done with results showing increasing pulmonary infiltrates/groundglass opacities and consolidations in his bilateral lower lobes.  He was treated empirically with Cefepime and Vancomycin to cover hospital-acquired pneumonia.  He was noted to be lethargic on 1/22/21.  Subsequently patient removed Dobbhoff tube on 1/25/21 and there is question of possible aspiration during that process.  He continued to have intermittent confusion on 1/26/21 was felt to be possible post ICU delirium.  He was followed by neurology.  MRI brain was unremarkable.  EEG showed mild slowing but no seizure activity was noted.  Antibiotics were discontinued after a 5 day course due to lethargy.  Repeat video swallow study was  "performed for dysphagia and patient was put on a soft diet with nectar thick liquids. On 2/3/21, hemoglobin was 11.2, WBC count 15.1, platelets were 275, BUN was 15, and creatinine was 0.7.  He has been needing assistance for mobility, transfers, self-care postoperatively. He is transferred to [facility] on 2/4/21 for further rehabilitation care.       SUBJ:  Free water protocol with ice chips and sips of water per speech therapy.  He feels better today.  Left wrist pain is decreased significantly per patient.  Working on endurance with physical therapy.  He required minimal to moderate assistance for transfers.  Working on ADLs with occupational therapy.     ROS: Denies chest pain or shortness of breath. Other ROS negative. Past, family, social history is unchanged.    Physical Exam      Blood pressure 123/66, pulse 78, temperature 36.8 °C (98.3 °F), temperature source Oral, resp. rate 18, height 1.727 m (5' 8\"), weight 73.3 kg (161 lb 8 oz), SpO2 98 %.  Body mass index is 24.56 kg/m².    General Appearance: Not in acute distress  Head/Ear/Nose/Throat: Normocephalic; Atraumatic.  On oxygen by nasal cannula.  Eye: EOMI; PERRL.   Neck: No JVD; No Bruits.   Respiratory: Decreased breath sounds at bases.   Cardiovascular: RRR; Normal S1, S2.   Gastrointestinal: Soft; NT; +BS.   Extremities: Bilateral lower extremity edema noted.   Musculoskeletal: Functional active range of motion in both upper and lower extremities.    Neurological: Awake alert oriented to person, had some difficulty naming the place and correct date.. Speech is fluent. Cranial nerve examination does not reveal any gross facial asymmetry. Strength testing about 4/5 strength in both upper extremities.  Bilateral hip flexion is 3+/5.  Bilateral quadriceps are 4/5 with the thighs supported.  Bilateral ankle dorsi and plantar flexion are 4/5.  He is grossly able to localize touch and position sense in great toes.  Deep tendon reflexes are " hypoactive and symmetric bilaterally.  Plantars are flexor.  Coordination is functional upper extremities.  Neurologically stable.  Behavior/Emotional: Appropriate; Cooperative.   Skin: No obvious rashes noted.        Current Facility-Administered Medications:   •  acetaminophen (TYLENOL) tablet 650 mg, 650 mg, oral, q4h PRN, Omari Spain MD, 650 mg at 02/06/21 2001  •  acetaminophen (TYLENOL) tablet 650 mg, 650 mg, oral, With meals & nightly, Isael Lang MD, 650 mg at 02/09/21 2012  •  albuterol HFA (VENTOLIN HFA) 90 mcg/actuation inhaler 2 puff, 2 puff, inhalation, q6h PRN, Omari Spain MD  •  albuterol HFA (VENTOLIN HFA) 90 mcg/actuation inhaler 2 puff, 2 puff, inhalation, q6h, Laure Bishop MD, 2 puff at 02/09/21 1726  •  alum-mag hydroxide-simeth (MAALOX) 200-200-20 mg/5 mL suspension 30 mL, 30 mL, oral, q6h PRN, Laure Bishop MD  •  amLODIPine (NORVASC) tablet 5 mg, 5 mg, oral, q12h EBER, Laure Bishop MD, 5 mg at 02/09/21 2013  •  aspirin enteric coated tablet 81 mg, 81 mg, oral, Daily, Omari Spain MD, 81 mg at 02/09/21 0901  •  bisacodyL (DULCOLAX) 10 mg suppository 10 mg, 10 mg, rectal, Daily PRN, Isael Lang MD, 10 mg at 02/09/21 1850  •  diclofenac sodium (VOLTAREN) 1 % topical gel 2 g, 2 g, Topical, TID, Laure Bishop MD, 2 g at 02/09/21 2013  •  docusate sodium (COLACE) capsule 100 mg, 100 mg, oral, TID, Isael Lang MD, 100 mg at 02/09/21 2013  •  furosemide (LASIX) tablet 20 mg, 20 mg, oral, BID (am, 4p), Laure Bishop MD, 20 mg at 02/09/21 1554  •  gemfibroziL (LOPID) tablet 600 mg, 600 mg, oral, BID , Omari Spain MD, 600 mg at 02/09/21 1554  •  guaiFENesin (MUCINEX) 12 hr ER tablet 600 mg, 600 mg, oral, BID, Laure Bishop MD, 600 mg at 02/09/21 2012  •  heparin (porcine) 5,000 unit/mL injection 5,000 Units, 5,000 Units, subcutaneous, q8h [facility], Omari Spain MD, 5,000 Units at 02/09/21 2013  •  magnesium hydroxide (M.O.M.) 400 mg/5 mL suspension 30 mL, 30  mL, oral, 2x daily PRN, Laure Bishop MD, 30 mL at 02/09/21 0904  •  magnesium oxide (MAG-OX) tablet 400 mg, 400 mg, oral, BID, Laure Bishop MD, 400 mg at 02/09/21 2013  •  melatonin ODT 3 mg, 3 mg, oral, Nightly, Laure Bishop MD, 3 mg at 02/09/21 2012  •  metoprolol tartrate (LOPRESSOR) split tablet 12.5 mg, 12.5 mg, oral, BID, Omari Spain MD, 12.5 mg at 02/09/21 2012  •  mouth moisturizer (TOOTHETTE) cream, , mucous membrane, TID, Laure Bishop MD, Given at 02/09/21 2014  •  polyethylene glycol (MIRALAX) 17 gram packet 17 g, 17 g, oral, Daily PRN, Omari Spain MD, 17 g at 02/04/21 2157  •  rosuvastatin (CRESTOR) tablet 5 mg, 5 mg, oral, Daily (6p), Laure Bishop MD, 5 mg at 02/07/21 1756  •  senna (SENOKOT) tablet 3 tablet, 3 tablet, oral, Daily before lunch, Isael Lang MD, 3 tablet at 02/09/21 1236       Labs / Radiology    Lab Results   Component Value Date    WBC 7.42 02/08/2021    HGB 11.0 (L) 02/08/2021    HCT 34.7 (L) 02/08/2021    MCV 96.7 02/08/2021     (H) 02/08/2021     Lab Results   Component Value Date    GLUCOSE 98 02/08/2021    CALCIUM 8.9 02/08/2021     (L) 02/08/2021    K 4.0 02/08/2021    CO2 25 02/08/2021    CL 94 (L) 02/08/2021    BUN 16 02/08/2021    CREATININE 0.6 (L) 02/08/2021       Assessment and Plan    ASSESSMENT PLAN:  1. 70-year-old right-handed white male with chronic conditions significant for coronary artery disease, hypertension, hyperlipidemia who was admitted to [facility] on 1/5/21 due to Covid 19 pneumonia and worsening respiratory status.  He required intubation on 1/9/21 and had ventilator-dependent respiratory failure.  He was treated with Decadron and Remdesivir.  He had dysphagia requiring Dobbhoff tube feeds.  He was extubated on 1/16/21 to OptiFlow.  On 1/23/21 he was transitioned to high flow nasal cannula.  Hospital course was significant for acute renal insufficiency likely secondary to acute tubular necrosis and he was  followed by nephrology.  He had subsequent improvement in renal function.  He also had new fever of 102.8°F on 1/20/21 and 1/21/21.  He was followed by infectious disease.  Blood cultures were negative. CT scan of the chest abdomen pelvis was done with results showing increasing pulmonary infiltrates/groundglass opacities and consolidations in his bilateral lower lobes.  He was treated empirically with Cefepime and Vancomycin to cover hospital-acquired pneumonia.  He was noted to be lethargic on 1/22/21.  Subsequently patient removed Dobbhoff tube on 1/25/21 and there is question of possible aspiration during that process.  He continued to have intermittent confusion on 1/26/21 was felt to be possible post ICU delirium.  He was followed by neurology.  MRI brain was unremarkable.  EEG showed mild slowing but no seizure activity was noted.  Antibiotics were discontinued after a 5 day course due to lethargy.  Repeat video swallow study was performed for dysphagia and patient was put on a soft diet with nectar thick liquids. On 2/3/21, hemoglobin was 11.2, WBC count 15.1, platelets were 275, BUN was 15, and creatinine was 0.7.  He has been needing assistance for mobility, transfers, self-care postoperatively. He is transferred to [place] [facility] on 2/4/21 for further rehabilitation care.       2. DVT prophylaxis - on subcutaneous Heparin.  On SCDs.  Platelets 296 on 2/5/2021.     3.  Deconditioning - recent severe Covid 19 pneumonia, ventilator-dependent respiratory failure, dysphagia, multiple medical problems - He had ventilator-dependent respiratory failure.  He was treated with Decadron and Remdesivir.  Continue PT, OT, speech, psychology.  Follow falls precautions, cardiac precautions, aspiration precautions.     4. GI - On Colace, Senokot.  On PRN MiraLAX, MOM, Maalox.      5.  - voiding.  Denies dysuria.  Monitor post void residuals.     6.  Pulmonary - recent severe Covid 19 pneumonia,  ventilator-dependent respiratory failure - on Ventolin HFA.  On Mucinex.     7. Pain - on PRN Tylenol.  On scheduled Tylenol for left wrist pain.  On topical Voltaren gel.  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  K pad as needed for pain.     8. Hematology -hemoglobin 12.0, WBC 8.15 on 2/5/2021.     9. CVS - history of coronary artery disease and hypertension.  On Norvasc.  On Lopressor.  On Aspirin.  On Lasix.  On Magnesium oxide.     10.  Hyperlipidemia - on Lopid.     11.  Insomnia - on Melatonin.     12.  Dysphagia -on soft diet with nectar thick liquids.  Free water protocol with ice chips and sips of water per speech therapy.  Speech therapy and nutrition following.     13. Rehabilitation medicine - Continue comprehensive rehabilitation care. Continue PT, OT, speech, psychology.  We will follow falls precautions, cardiac precautions, monitor pulse oximetry in therapy and follow aspiration precautions.  Tolerating therapies per endurance.  Discussed with speech therapy.  Free water protocol ice chips was started.  Denies coughing with meals.He reported some pain in left wrist.  He is not sure if he accidentally banged the left wrist against the side rail of the bed.  Denies history of gout.  Left wrist does not appear warm or tender to touch.  K pad ordered.  We will also order scheduled Tylenol for 7 days.  Discussed results of wrist x-rays with patient which showed moderate degenerative change particularly about the radial aspect of the wrist.  He reports pain is less today.  He ambulated 25 feet with rolling walker with assistance of 2 people with physical therapy.Free water protocol with ice chips and sips of water per speech therapy.  He feels better today.  Left wrist pain is decreased significantly per patient.  Working on endurance with physical therapy.  He required minimal to moderate assistance for transfers.  Working on ADLs with occupational therapy.     14. Reviewed labs  today.  BUN 15, creatinine 0.6 on 2/5/2021.               Isael Lang MD  2/9/2021

## 2021-02-10 NOTE — PLAN OF CARE
Problem: Rehabilitation (IRF) Plan of Care  Goal: Plan of Care Review  Flowsheets (Taken 2/10/2021 3856)  Plan of Care Reviewed With: patient  IRF Plan of Care Review: progress ongoing, continue  Outcome Summary: Pt improved tolerance and 02 saturation during transfer and dressing. Issued AD caity head and instruction

## 2021-02-10 NOTE — PROGRESS NOTES
Patient: Denis Green  Location: [facility] [address]  MRN: [id number]  Today's date: 2/10/2021    History of Present Illness  Denis LOVE is a 70 y.o. male admitted on 2/4/2021 with Pneumonia due to COVID-19 virus. Principal problem is No Principal Problem: There is no principal problem currently on the Problem List. Please update the Problem List and refresh..   Pt admitted to outside hospital on 1/5/2021 for COVID-19 PNA. Worsening resp status intubated on 1/9- extubated 1/16 to OptiFlow. Transition to [facility]NC on 1/23.Eval by nephrology while in ICU due to ERNIE likely secondary to ATN. Creatinine has been improving with excellent urine output. Received Decadron and Remdesivir. Dobhoff tube was placed due to dysphagia. New fever of 102.8 1/20 and 1/21. ID was consulted. Bld cx's negative. CT scan of the chest abdomen pelvis was done with results showing increasing pulmonary infiltrates/groundglass opacities and consolidations in his bilateral lower lobes. Started empirically on cefepime and vancomycin to cover hospital-acquired pneumonia. Pt had increased lethargy on 1/22. Pt removed DHT 1/25; required higher O2 afterward - possible aspiration during the process. 1/26 patient continued to have intermittent confusion; ?post ICU delirium. Neurology is following, MRI brain was unremarkable. EEG showed mild slowing but no seizure activity. D/c'd abx after 5 days d/t lethargy.       Past Medical History  Denis LOVE has a past medical history of Coronary artery disease, Hypertension, and Lipid disorder.    SLP Pain    Date/Time Pain Type Pref Pain Scale Side Location Rating: Rest Who   02/10/21 1001 Pain Assessment word (verbal rating pain scale) Left foot arch 2 - mild pain RS   02/10/21 1026 Pain Assessment word (verbal rating pain scale) Left foot arch 0 - no pain RS          Prior Living Environment      Most Recent Value   Lives With  spouse, child(mindy), adult [adult son lives in home  occasionally]   Living Arrangements  house   Home Accessibility  stairs to enter home (Group), stairs within home (Group)   Living Environment Comment  Lives with wife and intermittently with one son.  [bed and bath on 2nd ,  no powder room on first]   Number of Stairs, Main Entrance  1   Stair Railings, Main Entrance  none   Location, Patient Bedroom  second floor, must negotiate stairs to access   Location, Bathroom  second floor, must negotiate stairs to access   Bathroom Access Comment  Pt. reports has tub shower and stall shower, uses tub shower primarily, has grab bar in shower, has stanard height toilet c wall on both sides   Number of Stairs, Within Home, Primary  12   Surface of Stairs, Within Home, Primary  hardwood   Stair Railings, Within Home, Primary  railings on both sides of stairs          Prior Level of Function      Most Recent Value   Dominant Hand  right   Ambulation  independent   Transferring  independent   Toileting  independent   Bathing  independent   Dressing  independent   Eating  independent   Communication  understands/communicates without difficulty   Swallowing  swallows foods/liquids without difficulty   Baseline Diet/Method of Nutritional Intake  regular solids, thin liquids   Past History of Dysphagia  No previous reports    Prior Level of Function Comment  Pt is a cattle/,  previously IND for all IADLs.    Assistive Device/Animal Currently Used at Home  none          IRF SLP Evaluation and Treatment - 02/10/21 1002        Time Calculation    Start Time  1000     Stop Time  1030     Time Calculation (min)  30 min        Session Details    Document Type  daily treatment/progress note     Mode of Treatment  speech language pathology;individual therapy        General Information    General Observations of Patient  Pleasant and cooperative, pt received in ST office         Cognition/Psychosocial    Attention Deficit (Cognitive)  alternating attention;selective attention      "Comment, Attention  Continued with mod level alternating/selective attention message coding exercise, pt transcribed the last letter of words in a paragraph to the lines below with 90% acc i\"ly, single error self-identified, however required mod cues to correct.      Executive Function Deficit (Cognition)  information processing;problem solving/reasoning     Comment, Executive Function  It is important to note, pt reports history of dyslexia, which may impact his performance on visual exercises. Pt reports he compensates well however continues to have deficits, i.e, pt states he reads backwards. Functional math calculations related to money, Word Problems with Pictures \"[employer]\" 3/5 I'ly, 5/5 given mod cues for attention to details.        Daily Progress Summary (SLP)    Daily Outcome Statement (SLP)  Good participation, suspect cognition is close to baseline, mild deficits in inattention to detail leading to errors, however functional. Ongoing evaluation of cognitive status to ensure there are no higher level deficits.                        IRF SLP Goals      Most Recent Value   Verbal Expression Goal 1   Verbal Expression Goal 1  STG: pt will complete mod level verbal expression tasks x 90% c min cues. at 02/06/2021 1405   Time Frame  short-term goal (STG), 1 week at 02/06/2021 1405   Verbal Expression Goal 2   Verbal Expression Goal 2  LTG: pt will complete complex/abstract verbal expression tasks x 90% c independent use of WF strategies. at 02/06/2021 1405   Time Frame  long-term goal (LTG), 3 weeks at 02/06/2021 1405   Oral Nutrition/Hydration Goal 1   Activity  effective/safe/independent, use of swallowing techniques [regular/NTL, no overt s/s of aspiration ] at 02/05/2021 1202   Time Frame  short-term goal (STG), 1 week at 02/05/2021 1202   Oral Nutrition/Hydration Goal 2   Activity  effective/safe/independent, use of swallowing techniques [regular/thin, no overt s/s of aspiration ] at 02/05/2021 " 1202   Time Frame  long-term goal (LTG), 3 weeks at 02/05/2021 1202   Executive Function Goal 1   Activity  complete, abstract thinking tasks, executive function tasks, organization/sequencing tasks, problem solving/reasoning tasks, other (see comments) [STG,  simple-mod level tasks.] at 02/06/2021 1405   [place]/Accuracy  with minimum, verbal cues/redirection, with additional processing time, with repetition of directions, with 90% accuracy at 02/06/2021 1405   Time Frame  1 week at 02/06/2021 1405   Executive Function Goal 2   Activity  complete, abstract thinking tasks, exhibit impulse control, organization/sequencing tasks, problem solving/reasoning tasks, other (see comments) [LTG] at 02/06/2021 1405   [place]/Accuracy  independently, with 90% accuracy, other (see comments) [mod-complex tasks.] at 02/06/2021 1405   Time Frame  3 weeks at 02/06/2021 1405   Memory Goal 1   Activity  short-term memory tasks, immediate recall tasks, working memory tasks, recall recent events, other (see comments) [STG,  simple-mod level tasks.] at 02/06/2021 1405   [place]/Accuracy  minimal cues for use of strategies, with 90% accuracy at 02/06/2021 1405   Time Frame  short-term goal (STG), 1 week at 02/06/2021 1405   Memory Goal 2   Activity  short-term memory tasks, immediate recall tasks, working memory tasks, recall recent events, other (see comments) [LTG,  mod complex information.] at 02/06/2021 1405   [place]/Accuracy  independent use of environmental cues/strategies, with 90% accuracy at 02/06/2021 1405   Time Frame  long-term goal (LTG), 3 weeks at 02/06/2021 1405

## 2021-02-10 NOTE — PLAN OF CARE
Problem: Rehabilitation (IRF) Plan of Care  Goal: Plan of Care Review  Flowsheets (Taken 2/10/2021 1391)  Plan of Care Reviewed With: patient  IRF Plan of Care Review: progress ongoing, continue  Outcome Summary: Good participation, suspect cognition is close to baseline, mild deficits in inattention to detail leading to errors, however functional. Ongoing evaluation of cognitive status to ensure there are no higher level deficits.

## 2021-02-11 ENCOUNTER — APPOINTMENT (INPATIENT)
Dept: PHYSICAL THERAPY | Facility: REHABILITATION | Age: 71
DRG: 948 | End: 2021-02-11
Payer: COMMERCIAL

## 2021-02-11 ENCOUNTER — APPOINTMENT (INPATIENT)
Dept: OTHER | Facility: REHABILITATION | Age: 71
DRG: 948 | End: 2021-02-11
Payer: COMMERCIAL

## 2021-02-11 ENCOUNTER — APPOINTMENT (INPATIENT)
Dept: OCCUPATIONAL THERAPY | Facility: REHABILITATION | Age: 71
DRG: 948 | End: 2021-02-11
Payer: COMMERCIAL

## 2021-02-11 ENCOUNTER — APPOINTMENT (OUTPATIENT)
Dept: PSYCHOLOGY | Facility: CLINIC | Age: 71
End: 2021-02-11
Attending: PHYSICAL MEDICINE & REHABILITATION
Payer: COMMERCIAL

## 2021-02-11 ENCOUNTER — APPOINTMENT (INPATIENT)
Dept: SPEECH THERAPY | Facility: REHABILITATION | Age: 71
DRG: 948 | End: 2021-02-11
Payer: COMMERCIAL

## 2021-02-11 LAB
ANION GAP SERPL CALC-SCNC: 13 MEQ/L (ref 3–15)
BASOPHILS # BLD: 0.09 K/UL (ref 0.01–0.1)
BASOPHILS NFR BLD: 1.1 %
BUN SERPL-MCNC: 16 MG/DL (ref 8–20)
CALCIUM SERPL-MCNC: 8.8 MG/DL (ref 8.9–10.3)
CHLORIDE SERPL-SCNC: 92 MEQ/L (ref 98–109)
CO2 SERPL-SCNC: 27 MEQ/L (ref 22–32)
CREAT SERPL-MCNC: 0.7 MG/DL (ref 0.8–1.3)
DIFFERENTIAL METHOD BLD: ABNORMAL
EOSINOPHIL # BLD: 0.39 K/UL (ref 0.04–0.54)
EOSINOPHIL NFR BLD: 4.8 %
ERYTHROCYTE [DISTWIDTH] IN BLOOD BY AUTOMATED COUNT: 14.1 % (ref 11.6–14.4)
GFR SERPL CREATININE-BSD FRML MDRD: >60 ML/MIN/1.73M*2
GLUCOSE SERPL-MCNC: 94 MG/DL (ref 70–99)
HCT VFR BLDCO AUTO: 33.3 % (ref 40.1–51)
HGB BLD-MCNC: 10.7 G/DL (ref 13.7–17.5)
IMM GRANULOCYTES # BLD AUTO: 0.34 K/UL (ref 0–0.08)
IMM GRANULOCYTES NFR BLD AUTO: 4.2 %
LYMPHOCYTES # BLD: 2.23 K/UL (ref 1.2–3.5)
LYMPHOCYTES NFR BLD: 27.7 %
MCH RBC QN AUTO: 30.7 PG (ref 28–33.2)
MCHC RBC AUTO-ENTMCNC: 32.1 G/DL (ref 32.2–36.5)
MCV RBC AUTO: 95.7 FL (ref 83–98)
MONOCYTES # BLD: 0.94 K/UL (ref 0.3–1)
MONOCYTES NFR BLD: 11.7 %
NEUTROPHILS # BLD: 4.06 K/UL (ref 1.7–7)
NEUTS SEG NFR BLD: 50.5 %
NRBC BLD-RTO: 0 %
PDW BLD AUTO: 11.4 FL (ref 9.4–12.4)
PLATELET # BLD AUTO: 389 K/UL (ref 150–350)
POTASSIUM SERPL-SCNC: 4.2 MEQ/L (ref 3.6–5.1)
RBC # BLD AUTO: 3.48 M/UL (ref 4.5–5.8)
SODIUM SERPL-SCNC: 132 MEQ/L (ref 136–144)
WBC # BLD AUTO: 8.05 K/UL (ref 3.8–10.5)

## 2021-02-11 PROCEDURE — 63700000 HC SELF-ADMINISTRABLE DRUG: Performed by: INTERNAL MEDICINE

## 2021-02-11 PROCEDURE — 63700000 HC SELF-ADMINISTRABLE DRUG: Performed by: PHYSICAL MEDICINE & REHABILITATION

## 2021-02-11 PROCEDURE — 90832 PSYTX W PT 30 MINUTES: CPT | Performed by: PSYCHOLOGIST

## 2021-02-11 PROCEDURE — 63600000 HC DRUGS/DETAIL CODE: Performed by: PHYSICAL MEDICINE & REHABILITATION

## 2021-02-11 PROCEDURE — 85025 COMPLETE CBC W/AUTO DIFF WBC: CPT | Performed by: PHYSICAL MEDICINE & REHABILITATION

## 2021-02-11 PROCEDURE — 97110 THERAPEUTIC EXERCISES: CPT | Mod: GP

## 2021-02-11 PROCEDURE — 97535 SELF CARE MNGMENT TRAINING: CPT | Mod: GO

## 2021-02-11 PROCEDURE — 92526 ORAL FUNCTION THERAPY: CPT | Mod: GN

## 2021-02-11 PROCEDURE — 80048 BASIC METABOLIC PNL TOTAL CA: CPT | Performed by: PHYSICAL MEDICINE & REHABILITATION

## 2021-02-11 PROCEDURE — 97530 THERAPEUTIC ACTIVITIES: CPT | Mod: GO,59

## 2021-02-11 PROCEDURE — 97799 UNLISTED PHYSCL MED/REHAB PX: CPT

## 2021-02-11 PROCEDURE — 63600000 HC DRUGS/DETAIL CODE: Performed by: INTERNAL MEDICINE

## 2021-02-11 PROCEDURE — 97116 GAIT TRAINING THERAPY: CPT | Mod: GP

## 2021-02-11 PROCEDURE — 12800000 HC ROOM AND CARE SEMIPRIVATE REHAB

## 2021-02-11 RX ORDER — ENOXAPARIN SODIUM 100 MG/ML
40 INJECTION SUBCUTANEOUS EVERY 24 HOURS
Status: DISCONTINUED | OUTPATIENT
Start: 2021-02-12 | End: 2021-02-15

## 2021-02-11 RX ORDER — SYRING-NEEDL,DISP,INSUL,0.3 ML 29 G X1/2"
148 SYRINGE, EMPTY DISPOSABLE MISCELLANEOUS ONCE
Status: DISPENSED | OUTPATIENT
Start: 2021-02-11 | End: 2021-02-12

## 2021-02-11 RX ORDER — BISACODYL 5 MG
10 TABLET, DELAYED RELEASE (ENTERIC COATED) ORAL 2 TIMES DAILY
Status: COMPLETED | OUTPATIENT
Start: 2021-02-11 | End: 2021-02-12

## 2021-02-11 RX ADMIN — HEPARIN SODIUM 5000 UNITS: 5000 INJECTION INTRAVENOUS; SUBCUTANEOUS at 06:32

## 2021-02-11 RX ADMIN — SENNOSIDES 3 TABLET: 8.6 TABLET, FILM COATED ORAL at 12:12

## 2021-02-11 RX ADMIN — GUAIFENESIN 600 MG: 600 TABLET ORAL at 09:12

## 2021-02-11 RX ADMIN — HEPARIN SODIUM 5000 UNITS: 5000 INJECTION INTRAVENOUS; SUBCUTANEOUS at 20:59

## 2021-02-11 RX ADMIN — Medication 400 MG: at 20:59

## 2021-02-11 RX ADMIN — ALBUTEROL SULFATE 2 PUFF: 90 AEROSOL, METERED RESPIRATORY (INHALATION) at 06:36

## 2021-02-11 RX ADMIN — ACETAMINOPHEN 650 MG: 325 TABLET, FILM COATED ORAL at 09:13

## 2021-02-11 RX ADMIN — Medication 15 G: at 12:12

## 2021-02-11 RX ADMIN — FUROSEMIDE 20 MG: 20 TABLET ORAL at 17:10

## 2021-02-11 RX ADMIN — BISACODYL 10 MG: 5 TABLET, COATED ORAL at 20:57

## 2021-02-11 RX ADMIN — ACETAMINOPHEN 650 MG: 325 TABLET, FILM COATED ORAL at 12:13

## 2021-02-11 RX ADMIN — DICLOFENAC SODIUM 2 G: 10 GEL TOPICAL at 21:00

## 2021-02-11 RX ADMIN — Medication 3 MG: at 20:59

## 2021-02-11 RX ADMIN — ALBUTEROL SULFATE 2 PUFF: 90 AEROSOL, METERED RESPIRATORY (INHALATION) at 17:17

## 2021-02-11 RX ADMIN — DOCUSATE SODIUM 100 MG: 100 CAPSULE, LIQUID FILLED ORAL at 09:18

## 2021-02-11 RX ADMIN — DICLOFENAC SODIUM 2 G: 10 GEL TOPICAL at 09:13

## 2021-02-11 RX ADMIN — ACETAMINOPHEN 650 MG: 325 TABLET, FILM COATED ORAL at 17:17

## 2021-02-11 RX ADMIN — METOPROLOL TARTRATE 12.5 MG: 25 TABLET, FILM COATED ORAL at 20:59

## 2021-02-11 RX ADMIN — Medication 400 MG: at 09:11

## 2021-02-11 RX ADMIN — METOPROLOL TARTRATE 12.5 MG: 25 TABLET, FILM COATED ORAL at 09:12

## 2021-02-11 RX ADMIN — ALBUTEROL SULFATE 2 PUFF: 90 AEROSOL, METERED RESPIRATORY (INHALATION) at 01:54

## 2021-02-11 RX ADMIN — GEMFIBROZIL 600 MG: 600 TABLET ORAL at 09:11

## 2021-02-11 RX ADMIN — GEMFIBROZIL 600 MG: 600 TABLET ORAL at 17:10

## 2021-02-11 RX ADMIN — GUAIFENESIN 600 MG: 600 TABLET ORAL at 20:59

## 2021-02-11 RX ADMIN — ACETAMINOPHEN 650 MG: 325 TABLET, FILM COATED ORAL at 20:59

## 2021-02-11 RX ADMIN — DOCUSATE SODIUM 100 MG: 100 CAPSULE, LIQUID FILLED ORAL at 20:59

## 2021-02-11 RX ADMIN — DICLOFENAC SODIUM 2 G: 10 GEL TOPICAL at 17:11

## 2021-02-11 RX ADMIN — HEPARIN SODIUM 5000 UNITS: 5000 INJECTION INTRAVENOUS; SUBCUTANEOUS at 14:46

## 2021-02-11 RX ADMIN — ALBUTEROL SULFATE 2 PUFF: 90 AEROSOL, METERED RESPIRATORY (INHALATION) at 12:15

## 2021-02-11 RX ADMIN — ASPIRIN 81 MG: 81 TABLET ORAL at 09:11

## 2021-02-11 RX ADMIN — BISACODYL 10 MG: 5 TABLET, COATED ORAL at 12:12

## 2021-02-11 RX ADMIN — DOCUSATE SODIUM 100 MG: 100 CAPSULE, LIQUID FILLED ORAL at 17:10

## 2021-02-11 RX ADMIN — FUROSEMIDE 20 MG: 20 TABLET ORAL at 09:12

## 2021-02-11 ASSESSMENT — COGNITIVE AND FUNCTIONAL STATUS - GENERAL
RECENT MEMORY: MILD LOSS
AROUSAL LEVEL: ALERT
SLEEP_WAKE_CYCLE: DECREASED
SPEECH: REGULAR
DELUSIONS: NONE OR AGE APPROPRIATE
APPETITE: DECREASED
IMPULSE CONTROL: INTACT
LIBIDO: NON-CONTRIBUTORY
PERCEPTUAL FUNCTION: NORMAL
EYE_CONTACT: WNL
PSYCHOMOTOR FUNCTIONING: DECREASED
REMOTE MEMORY: WNL
EST. PREMORBID INTELLIGENCE: AVERAGE
AFFECT: FULL RANGE
APPEARANCE: WELL GROOMED
MOOD: HOPEFUL;MOTIVATED;FRUSTRATED
THOUGHT_PROCESS: WNL
INSIGHT: INTACT
ATTENTION: IMPAIRED, MILD
ORIENTATION: FULLY ORIENTED
THOUGHT_CONTENT: APPROPRIATE
CONCENTRATION: IMPAIRED, MILD

## 2021-02-11 NOTE — PLAN OF CARE
Problem: Rehabilitation (IRF) Plan of Care  Goal: Plan of Care Review  Flowsheets (Taken 2/11/2021 5877)  Plan of Care Reviewed With: patient  IRF Plan of Care Review: progress ongoing, continue  Outcome Summary: Good participation despite reports of stomach cramping. Pt presents with severely reduced breath support, education provided re. breath training and vocal fold adduction exercises for improved airway protection.

## 2021-02-11 NOTE — PROGRESS NOTES
[facility] Progress Note          Patient was seen and examined at bedside.    Subjective:    Stable, no new complaints or nursing issues, his PT/OT is progressing well.  No BM for 2-3 days, provide mag citrate with dulcolax capsules.  Na 132, provide urea-Na 15 g po daily.   Objective   Vital signs in last 24 hours:  Temp:  [36.4 °C (97.6 °F)-36.6 °C (97.9 °F)] 36.6 °C (97.8 °F)  Heart Rate:  [70-87] 87  Resp:  [18-80] 18  BP: (107-133)/(56-74) 133/63      Intake/Output Summary (Last 24 hours) at 2/11/2021 1004  Last data filed at 2/10/2021 1300  Gross per 24 hour   Intake --   Output 300 ml   Net -300 ml     Intake/Output this shift:  No intake/output data recorded.   Review of Systems:  All other systems reviewed and negative except as noted in the HPI.   Objective      Labs  reviewed his labs thoroughly   Lab Results   Component Value Date    WBC 8.05 02/11/2021    HGB 10.7 (L) 02/11/2021    HCT 33.3 (L) 02/11/2021    MCV 95.7 02/11/2021     (H) 02/11/2021     Lab Results   Component Value Date    GLUCOSE 94 02/11/2021    CALCIUM 8.8 (L) 02/11/2021     (L) 02/11/2021    K 4.2 02/11/2021    CO2 27 02/11/2021    CL 92 (L) 02/11/2021    BUN 16 02/11/2021    CREATININE 0.7 (L) 02/11/2021       Imaging  OSH imaging study reports reviewed:    CXR 2/5/2021:  IMPRESSION:  Bilateral airspace opacities most prominent in the left upper lobe and left  lower lobe likely representing COVID19 pneumonia.         Full Code    Physical Exam:  Head/Ear/Nose/Throat: normocephalic; atraumatic; moisture mouth mm, no oropharyngeal thrush noted.   Eyes: anicteric sclera, EOMI; PERRL.   Neck : supple, no JVD, no carotid bruits appeciated.   Respiratory: no evidence of labored breathing, lung sounds a little coarse, mild bibasilar diminished BS, no remarkable w/r/c.   Cardiovascular: RRR; normal S1, S2; no m/r/g; no S3 or S4.   Gastrointestinal: soft; NT; BS normal; mildly distended; no CVAT b/l.    Genitourinary: no lim.   Extremities : no c/c/e .   Neurological: AO x 3, fluent speeches, following commands, CNS II-XII grossly intact; no focal neurologic deficits.   Behavior/Emotional: in NAD, appropriate; cooperative.   Skin: clean, dry and intact.     Plan of care was discussed with patient, RN, and PMR attending.     Assessment     CC: S/p severe covid-19 PNA complicated by VDRF, dysphagia, ERNIE, bacterial PNA, and  hospital delirium, physical deconditioning with ADL and ambulatory dysfunction.       70 y.o. male, I PTA, with PMH of HTN, HLD, and CAD, admitted to [facility] on 1/5/2021 for severeCOVID-19 PNA. Worsening resp status intubated on 1/9- extubated 1/16 to OptiFlow. Transition to HFNC on 1/23.Eval by nephrology while in ICU due to ERNIE likely secondary to ATN. Creatinine has been improving with excellent urine output. Received Decadron and Remdesivir. Dobhoff tube was placed due to dysphagia. New fever of 102.8 on 1/20 and 1/21. ID was consulted. Bld cx's negative. CT scan of the chest abdomen pelvis was done with results showing increasing pulmonary infiltrates/groundglass opacities and consolidations in his bilateral lower lobes. Started empirically on cefepime and vancomycin to cover hospital-acquired pneumonia. Pt had increased lethargy on 1/22. Pt removed DHT 1/25; required higher O2 afterward - possible aspiration during the process. 1/26 patient continued to have intermittent confusion; ?post ICU delirium. Neurology is following, MRI brain was unremarkable. EEG showed mild slowing but no seizure activity. D/c'd abx after 5 days d/t lethargy. Repeat VFSS performed- now on soft nectar thick liquid diet .  Pt is AA&Ox3 with periods of confusion. Tolerates soft diet with NTL, voiding in urinal, O2 @ 2-3L via nc. Participating in therapies with marked improvement, functionally, he has residual ADL and ambulatory dysfunction, rec's for acute rehab prior to home with SO. Transferred to [facility] on 2/4/2021.       1.S/p severe covid-19 PNA complicated by VDRF, dysphagia, ERNIE, bacterial PNA, and  hospital delirium, physical deconditioning with ADL and ambulatory dysfunction : inpt acute rehab, gait and balancing training, SLP therapy, nutrition support,  Fall/aspiration precaution, medical management, DVT prophylaxis, dermal defense, f/u with PCP after inpt rehab.     2. Pulm-S/p severe covid-19 PNA complicated by VDRF, s/p possible bacteria PNA,  hypoxemia with mild exertion, atelectasis: monitor SO2, prn NC O2, keep SO2>92%, incentive spirometry, bronchodilator inhaler, mucolytic agent, pulm toileting. Check CXR.      3. Psych-s/p hospital acute delirium, intermittent confusion: his metal status has much improved, monitor his mood and mentation, help his orientation, psychology CBT, avoid unnecessary sedatives, SW support.      4. GI-on soft nectar thick liquid diet, constipation:SLP evaluation to advance his diet as tolerated; provide constipation bowel regimen, keep adequate hydration, timed toilet visits.     5. DVT prophy: SCD, early ambulation, SQ heparin, check LE venous DUS to r/o DVT.       6. HTN, dyslipidemia: cont home HTN meds with holding parameter, orthostatic hypotension precaution, monitor BP . On gemfibrozil, f/u with his PCP.      Mixed significant dyslipidemia: LCL-C 185, HDL-C 30, , h/o HTN, HLD, and CAD, only gemfibrozil is far from adequate, labeled Lipitor allergy, still worth retrial with hydrophilic formula Crestor from low dose, which is absolutely necessary and also guideline recommended    7. Renal-s/p ERNIE recovered, electrolytes imbalance: monitor renal function and volume status, avoid nephrotoxic agents and low BP,  monitor and keep electrolytes balance.     8. - urinary retention: timed voiding trial, monitor PVR with prn cic, check UA/UCX.     Billing code: [id number]  Diagnoses:  Patient Active Problem List   Diagnosis   • Acute metabolic encephalopathy   • ARDS (adult respiratory  distress syndrome) (CMS/HCC)   • Coronary artery disease involving native coronary artery of native heart without angina pectoris   • Hypoxia   • Pneumonia due to COVID-19 virus   • Transaminitis   • Essential hypertension   • Dyslipidemia   • Atelectasis   • Physical deconditioning          Laure Bishop MD  2/11/2021

## 2021-02-11 NOTE — PROGRESS NOTES
Patient: Denis Green  Location: [facility] [address]  MRN: [id number]  Today's date: 2/11/2021    History of Present Illness  Denis LOVE is a 70 y.o. male admitted on 2/4/2021 with Pneumonia due to COVID-19 virus. Principal problem is No Principal Problem: There is no principal problem currently on the Problem List. Please update the Problem List and refresh..   Pt admitted to outside hospital on 1/5/2021 for COVID-19 PNA. Worsening resp status intubated on 1/9- extubated 1/16 to OptiFlow. Transition to HFNC on 1/23.Eval by nephrology while in ICU due to ERNIE likely secondary to ATN. Creatinine has been improving with excellent urine output. Received Decadron and Remdesivir. Dobhoff tube was placed due to dysphagia. New fever of 102.8 1/20 and 1/21. ID was consulted. Bld cx's negative. CT scan of the chest abdomen pelvis was done with results showing increasing pulmonary infiltrates/groundglass opacities and consolidations in his bilateral lower lobes. Started empirically on cefepime and vancomycin to cover hospital-acquired pneumonia. Pt had increased lethargy on 1/22. Pt removed DHT 1/25; required higher O2 afterward - possible aspiration during the process. 1/26 patient continued to have intermittent confusion; ?post ICU delirium. Neurology is following, MRI brain was unremarkable. EEG showed mild slowing but no seizure activity. D/c'd abx after 5 days d/t lethargy.       Past Medical History  Denis LOVE has a past medical history of Coronary artery disease, Hypertension, and Lipid disorder.    PT Vitals    Date/Time Pulse HR Source SpO2 Pt Activity O2 Flow Rate BP Observations [facility]   02/11/21 1306 79 Monitor 93 % At rest 1 L/min 121/79 -- LG   02/11/21 1328 80 Monitor 94 % -- 1 L/min 134/78 pt reported stomach cramping - SLP made aware during hand off. atempt to contact RN via phone  LG      PT Pain    Date/Time Pain Type Rating: Rest [facility]   02/11/21 1306 Pain Assessment 0 - no pain LG    02/11/21 1328 Post Activity 4 - moderate pain LG          Prior Living Environment      Most Recent Value   Lives With  spouse, child(mindy), adult [adult son lives in home occasionally]   Living Arrangements  house   Home Accessibility  stairs to enter home (Group), stairs within home (Group)   Living Environment Comment  Lives with wife and intermittently with one son.  [bed and bath on 2nd ,  no powder room on first]   Number of Stairs, Main Entrance  1   Stair Railings, Main Entrance  none   Location, Patient Bedroom  second floor, must negotiate stairs to access   Location, Bathroom  second floor, must negotiate stairs to access   Bathroom Access Comment  Pt. reports has tub shower and stall shower, uses tub shower primarily, has grab bar in shower, has stanard height toilet c wall on both sides   Number of Stairs, Within Home, Primary  12   Surface of Stairs, Within Home, Primary  hardwood   Stair Railings, Within Home, Primary  railings on both sides of stairs          Prior Level of Function      Most Recent Value   Dominant Hand  right   Ambulation  independent   Transferring  independent   Toileting  independent   Bathing  independent   Dressing  independent   Eating  independent   Communication  understands/communicates without difficulty   Swallowing  swallows foods/liquids without difficulty   Baseline Diet/Method of Nutritional Intake  regular solids, thin liquids   Past History of Dysphagia  No previous reports    Prior Level of Function Comment  Pt is a cattle/,  previously IND for all IADLs.    Assistive Device/Animal Currently Used at Home  none          IRF PT Evaluation and Treatment - 02/11/21 1305        Time Calculation    Start Time  1300     Stop Time  1330     Time Calculation (min)  30 min        Session Details    Document Type  daily treatment/progress note     Mode of Treatment  individual therapy        General Information    General Observations of Patient  No Complaints of  pain. Received exericse prorgam         Bed Mobility    [place], Supine to Sit  minimum assist (75% or more patient effort)     [place], Sit to Supine  minimum assist (75% or more patient effort)     Comment (Bed Mobility)  on mat         Sit to Stand Transfer    [place], Sit to Stand Transfer  minimum assist (75% or more patient effort)     Verbal Cues  technique     Assistive Device  walker, front-wheeled     Comment  from w/c         Stand to Sit Transfer    [place], Stand to Sit Transfer  minimum assist (75% or more patient effort)     Verbal Cues  technique     Assistive Device  walker, front-wheeled     Comment  for  balance, from w/c         Stand Pivot Transfer    [place], Stand Pivot/Stand Step Transfer  minimum assist (75% or more patient effort)     Verbal Cues  safety     Assistive Device  walker, front-wheeled     Comment  via ambulation         Gait Training    [place], Gait  dependent (less than 25% patient effort)     Assistive Device  walker, front-wheeled     Distance in Feet  20 feet     Gait Pattern Utilized  step-through     Deviations/Abnormal Patterns (Gait)  base of support, narrow     Bilateral Gait Deviations  heel strike decreased     Comment  to/from mat / w/c         Lower Extremity (Therapeutic Exercise)    Comment (LE Therapeutic Exercise)  supine with Weddge: glut set, quad et, SAQ , heel slides, hip abd 3 X 10        Daily Progress Summary (PT)    Daily Outcome Statement (PT)  Reviewed exercise program per primary PT.  Pt able to complete with min cues                             IRF PT Goals      Most Recent Value   Bed Mobility Goal 1   Activity/Assistive Device  rolling to left, rolling to right, sit to supine/supine to sit at 02/05/2021 1030   [place]  supervision required at 02/05/2021 1030   Time Frame  short-term goal (STG), 1 week at 02/11/2021 0824   Strategies/Barriers  Impaired strength, decreased endurance, safety considerations at  02/11/2021 0824   Progress/Outcome  goal ongoing at 02/11/2021 0824   Bed Mobility Goal 2   Activity/Assistive Device  rolling to left, rolling to right, sit to supine/supine to sit at 02/05/2021 1030   [place]  modified independence at 02/05/2021 1030   Time Frame  long-term goal (LTG), 3 weeks at 02/11/2021 0824   Strategies/Barriers  Impaired strength, decreased endurance, safety considerations at 02/11/2021 0824   Progress/Outcome  goal ongoing at 02/11/2021 0824   Transfer Goal 1   Activity/Assistive Device  sit-to-stand/stand-to-sit, bed-to-chair/chair-to-bed at 02/11/2021 0824   [place]  minimum assist (75% or more patient effort) at 02/11/2021 0824   Time Frame  short-term goal (STG), 1 week at 02/11/2021 0824   Strategies/Barriers  poor strength, decreased strength at 02/11/2021 0824   Progress/Outcome  goal met, goal revised this date at 02/11/2021 0824   Transfer Goal 2   Activity/Assistive Device  sit-to-stand/stand-to-sit, bed-to-chair/chair-to-bed at 02/11/2021 0824   [place]  modified independence at 02/11/2021 0824   Time Frame  long-term goal (LTG), 3 weeks at 02/11/2021 0824   Strategies/Barriers  poor strength, decreased strength at 02/11/2021 0824   Progress/Outcome  goal ongoing at 02/11/2021 0824   Gait/Walking Locomotion Goal 1   Activity/Assistive Device  gait (walking locomotion) at 02/11/2021 0824   Distance  50 feet at 02/11/2021 0824   [place]  minimum assist (75% or more patient effort) at 02/11/2021 0824   Time Frame  short-term goal (STG), 1 week at 02/11/2021 0824   Strategies/Barriers  impaired endurance, decreased strength at 02/11/2021 0824   Progress/Outcome  goal met, goal revised this date at 02/11/2021 0824   Gait/Walking Locomotion Goal 2   Activity/Assistive Device  gait (walking locomotion) at 02/11/2021 0824   Distance  150 feet at 02/11/2021 0824   [place]  supervision required at 02/11/2021 0824   Time Frame  long-term goal (LTG), 3 weeks at  02/11/2021 0824   Strategies/Barriers  impaired endurance, decreased strength at 02/11/2021 0824   Progress/Outcome  goal ongoing at 02/11/2021 0824   Stairs Goal 1   Activity/Assistive Device  stairs, all skills at 02/11/2021 0824   Number of Stairs  4 at 02/11/2021 0824   [place]  minimum assist (75% or more patient effort) at 02/11/2021 0824   Time Frame  short-term goal (STG), 1 week at 02/11/2021 0824   Progress/Outcome  goal ongoing, goal revised this date at 02/11/2021 0824   Stairs Goal 2   Activity/Assistive Device  stairs, all skills at 02/11/2021 0824   Number of Stairs  12 at 02/11/2021 0824   [place]  supervision required at 02/11/2021 0824   Time Frame  long-term goal (LTG), 3 weeks at 02/11/2021 0824   Progress/Outcome  goal ongoing, goal revised this date at 02/11/2021 0824

## 2021-02-11 NOTE — PROGRESS NOTES
Patient: Denis Green  Location: [facility] [address]  MRN: [id number]  Today's date: 2/11/2021 02/11/21 0808   Pain/Comfort/Sleep   Pain Charting Type Pain Assessment   Preferred Pain Scale number (Numeric Rating Pain Scale)   Pain Rating (0-10): Rest 0   Vital Signs   Heart Rate 87   Heart Rate Source Monitor   SpO2 94 %   Patient Activity At rest   Oxygen Therapy Supplemental oxygen   /63   BP Location Left upper arm   BP Method Automatic   Patient Position Sitting          02/10/21 0808   Time Calculation   Start Time 0800   Stop Time 0900   Time Calculation (min) 60 min   Session Details   Document Type discharge evaluation   Mode of Treatment occupational therapy;individual therapy   General Information   General Observations of Patient Pt received supine in bed   Bed Mobility   Bed Mobility bed mobility (all) activities   [place], All Bed Mobility Activities touching/steadying assist;nonverbal cues (demo/gesture);1 person assist;safety considerations   Comment (Bed Mobility) hosp bed, rail, HOB elevated   Transfers   Transfers stand pivot transfer   Maintains Weight-Bearing Status (Transfers) able to maintain weight-bearing status   Comment cues for postural control   Stand Pivot Transfer   [place], Stand Pivot/Stand Step Transfer moderate assist (50-74% patient effort);nonverbal cues (demo/gesture);verbal cues;1 person assist;safety considerations;increased time to complete   Verbal Cues technique;safety   Comment SPT bed to w/c   Basic Activities of Daily Living (BADLs)   Basic Activities of Daily Living upper body dressing;lower body dressing   Upper Body Dressing   Tasks front-opening garment   Self-Performance threads left arm, shirt;threads right arm, shirt;pulls shirt over head/around back;pulls shirt down/adjusts   [place] Assistance obtains clothes   [place] close supervision;1 person assist;safety considerations;increased time to complete   Position  supported sitting   Lower Body Dressing   Self-Performance threads left leg, pants/shorts;threads right leg, pants/shorts;pulls pants/shorts up or down;dons/doffs left sock   [place] close supervision   Position edge of bed sitting;long sitting;sitting up in bed;supine   Adaptive Equipment sock aid;long-handled shoe horn;dressing stick;reacher   [place], Footwear close supervision;set up   Comment Recommed elastic socks for ease of threading onto sock aid   Daily Progress Summary (OT)   Symptoms Noted During/After Treatment none   Progress Toward Functional Goals (OT) progressing toward functional goals as expected   Daily Outcome Statement (OT) Pt improved tolerance and 02 saturation during transfer and dressing. Issued AD w demo and instruction   Barriers to Overall Progress (OT) general debility   Recommendations continue POC         Prior Living Environment      Most Recent Value   Lives With  spouse, child(mindy), adult [adult son lives in home occasionally]   Living Arrangements  house   Home Accessibility  stairs to enter home (Group), stairs within home (Group)   Living Environment Comment  Lives with wife and intermittently with one son.  [bed and bath on 2nd ,  no powder room on first]   Number of Stairs, Main Entrance  1   Stair Railings, Main Entrance  none   Location, Patient Bedroom  second floor, must negotiate stairs to access   Location, Bathroom  second floor, must negotiate stairs to access   Bathroom Access Comment  Pt. reports has tub shower and stall shower, uses tub shower primarily, has grab bar in shower, has stanard height toilet c wall on both sides   Number of Stairs, Within Home, Primary  12   Surface of Stairs, Within Home, Primary  hardwood   Stair Railings, Within Home, Primary  railings on both sides of stairs          Prior Level of Function      Most Recent Value   Dominant Hand  right   Ambulation  independent   Transferring  independent   Toileting  independent   Bathing   "independent   Dressing  independent   Eating  independent   Communication  understands/communicates without difficulty   Swallowing  swallows foods/liquids without difficulty   Baseline Diet/Method of Nutritional Intake  regular solids, thin liquids   Past History of Dysphagia  No previous reports    Prior Level of Function Comment  Pt is a cattle/,  previously IND for all IADLs.    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, (+) , enjoys farming and being outside   Patient Goals  \"I want to get back up walking, get stronger, and be self sufficient\"          [facility] - 02/11/21 0808        Time Calculation    Start Time  0800     Stop Time  0930     Time Calculation (min)  90 min        Session Details    Document Type  daily treatment/progress note     Mode of Treatment  occupational therapy;individual therapy        General Information    General Observations of Patient  Pt received supine in bed        Transfers    Transfers  stand pivot transfer;shower transfer        Stand Pivot Transfer    [place], Stand Pivot/Stand Step Transfer  minimum assist (75% or more patient effort);verbal cues;1 person assist;safety considerations     Verbal Cues  technique;safety     Assistive Device  walker, front-wheeled     Comment  bed to w/c        Shower Transfer    Transfer Technique  stand pivot     [place], Shower Transfer  minimum assist (75% or more patient effort);nonverbal cues (demo/gesture);verbal cues;1 person assist;safety considerations     Verbal Cues  technique;safety     Assistive Device  grab bars/tub rail;shower chair     Comment  seated on commode in barrier free shower        Bathing    Self-Performance  chest;left arm;right arm;abdomen;front perineal area;buttocks;left upper leg;right upper leg     [place] Assistance  " buttocks;left lower leg, including foot;right lower leg, including foot     [place]  minimum assist (75% or more patient effort);verbal cues;1 person assist;safety considerations;increased time to complete     Position  supported sitting;supported standing     Setup Assistance  obtain supplies;adaptive equipment setup;adjust water temperature/flow     Comment  issued LH sponge for home                            IRF OT Goals      Most Recent Value   Transfer Goal 1   Activity/Assistive Device  toilet at 02/10/2021 1632   [place]  tactile cues required, set-up required at 02/10/2021 1632   Time Frame  short-term goal (STG), 5 - 7 days at 02/10/2021 1632   Strategies/Barriers  DME at 02/10/2021 1632   Progress/Outcome  good progress toward goal, goal partially met, goal revised this date at 02/10/2021 1632   Transfer Goal 2   Activity/Assistive Device  toilet at 02/10/2021 1632   [place]  supervision required, set-up required at 02/10/2021 1632   Time Frame  long-term goal (LTG), 3 weeks at 02/10/2021 1632   Strategies/Barriers  DME at 02/10/2021 1632   Transfer Goal 3   Activity/Assistive Device  shower at 02/10/2021 1632   [place]  tactile cues required at 02/10/2021 1632   Time Frame  short-term goal (STG), 5 - 7 days at 02/10/2021 1632   Strategies/Barriers  DME at 02/10/2021 1632   Progress/Outcome  good progress toward goal, goal partially met, goal revised this date at 02/10/2021 1632   Transfer Goal 4   Activity/Assistive Device  shower at 02/10/2021 1632   [place]  supervision required, set-up required at 02/10/2021 1632   Time Frame  long-term goal (LTG), 3 weeks at 02/10/2021 1632   Strategies/Barriers  DME at 02/10/2021 1632   Bathing Goal 1   Activity/Assistive Device  bathing skills, all at 02/10/2021 1632   [place]  supervision required at 02/10/2021 1632   Time Frame  short-term goal (STG), 5 - 7 days at 02/10/2021 1632   Progress/Outcome  good progress toward goal, goal  partially met, goal revised this date at 02/10/2021 1632   Bathing Goal 2   Activity/Assistive Device  bathing skills, all at 02/05/2021 0726   [place]  supervision required at 02/05/2021 0726   Time Frame  long-term goal (LTG), 3 weeks at 02/05/2021 0726   Strategies/Barriers  DME TBD at 02/05/2021 0726   UB Dressing Goal 1   Activity/Assistive Device  upper body dressing at 02/10/2021 1632   [place]  supervision required at 02/10/2021 1632   Time Frame  short-term goal (STG), 5 - 7 days at 02/10/2021 1632   Progress/Outcome  good progress toward goal, goal partially met, goal ongoing at 02/10/2021 1632   UB Dressing Goal 2   Activity/Assistive Device  upper body dressing at 02/05/2021 0726   [place]  modified independence at 02/05/2021 0726   Time Frame  long-term goal (LTG), 3 weeks at 02/05/2021 0726   LB Dressing Goal 1   Activity/Assistive Device  lower body dressing at 02/10/2021 1632   [place]  supervision required at 02/10/2021 1632   Time Frame  short-term goal (STG), 5 - 7 days at 02/10/2021 1632   Strategies/Barriers  AD at 02/10/2021 1632   Progress/Outcome  good progress toward goal, goal partially met, goal revised this date at 02/10/2021 1632   LB Dressing Goal 2   Activity/Assistive Device  lower body dressing at 02/05/2021 0726   [place]  supervision required at 02/05/2021 0726   Time Frame  long-term goal (LTG), 3 weeks at 02/05/2021 0726   Grooming Goal 1   Activity/Assistive Device  grooming skills, all at 02/10/2021 1632   [place]  supervision required at 02/10/2021 1632   Time Frame  short-term goal (STG), 5 - 7 days at 02/10/2021 1632   Progress/Outcome  good progress toward goal, goal partially met, goal ongoing at 02/10/2021 1632   Grooming Goal 2   Activity/Assistive Device  grooming skills, all at 02/05/2021 0726   [place]  modified independence at 02/05/2021 0726   Time Frame  long-term goal (LTG), 3 weeks at 02/05/2021 0726   Toileting Goal 1    Activity/Assistive Device  toileting skills, all at 02/10/2021 1632   [place]  tactile cues required at 02/10/2021 1632   Time Frame  short-term goal (STG), 5 - 7 days at 02/10/2021 1632   Strategies/Barriers  DME at 02/10/2021 1632   Progress/Outcome  good progress toward goal, goal partially met, goal revised this date at 02/10/2021 1632   Toileting Goal 2   Activity/Assistive Device  toileting skills, all at 02/10/2021 1632   [place]  supervision required at 02/10/2021 1632   Time Frame  long-term goal (LTG), 3 weeks at 02/10/2021 1632   Strategies/Barriers  DME at 02/10/2021 1632   Progress/Outcome  goal revised this date at 02/10/2021 1632

## 2021-02-11 NOTE — PATIENT CARE CONFERENCE
Inpatient Rehabilitation Team Conference Note  Date: 2/11/2021  Time: 11:13 AM       Patient Name: Denis Green     Medical Record Number: [id number]   YOB: 1950  Sex: Male      Room/Bed: 320/320W  Payor Info: Payor: HU[employer]NA / Plan: HU[employer]NA MEDICARE / Product Type:  Managed Care /     Admitting Diagnosis: Pneumonia due to COVID-19 virus [U07.1, J12.82]  Physical deconditioning [R53.81]   Admit Date/Time: 2/4/2021  6:13 PM  Admission Comments: No comment available     Primary Diagnosis: No Principal Problem: There is no principal problem currently on the Problem List. Please update the Problem List and refresh.  Principal Problem: No Principal Problem: There is no principal problem currently on the Problem List. Please update the Problem List and refresh.    Patient Active Problem List    Diagnosis Date Noted   • Essential hypertension 02/05/2021   • Dyslipidemia 02/05/2021   • Atelectasis 02/05/2021   • Physical deconditioning 02/05/2021   • Acute metabolic encephalopathy 01/26/2021   • ARDS (adult respiratory distress syndrome) (CMS/[facility]) 01/16/2021   • Pneumonia due to COVID-19 virus 01/16/2021   • Hypoxia 01/05/2021   • Transaminitis 01/05/2021   • Coronary artery disease involving native coronary artery of native heart without angina pectoris 02/18/2013       Premorbid Status:  Dominant Hand: right  Ambulation: independent  Transferring: independent  Toileting: independent  Bathing: independent  Dressing: independent  Eating: independent  Communication: understands/communicates without difficulty  Swallowing: swallows foods/liquids without difficulty  Baseline Diet/Method of Nutritional Intake: regular solids;thin liquids  Past History of Dysphagia: No previous reports   Assistive Device/Animal Currently Used at Home: none  Prior Level of Function Comment: Pt is a cattle/,  previously IND for all IADLs.       Current Functional Status:  Mobility  Gait  [place], Gait: dependent  (less than 25% patient effort)  Assistive Device: walker, front-wheeled  Comment: 40' with Min-Mod A x1 and 2nd person for WC follow.     Stairs  [place], Stairs: dependent (less than 25% patient effort)  Assistive Device: railing  Handrail Location: both sides  Number of Stairs: 4  Stair Height: 6 inches  Comment: Mod A x1 + SBA x1 for safety considerations.      Wheelchair  Functional Mobility Training: wheelchair mobility skills, all  [place], All Mobility: dependent (less than 25% patient effort)  Comment, Functional Mobility: padding added to foot rests for improved posture.   Parts Management: dependent (less than 25% patient effort)  Management Activities: parts (all) management    Transfers  [place], All Bed Mobility Activities: touching/steadying assist;nonverbal cues (demo/gesture);1 person assist;safety considerations  [place], Roll Left: minimum assist (75% or more patient effort);verbal cues (for trunk)  Verbal Cues (Roll Left): hand placement;safety;technique  [place], Roll Right: minimum assist (75% or more patient effort);verbal cues (for trunk)  Verbal Cues (Roll Right): hand placement;safety;technique  [place], Supine to Sit: minimum assist (75% or more patient effort)  [place], Sit to Supine: minimum assist (75% or more patient effort)  Assistive Device (Bed Mobility): bed rails  Comment (Bed Mobility): hosp bed, rail, HOB elevated  Maintains Weight-Bearing Status (Transfers): able to maintain weight-bearing status  Comment: cues for postural control  [place], Bed to Chair: dependent (less than 25% patient effort);2 person assist;verbal cues  Verbal Cues: hand placement;preparatory posture;safety;technique  Assistive Device: wheelchair  Comment:  SPT; mod A x 1 for B hip/ knee ext , L/ R WS and steadying at pelvis; min A of 2nd person for safety; anterior approach of therapist.  (+ SOLIS)  [place], Chair to Bed: dependent (less than 25% patient effort);2  person assist;verbal cues  Verbal Cues: hand placement;preparatory posture;safety;technique  Assistive Device: wheelchair  Comment:  SPT; mod A x 1 for B hip/ knee ext , L/ R WS and steadying at pelvis; min A of 2nd person for safety; anterior approach of therapist.  (+ SOLIS)  [place], Sit to Stand Transfer: minimum assist (75% or more patient effort)  Verbal Cues: safety;technique  Assistive Device: walker, front-wheeled  Comment: from WC to RW, Min A for B hip extension to rise  [place], Stand to Sit Transfer: minimum assist (75% or more patient effort)  Verbal Cues: technique;safety  Assistive Device: walker, front-wheeled  Comment: to WC, Min A for controlled lowering to WC  [place], Stand Pivot/Stand Step Transfer: minimum assist (75% or more patient effort);verbal cues;1 person assist;safety considerations  Verbal Cues: technique;safety  Assistive Device: walker, front-wheeled  Comment: bed to w/c    Transfer Technique: stand pivot  [place], Toilet Transfer: dependent (less than 25% patient effort);2 person assist;verbal cues  Verbal Cues: hand placement;preparatory posture;safety;technique  Assistive Device: commode, 3-in-1;grab bars/safety frame  Comment: A x 2 for safety. Trial transfer attempt, no toileting needs present. SPT Max A anterior x 1, touching/steadying posteriorly x 1.   Transfer Technique: stand pivot  [place], Shower Transfer: minimum assist (75% or more patient effort);nonverbal cues (demo/gesture);verbal cues;1 person assist;safety considerations  Verbal Cues: technique;safety  Assistive Device: grab bars/tub rail;shower chair  Comment: seated on commode in barrier free shower      Self Care  Self-Performance: threads left arm, shirt;threads right arm, shirt;pulls shirt over head/around back;pulls shirt down/adjusts  [place] Assistance: obtains clothes  Adaptive Equipment: none  Comment: needed to redirect R arm away from head hole, limited by fatigue  Tasks:  "don;underwear;pants/bottoms;socks  Self-Performance: threads left leg, pants/shorts;threads right leg, pants/shorts;pulls pants/shorts up or down;dons/doffs left sock  [place] Assistance: threads left leg, pants/shorts;threads right leg, pants/shorts;pulls pants/shorts up or down;dons/doffs left sock;dons/doffs right sock  Adaptive Equipment: sock aid;long-handled shoe horn;dressing stick;reacher  [place]: close supervision  Comment: Recommed elastic socks for ease of threading onto sock aid  Self-Performance: chest;left arm;right arm;abdomen;front perineal area;buttocks;left upper leg;right upper leg  Adaptive Equipment: grab bar/tub rail;hand-held shower spray hose;shower chair, rolling  [place] Assistance: obtain supplies;adaptive equipment setup;adjust water temperature/flow  Comment: issued LH sponge for home  [place]: moderate assist (50-74% patient effort)  Adaptive Equipment: urinal  Setup Assistance: adaptive equipment setup  Comment: Pt did not request toileting  Self-Performance: washes, rinses and dries face;washes, rinses and dries hands;brushes/torres hair  Adaptive Equipment: none  Setup Assistance: open containers;obtain supplies  [place]: touching/steadying assist;nonverbal cues (demo/gesture);verbal cues;1 person assist;safety considerations  Comment: did not perform oral care    Cognition  Affect/Mental Status (Cognitive): WFL  Orientation Status (Cognition): oriented x 4  Follows Commands (Cognition): follows three step commands;over 90% accuracy  Cognitive Function (Cognitive): attention deficit;executive function deficit;memory deficit  Attention Deficit (Cognitive): alternating attention;selective attention  Comment, Attention: Continued with mod level alternating/selective attention message coding exercise, pt transcribed the last letter of words in a paragraph to the lines below with 90% acc i\"ly, single error self-identified, however required mod cues to correct.   Executive " "Function Deficit (Cognition): information processing;problem solving/reasoning  Comment, Executive Function: It is important to note, pt reports history of dyslexia, which may impact his performance on visual exercises. Pt reports he compensates well however continues to have deficits, i.e, pt states he reads backwards. Functional math calculations related to money, Word Problems with Pictures \"WordWatch\" 3/5 I'ly, 5/5 given mod cues for attention to details.  Memory Deficit (Cognitive): short-term memory;recall, recent events;working memory  Comment, Short Term Memory: Pt was able to recall 0/3 words after a 5 min delay. He had difficulty recalling recent events. Digit span forward was correct up to 5 numbers and digit span backwards was correct up to 3 numbers.  Safety Deficit (Cognitive): insight into deficits/self awareness  Comment, Cognition: Pt motivated, cooperative, asking appropriate questions, able to follow required directions, good historian  City: 1-->multiple choice, phonemic cuing  Kind of Place: 3-->spontaneous/free recall  Name of Hospital: 3-->spontaneous/free recall  Month: 3-->spontaneous/free recall  Date: 1-->multiple choice, phonemic cuing  Year: 3-->spontaneous/free recall  Day of Week: 3-->spontaneous/free recall  Clock Time: 3-->spontaneous/free recall  Etiology/Event: 3-->spontaneous/free recall  Pathology Deficits: 3-->spontaneous/free recall  Total Score: 26    Communication  Speech Intelligibility (Motor Speech): WNL  Articulation (Motor Speech): WNL  Speech Fluency (Motor Speech): WNL  Vocal Loudness (Motor Speech): WNL  Breath Support (Motor Speech): intact  Rate/Prosody (Motor Speech): WNL  Resonance (Motor Speech): WNL  Comment, Motor Speech Assessment: WNL  Follows Commands (Auditory Comprehension): WNL;3-step commands;2-step commands;1-step command  Yes/No Questions (Auditory Comprehension): WNL;complex questions;simple/factual questions;biographical/personal " questions  Complex Questions (Auditory Comprehension): intact  Simple/Factual Questions (Auditory Comprehension): intact  Biographical/Personal Questions (Auditory Comprehension): intact  Comment, Assessment (Auditory Comprehension): Mild impairment of A/C for short paragraph comprehension probably affected by impaired memory.      Frequency of Treatment (OT): 5-7 times per week, 60-90 minutes per day  Frequency of Treatment (PT): 5-7 times per week, 60-90 minutes per day  Therapy Frequency (SLP): 5-7 times per week, 30 minutes per day    Weekly Outcome Summaries:  Weekly Progress Summary (PT)  Progress Toward Functional Goals (PT): progressing toward functional goals as expected  Weekly Outcome Statement: Pt is a 69 y/o male initially admitted to an outside facility on 1/5. Dx'd with Covid 19 PNA. Intubated 1/9. Extubated 1/16. Hospital complications to include hospital aquired PNA, increased B pulmonary infiltrates requiring Abx , lethargy and confusion. Pt presenting with decreased activity tolerance/ SOLIS and O2 desat with minimal activity, decreased balance, decreased B LE strength. He currently requires Min A for sit/stand and Min A for bed mobility. He performs SPT with RW and Mod Ax1. He ambulates up to 50' with RW and Mod Ax1 + WC follow/O2 tank management. He requires Dependent (Mod A x1 + SBA x1) for stair training with BHR up to 4 steps. Recommend family training w/ pt's wife prior to d/c to home. ELOS 2-3 weeks.   Recommendations (PT): Continue IP PT 5-7x/week, 60-90 min/day  Weekly Progress Summary (OT)  Progress Toward Functional Goals (OT): progressing toward functional goals as expected  Weekly Outcome Statement: Pt is making progress in all areas but continues to be limited by general weakness and limited 02 saturation, currently on 2-3L 02. Pt is not min A bathing, dressing, toileting and supervision for grooming. OT recommending sitting during all ADL for energy conservation. Toilet transfer w min  "A to commode over toilet and shower tx min A from w/c to commode in barrier free stall shower w grab bars. Pt limited in LB ADL so is trialing dsg st, sock aid, reacher, and LHSH. Also issued LH sponge for bathing.   Barriers to Overall Progress (OT): balance, general weakness, debilitated on 02  Recommendations (OT): continue POC  Weekly Outcome Summary (Interdisciplinary)  Weekly Outcome Statement: Mr. Green is a 66-year-old man who is status post deconditioning secondary to Covid pneumonia.  He is on oxygen.  He does report significant fatigue.  However he reports that the therapist told him he did well in his initial therapy sessions.  He described it as \"very hard.\"  He reports if he asked me I did not think I did so well.  He reports the hardest thing is about his recovery is \"his need for therapy.\"  He reports he tends to be a positive person and denies significant emotional distress.  He reports that he is hopeful and motivated for recovery.  He reports his family is supportive.  He denies a previous psychiatric history.  During interview he provided an accurate history and displays a full range of affect.  He does struggle with fatigue and he recognizes this.  Cognition is is mildly impaired.  He had difficulty with both attention and recall task.  Recall was 1 out of 3 though cues helped.  He reports this is a decline from his previous functioning.  Is felt fatigue likely contributed to difficulty in these areas.  Psychology to follow for support during this inpatient admission.    Problem Resolution:   Bladder Management: external device for management  Strategies/Techniques (Bowel Management): bowel agents for management  Counseling (Coping Strategies): active listening utilized, goal setting facilitated, personal strengths utilized  Strategies to Enhance Eating/Swallowing: allow adequate time for eating, alternate food and liquid swallows, upright sitting position for eating   Identified Problems & " Impairments: household mobility impairment, inadequate health maintenance, coping skills/adjustment impairment (02/11/21 0948)  Comment, Identified Problems & Impairments: (not recorded)  Resolved Problems & Impairments: (not recorded)  Comment, Resolved Problems & Impairments: (not recorded) Team Weekly Outcome Statement: SN:  soft nectars, O2 2LPM, continent of B&B, pain L foot, tylenol, not sleeping well, PT:  min bed mob, RW mod x1 w/c follow, desat on 2LPM, 4 steps mod A x1.  OT:  min A ADLs, LB min A.  transfers mon A. ST:  video swallow to be ordered to r/o [place]. working on Cogs.  Psyc following for cogs.GOAL: PT mod I, super outdoor.   OT: mod I (02/11/21 0948)        Goals/Support System:  Patient/Family Goals  Patient's Goals For Discharge: return to all previous roles/activities    IRF PT Goals      Most Recent Value   Bed Mobility Goal 1   Activity/Assistive Device  rolling to left, rolling to right, sit to supine/supine to sit at 02/05/2021 1030   [place]  supervision required at 02/05/2021 1030   Time Frame  short-term goal (STG), 1 week at 02/11/2021 0824   Strategies/Barriers  Impaired strength, decreased endurance, safety considerations at 02/11/2021 0824   Progress/Outcome  goal ongoing at 02/11/2021 0824   Bed Mobility Goal 2   Activity/Assistive Device  rolling to left, rolling to right, sit to supine/supine to sit at 02/05/2021 1030   [place]  modified independence at 02/05/2021 1030   Time Frame  long-term goal (LTG), 3 weeks at 02/11/2021 0824   Strategies/Barriers  Impaired strength, decreased endurance, safety considerations at 02/11/2021 0824   Progress/Outcome  goal ongoing at 02/11/2021 0824   Transfer Goal 1   Activity/Assistive Device  sit-to-stand/stand-to-sit, bed-to-chair/chair-to-bed at 02/11/2021 0824   [place]  minimum assist (75% or more patient effort) at 02/11/2021 0824   Time Frame  short-term goal (STG), 1 week at 02/11/2021 0824   Strategies/Barriers   poor strength, decreased strength at 02/11/2021 0824   Progress/Outcome  goal met, goal revised this date at 02/11/2021 0824   Transfer Goal 2   Activity/Assistive Device  sit-to-stand/stand-to-sit, bed-to-chair/chair-to-bed at 02/11/2021 0824   [place]  modified independence at 02/11/2021 0824   Time Frame  long-term goal (LTG), 3 weeks at 02/11/2021 0824   Strategies/Barriers  poor strength, decreased strength at 02/11/2021 0824   Progress/Outcome  goal ongoing at 02/11/2021 0824   Gait/Walking Locomotion Goal 1   Activity/Assistive Device  gait (walking locomotion) at 02/11/2021 0824   Distance  50 feet at 02/11/2021 0824   [place]  minimum assist (75% or more patient effort) at 02/11/2021 0824   Time Frame  short-term goal (STG), 1 week at 02/11/2021 0824   Strategies/Barriers  impaired endurance, decreased strength at 02/11/2021 0824   Progress/Outcome  goal met, goal revised this date at 02/11/2021 0824   Gait/Walking Locomotion Goal 2   Activity/Assistive Device  gait (walking locomotion) at 02/11/2021 0824   Distance  150 feet at 02/11/2021 0824   [place]  supervision required at 02/11/2021 0824   Time Frame  long-term goal (LTG), 3 weeks at 02/11/2021 0824   Strategies/Barriers  impaired endurance, decreased strength at 02/11/2021 0824   Progress/Outcome  goal ongoing at 02/11/2021 0824   Stairs Goal 1   Activity/Assistive Device  stairs, all skills at 02/11/2021 0824   Number of Stairs  4 at 02/11/2021 0824   [place]  minimum assist (75% or more patient effort) at 02/11/2021 0824   Time Frame  short-term goal (STG), 1 week at 02/11/2021 0824   Progress/Outcome  goal ongoing, goal revised this date at 02/11/2021 0824   Stairs Goal 2   Activity/Assistive Device  stairs, all skills at 02/11/2021 0824   Number of Stairs  12 at 02/11/2021 0824   [place]  supervision required at 02/11/2021 0824   Time Frame  long-term goal (LTG), 3 weeks at 02/11/2021 0824   Progress/Outcome  goal  ongoing, goal revised this date at 02/11/2021 0824        IRF OT Goals      Most Recent Value   Transfer Goal 1   Activity/Assistive Device  toilet at 02/10/2021 1632   [place]  tactile cues required, set-up required at 02/10/2021 1632   Time Frame  short-term goal (STG), 5 - 7 days at 02/10/2021 1632   Strategies/Barriers  DME at 02/10/2021 1632   Progress/Outcome  good progress toward goal, goal partially met, goal revised this date at 02/10/2021 1632   Transfer Goal 2   Activity/Assistive Device  toilet at 02/10/2021 1632   [place]  supervision required, set-up required at 02/10/2021 1632   Time Frame  long-term goal (LTG), 3 weeks at 02/10/2021 1632   Strategies/Barriers  DME at 02/10/2021 1632   Transfer Goal 3   Activity/Assistive Device  shower at 02/10/2021 1632   [place]  tactile cues required at 02/10/2021 1632   Time Frame  short-term goal (STG), 5 - 7 days at 02/10/2021 1632   Strategies/Barriers  DME at 02/10/2021 1632   Progress/Outcome  good progress toward goal, goal partially met, goal revised this date at 02/10/2021 1632   Transfer Goal 4   Activity/Assistive Device  shower at 02/10/2021 1632   [place]  supervision required, set-up required at 02/10/2021 1632   Time Frame  long-term goal (LTG), 3 weeks at 02/10/2021 1632   Strategies/Barriers  DME at 02/10/2021 1632   Bathing Goal 1   Activity/Assistive Device  bathing skills, all at 02/10/2021 1632   [place]  supervision required at 02/10/2021 1632   Time Frame  short-term goal (STG), 5 - 7 days at 02/10/2021 1632   Progress/Outcome  good progress toward goal, goal partially met, goal revised this date at 02/10/2021 1632   Bathing Goal 2   Activity/Assistive Device  bathing skills, all at 02/05/2021 0726   [place]  supervision required at 02/05/2021 0726   Time Frame  long-term goal (LTG), 3 weeks at 02/05/2021 0726   Strategies/Barriers  DME TBD at 02/05/2021 0726   UB Dressing Goal 1   Activity/Assistive Device   upper body dressing at 02/10/2021 1632   [place]  supervision required at 02/10/2021 1632   Time Frame  short-term goal (STG), 5 - 7 days at 02/10/2021 1632   Progress/Outcome  good progress toward goal, goal partially met, goal ongoing at 02/10/2021 1632   UB Dressing Goal 2   Activity/Assistive Device  upper body dressing at 02/05/2021 0726   [place]  modified independence at 02/05/2021 0726   Time Frame  long-term goal (LTG), 3 weeks at 02/05/2021 0726   LB Dressing Goal 1   Activity/Assistive Device  lower body dressing at 02/10/2021 1632   [place]  supervision required at 02/10/2021 1632   Time Frame  short-term goal (STG), 5 - 7 days at 02/10/2021 1632   Strategies/Barriers  AD at 02/10/2021 1632   Progress/Outcome  good progress toward goal, goal partially met, goal revised this date at 02/10/2021 1632   LB Dressing Goal 2   Activity/Assistive Device  lower body dressing at 02/05/2021 0726   [place]  supervision required at 02/05/2021 0726   Time Frame  long-term goal (LTG), 3 weeks at 02/05/2021 0726   Grooming Goal 1   Activity/Assistive Device  grooming skills, all at 02/10/2021 1632   [place]  supervision required at 02/10/2021 1632   Time Frame  short-term goal (STG), 5 - 7 days at 02/10/2021 1632   Progress/Outcome  good progress toward goal, goal partially met, goal ongoing at 02/10/2021 1632   Grooming Goal 2   Activity/Assistive Device  grooming skills, all at 02/05/2021 0726   [place]  modified independence at 02/05/2021 0726   Time Frame  long-term goal (LTG), 3 weeks at 02/05/2021 0726   Toileting Goal 1   Activity/Assistive Device  toileting skills, all at 02/10/2021 1632   [place]  tactile cues required at 02/10/2021 1632   Time Frame  short-term goal (STG), 5 - 7 days at 02/10/2021 1632   Strategies/Barriers  DME at 02/10/2021 1632   Progress/Outcome  good progress toward goal, goal partially met, goal revised this date at 02/10/2021 1632   Toileting Goal 2    Activity/Assistive Device  toileting skills, all at 02/10/2021 1632   [place]  supervision required at 02/10/2021 1632   Time Frame  long-term goal (LTG), 3 weeks at 02/10/2021 1632   Strategies/Barriers  DME at 02/10/2021 1632   Progress/Outcome  goal revised this date at 02/10/2021 1632        IRF SLP Goals      Most Recent Value   Verbal Expression Goal 1   Verbal Expression Goal 1  STG: pt will complete mod level verbal expression tasks x 90% c min cues. at 02/06/2021 1405   Time Frame  short-term goal (STG), 1 week at 02/06/2021 1405   Verbal Expression Goal 2   Verbal Expression Goal 2  LTG: pt will complete complex/abstract verbal expression tasks x 90% c independent use of WF strategies. at 02/06/2021 1405   Time Frame  long-term goal (LTG), 3 weeks at 02/06/2021 1405   Oral Nutrition/Hydration Goal 1   Activity  effective/safe/independent, use of swallowing techniques [regular/NTL, no overt s/s of aspiration ] at 02/05/2021 1202   Time Frame  short-term goal (STG), 1 week at 02/05/2021 1202   Oral Nutrition/Hydration Goal 2   Activity  effective/safe/independent, use of swallowing techniques [regular/thin, no overt s/s of aspiration ] at 02/05/2021 1202   Time Frame  long-term goal (LTG), 3 weeks at 02/05/2021 1202   Executive Function Goal 1   Activity  complete, abstract thinking tasks, executive function tasks, organization/sequencing tasks, problem solving/reasoning tasks, other (see comments) [STG,  simple-mod level tasks.] at 02/06/2021 1405   [place]/Accuracy  with minimum, verbal cues/redirection, with additional processing time, with repetition of directions, with 90% accuracy at 02/06/2021 1405   Time Frame  1 week at 02/06/2021 1405   Executive Function Goal 2   Activity  complete, abstract thinking tasks, exhibit impulse control, organization/sequencing tasks, problem solving/reasoning tasks, other (see comments) [LTG] at 02/06/2021 1405   [place]/Accuracy  independently, with  90% accuracy, other (see comments) [mod-complex tasks.] at 02/06/2021 1405   Time Frame  3 weeks at 02/06/2021 1405   Memory Goal 1   Activity  short-term memory tasks, immediate recall tasks, working memory tasks, recall recent events, other (see comments) [STG,  simple-mod level tasks.] at 02/06/2021 1405   [place]/Accuracy  minimal cues for use of strategies, with 90% accuracy at 02/06/2021 1405   Time Frame  short-term goal (STG), 1 week at 02/06/2021 1405   Memory Goal 2   Activity  short-term memory tasks, immediate recall tasks, working memory tasks, recall recent events, other (see comments) [LTG,  mod complex information.] at 02/06/2021 1405   [place]/Accuracy  independent use of environmental cues/strategies, with 90% accuracy at 02/06/2021 1405   Time Frame  long-term goal (LTG), 3 weeks at 02/06/2021 1405          Risk for Complications  Constipation: Moderate  DVT: Moderate  Falls: Moderate      The patient's medical prognosis is fair to achieve the stated goals below.    Expected Level of Function  Expected Functional Improvement: communication;mobility;motor dysfunction;safety;self-care;other (see comments) (swallow)  Self-Care: Setup or clean-up assistance  Sphincter Control: Setup or clean-up assistance  Transfers: Setup or clean-up assistance  Locomotion: Supervision or touching assistance  Communication: Independent  Social Cognition: Independent       Discharge Planning:  Anticipated Discharge Disposition: home with home health services  Type of Home Care Services: home PT;home OT;nursing    Equipment/Device Needs at Discharge  Assistive Device/Animal Currently Used at Home: none    Anticipated Discharge Date: 2/24/2021    Needs Identified:         Team Members Present     Rehab Attending Present:  Isael Lang MD    Care Coordinator Present:  Gerda Sharif CM    Nurse Present:  Kandice Lopez RN    OT Present:  Isa Johnston OT    PT Present:  David Roach, PT    SLP  Present:  Susu Mandujano, CCC-SLP    Psychologist Present:  Leonela Nunes PSY.D        Next Team Conference Date: 02/18/21

## 2021-02-11 NOTE — PLAN OF CARE
Problem: Rehabilitation (IRF) Plan of Care  Goal: Plan of Care Review  Flowsheets (Taken 2/11/2021 1213)  Plan of Care Reviewed With: patient  IRF Plan of Care Review: progress ongoing, continue  Outcome Summary: Patient ambulated 57' (2x) with Min-Mod Ax1 + WC follow. Trilaled without O2 and able to maintain > 92% on RA

## 2021-02-11 NOTE — PLAN OF CARE
Problem: Rehabilitation (IRF) Plan of Care  Goal: Plan of Care Review  Flowsheets (Taken 2/11/2021 1311)  Plan of Care Reviewed With: patient  IRF Plan of Care Review: progress ongoing, continue  Outcome Summary: Pt was seen by OT for ADL assessment includeing shower. Pt is improving tolerance for seated activities but continues to benefit from DME for energy conservation and safety due to supplimental 02 and balance deficits.

## 2021-02-11 NOTE — PROGRESS NOTES
"Inpatient Psychology Progress Note    Duration: 25 minutes  Denis Green : 1950, a 70 y.o. male, for follow up visit.  Reason:  He has been experiencing Adjustment Issues.    HPI: s/p Covid 19 pneumonia; deconditioning;    Current Evaluation:     Mental Status Evaluation:  Arousal Level: Alert  Appearance: Well Groomed  Speech: Regular  Psychomotor Functioning: Decreased  Eye Contact: WNL  Est. Premorbid Intelligence: Average  Orientation: Fully oriented  Attention: Impaired, Mild  Concentration: Impaired, Mild  Recent Memory: Mild Loss  Remote Memory: WNL  Thought Content: Appropriate  Thought Process: WNL  Insight: Intact  Perceptual Function: Normal  Delusions: None or age appropriate  Sleeping: Decreased  Appetite: Decreased  Libido: Non-Contributory  Affect: Full Range  Mood: Hopeful, Motivated, Frustrated       [place] Suicide Severity Rating Scale:  Not done today    Interventions  Acceptance & Adjustment  Cognitive Behavior Training  Monitoring of Symptoms  Psychoeducation    Psychoeducation provided on:  Other: Deconditioning     Recommendations:   Individual Therapy  25 minutes1 times weekly    Goals    Maximize Adjustment and Acceptance  of Limitations         Visit Diagnosis:     1 Adjustment disorder with anxiety        Diagnostic Impression: He reports that he is making gains.   He reports that he climbed steps for first time yesterday, walking further, raising feet.  Discuss imporatnace of small t steps.  Reports that he is aware that he does \"lose his breather\"  Reports that he comes back quicklyHe reports he was distressed about  The water situation- this has frida rewsolved and he is on free water.   He reports that he will swallow study o Tuesday.  Reports that he does want to slow down after discharge.    He denies signficant emotional distress.  Reinforce coping and progress.      Psychological condition is generally improving       Leonela Nunes, PSY.D   "

## 2021-02-11 NOTE — PROGRESS NOTES
Subjective    Patient seen and examined on rounds.  Chart reviewed.  Events overnight noted.  History reviewed briefly with patient.     CC:  Deficits in mobility, transfers, self-care status post deconditioning, severe Covid 19 pneumonia, ventilator-dependent respiratory failure, dysphagia, multiple medical problems.     HPI:  Mr. Denis Green is a 70-year-old right-handed white male with chronic conditions significant for coronary artery disease, hypertension, hyperlipidemia who was admitted to [facility] on 1/5/21 due to Covid 19 pneumonia and worsening respiratory status.  He required intubation on 1/9/21 and had ventilator-dependent respiratory failure.  He was treated with Decadron and Remdesivir.  He had dysphagia requiring Dobbhoff tube feeds.  He was extubated on 1/16/21 to OptiFlow.  On 1/23/21 he was transitioned to high flow nasal cannula.  Hospital course was significant for acute renal insufficiency likely secondary to acute tubular necrosis and he was followed by nephrology.  He had subsequent improvement in renal function.  He also had new fever of 102.8°F on 1/20/21 and 1/21/21.  He was followed by infectious disease.  Blood cultures were negative. CT scan of the chest abdomen pelvis was done with results showing increasing pulmonary infiltrates/groundglass opacities and consolidations in his bilateral lower lobes.  He was treated empirically with Cefepime and Vancomycin to cover hospital-acquired pneumonia.  He was noted to be lethargic on 1/22/21.  Subsequently patient removed Dobbhoff tube on 1/25/21 and there is question of possible aspiration during that process.  He continued to have intermittent confusion on 1/26/21 was felt to be possible post ICU delirium.  He was followed by neurology.  MRI brain was unremarkable.  EEG showed mild slowing but no seizure activity was noted.  Antibiotics were discontinued after a 5 day course due to lethargy.  Repeat video swallow study was  "performed for dysphagia and patient was put on a soft diet with nectar thick liquids. On 2/3/21, hemoglobin was 11.2, WBC count 15.1, platelets were 275, BUN was 15, and creatinine was 0.7.  He has been needing assistance for mobility, transfers, self-care postoperatively. He is transferred to [place] [facility] on 2/4/21 for further rehabilitation care.       SUBJ: He feels better today.  Denies increased pain.  Discussed with him about doing incentive spirometry.  We will try to wean off oxygen if possible.    ROS: Denies chest pain or shortness of breath. Other ROS negative. Past, family, social history is unchanged.    Physical Exam      Blood pressure 132/74, pulse 86, temperature 36.6 °C (97.9 °F), temperature source Oral, resp. rate 20, height 1.727 m (5' 8\"), weight 77.1 kg (170 lb), SpO2 97 %.  Body mass index is 25.85 kg/m².    General Appearance: Not in acute distress  Head/Ear/Nose/Throat: Normocephalic; Atraumatic.  On oxygen by nasal cannula.  Eye: EOMI; PERRL.   Neck: No JVD; No Bruits.   Respiratory: Decreased breath sounds at bases.   Cardiovascular: RRR; Normal S1, S2.   Gastrointestinal: Soft; NT; +BS.   Extremities: Bilateral lower extremity edema noted.   Musculoskeletal: Functional active range of motion in both upper and lower extremities.    Neurological: Awake alert oriented to person, had some difficulty naming the place and correct date.. Speech is fluent. Cranial nerve examination does not reveal any gross facial asymmetry. Strength testing about 4/5 strength in both upper extremities.  Bilateral hip flexion is 3+/5.  Bilateral quadriceps are 4/5 with the thighs supported.  Bilateral ankle dorsi and plantar flexion are 4/5.  He is grossly able to localize touch and position sense in great toes.  Deep tendon reflexes are hypoactive and symmetric bilaterally.  Plantars are flexor.  Coordination is functional upper extremities.  Neurologically unchanged  Behavior/Emotional: " Appropriate; Cooperative.   Skin: No obvious rashes noted.        Current Facility-Administered Medications:   •  acetaminophen (TYLENOL) tablet 650 mg, 650 mg, oral, q4h PRN, Omari Spain MD, 650 mg at 02/06/21 2001  •  acetaminophen (TYLENOL) tablet 650 mg, 650 mg, oral, With meals & nightly, Isael Lang MD, 650 mg at 02/10/21 1617  •  albuterol HFA (VENTOLIN HFA) 90 mcg/actuation inhaler 2 puff, 2 puff, inhalation, q6h PRN, Omari Spain MD  •  albuterol HFA (VENTOLIN HFA) 90 mcg/actuation inhaler 2 puff, 2 puff, inhalation, q6h, Laure Bishop MD, 2 puff at 02/10/21 1748  •  alum-mag hydroxide-simeth (MAALOX) 200-200-20 mg/5 mL suspension 30 mL, 30 mL, oral, q6h PRN, Laure Bishop MD  •  amLODIPine (NORVASC) tablet 5 mg, 5 mg, oral, q12h EBER, Laure Bishop MD, 5 mg at 02/10/21 0903  •  aspirin enteric coated tablet 81 mg, 81 mg, oral, Daily, Omari Spain MD, 81 mg at 02/10/21 0744  •  bisacodyL (DULCOLAX) 10 mg suppository 10 mg, 10 mg, rectal, Daily PRN, Isael Lang MD, 10 mg at 02/09/21 1850  •  diclofenac sodium (VOLTAREN) 1 % topical gel 2 g, 2 g, Topical, TID, Laure Bishop MD, 2 g at 02/10/21 1503  •  docusate sodium (COLACE) capsule 100 mg, 100 mg, oral, TID, Isael Lang MD, 100 mg at 02/10/21 1503  •  furosemide (LASIX) tablet 20 mg, 20 mg, oral, BID (am, 4p), Laure Bishop MD, 20 mg at 02/10/21 1503  •  gemfibroziL (LOPID) tablet 600 mg, 600 mg, oral, BID AC, Omari Spain MD, 600 mg at 02/10/21 1617  •  guaiFENesin (MUCINEX) 12 hr ER tablet 600 mg, 600 mg, oral, BID, Laure Bishop MD, 600 mg at 02/10/21 0743  •  heparin (porcine) 5,000 unit/mL injection 5,000 Units, 5,000 Units, subcutaneous, q8h [facility], Omari Spain MD, 5,000 Units at 02/10/21 1503  •  magnesium hydroxide (M.O.M.) 400 mg/5 mL suspension 30 mL, 30 mL, oral, 2x daily PRN, Laure Bishop MD, 30 mL at 02/09/21 0904  •  magnesium oxide (MAG-OX) tablet 400 mg, 400 mg, oral, BID, Laure Bishop MD, 400  mg at 02/10/21 0744  •  melatonin ODT 3 mg, 3 mg, oral, Nightly, Laure Bishop MD, 3 mg at 02/09/21 2012  •  metoprolol tartrate (LOPRESSOR) split tablet 12.5 mg, 12.5 mg, oral, BID, Omari Spain MD, 12.5 mg at 02/10/21 0744  •  mouth moisturizer (TOOTHETTE) cream, , mucous membrane, TID, Laure Bishop MD, Given at 02/10/21 1503  •  polyethylene glycol (MIRALAX) 17 gram packet 17 g, 17 g, oral, Daily PRN, Omari Spain MD, 17 g at 02/04/21 2157  •  rosuvastatin (CRESTOR) tablet 5 mg, 5 mg, oral, Daily (6p), Laure Bishop MD, 5 mg at 02/07/21 1756  •  senna (SENOKOT) tablet 3 tablet, 3 tablet, oral, Daily before lunch, Isael Lang MD, 3 tablet at 02/10/21 1133       Labs / Radiology    Lab Results   Component Value Date    WBC 7.42 02/08/2021    HGB 11.0 (L) 02/08/2021    HCT 34.7 (L) 02/08/2021    MCV 96.7 02/08/2021     (H) 02/08/2021     Lab Results   Component Value Date    GLUCOSE 98 02/08/2021    CALCIUM 8.9 02/08/2021     (L) 02/08/2021    K 4.0 02/08/2021    CO2 25 02/08/2021    CL 94 (L) 02/08/2021    BUN 16 02/08/2021    CREATININE 0.6 (L) 02/08/2021       Assessment and Plan    ASSESSMENT PLAN:  1. 70-year-old right-handed white male with chronic conditions significant for coronary artery disease, hypertension, hyperlipidemia who was admitted to [facility] on 1/5/21 due to Covid 19 pneumonia and worsening respiratory status.  He required intubation on 1/9/21 and had ventilator-dependent respiratory failure.  He was treated with Decadron and Remdesivir.  He had dysphagia requiring Dobbhoff tube feeds.  He was extubated on 1/16/21 to OptiFlow.  On 1/23/21 he was transitioned to high flow nasal cannula.  Hospital course was significant for acute renal insufficiency likely secondary to acute tubular necrosis and he was followed by nephrology.  He had subsequent improvement in renal function.  He also had new fever of 102.8°F on 1/20/21 and 1/21/21.  He was followed by  infectious disease.  Blood cultures were negative. CT scan of the chest abdomen pelvis was done with results showing increasing pulmonary infiltrates/groundglass opacities and consolidations in his bilateral lower lobes.  He was treated empirically with Cefepime and Vancomycin to cover hospital-acquired pneumonia.  He was noted to be lethargic on 1/22/21.  Subsequently patient removed Dobbhoff tube on 1/25/21 and there is question of possible aspiration during that process.  He continued to have intermittent confusion on 1/26/21 was felt to be possible post ICU delirium.  He was followed by neurology.  MRI brain was unremarkable.  EEG showed mild slowing but no seizure activity was noted.  Antibiotics were discontinued after a 5 day course due to lethargy.  Repeat video swallow study was performed for dysphagia and patient was put on a soft diet with nectar thick liquids. On 2/3/21, hemoglobin was 11.2, WBC count 15.1, platelets were 275, BUN was 15, and creatinine was 0.7.  He has been needing assistance for mobility, transfers, self-care postoperatively. He is transferred to [place] [facility] on 2/4/21 for further rehabilitation care.       2. DVT prophylaxis - on subcutaneous Heparin.  On SCDs.  Platelets 296 on 2/5/2021.     3.  Deconditioning - recent severe Covid 19 pneumonia, ventilator-dependent respiratory failure, dysphagia, multiple medical problems - He had ventilator-dependent respiratory failure.  He was treated with Decadron and Remdesivir.  Continue PT, OT, speech, psychology.  Follow falls precautions, cardiac precautions, aspiration precautions.     4. GI - On Colace, Senokot.  On PRN MiraLAX, MOM, Maalox.      5.  - voiding.  Denies dysuria.  Monitor post void residuals.     6.  Pulmonary - recent severe Covid 19 pneumonia, ventilator-dependent respiratory failure - on Ventolin HFA.  On Mucinex.     7. Pain - on PRN Tylenol.  On scheduled Tylenol for left wrist pain.  On topical Voltaren  gel.  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  K pad as needed for pain.     8. Hematology -hemoglobin 12.0, WBC 8.15 on 2/5/2021.     9. CVS - history of coronary artery disease and hypertension.  On Norvasc.  On Lopressor.  On Aspirin.  On Lasix.  On Magnesium oxide.     10.  Hyperlipidemia - on Lopid.     11.  Insomnia - on Melatonin.     12.  Dysphagia -on soft diet with nectar thick liquids.  Free water protocol with ice chips and sips of water per speech therapy.  Speech therapy and nutrition following.     13. Rehabilitation medicine - Continue comprehensive rehabilitation care. Continue PT, OT, speech, psychology.  We will follow falls precautions, cardiac precautions, monitor pulse oximetry in therapy and follow aspiration precautions.  Tolerating therapies per endurance.  Discussed with speech therapy.  Free water protocol ice chips was started.  Denies coughing with meals.He reported some pain in left wrist.  He is not sure if he accidentally banged the left wrist against the side rail of the bed.  Denies history of gout.  Left wrist does not appear warm or tender to touch.  K pad ordered.  We will also order scheduled Tylenol for 7 days.  Discussed results of wrist x-rays with patient which showed moderate degenerative change particularly about the radial aspect of the wrist.  He reports pain is less today.  He ambulated 25 feet with rolling walker with assistance of 2 people with physical therapy.Free water protocol with ice chips and sips of water per speech therapy.  He feels better today.  Left wrist pain is decreased significantly per patient.  Working on endurance with physical therapy.  He required minimal to moderate assistance for transfers.  Working on ADLs with occupational therapy.He feels better today.  Denies increased pain.  Discussed with him about doing incentive spirometry.  We will try to wean off oxygen if possible.     14. Reviewed labs today.  BUN 15, creatinine  0.6 on 2/5/2021.               Isael Lang MD  2/10/2021

## 2021-02-11 NOTE — PLAN OF CARE
Problem: Rehabilitation (IRF) Plan of Care  Goal: Plan of Care Review  Outcome: Progressing  Flowsheets (Taken 2/11/2021 0277)  Plan of Care Reviewed With: spouse  IRF Plan of Care Review: progress ongoing, continue  Outcome Summary: called pts wife, Dana, no answer, left a vm including CM direct #.  Gave brief upated on vm and encouraged wife to call me with questions.  advised her of anticipated d/c date of 2/24.  D/c plan would be home w/ homecare, possible need for home.  Will continue to follow.

## 2021-02-11 NOTE — PLAN OF CARE
Plan of Care Review  IRF Plan of Care Review: progress ongoing, continue  Plan of Care Reviewed With: patient  Outcome Summary: OOB in w/c for therapy & meals today. Nata 2l nc no sob noted. refusing breakfast since pt states he not used to eatting but had good lunch. Pt upset about free water protocol, stating he is not being given enough water by staff. We clarified w/ speech & he is allowed to have as much water as he wants between meals but sm sips.Allowed to have free water while in bed as long as he is in high fowlers position.   Pt nata well no coughing noted. Had episode in bathroom @ 1430 while on toilet to have bm where he felt dizzy & felt he may pass out. put back to bed. vss. Paged Dr. Bishop @ 1500 to let him know of episode but no call back. Reported off to next shift of this episode.

## 2021-02-11 NOTE — PLAN OF CARE
Problem: Rehabilitation (IRF) Plan of Care  Goal: Plan of Care Review  Outcome: Progressing  Flowsheets (Taken 2/11/2021 3193)  Plan of Care Reviewed With: patient  Outcome Summary: Reviewed exercise program per primary PT.  Pt able to complete with min cues

## 2021-02-11 NOTE — PROGRESS NOTES
Patient: Denis Green  Location: [facility] [address]  MRN: [id number]  Today's date: 2/11/2021    History of Present Illness  Denis LOVE is a 70 y.o. male admitted on 2/4/2021 with Pneumonia due to COVID-19 virus. Principal problem is No Principal Problem: There is no principal problem currently on the Problem List. Please update the Problem List and refresh..   Pt admitted to outside hospital on 1/5/2021 for COVID-19 PNA. Worsening resp status intubated on 1/9- extubated 1/16 to OptiFlow. Transition to HFNC on 1/23.Eval by nephrology while in ICU due to ERNIE likely secondary to ATN. Creatinine has been improving with excellent urine output. Received Decadron and Remdesivir. Dobhoff tube was placed due to dysphagia. New fever of 102.8 1/20 and 1/21. ID was consulted. Bld cx's negative. CT scan of the chest abdomen pelvis was done with results showing increasing pulmonary infiltrates/groundglass opacities and consolidations in his bilateral lower lobes. Started empirically on cefepime and vancomycin to cover hospital-acquired pneumonia. Pt had increased lethargy on 1/22. Pt removed DHT 1/25; required higher O2 afterward - possible aspiration during the process. 1/26 patient continued to have intermittent confusion; ?post ICU delirium. Neurology is following, MRI brain was unremarkable. EEG showed mild slowing but no seizure activity. D/c'd abx after 5 days d/t lethargy.       Past Medical History  Denis LOVE has a past medical history of Coronary artery disease, Hypertension, and Lipid disorder.        Prior Living Environment      Most Recent Value   Lives With  spouse, child(mindy), adult [adult son lives in home occasionally]   Living Arrangements  house   Home Accessibility  stairs to enter home (Group), stairs within home (Group)   Living Environment Comment  Lives with wife and intermittently with one son.  [bed and bath on 2nd ,  no powder room on first]   Number of Stairs, Main Entrance   "1   Stair Railings, Main Entrance  none   Location, Patient Bedroom  second floor, must negotiate stairs to access   Location, Bathroom  second floor, must negotiate stairs to access   Bathroom Access Comment  Pt. reports has tub shower and stall shower, uses tub shower primarily, has grab bar in shower, has stanard height toilet c wall on both sides   Number of Stairs, Within Home, Primary  12   Surface of Stairs, Within Home, Primary  hardwood   Stair Railings, Within Home, Primary  railings on both sides of stairs          Prior Level of Function      Most Recent Value   Dominant Hand  right   Ambulation  independent   Transferring  independent   Toileting  independent   Bathing  independent   Dressing  independent   Eating  independent   Communication  understands/communicates without difficulty   Swallowing  swallows foods/liquids without difficulty   Baseline Diet/Method of Nutritional Intake  regular solids, thin liquids   Past History of Dysphagia  No previous reports    Prior Level of Function Comment  Pt is a cattle/,  previously IND for all IADLs.    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, (+) , enjoys farming and being outside   Patient Goals  \"I want to get back up walking, get stronger, and be self sufficient\"           02/11/21 0808   Pain/Comfort/Sleep   Pain Charting Type Pain Assessment   Preferred Pain Scale number (Numeric Rating Pain Scale)   Pain Rating (0-10): Rest 0   Vital Signs   Heart Rate 87   Heart Rate Source Monitor   SpO2 94 %   Patient Activity At rest   Oxygen Therapy Supplemental oxygen   O2 Flow Rate (L/min) 3 L/min   /63   BP Location Left upper arm   BP Method Automatic   Patient Position Sitting      02/11/21 0925   Pain/Comfort/Sleep   Pain Charting Type Pain Reassessment   Preferred Pain Scale number (Numeric Rating " Pain Scale)   Pain Rating (0-10): Rest 0   Vital Signs   SpO2 95 %   Patient Activity At rest   Oxygen Therapy Supplemental oxygen   O2 Delivery Method Nasal cannula   O2 Flow Rate (L/min) 2 L/min         [facility] OT Evaluation and Treatment - 02/11/21 0808        Time Calculation    Start Time  0800     Stop Time  0930     Time Calculation (min)  90 min        Session Details    Document Type  daily treatment/progress note     Mode of Treatment  occupational therapy;individual therapy        General Information    General Observations of Patient  Pt received supine in bed        Bed Mobility    Bed Mobility  bed mobility (all) activities     [place], All Bed Mobility Activities  close supervision        Transfers    Transfers  stand pivot transfer;shower transfer        Bed to Chair Transfer    [place], Bed to Chair  touching/steadying assist     Verbal Cues  safety;technique     Assistive Device  walker, front-wheeled     Comment  bed to w/c        Stand Pivot Transfer    [place], Stand Pivot/Stand Step Transfer  minimum assist (75% or more patient effort);verbal cues;1 person assist;safety considerations     Verbal Cues  technique;safety     Assistive Device  walker, front-wheeled     Comment  bed to w/c        Shower Transfer    Transfer Technique  stand pivot     [place], Shower Transfer  minimum assist (75% or more patient effort);nonverbal cues (demo/gesture);verbal cues;1 person assist;safety considerations     Verbal Cues  technique;safety     Assistive Device  grab bars/tub rail;shower chair     Comment  seated on commode in barrier free shower        Bathing    Self-Performance  chest;left arm;right arm;abdomen;front perineal area;buttocks;left upper leg;right upper leg     [place] Assistance  buttocks;left lower leg, including foot;right lower leg, including foot     [place]  minimum assist (75% or more patient effort);verbal cues;1 person assist;safety considerations;increased time to  complete     Position  supported sitting;supported standing     Setup Assistance  obtain supplies;adaptive equipment setup;adjust water temperature/flow     Comment  issued LH sponge for home        Upper Body Dressing    Tasks  pull over garment     Self-Performance  threads left arm, shirt;threads right arm, shirt;pulls shirt over head/around back;pulls shirt down/adjusts     [place] Assistance  obtains clothes     [place]  supervision;1 person assist;safety considerations     Position  supported sitting     Adaptive Equipment  none        Lower Body Dressing    Self-Performance  threads left leg, pants/shorts;threads right leg, pants/shorts;pulls pants/shorts up or down;dons/doffs right sock     [place] Assistance  obtains clothes;dons/doffs right sock;dons/doffs left shoe;dons/doffs right shoe;shoelaces, left;shoelaces, right     [place]  minimum assist (75% or more patient effort)     Position  supported sitting;supported standing     Adaptive Equipment  dressing stick;long-handled shoe horn;reacher     [place], Footwear  minimum assist (75% or more patient effort)     Comment  increased fatigue s/p shower        Grooming    Self-Performance  washes, rinses and dries face;washes, rinses and dries hands;brushes/torres hair;oral care (brushing teeth, cleaning dentures)     [place]  supervision     Position  supported sitting     Adaptive Equipment  none     [place], Oral Hygiene  supervision     Comment  seated for energy conservation        Toileting    Comment  Pt declining need to void.         Daily Progress Summary (OT)    Symptoms Noted During/After Treatment  fatigue     Progress Toward Functional Goals (OT)  progressing toward functional goals as expected     Daily Outcome Statement (OT)  Pt was seen by OT for ADL assessment includeing shower. Pt is improving tolerance for seated activities but continues to benefit from DME for energy conservation and safety due to supplimental 02  and balance deficits.      Barriers to Overall Progress (OT)  general weakness, supplimental 02, balance deficit     Recommendations  continue exercises to increase tolerance for ADL. standing and transfers, continue POC                            IRF OT Goals      Most Recent Value   Transfer Goal 1   Activity/Assistive Device  toilet at 02/10/2021 1632   [place]  tactile cues required, set-up required at 02/10/2021 1632   Time Frame  short-term goal (STG), 5 - 7 days at 02/10/2021 1632   Strategies/Barriers  DME at 02/10/2021 1632   Progress/Outcome  good progress toward goal, goal partially met, goal revised this date at 02/10/2021 1632   Transfer Goal 2   Activity/Assistive Device  toilet at 02/10/2021 1632   [place]  supervision required, set-up required at 02/10/2021 1632   Time Frame  long-term goal (LTG), 3 weeks at 02/10/2021 1632   Strategies/Barriers  DME at 02/10/2021 1632   Transfer Goal 3   Activity/Assistive Device  shower at 02/10/2021 1632   [place]  tactile cues required at 02/10/2021 1632   Time Frame  short-term goal (STG), 5 - 7 days at 02/10/2021 1632   Strategies/Barriers  DME at 02/10/2021 1632   Progress/Outcome  good progress toward goal, goal partially met, goal revised this date at 02/10/2021 1632   Transfer Goal 4   Activity/Assistive Device  shower at 02/10/2021 1632   [place]  supervision required, set-up required at 02/10/2021 1632   Time Frame  long-term goal (LTG), 3 weeks at 02/10/2021 1632   Strategies/Barriers  DME at 02/10/2021 1632   Bathing Goal 1   Activity/Assistive Device  bathing skills, all at 02/10/2021 1632   [place]  supervision required at 02/10/2021 1632   Time Frame  short-term goal (STG), 5 - 7 days at 02/10/2021 1632   Progress/Outcome  good progress toward goal, goal partially met, goal revised this date at 02/10/2021 1632   Bathing Goal 2   Activity/Assistive Device  bathing skills, all at 02/05/2021 0726   [place]  supervision  required at 02/05/2021 0726   Time Frame  long-term goal (LTG), 3 weeks at 02/05/2021 0726   Strategies/Barriers  DME TBD at 02/05/2021 0726   UB Dressing Goal 1   Activity/Assistive Device  upper body dressing at 02/10/2021 1632   [place]  supervision required at 02/10/2021 1632   Time Frame  short-term goal (STG), 5 - 7 days at 02/10/2021 1632   Progress/Outcome  good progress toward goal, goal partially met, goal ongoing at 02/10/2021 1632   UB Dressing Goal 2   Activity/Assistive Device  upper body dressing at 02/05/2021 0726   [place]  modified independence at 02/05/2021 0726   Time Frame  long-term goal (LTG), 3 weeks at 02/05/2021 0726   LB Dressing Goal 1   Activity/Assistive Device  lower body dressing at 02/10/2021 1632   [place]  supervision required at 02/10/2021 1632   Time Frame  short-term goal (STG), 5 - 7 days at 02/10/2021 1632   Strategies/Barriers  AD at 02/10/2021 1632   Progress/Outcome  good progress toward goal, goal partially met, goal revised this date at 02/10/2021 1632   LB Dressing Goal 2   Activity/Assistive Device  lower body dressing at 02/05/2021 0726   [place]  supervision required at 02/05/2021 0726   Time Frame  long-term goal (LTG), 3 weeks at 02/05/2021 0726   Grooming Goal 1   Activity/Assistive Device  grooming skills, all at 02/10/2021 1632   [place]  supervision required at 02/10/2021 1632   Time Frame  short-term goal (STG), 5 - 7 days at 02/10/2021 1632   Progress/Outcome  good progress toward goal, goal partially met, goal ongoing at 02/10/2021 1632   Grooming Goal 2   Activity/Assistive Device  grooming skills, all at 02/05/2021 0726   [place]  modified independence at 02/05/2021 0726   Time Frame  long-term goal (LTG), 3 weeks at 02/05/2021 0726   Toileting Goal 1   Activity/Assistive Device  toileting skills, all at 02/10/2021 1632   [place]  tactile cues required at 02/10/2021 1632   Time Frame  short-term goal (STG), 5 - 7 days at  02/10/2021 1632   Strategies/Barriers  DME at 02/10/2021 1632   Progress/Outcome  good progress toward goal, goal partially met, goal revised this date at 02/10/2021 1632   Toileting Goal 2   Activity/Assistive Device  toileting skills, all at 02/10/2021 1632   [place]  supervision required at 02/10/2021 1632   Time Frame  long-term goal (LTG), 3 weeks at 02/10/2021 1632   Strategies/Barriers  DME at 02/10/2021 1632   Progress/Outcome  goal revised this date at 02/10/2021 1632

## 2021-02-11 NOTE — PROGRESS NOTES
Patient: Denis Green  Location: [facility] [address]  MRN: [id number]  Today's date: 2/11/2021    History of Present Illness  Denis LOVE is a 70 y.o. male admitted on 2/4/2021 with Pneumonia due to COVID-19 virus. Principal problem is No Principal Problem: There is no principal problem currently on the Problem List. Please update the Problem List and refresh..   Pt admitted to outside hospital on 1/5/2021 for COVID-19 PNA. Worsening resp status intubated on 1/9- extubated 1/16 to OptiFlow. Transition to HFNC on 1/23.Eval by nephrology while in ICU due to ERNIE likely secondary to ATN. Creatinine has been improving with excellent urine output. Received Decadron and Remdesivir. Dobhoff tube was placed due to dysphagia. New fever of 102.8 1/20 and 1/21. ID was consulted. Bld cx's negative. CT scan of the chest abdomen pelvis was done with results showing increasing pulmonary infiltrates/groundglass opacities and consolidations in his bilateral lower lobes. Started empirically on cefepime and vancomycin to cover hospital-acquired pneumonia. Pt had increased lethargy on 1/22. Pt removed DHT 1/25; required higher O2 afterward - possible aspiration during the process. 1/26 patient continued to have intermittent confusion; ?post ICU delirium. Neurology is following, MRI brain was unremarkable. EEG showed mild slowing but no seizure activity. D/c'd abx after 5 days d/t lethargy.       Past Medical History  Denis LOVE has a past medical history of Coronary artery disease, Hypertension, and Lipid disorder.       02/10/21 0804   Pain/Comfort/Sleep   Pain Charting Type Pain Assessment   Preferred Pain Scale number (Numeric Rating Pain Scale)   Pain Rating (0-10): Rest 0   Vital Signs   Heart Rate 80   Heart Rate Source Monitor   SpO2 98 %   Patient Activity At rest   Oxygen Therapy Supplemental oxygen   O2 Delivery Method Nasal cannula   O2 Flow Rate (L/min) 3.5 L/min   /67   BP Location Left upper  arm   BP Method Automatic   Patient Position Sitting        02/10/21 0855   Pain/Comfort/Sleep   Pain Charting Type Pain Reassessment   Preferred Pain Scale number (Numeric Rating Pain Scale)   Pain Rating (0-10): Rest 0         Prior Living Environment      Most Recent Value   Lives With  spouse, child(mindy), adult [adult son lives in home occasionally]   Living Arrangements  house   Home Accessibility  stairs to enter home (Group), stairs within home (Group)   Living Environment Comment  Lives with wife and intermittently with one son.  [bed and bath on 2nd ,  no powder room on first]   Number of Stairs, Main Entrance  1   Stair Railings, Main Entrance  none   Location, Patient Bedroom  second floor, must negotiate stairs to access   Location, Bathroom  second floor, must negotiate stairs to access   Bathroom Access Comment  Pt. reports has tub shower and stall shower, uses tub shower primarily, has grab bar in shower, has stanard height toilet c wall on both sides   Number of Stairs, Within Home, Primary  12   Surface of Stairs, Within Home, Primary  hardwood   Stair Railings, Within Home, Primary  railings on both sides of stairs          Prior Level of Function      Most Recent Value   Dominant Hand  right   Ambulation  independent   Transferring  independent   Toileting  independent   Bathing  independent   Dressing  independent   Eating  independent   Communication  understands/communicates without difficulty   Swallowing  swallows foods/liquids without difficulty   Baseline Diet/Method of Nutritional Intake  regular solids, thin liquids   Past History of Dysphagia  No previous reports    Prior Level of Function Comment  Pt is a cattle/,  previously IND for all IADLs.    Assistive Device/Animal Currently Used at Home  none         02/10/21 0808   Time Calculation   Start Time 0800   Stop Time 0900   Time Calculation (min) 60 min   Session Details   Document Type discharge evaluation   Mode of  Treatment occupational therapy;individual therapy   General Information   General Observations of Patient Pt received supine in bed   Bed Mobility   Bed Mobility bed mobility (all) activities   [place], All Bed Mobility Activities touching/steadying assist;nonverbal cues (demo/gesture);1 person assist;safety considerations   Comment (Bed Mobility) hosp bed, rail, HOB elevated   Transfers   Transfers stand pivot transfer   Maintains Weight-Bearing Status (Transfers) able to maintain weight-bearing status   Comment cues for postural control   Stand Pivot Transfer   [place], Stand Pivot/Stand Step Transfer moderate assist (50-74% patient effort);nonverbal cues (demo/gesture);verbal cues;1 person assist;safety considerations;increased time to complete   Verbal Cues technique;safety   Comment SPT bed to w/c   Basic Activities of Daily Living (BADLs)   Basic Activities of Daily Living upper body dressing;lower body dressing   Upper Body Dressing   Tasks front-opening garment   Self-Performance threads left arm, shirt;threads right arm, shirt;pulls shirt over head/around back;pulls shirt down/adjusts   [place] Assistance obtains clothes   [place] close supervision;1 person assist;safety considerations;increased time to complete   Position supported sitting   Lower Body Dressing   Self-Performance threads left leg, pants/shorts;threads right leg, pants/shorts;pulls pants/shorts up or down;dons/doffs left sock   [place] close supervision   Position edge of bed sitting;long sitting;sitting up in bed;supine   Adaptive Equipment sock aid;long-handled shoe horn;dressing stick;reacher   [place], Footwear close supervision;set up   Comment Recommed elastic socks for ease of threading onto sock aid   Daily Progress Summary (OT)   Symptoms Noted During/After Treatment none   Progress Toward Functional Goals (OT) progressing toward functional goals as expected   Daily Outcome Statement (OT) Pt improved  tolerance and 02 saturation during transfer and dressing. Issued AD w demo and instruction   Barriers to Overall Progress (OT) general debility   Recommendations continue POC         IRF OT Goals      Most Recent Value   Transfer Goal 1   Activity/Assistive Device  toilet at 02/10/2021 1632   [place]  tactile cues required, set-up required at 02/10/2021 1632   Time Frame  short-term goal (STG), 5 - 7 days at 02/10/2021 1632   Strategies/Barriers  DME at 02/10/2021 1632   Progress/Outcome  good progress toward goal, goal partially met, goal revised this date at 02/10/2021 1632   Transfer Goal 2   Activity/Assistive Device  toilet at 02/10/2021 1632   [place]  supervision required, set-up required at 02/10/2021 1632   Time Frame  long-term goal (LTG), 3 weeks at 02/10/2021 1632   Strategies/Barriers  DME at 02/10/2021 1632   Transfer Goal 3   Activity/Assistive Device  shower at 02/10/2021 1632   [place]  tactile cues required at 02/10/2021 1632   Time Frame  short-term goal (STG), 5 - 7 days at 02/10/2021 1632   Strategies/Barriers  DME at 02/10/2021 1632   Progress/Outcome  good progress toward goal, goal partially met, goal revised this date at 02/10/2021 1632   Transfer Goal 4   Activity/Assistive Device  shower at 02/10/2021 1632   [place]  supervision required, set-up required at 02/10/2021 1632   Time Frame  long-term goal (LTG), 3 weeks at 02/10/2021 1632   Strategies/Barriers  DME at 02/10/2021 1632   Bathing Goal 1   Activity/Assistive Device  bathing skills, all at 02/10/2021 1632   [place]  supervision required at 02/10/2021 1632   Time Frame  short-term goal (STG), 5 - 7 days at 02/10/2021 1632   Progress/Outcome  good progress toward goal, goal partially met, goal revised this date at 02/10/2021 1632   Bathing Goal 2   Activity/Assistive Device  bathing skills, all at 02/05/2021 0726   [place]  supervision required at 02/05/2021 0726   Time Frame  long-term goal (LTG), 3  weeks at 02/05/2021 0726   Strategies/Barriers  DME TBD at 02/05/2021 0726   UB Dressing Goal 1   Activity/Assistive Device  upper body dressing at 02/10/2021 1632   [place]  supervision required at 02/10/2021 1632   Time Frame  short-term goal (STG), 5 - 7 days at 02/10/2021 1632   Progress/Outcome  good progress toward goal, goal partially met, goal ongoing at 02/10/2021 1632   UB Dressing Goal 2   Activity/Assistive Device  upper body dressing at 02/05/2021 0726   [place]  modified independence at 02/05/2021 0726   Time Frame  long-term goal (LTG), 3 weeks at 02/05/2021 0726   LB Dressing Goal 1   Activity/Assistive Device  lower body dressing at 02/10/2021 1632   [place]  supervision required at 02/10/2021 1632   Time Frame  short-term goal (STG), 5 - 7 days at 02/10/2021 1632   Strategies/Barriers  AD at 02/10/2021 1632   Progress/Outcome  good progress toward goal, goal partially met, goal revised this date at 02/10/2021 1632   LB Dressing Goal 2   Activity/Assistive Device  lower body dressing at 02/05/2021 0726   [place]  supervision required at 02/05/2021 0726   Time Frame  long-term goal (LTG), 3 weeks at 02/05/2021 0726   Grooming Goal 1   Activity/Assistive Device  grooming skills, all at 02/10/2021 1632   [place]  supervision required at 02/10/2021 1632   Time Frame  short-term goal (STG), 5 - 7 days at 02/10/2021 1632   Progress/Outcome  good progress toward goal, goal partially met, goal ongoing at 02/10/2021 1632   Grooming Goal 2   Activity/Assistive Device  grooming skills, all at 02/05/2021 0726   [place]  modified independence at 02/05/2021 0726   Time Frame  long-term goal (LTG), 3 weeks at 02/05/2021 0726   Toileting Goal 1   Activity/Assistive Device  toileting skills, all at 02/10/2021 1632   [place]  tactile cues required at 02/10/2021 1632   Time Frame  short-term goal (STG), 5 - 7 days at 02/10/2021 1632   Strategies/Barriers  DME at 02/10/2021 6917    Progress/Outcome  good progress toward goal, goal partially met, goal revised this date at 02/10/2021 1632   Toileting Goal 2   Activity/Assistive Device  toileting skills, all at 02/10/2021 1632   [place]  supervision required at 02/10/2021 1632   Time Frame  long-term goal (LTG), 3 weeks at 02/10/2021 1632   Strategies/Barriers  DME at 02/10/2021 1632   Progress/Outcome  goal revised this date at 02/10/2021 1632

## 2021-02-11 NOTE — PROGRESS NOTES
Patient: Denis Green  Location: [facility] [address]  MRN: [id number]  Today's date: 2/11/2021    History of Present Illness  Denis LOVE is a 70 y.o. male admitted on 2/4/2021 with Pneumonia due to COVID-19 virus. Principal problem is No Principal Problem: There is no principal problem currently on the Problem List. Please update the Problem List and refresh..   Pt admitted to outside hospital on 1/5/2021 for COVID-19 PNA. Worsening resp status intubated on 1/9- extubated 1/16 to OptiFlow. Transition to [facility]NC on 1/23.Eval by nephrology while in ICU due to ERNIE likely secondary to ATN. Creatinine has been improving with excellent urine output. Received Decadron and Remdesivir. Dobhoff tube was placed due to dysphagia. New fever of 102.8 1/20 and 1/21. ID was consulted. Bld cx's negative. CT scan of the chest abdomen pelvis was done with results showing increasing pulmonary infiltrates/groundglass opacities and consolidations in his bilateral lower lobes. Started empirically on cefepime and vancomycin to cover hospital-acquired pneumonia. Pt had increased lethargy on 1/22. Pt removed DHT 1/25; required higher O2 afterward - possible aspiration during the process. 1/26 patient continued to have intermittent confusion; ?post ICU delirium. Neurology is following, MRI brain was unremarkable. EEG showed mild slowing but no seizure activity. D/c'd abx after 5 days d/t lethargy.       Past Medical History  Denis LOVE has a past medical history of Coronary artery disease, Hypertension, and Lipid disorder.    SLP Pain    Date/Time Pain Type Pref Pain Scale Rating: Rest Who   02/11/21 1335 Pain Assessment word (verbal rating pain scale) 0 - no pain RS   02/11/21 1359 Pain Reassessment word (verbal rating pain scale) 0 - no pain RS          Prior Living Environment      Most Recent Value   Lives With  spouse, child(mindy), adult [adult son lives in home occasionally]   Living Arrangements  house   Home  Accessibility  stairs to enter home (Group), stairs within home (Group)   Living Environment Comment  Lives with wife and intermittently with one son.  [bed and bath on 2nd ,  no powder room on first]   Number of Stairs, Main Entrance  1   Stair Railings, Main Entrance  none   Location, Patient Bedroom  second floor, must negotiate stairs to access   Location, Bathroom  second floor, must negotiate stairs to access   Bathroom Access Comment  Pt. reports has tub shower and stall shower, uses tub shower primarily, has grab bar in shower, has stanard height toilet c wall on both sides   Number of Stairs, Within Home, Primary  12   Surface of Stairs, Within Home, Primary  hardwood   Stair Railings, Within Home, Primary  railings on both sides of stairs          Prior Level of Function      Most Recent Value   Dominant Hand  right   Ambulation  independent   Transferring  independent   Toileting  independent   Bathing  independent   Dressing  independent   Eating  independent   Communication  understands/communicates without difficulty   Swallowing  swallows foods/liquids without difficulty   Baseline Diet/Method of Nutritional Intake  regular solids, thin liquids   Past History of Dysphagia  No previous reports    Prior Level of Function Comment  Pt is a cattle/,  previously IND for all IADLs.    Assistive Device/Animal Currently Used at Home  none          IRF SLP Evaluation and Treatment - 02/11/21 1335        Time Calculation    Start Time  1330     Stop Time  1400     Time Calculation (min)  30 min        Session Details    Document Type  daily treatment/progress note     Mode of Treatment  speech language pathology;individual therapy        General Information    General Observations of Patient  Pleasant and cooperative, pt received in handoff from PT and taken to his room        Swallowing Intervention    Dysphagia/Swallowing Interventions  pharyngeal therapeutic exercise program     Pharyngeal  Therapeutic Exercise Program  effortful swallow;effortful phonation of eee;laryngeal adduction exercises     Comment, Swallowing Interventions  Sustained phonation of /ah/ avg. 2.5 seconds over 10 trials. Incentive Spirometer, avg 800mL displaced over 5 trials. Education provided re. breath support and vocal fold function related to airway protection and swallowing. HEP initiated with sustained /ah/, effortful swallow, and use of incentive spirometer.         Daily Progress Summary (SLP)    Daily Outcome Statement (SLP)  Good participation despite reports of stomach cramping. Pt presents with severely reduced breath support, education provided re. breath training and vocal fold adduction exercises for improved airway protection. Continue listed exercises next session.                        IRF SLP Goals      Most Recent Value   Verbal Expression Goal 1   Verbal Expression Goal 1  STG: pt will complete mod level verbal expression tasks x 90% c min cues. at 02/06/2021 1405   Time Frame  short-term goal (STG), 1 week at 02/06/2021 1405   Verbal Expression Goal 2   Verbal Expression Goal 2  LTG: pt will complete complex/abstract verbal expression tasks x 90% c independent use of WF strategies. at 02/06/2021 1405   Time Frame  long-term goal (LTG), 3 weeks at 02/06/2021 1405   Oral Nutrition/Hydration Goal 1   Activity  effective/safe/independent, use of swallowing techniques [regular/NTL, no overt s/s of aspiration ] at 02/05/2021 1202   Time Frame  short-term goal (STG), 1 week at 02/05/2021 1202   Oral Nutrition/Hydration Goal 2   Activity  effective/safe/independent, use of swallowing techniques [regular/thin, no overt s/s of aspiration ] at 02/05/2021 1202   Time Frame  long-term goal (LTG), 3 weeks at 02/05/2021 1202   Executive Function Goal 1   Activity  complete, abstract thinking tasks, executive function tasks, organization/sequencing tasks, problem solving/reasoning tasks, other (see comments) [STG,   simple-mod level tasks.] at 02/06/2021 1405   [place]/Accuracy  with minimum, verbal cues/redirection, with additional processing time, with repetition of directions, with 90% accuracy at 02/06/2021 1405   Time Frame  1 week at 02/06/2021 1405   Executive Function Goal 2   Activity  complete, abstract thinking tasks, exhibit impulse control, organization/sequencing tasks, problem solving/reasoning tasks, other (see comments) [LTG] at 02/06/2021 1405   [place]/Accuracy  independently, with 90% accuracy, other (see comments) [mod-complex tasks.] at 02/06/2021 1405   Time Frame  3 weeks at 02/06/2021 1405   Memory Goal 1   Activity  short-term memory tasks, immediate recall tasks, working memory tasks, recall recent events, other (see comments) [STG,  simple-mod level tasks.] at 02/06/2021 1405   [place]/Accuracy  minimal cues for use of strategies, with 90% accuracy at 02/06/2021 1405   Time Frame  short-term goal (STG), 1 week at 02/06/2021 1405   Memory Goal 2   Activity  short-term memory tasks, immediate recall tasks, working memory tasks, recall recent events, other (see comments) [LTG,  mod complex information.] at 02/06/2021 1405   [place]/Accuracy  independent use of environmental cues/strategies, with 90% accuracy at 02/06/2021 1405   Time Frame  long-term goal (LTG), 3 weeks at 02/06/2021 1405

## 2021-02-11 NOTE — PROGRESS NOTES
Patient: Denis Green  Location: [facility] [address]  MRN: [id number]  Today's date: 2/11/2021    History of Present Illness  Denis LOVE is a 70 y.o. male admitted on 2/4/2021 with Pneumonia due to COVID-19 virus. Principal problem is No Principal Problem: There is no principal problem currently on the Problem List. Please update the Problem List and refresh..   Pt admitted to outside hospital on 1/5/2021 for COVID-19 PNA. Worsening resp status intubated on 1/9- extubated 1/16 to OptiFlow. Transition to HFNC on 1/23.Eval by nephrology while in ICU due to ERNIE likely secondary to ATN. Creatinine has been improving with excellent urine output. Received Decadron and Remdesivir. Dobhoff tube was placed due to dysphagia. New fever of 102.8 1/20 and 1/21. ID was consulted. Bld cx's negative. CT scan of the chest abdomen pelvis was done with results showing increasing pulmonary infiltrates/groundglass opacities and consolidations in his bilateral lower lobes. Started empirically on cefepime and vancomycin to cover hospital-acquired pneumonia. Pt had increased lethargy on 1/22. Pt removed DHT 1/25; required higher O2 afterward - possible aspiration during the process. 1/26 patient continued to have intermittent confusion; ?post ICU delirium. Neurology is following, MRI brain was unremarkable. EEG showed mild slowing but no seizure activity. D/c'd abx after 5 days d/t lethargy.       Past Medical History  Denis LOVE has a past medical history of Coronary artery disease, Hypertension, and Lipid disorder.    PT Vitals    Date/Time Pulse HR Source SpO2 Pt Activity O2 Therapy O2 Del Method O2 Flow Rate BP BP Location BP Method Pt Position Observations [facility]   02/11/21 1135 75 Monitor 93 % At rest Supplemental oxygen Nasal cannula 2 L/min 121/74 Left upper arm Automatic Sitting -- CINDY   02/11/21 1140 -- -- 95 % At rest None (Room air) -- -- -- -- -- -- Pre-ambulation trial [facility]   02/11/21 1143 -- -- 94 % At  rest None (Room air) -- -- -- -- -- -- Post ambulation trial CINDY   02/11/21 1156 81 Monitor 95 % At rest Supplemental oxygen Nasal cannula 0.5 L/min 119/71 Left upper arm Automatic Sitting -- CINDY      PT Pain    Date/Time Pain Type Rating: Rest Who   02/11/21 1135 Pain Assessment 0 CINDY   02/11/21 1156 Pain Reassessment 0 CINDY          Prior Living Environment      Most Recent Value   Lives With  spouse, child(mindy), adult [adult son lives in home occasionally]   Living Arrangements  house   Home Accessibility  stairs to enter home (Group), stairs within home (Group)   Living Environment Comment  Lives with wife and intermittently with one son.  [bed and bath on 2nd ,  no powder room on first]   Number of Stairs, Main Entrance  1   Stair Railings, Main Entrance  none   Location, Patient Bedroom  second floor, must negotiate stairs to access   Location, Bathroom  second floor, must negotiate stairs to access   Bathroom Access Comment  Pt. reports has tub shower and stall shower, uses tub shower primarily, has grab bar in shower, has stanard height toilet c wall on both sides   Number of Stairs, Within Home, Primary  12   Surface of Stairs, Within Home, Primary  hardwood   Stair Railings, Within Home, Primary  railings on both sides of stairs          Prior Level of Function      Most Recent Value   Dominant Hand  right   Ambulation  independent   Transferring  independent   Toileting  independent   Bathing  independent   Dressing  independent   Eating  independent   Communication  understands/communicates without difficulty   Swallowing  swallows foods/liquids without difficulty   Baseline Diet/Method of Nutritional Intake  regular solids, thin liquids   Past History of Dysphagia  No previous reports    Prior Level of Function Comment  Pt is a cattle/,  previously IND for all IADLs.    Assistive Device/Animal Currently Used at Home  none          IRF PT Evaluation and Treatment - 02/11/21 1135        Time  Calculation    Start Time  1130     Stop Time  1200     Time Calculation (min)  30 min        Session Details    Document Type  daily treatment/progress note     Mode of Treatment  individual therapy;physical therapy        Transfers    Transfers  stand pivot transfer        Sit to Stand Transfer    [place], Sit to Stand Transfer  minimum assist (75% or more patient effort)     Verbal Cues  technique;safety     Assistive Device  walker, front-wheeled     Comment  from WC to RW, Min A for B hip extension to rise        Stand to Sit Transfer    [place], Stand to Sit Transfer  minimum assist (75% or more patient effort)     Verbal Cues  technique;safety     Assistive Device  walker, front-wheeled     Comment  to WC, Min A for controlled lowering to WC        Stand Pivot Transfer    [place], Stand Pivot/Stand Step Transfer  minimum assist (75% or more patient effort)     Verbal Cues  safety;technique     Assistive Device  walker, front-wheeled     Comment  via ambulatory approach back to  with RW. Min A for guidance at hips        Gait Training    [place], Gait  dependent (less than 25% patient effort)     Assistive Device  walker, front-wheeled     Distance in Feet  57 feet     Gait Pattern Utilized  step-through     Deviations/Abnormal Patterns (Gait)  base of support, narrow;gait speed decreased;step length decreased     Bilateral Gait Deviations  heel strike decreased     Comment  57' (2x) with RW and Min-Mod A x1. 2nd person for WC follow.          Daily Progress Summary (PT)    Daily Outcome Statement (PT)  Trialed ambulation today without use of O2. Patient was able to maintain SpO2 > 92% on room air only. Patient was able to progress to ambulation distance. Pt arrived to session on 2 L, however reduced to 1 L when leaving session. Continue to assess reduction of O2 throughout PT/OT sessions. Team updated on pt's status with O2 change with activity.                               IRF PT Goals       Most Recent Value   Bed Mobility Goal 1   Activity/Assistive Device  rolling to left, rolling to right, sit to supine/supine to sit at 02/05/2021 1030   [place]  supervision required at 02/05/2021 1030   Time Frame  short-term goal (STG), 1 week at 02/11/2021 0824   Strategies/Barriers  Impaired strength, decreased endurance, safety considerations at 02/11/2021 0824   Progress/Outcome  goal ongoing at 02/11/2021 0824   Bed Mobility Goal 2   Activity/Assistive Device  rolling to left, rolling to right, sit to supine/supine to sit at 02/05/2021 1030   [place]  modified independence at 02/05/2021 1030   Time Frame  long-term goal (LTG), 3 weeks at 02/11/2021 0824   Strategies/Barriers  Impaired strength, decreased endurance, safety considerations at 02/11/2021 0824   Progress/Outcome  goal ongoing at 02/11/2021 0824   Transfer Goal 1   Activity/Assistive Device  sit-to-stand/stand-to-sit, bed-to-chair/chair-to-bed at 02/11/2021 0824   [place]  minimum assist (75% or more patient effort) at 02/11/2021 0824   Time Frame  short-term goal (STG), 1 week at 02/11/2021 0824   Strategies/Barriers  poor strength, decreased strength at 02/11/2021 0824   Progress/Outcome  goal met, goal revised this date at 02/11/2021 0824   Transfer Goal 2   Activity/Assistive Device  sit-to-stand/stand-to-sit, bed-to-chair/chair-to-bed at 02/11/2021 0824   [place]  modified independence at 02/11/2021 0824   Time Frame  long-term goal (LTG), 3 weeks at 02/11/2021 0824   Strategies/Barriers  poor strength, decreased strength at 02/11/2021 0824   Progress/Outcome  goal ongoing at 02/11/2021 0824   Gait/Walking Locomotion Goal 1   Activity/Assistive Device  gait (walking locomotion) at 02/11/2021 0824   Distance  50 feet at 02/11/2021 0824   [place]  minimum assist (75% or more patient effort) at 02/11/2021 0824   Time Frame  short-term goal (STG), 1 week at 02/11/2021 0824   Strategies/Barriers  impaired endurance,  decreased strength at 02/11/2021 0824   Progress/Outcome  goal met, goal revised this date at 02/11/2021 0824   Gait/Walking Locomotion Goal 2   Activity/Assistive Device  gait (walking locomotion) at 02/11/2021 0824   Distance  150 feet at 02/11/2021 0824   [place]  supervision required at 02/11/2021 0824   Time Frame  long-term goal (LTG), 3 weeks at 02/11/2021 0824   Strategies/Barriers  impaired endurance, decreased strength at 02/11/2021 0824   Progress/Outcome  goal ongoing at 02/11/2021 0824   Stairs Goal 1   Activity/Assistive Device  stairs, all skills at 02/11/2021 0824   Number of Stairs  4 at 02/11/2021 0824   [place]  minimum assist (75% or more patient effort) at 02/11/2021 0824   Time Frame  short-term goal (STG), 1 week at 02/11/2021 0824   Progress/Outcome  goal ongoing, goal revised this date at 02/11/2021 0824   Stairs Goal 2   Activity/Assistive Device  stairs, all skills at 02/11/2021 0824   Number of Stairs  12 at 02/11/2021 0824   [place]  supervision required at 02/11/2021 0824   Time Frame  long-term goal (LTG), 3 weeks at 02/11/2021 0824   Progress/Outcome  goal ongoing, goal revised this date at 02/11/2021 0824

## 2021-02-11 NOTE — PROGRESS NOTES
Patient: Denis Green  Location: [facility] [address]  MRN: [id number]  Today's date: 2/11/2021    Hospital Course  History of Present Illness  Denis LOVE is a 70 y.o. male admitted on 2/4/2021 with Pneumonia due to COVID-19 virus. Principal problem is No Principal Problem: There is no principal problem currently on the Problem List. Please update the Problem List and refresh..   Pt admitted to outside hospital on 1/5/2021 for COVID-19 PNA. Worsening resp status intubated on 1/9- extubated 1/16 to OptiFlow. Transition to [facility]NC on 1/23.Eval by nephrology while in ICU due to ERNIE likely secondary to ATN. Creatinine has been improving with excellent urine output. Received Decadron and Remdesivir. Dobhoff tube was placed due to dysphagia. New fever of 102.8 1/20 and 1/21. ID was consulted. Bld cx's negative. CT scan of the chest abdomen pelvis was done with results showing increasing pulmonary infiltrates/groundglass opacities and consolidations in his bilateral lower lobes. Started empirically on cefepime and vancomycin to cover hospital-acquired pneumonia. Pt had increased lethargy on 1/22. Pt removed DHT 1/25; required higher O2 afterward - possible aspiration during the process. 1/26 patient continued to have intermittent confusion; ?post ICU delirium. Neurology is following, MRI brain was unremarkable. EEG showed mild slowing but no seizure activity. D/c'd abx after 5 days d/t lethargy.       Past Medical History  Denis LOVE has a past medical history of Coronary artery disease, Hypertension, and Lipid disorder.    Therapy Pain/Vitals - 02/11/21 1156        Pain/Comfort/Sleep    Pain Charting Type  Pain Reassessment     Pain Rating (0-10): Rest  0        Vital Signs    Pulse  81     Heart Rate Source  Monitor     SpO2  95 %     Patient Activity  At rest     Oxygen Therapy  Supplemental oxygen     O2 Delivery Method  Nasal cannula     O2 Flow Rate (L/min)  0.5 L/min     BP  119/71     BP  Location  Left upper arm     BP Method  Automatic     Patient Position  Sitting           Prior Living Environment      Most Recent Value   Lives With  spouse, child(mindy), adult [adult son lives in home occasionally]   Living Arrangements  house   Home Accessibility  stairs to enter home (Group), stairs within home (Group)   Living Environment Comment  Lives with wife and intermittently with one son.  [bed and bath on 2nd ,  no powder room on first]   Number of Stairs, Main Entrance  1   Stair Railings, Main Entrance  none   Location, Patient Bedroom  second floor, must negotiate stairs to access   Location, Bathroom  second floor, must negotiate stairs to access   Bathroom Access Comment  Pt. reports has tub shower and stall shower, uses tub shower primarily, has grab bar in shower, has stanard height toilet c wall on both sides   Number of Stairs, Within Home, Primary  12   Surface of Stairs, Within Home, Primary  hardwood   Stair Railings, Within Home, Primary  railings on both sides of stairs          Prior Level of Function      Most Recent Value   Dominant Hand  right   Ambulation  independent   Transferring  independent   Toileting  independent   Bathing  independent   Dressing  independent   Eating  independent   Communication  understands/communicates without difficulty   Swallowing  swallows foods/liquids without difficulty   Baseline Diet/Method of Nutritional Intake  regular solids, thin liquids   Past History of Dysphagia  No previous reports    Prior Level of Function Comment  Pt is a cattle/,  previously IND for all IADLs.    Assistive Device/Animal Currently Used at Home  none          Recreational Therapy Evaluation and Treatment - 02/11/21 6034        Session Details    Document Type  initial evaluation     Mode of Treatment  recreational therapy        General Information    Patient Profile Reviewed?  yes        Coping/Community Reintegration    Observed Emotional State  calm      "Verbalized Emotional State  acceptance        Family/Support System    Health Advocacy  self-advocacy for health        Coping Strategies    Counseling (Coping Strategies)  active listening utilized;goal setting facilitated;involvement in care planning encouraged;positive reinforcement provided;self-care encouraged;verbalization of feelings encouraged     Comment  ABOUT: previous pet store owner, current farmer, two sons. Very active and a hard worker, reported that he will keep going until he thinks he Is good to go. Raises various cattle, mountain lions, deer, horses. \"I'm a beach person.\" LEISURE: used to hunt, has \"little free time,\" his dream is to move to [place] and buy a boat, enjoys fishing, told RT he wants find leisure interests to do with his wife, loves horticulture. COMM: Wing Wednesdays at local pub, out to dinner, spending time with friends. GOALS: \"running around the corner\"      Therapeutic Recreational Activities (Coping Strategies)  gardening;individual hobbies;other (see comments)     Quality of Life Promotion (Coping Strategies)  community involvement promoted;normalization/integrity of family encouraged;wellness behaviors promoted        Community Reintegration    Activities  integration into family life;integration into community life     Barriers  type of disability     Comment, Community Reintegration  Pt given education regarding RT role and community reintegration (and simulated CI). Pt verbalizes understanding of COVID-19 precautions.        Rec Therapy Clinical Impression    Patient's Goals for Discharge  take care of myself at home;return to work;return to all previous roles/activities;other (see comments)     Plan For Care Reviewed: Recreational Therapy  patient voices agreement with Recreational Therapy plan for care;patient feedback incorporated in Recreational Therapy plan for care;Recreational Therapy plan for care discussed with patient     Functional Limitations in Following " Categories  home management;self-care;work;community/leisure     Rehab Potential/Prognosis  good, to achieve stated therapy goals     Rec Therapy Frequency of Treatment  other (see comments)    2-3x/wk, 30'-60'/session    Activity Limitations Related to Problem List  community activities not performed adequately or safely;functional mobility not performed adequately or safely for community activity     Daily Outcome Statement  RT initial evaluation completed this morning, begin POC                       Rec Care Plan Goals      Most Recent Value   Community Goal, Recreation   Recreation STG Activity: Community  Participate in simulated community integration to maximize on functional mobility and independence c min A and appropriate AD (at time of CI), in order to return to pre-morbid community involvement.   Recreation STG: Community  minimum assist (75% or less patient effort)   Recreation STG Date Established: Community  02/11/21   Recreation STG Duration: Community  10 days or less   Leisure Goal, Recreation   Recreation STG Activity: Leisure  Participate in chosen therapeutic activity incorporating strength and coordination in B LE's c min A and appropriate AD to promote independence and QOL at discharge.    Recreation STG: Leisure  minimum assist (75% or less patient effort)   Recreation STG Date Established: Leisure  02/11/21   Recreation STG Duration: Leisure  10 days or less   Additional Goal, Recreation   Recreation STG Activity: Additional Goal  Participate in pre-morbid leisure interests at least 2-3x/week to increase affect and coping mechanisms in hospital environment with global pandemic precautions in place.   Recreation STG: Additional  other (see comments) [2-3x/wk]   Recreation STG Date Established: Additional  02/11/21   Recreation STG Duration: Additional  10 days or less

## 2021-02-11 NOTE — PLAN OF CARE
Problem: Rehabilitation (IRF) Plan of Care  Goal: Plan of Care Review  Flowsheets (Taken 2/11/2021 7177)  Plan of Care Reviewed With: patient  IRF Plan of Care Review: other (see comments)  Outcome Summary: RT initial evaluation completed this date. Pt would benefit from recreational therapy services for the promotion of quality of life through therapeutic activities/leisure education and to increase knowledge of current functional mobility within the community setting during IP rehab. Begin POC

## 2021-02-12 ENCOUNTER — APPOINTMENT (INPATIENT)
Dept: OCCUPATIONAL THERAPY | Facility: REHABILITATION | Age: 71
DRG: 948 | End: 2021-02-12
Payer: COMMERCIAL

## 2021-02-12 ENCOUNTER — APPOINTMENT (INPATIENT)
Dept: SPEECH THERAPY | Facility: REHABILITATION | Age: 71
DRG: 948 | End: 2021-02-12
Payer: COMMERCIAL

## 2021-02-12 ENCOUNTER — APPOINTMENT (INPATIENT)
Dept: OTHER | Facility: REHABILITATION | Age: 71
DRG: 948 | End: 2021-02-12
Payer: COMMERCIAL

## 2021-02-12 ENCOUNTER — APPOINTMENT (INPATIENT)
Dept: PHYSICAL THERAPY | Facility: REHABILITATION | Age: 71
DRG: 948 | End: 2021-02-12
Payer: COMMERCIAL

## 2021-02-12 PROCEDURE — 63600000 HC DRUGS/DETAIL CODE: Performed by: INTERNAL MEDICINE

## 2021-02-12 PROCEDURE — 63700000 HC SELF-ADMINISTRABLE DRUG: Performed by: PHYSICAL MEDICINE & REHABILITATION

## 2021-02-12 PROCEDURE — 97530 THERAPEUTIC ACTIVITIES: CPT | Mod: GP

## 2021-02-12 PROCEDURE — 97530 THERAPEUTIC ACTIVITIES: CPT | Mod: 59

## 2021-02-12 PROCEDURE — 97535 SELF CARE MNGMENT TRAINING: CPT | Mod: GO

## 2021-02-12 PROCEDURE — 97530 THERAPEUTIC ACTIVITIES: CPT | Mod: GO,59

## 2021-02-12 PROCEDURE — 92526 ORAL FUNCTION THERAPY: CPT | Mod: GN

## 2021-02-12 PROCEDURE — 97110 THERAPEUTIC EXERCISES: CPT | Mod: GP,59

## 2021-02-12 PROCEDURE — 97116 GAIT TRAINING THERAPY: CPT | Mod: GP

## 2021-02-12 PROCEDURE — 63700000 HC SELF-ADMINISTRABLE DRUG: Performed by: INTERNAL MEDICINE

## 2021-02-12 PROCEDURE — 12800000 HC ROOM AND CARE SEMIPRIVATE REHAB

## 2021-02-12 PROCEDURE — 92507 TX SP LANG VOICE COMM INDIV: CPT | Mod: GN

## 2021-02-12 PROCEDURE — 97110 THERAPEUTIC EXERCISES: CPT | Mod: GO,59

## 2021-02-12 RX ORDER — AMLODIPINE BESYLATE 2.5 MG/1
2.5 TABLET ORAL NIGHTLY
Status: DISCONTINUED | OUTPATIENT
Start: 2021-02-12 | End: 2021-02-24 | Stop reason: HOSPADM

## 2021-02-12 RX ORDER — SODIUM CHLORIDE 1000 MG
1 TABLET, SOLUBLE MISCELLANEOUS 2 TIMES DAILY WITH MEALS
Status: DISCONTINUED | OUTPATIENT
Start: 2021-02-12 | End: 2021-02-15

## 2021-02-12 RX ORDER — DIPHENHYDRAMINE HCL 25 MG
50 CAPSULE ORAL NIGHTLY
Status: DISCONTINUED | OUTPATIENT
Start: 2021-02-12 | End: 2021-02-24 | Stop reason: HOSPADM

## 2021-02-12 RX ADMIN — GEMFIBROZIL 600 MG: 600 TABLET ORAL at 08:16

## 2021-02-12 RX ADMIN — GUAIFENESIN 600 MG: 600 TABLET ORAL at 21:15

## 2021-02-12 RX ADMIN — DICLOFENAC SODIUM 2 G: 10 GEL TOPICAL at 08:17

## 2021-02-12 RX ADMIN — FUROSEMIDE 20 MG: 20 TABLET ORAL at 08:16

## 2021-02-12 RX ADMIN — METOPROLOL TARTRATE 12.5 MG: 25 TABLET, FILM COATED ORAL at 08:16

## 2021-02-12 RX ADMIN — DIPHENHYDRAMINE HYDROCHLORIDE 50 MG: 25 CAPSULE ORAL at 21:15

## 2021-02-12 RX ADMIN — ACETAMINOPHEN 650 MG: 325 TABLET, FILM COATED ORAL at 11:09

## 2021-02-12 RX ADMIN — GUAIFENESIN 600 MG: 600 TABLET ORAL at 08:16

## 2021-02-12 RX ADMIN — AMLODIPINE BESYLATE 2.5 MG: 2.5 TABLET ORAL at 21:15

## 2021-02-12 RX ADMIN — DOCUSATE SODIUM 100 MG: 100 CAPSULE, LIQUID FILLED ORAL at 21:15

## 2021-02-12 RX ADMIN — SENNOSIDES 3 TABLET: 8.6 TABLET, FILM COATED ORAL at 12:18

## 2021-02-12 RX ADMIN — Medication 3 MG: at 21:15

## 2021-02-12 RX ADMIN — FUROSEMIDE 20 MG: 20 TABLET ORAL at 17:18

## 2021-02-12 RX ADMIN — ALBUTEROL SULFATE 2 PUFF: 90 AEROSOL, METERED RESPIRATORY (INHALATION) at 17:23

## 2021-02-12 RX ADMIN — DICLOFENAC SODIUM 2 G: 10 GEL TOPICAL at 21:16

## 2021-02-12 RX ADMIN — ACETAMINOPHEN 650 MG: 325 TABLET, FILM COATED ORAL at 17:18

## 2021-02-12 RX ADMIN — BISACODYL 10 MG: 5 TABLET, COATED ORAL at 08:16

## 2021-02-12 RX ADMIN — DOCUSATE SODIUM 100 MG: 100 CAPSULE, LIQUID FILLED ORAL at 08:19

## 2021-02-12 RX ADMIN — BISACODYL 10 MG: 5 TABLET, COATED ORAL at 21:15

## 2021-02-12 RX ADMIN — ACETAMINOPHEN 650 MG: 325 TABLET, FILM COATED ORAL at 08:15

## 2021-02-12 RX ADMIN — Medication 400 MG: at 21:15

## 2021-02-12 RX ADMIN — DICLOFENAC SODIUM 2 G: 10 GEL TOPICAL at 17:19

## 2021-02-12 RX ADMIN — GEMFIBROZIL 600 MG: 600 TABLET ORAL at 17:18

## 2021-02-12 RX ADMIN — METOPROLOL TARTRATE 12.5 MG: 25 TABLET, FILM COATED ORAL at 21:15

## 2021-02-12 RX ADMIN — ALBUTEROL SULFATE 2 PUFF: 90 AEROSOL, METERED RESPIRATORY (INHALATION) at 12:21

## 2021-02-12 RX ADMIN — ACETAMINOPHEN 650 MG: 325 TABLET, FILM COATED ORAL at 21:15

## 2021-02-12 RX ADMIN — Medication 400 MG: at 08:16

## 2021-02-12 RX ADMIN — ASPIRIN 81 MG: 81 TABLET ORAL at 08:16

## 2021-02-12 RX ADMIN — ENOXAPARIN SODIUM 40 MG: 40 INJECTION SUBCUTANEOUS at 08:26

## 2021-02-12 RX ADMIN — DOCUSATE SODIUM 100 MG: 100 CAPSULE, LIQUID FILLED ORAL at 17:19

## 2021-02-12 RX ADMIN — SODIUM CHLORIDE 1 G: 1 TABLET ORAL at 17:21

## 2021-02-12 NOTE — PLAN OF CARE
Problem: Rehabilitation (IRF) Plan of Care  Goal: Plan of Care Review  Flowsheets (Taken 2/12/2021 2167)  Plan of Care Reviewed With: patient  IRF Plan of Care Review: progress ongoing, continue  Outcome Summary: Pt initially reported pain. Nursing contacted, moist heat ordered and applied, medications issued. Vitals stable but pt reported feeling dizzy when standing for functional task. Pt is limited in all areas by poor activity and exercise tolerance.

## 2021-02-12 NOTE — PROGRESS NOTES
Subjective    Patient seen and examined on rounds.  Chart reviewed.  Events overnight noted.  History reviewed briefly with patient.     CC:  Deficits in mobility, transfers, self-care status post deconditioning, severe Covid 19 pneumonia, ventilator-dependent respiratory failure, dysphagia, multiple medical problems.     HPI:  Mr. Denis Green is a 70-year-old right-handed white male with chronic conditions significant for coronary artery disease, hypertension, hyperlipidemia who was admitted to [facility] on 1/5/21 due to Covid 19 pneumonia and worsening respiratory status.  He required intubation on 1/9/21 and had ventilator-dependent respiratory failure.  He was treated with Decadron and Remdesivir.  He had dysphagia requiring Dobbhoff tube feeds.  He was extubated on 1/16/21 to OptiFlow.  On 1/23/21 he was transitioned to high flow nasal cannula.  Hospital course was significant for acute renal insufficiency likely secondary to acute tubular necrosis and he was followed by nephrology.  He had subsequent improvement in renal function.  He also had new fever of 102.8°F on 1/20/21 and 1/21/21.  He was followed by infectious disease.  Blood cultures were negative. CT scan of the chest abdomen pelvis was done with results showing increasing pulmonary infiltrates/groundglass opacities and consolidations in his bilateral lower lobes.  He was treated empirically with Cefepime and Vancomycin to cover hospital-acquired pneumonia.  He was noted to be lethargic on 1/22/21.  Subsequently patient removed Dobbhoff tube on 1/25/21 and there is question of possible aspiration during that process.  He continued to have intermittent confusion on 1/26/21 was felt to be possible post ICU delirium.  He was followed by neurology.  MRI brain was unremarkable.  EEG showed mild slowing but no seizure activity was noted.  Antibiotics were discontinued after a 5 day course due to lethargy.  Repeat video swallow study was  "performed for dysphagia and patient was put on a soft diet with nectar thick liquids. On 2/3/21, hemoglobin was 11.2, WBC count 15.1, platelets were 275, BUN was 15, and creatinine was 0.7.  He has been needing assistance for mobility, transfers, self-care postoperatively. He is transferred to [place] [facility] on 2/4/21 for further rehabilitation care.       SUBJ: Trying to wean off oxygen as possible.  Tolerating therapies per endurance.  Denies increased pain.    ROS: Denies chest pain or shortness of breath. Other ROS negative. Past, family, social history is unchanged.    Physical Exam      Blood pressure 117/67, pulse 86, temperature 36.4 °C (97.5 °F), temperature source Oral, resp. rate 18, height 1.727 m (5' 8\"), weight 77.1 kg (170 lb), SpO2 97 %.  Body mass index is 25.85 kg/m².    General Appearance: Not in acute distress  Head/Ear/Nose/Throat: Normocephalic; Atraumatic.  On oxygen by nasal cannula.  Eye: EOMI; PERRL.   Neck: No JVD; No Bruits.   Respiratory: Decreased breath sounds at bases.   Cardiovascular: RRR; Normal S1, S2.   Gastrointestinal: Soft; NT; +BS.   Extremities: Bilateral lower extremity edema noted.   Musculoskeletal: Functional active range of motion in both upper and lower extremities.    Neurological: Awake alert oriented to person, had some difficulty naming the place and correct date.. Speech is fluent. Cranial nerve examination does not reveal any gross facial asymmetry. Strength testing about 4/5 strength in both upper extremities.  Bilateral hip flexion is 3+/5.  Bilateral quadriceps are 4/5 with the thighs supported.  Bilateral ankle dorsi and plantar flexion are 4/5.  He is grossly able to localize touch and position sense in great toes.  Deep tendon reflexes are hypoactive and symmetric bilaterally.  Plantars are flexor.  Coordination is functional upper extremities.  Neurologically stable.  Behavior/Emotional: Appropriate; Cooperative.   Skin: No obvious rashes noted. "        Current Facility-Administered Medications:   •  acetaminophen (TYLENOL) tablet 650 mg, 650 mg, oral, q4h PRN, Omari Spain MD, 650 mg at 02/06/21 2001  •  acetaminophen (TYLENOL) tablet 650 mg, 650 mg, oral, With meals & nightly, Isael aLng MD, 650 mg at 02/11/21 1717  •  albuterol HFA (VENTOLIN HFA) 90 mcg/actuation inhaler 2 puff, 2 puff, inhalation, q6h PRN, Omari Spain MD  •  albuterol HFA (VENTOLIN HFA) 90 mcg/actuation inhaler 2 puff, 2 puff, inhalation, q6h, Laure Bishop MD, 2 puff at 02/11/21 1717  •  alum-mag hydroxide-simeth (MAALOX) 200-200-20 mg/5 mL suspension 30 mL, 30 mL, oral, q6h PRN, Laure Bishop MD  •  amLODIPine (NORVASC) tablet 5 mg, 5 mg, oral, q12h EBER, Laure Bishop MD, 5 mg at 02/10/21 2128  •  aspirin enteric coated tablet 81 mg, 81 mg, oral, Daily, Omari Spain MD, 81 mg at 02/11/21 0911  •  bisacodyL (DULCOLAX) 10 mg suppository 10 mg, 10 mg, rectal, Daily PRN, Isael Lang MD, 10 mg at 02/09/21 1850  •  bisacodyL (DULCOLAX) tablet,delayed release (DR/EC) 10 mg, 10 mg, oral, BID, Laure Bishop MD, 10 mg at 02/11/21 1212  •  diclofenac sodium (VOLTAREN) 1 % topical gel 2 g, 2 g, Topical, TID, Laure Bishop MD, 2 g at 02/11/21 1711  •  docusate sodium (COLACE) capsule 100 mg, 100 mg, oral, TID, Isael Lagn MD, 100 mg at 02/11/21 1710  •  [START ON 2/12/2021] enoxaparin (LOVENOX) syringe 40 mg, 40 mg, subcutaneous, q24h, Laure Bishop MD  •  furosemide (LASIX) tablet 20 mg, 20 mg, oral, BID (am, 4p), Laure Bishop MD, 20 mg at 02/11/21 1710  •  gemfibroziL (LOPID) tablet 600 mg, 600 mg, oral, BID AC, Omari Spain MD, 600 mg at 02/11/21 1710  •  guaiFENesin (MUCINEX) 12 hr ER tablet 600 mg, 600 mg, oral, BID, Laure Bishop MD, 600 mg at 02/11/21 0912  •  heparin (porcine) 5,000 unit/mL injection 5,000 Units, 5,000 Units, subcutaneous, q8h EBER, Laure Bishop MD, 5,000 Units at 02/11/21 1446  •  magnesium citrate solution 148 mL, 148 mL,  oral, Once, Laure Bishop MD  •  magnesium hydroxide (M.O.M.) 400 mg/5 mL suspension 30 mL, 30 mL, oral, 2x daily PRN, Laure Bishop MD, 30 mL at 02/09/21 0904  •  magnesium oxide (MAG-OX) tablet 400 mg, 400 mg, oral, BID, Laure Bishop MD, 400 mg at 02/11/21 0911  •  melatonin ODT 3 mg, 3 mg, oral, Nightly, Laure Bishop MD, 3 mg at 02/10/21 2127  •  metoprolol tartrate (LOPRESSOR) split tablet 12.5 mg, 12.5 mg, oral, BID, Omari Spain MD, 12.5 mg at 02/11/21 0912  •  mouth moisturizer (TOOTHETTE) cream, , mucous membrane, TID, Laure Bishop MD, Given at 02/11/21 1710  •  polyethylene glycol (MIRALAX) 17 gram packet 17 g, 17 g, oral, Daily PRN, Omari Spain MD, 17 g at 02/04/21 2157  •  senna (SENOKOT) tablet 3 tablet, 3 tablet, oral, Daily before lunch, Isael Lang MD, 3 tablet at 02/11/21 1212  •  urea (URE-NA) powder packet 15 g, 15 g, oral, Daily, Laure Bishop MD, 15 g at 02/11/21 1212       Labs / Radiology    Lab Results   Component Value Date    WBC 8.05 02/11/2021    HGB 10.7 (L) 02/11/2021    HCT 33.3 (L) 02/11/2021    MCV 95.7 02/11/2021     (H) 02/11/2021     Lab Results   Component Value Date    GLUCOSE 94 02/11/2021    CALCIUM 8.8 (L) 02/11/2021     (L) 02/11/2021    K 4.2 02/11/2021    CO2 27 02/11/2021    CL 92 (L) 02/11/2021    BUN 16 02/11/2021    CREATININE 0.7 (L) 02/11/2021       Assessment and Plan    ASSESSMENT PLAN:  1. 70-year-old right-handed white male with chronic conditions significant for coronary artery disease, hypertension, hyperlipidemia who was admitted to [facility] on 1/5/21 due to Covid 19 pneumonia and worsening respiratory status.  He required intubation on 1/9/21 and had ventilator-dependent respiratory failure.  He was treated with Decadron and Remdesivir.  He had dysphagia requiring Dobbhoff tube feeds.  He was extubated on 1/16/21 to OptiFlow.  On 1/23/21 he was transitioned to high flow nasal cannula.  Hospital course was  significant for acute renal insufficiency likely secondary to acute tubular necrosis and he was followed by nephrology.  He had subsequent improvement in renal function.  He also had new fever of 102.8°F on 1/20/21 and 1/21/21.  He was followed by infectious disease.  Blood cultures were negative. CT scan of the chest abdomen pelvis was done with results showing increasing pulmonary infiltrates/groundglass opacities and consolidations in his bilateral lower lobes.  He was treated empirically with Cefepime and Vancomycin to cover hospital-acquired pneumonia.  He was noted to be lethargic on 1/22/21.  Subsequently patient removed Dobbhoff tube on 1/25/21 and there is question of possible aspiration during that process.  He continued to have intermittent confusion on 1/26/21 was felt to be possible post ICU delirium.  He was followed by neurology.  MRI brain was unremarkable.  EEG showed mild slowing but no seizure activity was noted.  Antibiotics were discontinued after a 5 day course due to lethargy.  Repeat video swallow study was performed for dysphagia and patient was put on a soft diet with nectar thick liquids. On 2/3/21, hemoglobin was 11.2, WBC count 15.1, platelets were 275, BUN was 15, and creatinine was 0.7.  He has been needing assistance for mobility, transfers, self-care postoperatively. He is transferred to [place] [facility] on 2/4/21 for further rehabilitation care.       2. DVT prophylaxis - on subcutaneous Heparin.  On SCDs.  Platelets 296 on 2/5/2021.     3.  Deconditioning - recent severe Covid 19 pneumonia, ventilator-dependent respiratory failure, dysphagia, multiple medical problems - He had ventilator-dependent respiratory failure.  He was treated with Decadron and Remdesivir.  Continue PT, OT, speech, psychology.  Follow falls precautions, cardiac precautions, aspiration precautions.     4. GI - On Colace, Senokot.  On PRN MiraLAX, MOM, Maalox.      5.  - voiding.  Denies dysuria.   Monitor post void residuals.     6.  Pulmonary - recent severe Covid 19 pneumonia, ventilator-dependent respiratory failure - on Ventolin HFA.  On Mucinex.     7. Pain - on PRN Tylenol.  On scheduled Tylenol for left wrist pain.  On topical Voltaren gel.  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  K pad as needed for pain.     8. Hematology -hemoglobin 12.0, WBC 8.15 on 2/5/2021.     9. CVS - history of coronary artery disease and hypertension.  On Norvasc.  On Lopressor.  On Aspirin.  On Lasix.  On Magnesium oxide.     10.  Hyperlipidemia - on Lopid.     11.  Insomnia - on Melatonin.     12.  Dysphagia -on soft diet with nectar thick liquids.  Free water protocol with ice chips and sips of water per speech therapy.  Speech therapy and nutrition following.     13. Rehabilitation medicine - Continue comprehensive rehabilitation care. Continue PT, OT, speech, psychology.  We will follow falls precautions, cardiac precautions, monitor pulse oximetry in therapy and follow aspiration precautions.  Tolerating therapies per endurance.  Discussed with speech therapy.  Free water protocol ice chips was started.  Denies coughing with meals.He reported some pain in left wrist.  He is not sure if he accidentally banged the left wrist against the side rail of the bed.  Denies history of gout.  Left wrist does not appear warm or tender to touch.  K pad ordered.  We will also order scheduled Tylenol for 7 days.  Discussed results of wrist x-rays with patient which showed moderate degenerative change particularly about the radial aspect of the wrist.  He reports pain is less today.  He ambulated 25 feet with rolling walker with assistance of 2 people with physical therapy.Free water protocol with ice chips and sips of water per speech therapy.  He feels better today.  Left wrist pain is decreased significantly per patient.  Working on endurance with physical therapy.  He required minimal to moderate assistance  for transfers.  Working on ADLs with occupational therapy.He feels better today.  Denies increased pain.  Discussed with him about doing incentive spirometry.  We will try to wean off oxygen if possible.Trying to wean off oxygen as possible.  Tolerating therapies per endurance.  Denies increased pain.     14. Reviewed labs today.  BUN 15, creatinine 0.6 on 2/5/2021.               Isael Lang MD  2/11/2021

## 2021-02-12 NOTE — PROGRESS NOTES
Nutrition Note    Clinical Course: Patient is a 70 y.o. male who was admitted on 2/4/2021 with a diagnosis of Pneumonia due to COVID-19 virus [U07.1, J12.82]  Physical deconditioning [R53.81].     Nutrition Interventions/ Recommendations:   1. Diet/liquid consistency per SLP  2. Will d/c Magic Cup and send a trial of Vanilla Boost Pudding  3. Check weight weekly  4. T/C iron/vitamin C supplementation for anemia (may cause constipation)  5. Will monitor po intake, weight, results of VFSS, labs   At nutrition risk level 2    Past Medical History:   Diagnosis Date   • Coronary artery disease    • Hypertension    • Lipid disorder      No past surgical history on file.         Dietary Orders   (From admission, onward)             Start     Ordered    02/09/21 1735  Diet message  Once      02/09/21 1735 02/09/21 1735  Dietary nutrition supplements  Once     Comments: Please meet with patient-he has decreased appetite and constipation. Can benefit from education and food selection choices. Thank you    02/09/21 1735 02/04/21 2122  Adult Diet Soft Texture; Nectar Thick Liquids  Diet effective now     Question Answer Comment   Diet Texture Soft Texture    Fluid Consistency: Nectar Thick Liquids        02/04/21 2123                Reason for Assessment  Reason For Assessment: per organizational policy  Diagnosis: infection/sepsis  Identified At Risk by Screening Criteria: difficulty chewing/swallowing, reduced oral intake over the last month, unintentional loss of 10 lbs or more in the past 2 mos    [facility] Nutrition Screen Tool  Has patient lost weight without trying?: 0-->No  If yes,how much weight has been lost?: 0-->Patient has not lost weight  Has patient been eating poorly due to decreased appetite?: 0-->No  MST Nutrition Screen Score: 0    Nutrition/Diet History  Typical Food/Fluid Intake: Regular diet at home  Diet Prior to Admission: Soft/Nectars  Appetite Prior to Admission: Fair-25-50%  Intake (%): (Variable po  "intake)  Food Preferences: Loves meat, dislikes nectar liquids  Cultural/Adventism Preferences: None  Meal/Snack Patterns: Often only eats dinner  Supplemental Drinks/Foods/Additives: Magic Cup BID  Typical Activity Patterns: vigorous intensity(Farmer)  Functional Status: able to prepare meals, able to purchase food, ambulatory  Food Allergies: (NKFA)  Factors Affecting Nutritional Intake: anorexia, nausea  Tube feeding/TPN Prior to Admission?: Tube feeding-yes    Physical Findings  Overall Physical Appearance: loss of muscle mass, loss of subcutaneous fat, other (see comments)(mild temporal wasting, wearing O2)  Gastrointestinal: (C/O recent constipation)  Last Bowel Movement: 02/12/21  Oral/Mouth Cavity: (WDL)  Skin: (WDL)    RETS18 Physical Appearance  Overall Physical Appearance: loss of muscle mass, loss of subcutaneous fat, other (see comments)(mild temporal wasting, wearing O2)  Gastrointestinal: (C/O recent constipation)  Last Bowel Movement: 02/12/21  Oral/Mouth Cavity: (WDL)  Skin: (WDL)    Nutrition Order  Nutrition Order Review: meets nutritional requirements  Nutrition Order Comments: Soft/Nectars    Anthropometrics  Height: 172.7 cm (5' 8\")    Wt Readings from Last 3 Encounters:   02/10/21 77.1 kg (170 lb)   02/01/21 84 kg (185 lb 3 oz)       Weights (last 7 days)     Date/Time   Weight    02/10/21 1800   77.1 kg (170 lb)                   Current Weight  Weight Method: Bed scale  Weight: 77.1 kg (170 lb)    Ideal Body Weight (IBW)  Ideal Body Weight (IBW) (kg): 70.89    Usual Body Weight (UBW)  Usual Body Weight: 93 kg (205 lb)  % of Usual Body Weight Assessment: 85-95% - mild deficit  Weight Loss: unintentional  Time Frame: 1 Month(~20# (10%))    Body Mass Index (BMI)  BMI (Calculated): 25.9  BMI Assessment: BMI 25-29.9: overweight       Labs/Procedures/Meds  Lab Results Reviewed: reviewed, pertinent  Lab Results Comments: 2/11 Na 132L, H/H 10.7/33.3    CMP Results       02/11/21 02/08/21 02/05/21    "                 0637 0537 0544          133 134         K 4.2 4.0 4.1         Cl 92 94 97         CO2 27 25 25         Glucose 94 98 96         BUN 16 16 15         Creatinine 0.7 0.6 0.6         Calcium 8.8 8.9 8.8         Anion Gap 13 14 12         AST -- -- 20         ALT -- -- 59         Albumin -- -- 2.8         EGFR >60.0 >60.0 >60.0                     Lab Results   Component Value Date    ALT 59 02/05/2021    AST 20 02/05/2021    ALKPHOS 124 02/05/2021    BILITOT 0.7 02/05/2021     No results found for: [id number]  Lab Results   Component Value Date    CALCIUM 8.8 (L) 02/11/2021     Lab Results   Component Value Date    WBC 8.05 02/11/2021    HGB 10.7 (L) 02/11/2021    HCT 33.3 (L) 02/11/2021    MCV 95.7 02/11/2021     (H) 02/11/2021     No results found for: IRON, TIBC, FERRITIN  Lab Results   Component Value Date    CHOL 269 (H) 02/05/2021     Lab Results   Component Value Date    HDL 30 (L) 02/05/2021     Lab Results   Component Value Date    LDLCALC 185 (H) 02/05/2021     Lab Results   Component Value Date    TRIG 269 (H) 02/05/2021     No results found for: CHOLHDL  Glucose Results     No lab values to display.        • acetaminophen  650 mg oral With meals & nightly   • albuterol HFA  2 puff inhalation q6h   • amLODIPine  2.5 mg oral Nightly   • aspirin  81 mg oral Daily   • bisacodyL  10 mg oral BID   • diclofenac sodium  2 g Topical TID   • docusate sodium  100 mg oral TID   • enoxaparin  40 mg subcutaneous q24h   • furosemide  20 mg oral BID (am, 4p)   • gemfibroziL  600 mg oral BID AC   • guaiFENesin  600 mg oral BID   • magnesium oxide  400 mg oral BID   • melatonin  3 mg oral Nightly   • metoprolol tartrate  12.5 mg oral BID   • mouth moisturizer   mucous membrane TID   • senna  3 tablet oral Daily before lunch   • sodium chloride  1 g oral BID with meals     Medications  Pertinent Medications Reviewed: reviewed, pertinent  Pertinent Medications Comments: dulcolax, colace, lasix,  mag-ox, senokot    Estimated/Assessed Needs  Additional Documentation: Calorie Requirements (Group), Fluid Requirements (Group), Protein Requirements (Group)    Calorie Requirements  Estimated kCal Needs: Actual Body Weight  Estimated Calorie Need Method: kcal/kg  Calorie/kg Recommended: 22-25  Calorie Recommendations: 7375-3718    Protein Requirements  Recommended Dosing Weight (Estimated Protein Needs): Actual Body Weight  Est Protein Requirement Amount (gms/kg): (1.2-1.3g/kg)  Protein Recommendations: (101-109)    Fluid Requirements  Fluid Recommendation (mL): 20-25ml  Recommended Fluid Needs Dosing Weight: Actual Body Weight  Fluid Requirements (mL/day): (5408-2162)  Estimated Fluid Requirement Method: RDA Method  Primitivo-Michael Method (over 20 kg): 3042.22    PES  Statement: PES Statement  Nutrition Diagnosis: Inadequate Protein-Energy Intake  Related To:: Decreased ability to consume sufficient energy  As Evidenced By:: Variable po intake  Nutritional Needs Met?: No                                 Clinical Comments:     Patient reports that his appetite is decreased.  He had an episode of lightheadedness in therapy and was unable to eat any lunch today.  He disliked the Magic Cups and dislikes the nectar liquids.  VFSS scheduled for Tuesday. Patient is allowed water if he takes small sips.  Will send trial of Boost Pudding. Patient is hyponatremic - receiving NaCl tablets BID.    Goals:  1. Adequate po intake to meet > 75% of nutritional needs  2. Lytes/labs WNL    Discussed with: Patient  Date: 02/12/21  Signature: Marilin Salas RD

## 2021-02-12 NOTE — PLAN OF CARE
Plan of Care Review  IRF Plan of Care Review: progress ongoing, continue  Plan of Care Reviewed With: patient  Outcome Summary: Nata OOB during therapy & meals. refused breakfast & dinner d/t no appetite. Cont b/b. Had bm this afternoon but pt feels he didn't empty fully yet. taking bowel meds.  Taking tylenol for back discomfort & kpad obtained for comfort. Nata free water/ice well without any coughing noted. O2 1-2l but nata roomair when oob today.

## 2021-02-12 NOTE — PLAN OF CARE
Problem: Rehabilitation (IRF) Plan of Care  Goal: Plan of Care Review  Outcome: Progressing  Flowsheets  Taken 2/12/2021 0428 by Linsey Ramesh, RN  Outcome Summary: aox3. pt was irritable this shift. no c/o pain or discomfort. meds taken as ordered. no apparent distress noted  Taken 2/11/2021 1802 by Aura Anguiano RN  Plan of Care Reviewed With: patient  Taken 2/11/2021 1723 by Susu Mandujano CCC-SLP  IRF Plan of Care Review: progress ongoing, continue   Plan of Care Review  Outcome Summary: aox3. pt was irritable this shift. no c/o pain or discomfort. meds taken as ordered. no apparent distress noted

## 2021-02-12 NOTE — PROGRESS NOTES
Patient: Denis Green  Location: [facility] [address]  MRN: [id number]  Today's date: 2/12/2021    History of Present Illness  Denis LOVE is a 70 y.o. male admitted on 2/4/2021 with Pneumonia due to COVID-19 virus. Principal problem is No Principal Problem: There is no principal problem currently on the Problem List. Please update the Problem List and refresh..   Pt admitted to outside hospital on 1/5/2021 for COVID-19 PNA. Worsening resp status intubated on 1/9- extubated 1/16 to OptiFlow. Transition to [facility] on 1/23.Eval by nephrology while in ICU due to ERNIE likely secondary to ATN. Creatinine has been improving with excellent urine output. Received Decadron and Remdesivir. Dobhoff tube was placed due to dysphagia. New fever of 102.8 1/20 and 1/21. ID was consulted. Bld cx's negative. CT scan of the chest abdomen pelvis was done with results showing increasing pulmonary infiltrates/groundglass opacities and consolidations in his bilateral lower lobes. Started empirically on cefepime and vancomycin to cover hospital-acquired pneumonia. Pt had increased lethargy on 1/22. Pt removed DHT 1/25; required higher O2 afterward - possible aspiration during the process. 1/26 patient continued to have intermittent confusion; ?post ICU delirium. Neurology is following, MRI brain was unremarkable. EEG showed mild slowing but no seizure activity. D/c'd abx after 5 days d/t lethargy.       Past Medical History  Denis LOVE has a past medical history of Coronary artery disease, Hypertension, and Lipid disorder.     02/12/21 1038   Pain/Comfort/Sleep   Pain Charting Type Pain Assessment   Preferred Pain Scale number (Numeric Rating Pain Scale)   Pain Body Location - Orientation lower   Pain Body Location back   Pain Rating (0-10): Rest 5   Pain Onset/Duration Pt came to therapy reporting limiting back pain associated with being in w/c. OT contacted [facility] with suggestion for application of moist heat. Received  "verbal order   Pain Management Interventions heat applied   Vital Signs   Heart Rate 64   Heart Rate Source Monitor   SpO2 95 %   Patient Activity At rest   Oxygen Therapy Supplemental oxygen   O2 Delivery Method Nasal cannula   O2 Flow Rate (L/min) 1 L/min   /80   BP Location Left upper arm   BP Method Automatic   Patient Position Sitting        02/12/21 1155   Pain/Comfort/Sleep   Pain Charting Type Pain Reassessment   Preferred Pain Scale number (Numeric Rating Pain Scale)   Pain Body Location - Orientation lower   Pain Body Location back   Pain Rating (0-10): Rest 4   Pain Onset/Duration Back paion slightly improved after receiveing medication, having a bowel movement and changing position but continued to limit overall functional performance         Prior Level of Function      Most Recent Value   Dominant Hand  right   Ambulation  independent   Transferring  independent   Toileting  independent   Bathing  independent   Dressing  independent   Eating  independent   Communication  understands/communicates without difficulty   Swallowing  swallows foods/liquids without difficulty   Baseline Diet/Method of Nutritional Intake  regular solids, thin liquids   Past History of Dysphagia  No previous reports    Prior Level of Function Comment  Pt is a cattle/,  previously IND for all IADLs.    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, (+) , enjoys farming and being outside   Patient Goals  \"I want to get back up walking, get stronger, and be self sufficient\"          IRF OT Evaluation and Treatment - 02/12/21 1038        Time Calculation    Start Time  1032     Stop Time  1202     Time Calculation (min)  90 min        Session Details    Document Type  daily treatment/progress note     Mode of Treatment  occupational therapy;individual therapy        General " "Information    General Observations of Patient  Pt received reclined seated in w/c in hallway        Transfers    Transfers  toilet transfer;stand pivot transfer        Stand Pivot Transfer    [place], Stand Pivot/Stand Step Transfer  minimum assist (75% or more patient effort);1 person assist;safety considerations     Verbal Cues  safety     Assistive Device  none     Comment  w/c to bed        Toilet Transfer    Transfer Technique  stand pivot     [place], Toilet Transfer  moderate assist (50-74% patient effort);1 person assist;safety considerations     Verbal Cues  safety;technique     Comment  used commode with grab bars on wall        Balance    Balance Assessment  sitting static balance;sit to stand dynamic balance;standing static balance     Static Sitting Balance  mild impairment;unsupported;sitting, edge of bed     Sit to Stand Dynamic Balance  mild impairment;unsupported;standing     Static Standing Balance  mild impairment;unsupported;standing     Comment, Balance  Pt participated in standing bal/nata activity standing at table. Decreased standing tolerance for functional tasks. Pt reported feeling \"funny\" requesting to sit within less than 1 minute each trial x3. While standing, pt required touching assist and v/cs for postural control. Pt required at least unilateral UE support on table.          Motor Skills    Motor Skills  postural control        Postural Control Assessment    Issues Impacting Performance  compensatory motor behaviors;increased muscle tone;limited movement components     Comment, Issues Impacting Performance  general weakness, feeling lightheaded w some nausea, back pain     Comment, Impact of Muscle Weakness  general weakness, LEs, core, low back pain        Therapeutic Interventions    Therapeutic Interventions  Positioning (Row);Functional Position Change (Row)        Upper Extremity (Therapeutic Exercise)    Exercise Position/Type (UE Therapeutic Exercise)  seated;AROM " (active range of motion)     Range of Motion Exercises (Upper Extremity Therapeutic Exercise)  bilateral;shoulder flexion/extension     Reps and Sets (Upper Extremity Therapeutic Exercise)  UBE 7 minutes combined forward/backwards seated        Grooming    Self-Performance  washes, rinses and dries hands     [place]  supervision     Comment  seated        Toileting    [place]  touching/steadying assist;1 person assist;safety considerations     Position  supported sitting;supported standing     Setup Assistance  adaptive equipment setup     Adaptive Equipment  commode, 3-in-1;grab bar/safety frame     Comment  Decreased std nata but pt was able to reach posterior perineal for hygiene with steady assist        Daily Progress Summary (OT)    Symptoms Noted During/After Treatment  increased pain;dizziness;fatigue     Progress Toward Functional Goals (OT)  progressing toward functional goals slower than expected     Daily Outcome Statement (OT)  Pt initially reported pain. Nursing contacted, moist heat ordered and applied, medications issued. Vitals stable but pt reported feeling dizzy when standing for functional task. Pt is limited in all areas by poor activity and exercise tolerance.      Barriers to Overall Progress (OT)  balance, general debility, back pain, o2 stats     Recommendations  continue POC                            IRF OT Goals      Most Recent Value   Transfer Goal 1   Activity/Assistive Device  toilet at 02/10/2021 1632   [place]  tactile cues required, set-up required at 02/10/2021 1632   Time Frame  short-term goal (STG), 5 - 7 days at 02/10/2021 1632   Strategies/Barriers  DME at 02/10/2021 1632   Progress/Outcome  good progress toward goal, goal partially met, goal revised this date at 02/10/2021 1632   Transfer Goal 2   Activity/Assistive Device  toilet at 02/10/2021 1632   [place]  supervision required, set-up required at 02/10/2021 1632   Time Frame  long-term goal (LTG), 3  weeks at 02/10/2021 1632   Strategies/Barriers  DME at 02/10/2021 1632   Transfer Goal 3   Activity/Assistive Device  shower at 02/10/2021 1632   [place]  tactile cues required at 02/10/2021 1632   Time Frame  short-term goal (STG), 5 - 7 days at 02/10/2021 1632   Strategies/Barriers  DME at 02/10/2021 1632   Progress/Outcome  good progress toward goal, goal partially met, goal revised this date at 02/10/2021 1632   Transfer Goal 4   Activity/Assistive Device  shower at 02/10/2021 1632   [place]  supervision required, set-up required at 02/10/2021 1632   Time Frame  long-term goal (LTG), 3 weeks at 02/10/2021 1632   Strategies/Barriers  DME at 02/10/2021 1632   Bathing Goal 1   Activity/Assistive Device  bathing skills, all at 02/10/2021 1632   [place]  supervision required at 02/10/2021 1632   Time Frame  short-term goal (STG), 5 - 7 days at 02/10/2021 1632   Progress/Outcome  good progress toward goal, goal partially met, goal revised this date at 02/10/2021 1632   Bathing Goal 2   Activity/Assistive Device  bathing skills, all at 02/05/2021 0726   [place]  supervision required at 02/05/2021 0726   Time Frame  long-term goal (LTG), 3 weeks at 02/05/2021 0726   Strategies/Barriers  DME TBD at 02/05/2021 0726   UB Dressing Goal 1   Activity/Assistive Device  upper body dressing at 02/10/2021 1632   [place]  supervision required at 02/10/2021 1632   Time Frame  short-term goal (STG), 5 - 7 days at 02/10/2021 1632   Progress/Outcome  good progress toward goal, goal partially met, goal ongoing at 02/10/2021 1632   UB Dressing Goal 2   Activity/Assistive Device  upper body dressing at 02/05/2021 0726   [place]  modified independence at 02/05/2021 0726   Time Frame  long-term goal (LTG), 3 weeks at 02/05/2021 0726   LB Dressing Goal 1   Activity/Assistive Device  lower body dressing at 02/10/2021 1632   [place]  supervision required at 02/10/2021 1632   Time Frame  short-term goal  (STG), 5 - 7 days at 02/10/2021 1632   Strategies/Barriers  AD at 02/10/2021 1632   Progress/Outcome  good progress toward goal, goal partially met, goal revised this date at 02/10/2021 1632   LB Dressing Goal 2   Activity/Assistive Device  lower body dressing at 02/05/2021 0726   [place]  supervision required at 02/05/2021 0726   Time Frame  long-term goal (LTG), 3 weeks at 02/05/2021 0726   Grooming Goal 1   Activity/Assistive Device  grooming skills, all at 02/10/2021 1632   [place]  supervision required at 02/10/2021 1632   Time Frame  short-term goal (STG), 5 - 7 days at 02/10/2021 1632   Progress/Outcome  good progress toward goal, goal partially met, goal ongoing at 02/10/2021 1632   Grooming Goal 2   Activity/Assistive Device  grooming skills, all at 02/05/2021 0726   [place]  modified independence at 02/05/2021 0726   Time Frame  long-term goal (LTG), 3 weeks at 02/05/2021 0726   Toileting Goal 1   Activity/Assistive Device  toileting skills, all at 02/10/2021 1632   [place]  tactile cues required at 02/10/2021 1632   Time Frame  short-term goal (STG), 5 - 7 days at 02/10/2021 1632   Strategies/Barriers  DME at 02/10/2021 1632   Progress/Outcome  good progress toward goal, goal partially met, goal revised this date at 02/10/2021 1632   Toileting Goal 2   Activity/Assistive Device  toileting skills, all at 02/10/2021 1632   [place]  supervision required at 02/10/2021 1632   Time Frame  long-term goal (LTG), 3 weeks at 02/10/2021 1632   Strategies/Barriers  DME at 02/10/2021 1632   Progress/Outcome  goal revised this date at 02/10/2021 1632

## 2021-02-12 NOTE — PLAN OF CARE
Problem: Rehabilitation (IRF) Plan of Care  Goal: Plan of Care Review  Flowsheets (Taken 2/12/2021 0108)  Plan of Care Reviewed With: patient  IRF Plan of Care Review: progress ongoing, continue  Outcome Summary: Pt moderately furstrated with staff follow-through of FWP. Extensive education provided again this date to both staff and patient, signage re-posted in pt room for clarity.

## 2021-02-12 NOTE — PLAN OF CARE
Problem: Rehabilitation (IRF) Plan of Care  Goal: Plan of Care Review  Flowsheets (Taken 2/12/2021 2977)  Plan of Care Reviewed With: patient  IRF Plan of Care Review: progress ongoing, continue  Outcome Summary: Patient tolerated ambulation trial, curb elevation, and stairs on RA only, maintaining SPO2 > 92%

## 2021-02-12 NOTE — PLAN OF CARE
Problem: Rehabilitation (IRF) Plan of Care  Goal: Plan of Care Review  Flowsheets (Taken 2/12/2021 5274)  Plan of Care Reviewed With: patient  IRF Plan of Care Review: progress ongoing, continue  Outcome Summary: RT session focused on normalcy and UE endurance via premorbid leisure interest of gardening

## 2021-02-12 NOTE — PROGRESS NOTES
[facility] Progress Note          Patient was seen and examined at bedside.    Subjective:  RN reported yesterday afternoon, he had one episode of symptomatic orthostatic hypotension in bathroom, after lying down in bid, his symptom resolved.  His BP has been normal, adjusted his antihypertensives with holding parameter.   He dislike urea-Na, change to salt tablets for his hyponatremia, d/w him about re challenge statin for his hypercholesterolemia, he will f/u with his PCP and ideally a dyslipidemia specialist as outpt for that perstective.       Objective   Vital signs in last 24 hours:  Temp:  [36.4 °C (97.5 °F)-36.5 °C (97.7 °F)] 36.5 °C (97.7 °F)  Heart Rate:  [73-86] 85  Resp:  [18] 18  BP: (104-134)/(57-82) 128/82      Intake/Output Summary (Last 24 hours) at 2/12/2021 0949  Last data filed at 2/11/2021 1802  Gross per 24 hour   Intake --   Output 200 ml   Net -200 ml     Intake/Output this shift:  No intake/output data recorded.   Review of Systems:  All other systems reviewed and negative except as noted in the HPI.   Objective      Labs  reviewed his labs thoroughly   Lab Results   Component Value Date    WBC 8.05 02/11/2021    HGB 10.7 (L) 02/11/2021    HCT 33.3 (L) 02/11/2021    MCV 95.7 02/11/2021     (H) 02/11/2021     Lab Results   Component Value Date    GLUCOSE 94 02/11/2021    CALCIUM 8.8 (L) 02/11/2021     (L) 02/11/2021    K 4.2 02/11/2021    CO2 27 02/11/2021    CL 92 (L) 02/11/2021    BUN 16 02/11/2021    CREATININE 0.7 (L) 02/11/2021       Imaging  OSH imaging study reports reviewed:    CXR 2/5/2021:  IMPRESSION:  Bilateral airspace opacities most prominent in the left upper lobe and left  lower lobe likely representing COVID19 pneumonia.         Full Code    Physical Exam:  Head/Ear/Nose/Throat: normocephalic; atraumatic; moisture mouth mm, no oropharyngeal thrush noted.   Eyes: anicteric sclera, EOMI; PERRL.   Neck : supple, no JVD, no carotid bruits  appeciated.   Respiratory: no evidence of labored breathing, lung sounds a little coarse, mild bibasilar diminished BS, no remarkable w/r/c.   Cardiovascular: RRR; normal S1, S2; no m/r/g; no S3 or S4.   Gastrointestinal: soft; NT; BS normal; mildly distended; no CVAT b/l.   Genitourinary: no lim.   Extremities : no c/c/e .   Neurological: AO x 3, fluent speeches, following commands, CNS II-XII grossly intact; no focal neurologic deficits.   Behavior/Emotional: in NAD, appropriate; cooperative.   Skin: clean, dry and intact.     Plan of care was discussed with patient, RN, and PMR attending.     Assessment     CC: S/p severe covid-19 PNA complicated by VDRF, dysphagia, ERNIE, bacterial PNA, and  hospital delirium, physical deconditioning with ADL and ambulatory dysfunction.       70 y.o. male, I PTA, with PMH of HTN, HLD, and CAD, admitted to [facility] on 1/5/2021 for severeCOVID-19 PNA. Worsening resp status intubated on 1/9- extubated 1/16 to OptiFlow. Transition to HFNC on 1/23.Eval by nephrology while in ICU due to ERNIE likely secondary to ATN. Creatinine has been improving with excellent urine output. Received Decadron and Remdesivir. Dobhoff tube was placed due to dysphagia. New fever of 102.8 on 1/20 and 1/21. ID was consulted. Bld cx's negative. CT scan of the chest abdomen pelvis was done with results showing increasing pulmonary infiltrates/groundglass opacities and consolidations in his bilateral lower lobes. Started empirically on cefepime and vancomycin to cover hospital-acquired pneumonia. Pt had increased lethargy on 1/22. Pt removed DHT 1/25; required higher O2 afterward - possible aspiration during the process. 1/26 patient continued to have intermittent confusion; ?post ICU delirium. Neurology is following, MRI brain was unremarkable. EEG showed mild slowing but no seizure activity. D/c'd abx after 5 days d/t lethargy. Repeat VFSS performed- now on soft nectar thick liquid diet .  Pt is AA&Ox3 with  periods of confusion. Tolerates soft diet with NTL, voiding in urinal, O2 @ 2-3L via nc. Participating in therapies with marked improvement, functionally, he has residual ADL and ambulatory dysfunction, rec's for acute rehab prior to home with SO. Transferred to [facility] on 2/4/2021.      1.S/p severe covid-19 PNA complicated by VDRF, dysphagia, ERNIE, bacterial PNA, and  hospital delirium, physical deconditioning with ADL and ambulatory dysfunction : inpt acute rehab, gait and balancing training, SLP therapy, nutrition support,  Fall/aspiration precaution, medical management, DVT prophylaxis, dermal defense, f/u with PCP after inpt rehab.     2. Pulm-S/p severe covid-19 PNA complicated by VDRF, s/p possible bacteria PNA,  hypoxemia with mild exertion, atelectasis: monitor SO2, prn NC O2, keep SO2>92%, incentive spirometry, bronchodilator inhaler, mucolytic agent, pulm toileting. Check CXR.      3. Psych-s/p hospital acute delirium, intermittent confusion: his metal status has much improved, monitor his mood and mentation, help his orientation, psychology CBT, avoid unnecessary sedatives, SW support.      4. GI-on soft nectar thick liquid diet, constipation:SLP evaluation to advance his diet as tolerated; provide constipation bowel regimen, keep adequate hydration, timed toilet visits.     5. DVT prophy: SCD, early ambulation, SQ heparin, check LE venous DUS to r/o DVT.       6. HTN, dyslipidemia: cont home HTN meds with holding parameter, orthostatic hypotension precaution, monitor BP . On gemfibrozil, f/u with his PCP.      Mixed significant dyslipidemia: LCL-C 185, HDL-C 30, , h/o HTN, HLD, and CAD, only gemfibrozil is far from adequate, labeled Lipitor allergy, still worth retrial with hydrophilic formula Crestor from low dose, which is absolutely necessary and also guideline recommended    7. Renal-s/p ERNIE recovered, electrolytes imbalance: monitor renal function and volume status, avoid nephrotoxic agents and  low BP,  monitor and keep electrolytes balance.     8. - urinary retention: timed voiding trial, monitor PVR with prn cic, check UA/UCX.     Billing code: [id number]  Diagnoses:  Patient Active Problem List   Diagnosis   • Acute metabolic encephalopathy   • ARDS (adult respiratory distress syndrome) (CMS/HCC)   • Coronary artery disease involving native coronary artery of native heart without angina pectoris   • Hypoxia   • Pneumonia due to COVID-19 virus   • Transaminitis   • Essential hypertension   • Dyslipidemia   • Atelectasis   • Physical deconditioning     complicated case with multiple comorbidities as mentioned in subjective section, spent 35 min to manage the case, >50% of the time consulting the patient about current medical condition, existing comorbidities, new findings/concerns and care/management plan.       Laure Bishop MD  2/12/2021

## 2021-02-12 NOTE — PROGRESS NOTES
Patient: Denis Green  Location: [facility] [address]  MRN: [id number]  Today's date: 2/12/2021    Hospital Course  History of Present Illness  Denis LOVE is a 70 y.o. male admitted on 2/4/2021 with Pneumonia due to COVID-19 virus. Principal problem is No Principal Problem: There is no principal problem currently on the Problem List. Please update the Problem List and refresh..   Pt admitted to outside hospital on 1/5/2021 for COVID-19 PNA. Worsening resp status intubated on 1/9- extubated 1/16 to OptiFlow. Transition to [facility]NC on 1/23.Eval by nephrology while in ICU due to ERNIE likely secondary to ATN. Creatinine has been improving with excellent urine output. Received Decadron and Remdesivir. Dobhoff tube was placed due to dysphagia. New fever of 102.8 1/20 and 1/21. ID was consulted. Bld cx's negative. CT scan of the chest abdomen pelvis was done with results showing increasing pulmonary infiltrates/groundglass opacities and consolidations in his bilateral lower lobes. Started empirically on cefepime and vancomycin to cover hospital-acquired pneumonia. Pt had increased lethargy on 1/22. Pt removed DHT 1/25; required higher O2 afterward - possible aspiration during the process. 1/26 patient continued to have intermittent confusion; ?post ICU delirium. Neurology is following, MRI brain was unremarkable. EEG showed mild slowing but no seizure activity. D/c'd abx after 5 days d/t lethargy.       Past Medical History  Denis LOVE has a past medical history of Coronary artery disease, Hypertension, and Lipid disorder.    Therapy Pain/Vitals - 02/12/21 1352        Pain/Comfort/Sleep    Pain Charting Type  Pain Reassessment     Pain Body Location - Orientation  lower     Pain Body Location  back     Pain Rating (0-10): Rest  0        Vital Signs    SpO2  92 %     Patient Activity  At rest     Oxygen Therapy  None (Room air)        Patient Observation    Observations  post gardening           Prior  Living Environment      Most Recent Value   Lives With  spouse, child(mindy), adult [adult son lives in home occasionally]   Living Arrangements  house   Home Accessibility  stairs to enter home (Group), stairs within home (Group)   Living Environment Comment  Lives with wife and intermittently with one son.  [bed and bath on 2nd ,  no powder room on first]   Number of Stairs, Main Entrance  1   Stair Railings, Main Entrance  none   Location, Patient Bedroom  second floor, must negotiate stairs to access   Location, Bathroom  second floor, must negotiate stairs to access   Bathroom Access Comment  Pt. reports has tub shower and stall shower, uses tub shower primarily, has grab bar in shower, has stanard height toilet c wall on both sides   Number of Stairs, Within Home, Primary  12   Surface of Stairs, Within Home, Primary  hardwood   Stair Railings, Within Home, Primary  railings on both sides of stairs          Prior Level of Function      Most Recent Value   Dominant Hand  right   Ambulation  independent   Transferring  independent   Toileting  independent   Bathing  independent   Dressing  independent   Eating  independent   Communication  understands/communicates without difficulty   Swallowing  swallows foods/liquids without difficulty   Baseline Diet/Method of Nutritional Intake  regular solids, thin liquids   Past History of Dysphagia  No previous reports    Prior Level of Function Comment  Pt is a cattle/,  previously IND for all IADLs.    Assistive Device/Animal Currently Used at Home  none          Recreational Therapy Evaluation and Treatment - 02/12/21 1303        Session Details    Document Type  daily treatment/progress note     Mode of Treatment  individual therapy;recreational therapy        Time Calculation    Start Time  1300     Stop Time  1400     Time Calculation (min)  60 min        General Information    Patient Profile Reviewed?  yes     General Observations of Patient  pt jatin atwood  "PCT in room donning shoes, cooperative and ready for therapy        Coping/Community Reintegration    Observed Emotional State  calm     Verbalized Emotional State  acceptance        Coping Strategies    Counseling (Coping Strategies)  active listening utilized;positive reinforcement provided;relaxation techniques promoted;personal strengths utilized     Comment  pt tolerated room air for therapy session while seated (d/t orthostatic episodes earlier) as pt did well on room air during PT session and. At start of session pt SpO2 97% on RA. 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      Therapeutic Recreational Activities (Coping Strategies)  gardening     Quality of Life Promotion (Coping Strategies)  balance between activity/rest encouraged        Therapeutic Interventions    Comment, Therapeutic Intervention  RT: pt reaching anteriorly into greenhouse trays to retrieve plants of interest; utilizing BL UEs to repot plants; adding soil form bin on right side; squeezing small pots c BL hands as pt rotates them; pulls apart soil from roots and adds to new pot. Pt completes activity at slightly raised table to engage shoulder and back muscles. At end of session pt Sp02 at 92%.        Rec Therapy Clinical Impression    Rehab Potential/Prognosis  good, to achieve stated therapy goals     Daily Outcome Statement  RT session focused on normalcy and UE endurance via premorbid leisure interest of gardening; ended session SpO2 92%           Pain Assessment/Intervention  Pain Charting Type: Pain Reassessment           Rec Care Plan Goals      Most Recent Value   Community Goal, Recreation   Recreation STG Activity: Community  Participate in simulated community " integration to maximize on functional mobility and independence c min A and appropriate AD (at time of CI), in order to return to pre-morbid community involvement.   Recreation STG: Community  minimum assist (75% or less patient effort)   Recreation STG Date Established: Community  02/11/21   Recreation STG Duration: Community  10 days or less   Leisure Goal, Recreation   Recreation STG Activity: Leisure  Participate in chosen therapeutic activity incorporating strength and coordination in B LE's c min A and appropriate AD to promote independence and QOL at discharge.    Recreation STG: Leisure  minimum assist (75% or less patient effort)   Recreation STG Date Established: Leisure  02/11/21   Recreation STG Duration: Leisure  10 days or less   Additional Goal, Recreation   Recreation STG Activity: Additional Goal  Participate in pre-morbid leisure interests at least 2-3x/week to increase affect and coping mechanisms in hospital environment with global pandemic precautions in place.   Recreation STG: Additional  other (see comments) [2-3x/wk]   Recreation STG Date Established: Additional  02/11/21   Recreation STG Duration: Additional  10 days or less

## 2021-02-12 NOTE — PROGRESS NOTES
Patient: Denis Green  Location: [facility] [address]  MRN: [id number]  Today's date: 2/12/2021    History of Present Illness  Denis LOVE is a 70 y.o. male admitted on 2/4/2021 with Pneumonia due to COVID-19 virus. Principal problem is No Principal Problem: There is no principal problem currently on the Problem List. Please update the Problem List and refresh..   Pt admitted to outside hospital on 1/5/2021 for COVID-19 PNA. Worsening resp status intubated on 1/9- extubated 1/16 to OptiFlow. Transition to [facility]NC on 1/23.Eval by nephrology while in ICU due to ERNIE likely secondary to ATN. Creatinine has been improving with excellent urine output. Received Decadron and Remdesivir. Dobhoff tube was placed due to dysphagia. New fever of 102.8 1/20 and 1/21. ID was consulted. Bld cx's negative. CT scan of the chest abdomen pelvis was done with results showing increasing pulmonary infiltrates/groundglass opacities and consolidations in his bilateral lower lobes. Started empirically on cefepime and vancomycin to cover hospital-acquired pneumonia. Pt had increased lethargy on 1/22. Pt removed DHT 1/25; required higher O2 afterward - possible aspiration during the process. 1/26 patient continued to have intermittent confusion; ?post ICU delirium. Neurology is following, MRI brain was unremarkable. EEG showed mild slowing but no seizure activity. D/c'd abx after 5 days d/t lethargy.       Past Medical History  Denis LOVE has a past medical history of Coronary artery disease, Hypertension, and Lipid disorder.    PT Vitals    Date/Time Pulse HR Source SpO2 Pt Activity O2 Therapy O2 Del Method O2 Flow Rate BP BP Location BP Method Pt Position Observations [facility]   02/12/21 0907 85 Monitor 96 % At rest Supplemental oxygen Nasal cannula 1 L/min 128/82 Left upper arm Automatic Sitting -- CINDY   02/12/21 0930 -- -- 94 % At rest None (Room air) -- -- -- -- -- -- post ambulation  CINDY   02/12/21 0940 -- -- 93 % At rest  None (Room air) -- -- -- -- -- -- post stairs CINDY   02/12/21 0956 80 Monitor 96 % At rest Supplemental oxygen Nasal cannula 1 L/min 122/77 Left upper arm Automatic Sitting -- CINDY      PT Pain    Date/Time Pain Type Rating: Activity Who   02/12/21 0907 Pain Assessment 0 CINDY   02/12/21 0956 Pain Reassessment 0 CINDY          Prior Living Environment      Most Recent Value   Lives With  spouse, child(mindy), adult [adult son lives in home occasionally]   Living Arrangements  house   Home Accessibility  stairs to enter home (Group), stairs within home (Group)   Living Environment Comment  Lives with wife and intermittently with one son.  [bed and bath on 2nd ,  no powder room on first]   Number of Stairs, Main Entrance  1   Stair Railings, Main Entrance  none   Location, Patient Bedroom  second floor, must negotiate stairs to access   Location, Bathroom  second floor, must negotiate stairs to access   Bathroom Access Comment  Pt. reports has tub shower and stall shower, uses tub shower primarily, has grab bar in shower, has stanard height toilet c wall on both sides   Number of Stairs, Within Home, Primary  12   Surface of Stairs, Within Home, Primary  hardwood   Stair Railings, Within Home, Primary  railings on both sides of stairs          Prior Level of Function      Most Recent Value   Dominant Hand  right   Ambulation  independent   Transferring  independent   Toileting  independent   Bathing  independent   Dressing  independent   Eating  independent   Communication  understands/communicates without difficulty   Swallowing  swallows foods/liquids without difficulty   Baseline Diet/Method of Nutritional Intake  regular solids, thin liquids   Past History of Dysphagia  No previous reports    Prior Level of Function Comment  Pt is a cattle/,  previously IND for all IADLs.    Assistive Device/Animal Currently Used at Home  none          IRF PT Evaluation and Treatment - 02/12/21 0908        Time Calculation     "Start Time  0900     Stop Time  1000     Time Calculation (min)  60 min        Session Details    Document Type  daily treatment/progress note     Mode of Treatment  individual therapy;physical therapy        Transfers    Transfers  stand pivot transfer        Sit to Stand Transfer    [place], Sit to Stand Transfer  minimum assist (75% or more patient effort)     Verbal Cues  technique;safety     Assistive Device  walker, front-wheeled     Comment  from WC to RW, Min A for balance and postural control        Stand to Sit Transfer    [place], Stand to Sit Transfer  minimum assist (75% or more patient effort)     Verbal Cues  safety     Assistive Device  walker, front-wheeled     Comment  to WC, Min A for controlled lowering to WC        Stand Pivot Transfer    [place], Stand Pivot/Stand Step Transfer  minimum assist (75% or more patient effort)     Verbal Cues  safety     Assistive Device  walker, front-wheeled     Comment  via ambulatory approach to/from WC with RW        Gait Training    [place], Gait  minimum assist (75% or more patient effort)     Assistive Device  walker, front-wheeled     Distance in Feet  60 feet     Gait Pattern Utilized  step-through     Comment  60' (2x) with Min A x1 for postural control. Performed on RA. SpO2 pre-walk 95%, post walk 94%.         Curb Negotiation (Mobility)    [place]  moderate assist (50-74% patient effort)     Assistive Device  walker, front-wheeled     Curb Height  6 inches     Comment  6\" curb step up/down with use of RW and Min-Mod A for postural control posteriorly        Stairs Training    [place], Stairs  moderate assist (50-74% patient effort)     Assistive Device  railing     Handrail Location  both sides     Number of Stairs  4     Stair Height  6 inches     Ascending Stairs Technique  step-to-step     Descending Stairs Technique  step-to-step     Comment  Mod A for B hip extension, and cue for knee extension when descending        " Lower Extremity (Therapeutic Exercise)    Exercise Position/Type (LE Therapeutic Exercise)  standing     General Exercise (LE Therapeutic Exercise)  bilateral     Reps and Sets (LE Therapeutic Exercise)  2x10 reps      Comment (LE Therapeutic Exercise)  Standing with RW. Hip marches alternating, mini-squats, and heel raises. Cl-S for safety. Initially on 1 L during standing Leah, progressed to completing on RA. SpO2 > 94% with standing therex on RA.         Daily Progress Summary (PT)    Daily Outcome Statement (PT)  Trialed patient on RA only during session. Patient was able to maintain SpO2 > 92% with all tasks including stairs, curbs, ambulation (60') and standing therex. PT returned pt onto 1 L when unsupervised at end of session. Cont to monitor SPO2 off of oxygen during therapy sessions.                             IRF PT Goals      Most Recent Value   Bed Mobility Goal 1   Activity/Assistive Device  rolling to left, rolling to right, sit to supine/supine to sit at 02/05/2021 1030   [place]  supervision required at 02/05/2021 1030   Time Frame  short-term goal (STG), 1 week at 02/11/2021 0824   Strategies/Barriers  Impaired strength, decreased endurance, safety considerations at 02/11/2021 0824   Progress/Outcome  goal ongoing at 02/11/2021 0824   Bed Mobility Goal 2   Activity/Assistive Device  rolling to left, rolling to right, sit to supine/supine to sit at 02/05/2021 1030   [place]  modified independence at 02/05/2021 1030   Time Frame  long-term goal (LTG), 3 weeks at 02/11/2021 0824   Strategies/Barriers  Impaired strength, decreased endurance, safety considerations at 02/11/2021 0824   Progress/Outcome  goal ongoing at 02/11/2021 0824   Transfer Goal 1   Activity/Assistive Device  sit-to-stand/stand-to-sit, bed-to-chair/chair-to-bed at 02/11/2021 0824   [place]  minimum assist (75% or more patient effort) at 02/11/2021 0824   Time Frame  short-term goal (STG), 1 week at 02/11/2021 0824    Strategies/Barriers  poor strength, decreased strength at 02/11/2021 0824   Progress/Outcome  goal met, goal revised this date at 02/11/2021 0824   Transfer Goal 2   Activity/Assistive Device  sit-to-stand/stand-to-sit, bed-to-chair/chair-to-bed at 02/11/2021 0824   [place]  modified independence at 02/11/2021 0824   Time Frame  long-term goal (LTG), 3 weeks at 02/11/2021 0824   Strategies/Barriers  poor strength, decreased strength at 02/11/2021 0824   Progress/Outcome  goal ongoing at 02/11/2021 0824   Gait/Walking Locomotion Goal 1   Activity/Assistive Device  gait (walking locomotion) at 02/11/2021 0824   Distance  50 feet at 02/11/2021 0824   [place]  minimum assist (75% or more patient effort) at 02/11/2021 0824   Time Frame  short-term goal (STG), 1 week at 02/11/2021 0824   Strategies/Barriers  impaired endurance, decreased strength at 02/11/2021 0824   Progress/Outcome  goal met, goal revised this date at 02/11/2021 0824   Gait/Walking Locomotion Goal 2   Activity/Assistive Device  gait (walking locomotion) at 02/11/2021 0824   Distance  150 feet at 02/11/2021 0824   [place]  supervision required at 02/11/2021 0824   Time Frame  long-term goal (LTG), 3 weeks at 02/11/2021 0824   Strategies/Barriers  impaired endurance, decreased strength at 02/11/2021 0824   Progress/Outcome  goal ongoing at 02/11/2021 0824   Stairs Goal 1   Activity/Assistive Device  stairs, all skills at 02/11/2021 0824   Number of Stairs  4 at 02/11/2021 0824   [place]  minimum assist (75% or more patient effort) at 02/11/2021 0824   Time Frame  short-term goal (STG), 1 week at 02/11/2021 0824   Progress/Outcome  goal ongoing, goal revised this date at 02/11/2021 0824   Stairs Goal 2   Activity/Assistive Device  stairs, all skills at 02/11/2021 0824   Number of Stairs  12 at 02/11/2021 0824   [place]  supervision required at 02/11/2021 0824   Time Frame  long-term goal (LTG), 3 weeks at 02/11/2021 0859    Progress/Outcome  goal ongoing, goal revised this date at 02/11/2021 0816

## 2021-02-12 NOTE — PLAN OF CARE
Problem: Skin Injury Risk Increased  Goal: Skin Health and Integrity  Outcome: Progressing  Intervention: Promote and Optimize Oral Intake  Flowsheets (Taken 2/12/2021 6496)  Oral Nutrition Promotion:   calorie dense foods provided   nutritional therapy counseling provided     Nutrition Interventions/ Recommendations:   1. Diet/liquid consistency per SLP  2. Will d/c Magic Cup and send a trial of Vanilla Boost Pudding  3. Check weight weekly  4. T/C iron/vitamin C supplementation for anemia (may cause constipation)  5. Will monitor po intake, weight, results of VFSS, labs

## 2021-02-13 ENCOUNTER — APPOINTMENT (INPATIENT)
Dept: PHYSICAL THERAPY | Facility: REHABILITATION | Age: 71
DRG: 948 | End: 2021-02-13
Payer: COMMERCIAL

## 2021-02-13 PROCEDURE — 63700000 HC SELF-ADMINISTRABLE DRUG: Performed by: INTERNAL MEDICINE

## 2021-02-13 PROCEDURE — 97110 THERAPEUTIC EXERCISES: CPT | Mod: GP

## 2021-02-13 PROCEDURE — 63700000 HC SELF-ADMINISTRABLE DRUG: Performed by: PHYSICAL MEDICINE & REHABILITATION

## 2021-02-13 PROCEDURE — 63600000 HC DRUGS/DETAIL CODE: Performed by: INTERNAL MEDICINE

## 2021-02-13 PROCEDURE — 12800000 HC ROOM AND CARE SEMIPRIVATE REHAB

## 2021-02-13 PROCEDURE — 97116 GAIT TRAINING THERAPY: CPT | Mod: GP

## 2021-02-13 RX ADMIN — Medication 400 MG: at 08:28

## 2021-02-13 RX ADMIN — SENNOSIDES 3 TABLET: 8.6 TABLET, FILM COATED ORAL at 12:51

## 2021-02-13 RX ADMIN — AMLODIPINE BESYLATE 2.5 MG: 2.5 TABLET ORAL at 20:30

## 2021-02-13 RX ADMIN — DOCUSATE SODIUM 100 MG: 100 CAPSULE, LIQUID FILLED ORAL at 20:30

## 2021-02-13 RX ADMIN — ACETAMINOPHEN 650 MG: 325 TABLET, FILM COATED ORAL at 20:30

## 2021-02-13 RX ADMIN — ACETAMINOPHEN 650 MG: 325 TABLET, FILM COATED ORAL at 16:44

## 2021-02-13 RX ADMIN — ALBUTEROL SULFATE 2 PUFF: 90 AEROSOL, METERED RESPIRATORY (INHALATION) at 12:52

## 2021-02-13 RX ADMIN — DICLOFENAC SODIUM 2 G: 10 GEL TOPICAL at 16:45

## 2021-02-13 RX ADMIN — ASPIRIN 81 MG: 81 TABLET ORAL at 08:28

## 2021-02-13 RX ADMIN — Medication 400 MG: at 20:29

## 2021-02-13 RX ADMIN — ACETAMINOPHEN 650 MG: 325 TABLET, FILM COATED ORAL at 08:27

## 2021-02-13 RX ADMIN — GUAIFENESIN 600 MG: 600 TABLET ORAL at 08:27

## 2021-02-13 RX ADMIN — FUROSEMIDE 20 MG: 20 TABLET ORAL at 08:27

## 2021-02-13 RX ADMIN — DIPHENHYDRAMINE HYDROCHLORIDE 50 MG: 25 CAPSULE ORAL at 20:29

## 2021-02-13 RX ADMIN — ALBUTEROL SULFATE 2 PUFF: 90 AEROSOL, METERED RESPIRATORY (INHALATION) at 16:53

## 2021-02-13 RX ADMIN — GUAIFENESIN 600 MG: 600 TABLET ORAL at 20:30

## 2021-02-13 RX ADMIN — ALBUTEROL SULFATE 2 PUFF: 90 AEROSOL, METERED RESPIRATORY (INHALATION) at 05:43

## 2021-02-13 RX ADMIN — FUROSEMIDE 20 MG: 20 TABLET ORAL at 16:44

## 2021-02-13 RX ADMIN — SODIUM CHLORIDE 1 G: 1 TABLET ORAL at 16:44

## 2021-02-13 RX ADMIN — SODIUM CHLORIDE 1 G: 1 TABLET ORAL at 08:28

## 2021-02-13 RX ADMIN — DOCUSATE SODIUM 100 MG: 100 CAPSULE, LIQUID FILLED ORAL at 16:44

## 2021-02-13 RX ADMIN — METOPROLOL TARTRATE 12.5 MG: 25 TABLET, FILM COATED ORAL at 20:30

## 2021-02-13 RX ADMIN — Medication 3 MG: at 20:30

## 2021-02-13 RX ADMIN — ALBUTEROL SULFATE 2 PUFF: 90 AEROSOL, METERED RESPIRATORY (INHALATION) at 00:08

## 2021-02-13 RX ADMIN — ACETAMINOPHEN 650 MG: 325 TABLET, FILM COATED ORAL at 12:51

## 2021-02-13 RX ADMIN — DICLOFENAC SODIUM 2 G: 10 GEL TOPICAL at 08:28

## 2021-02-13 RX ADMIN — ENOXAPARIN SODIUM 40 MG: 40 INJECTION SUBCUTANEOUS at 08:27

## 2021-02-13 RX ADMIN — DOCUSATE SODIUM 100 MG: 100 CAPSULE, LIQUID FILLED ORAL at 08:27

## 2021-02-13 RX ADMIN — GEMFIBROZIL 600 MG: 600 TABLET ORAL at 16:44

## 2021-02-13 RX ADMIN — METOPROLOL TARTRATE 12.5 MG: 25 TABLET, FILM COATED ORAL at 08:28

## 2021-02-13 RX ADMIN — GEMFIBROZIL 600 MG: 600 TABLET ORAL at 08:28

## 2021-02-13 NOTE — PLAN OF CARE
Problem: Rehabilitation (IRF) Plan of Care  Goal: Plan of Care Review  Flowsheets (Taken 2/13/2021 1213)  Plan of Care Reviewed With: patient  IRF Plan of Care Review: progress ongoing, continue  Outcome Summary: Pt ambulated 88' with RW and Min A x1. Pt able to be on RA only without drop in SpO2 (>94% throughout session)

## 2021-02-13 NOTE — PROGRESS NOTES
Subjective    Patient seen and examined on rounds.  Chart reviewed.  Events overnight noted.  History reviewed briefly with patient.     CC:  Deficits in mobility, transfers, self-care status post deconditioning, severe Covid 19 pneumonia, ventilator-dependent respiratory failure, dysphagia, multiple medical problems.     HPI:  Mr. Denis Green is a 70-year-old right-handed white male with chronic conditions significant for coronary artery disease, hypertension, hyperlipidemia who was admitted to [facility] on 1/5/21 due to Covid 19 pneumonia and worsening respiratory status.  He required intubation on 1/9/21 and had ventilator-dependent respiratory failure.  He was treated with Decadron and Remdesivir.  He had dysphagia requiring Dobbhoff tube feeds.  He was extubated on 1/16/21 to OptiFlow.  On 1/23/21 he was transitioned to high flow nasal cannula.  Hospital course was significant for acute renal insufficiency likely secondary to acute tubular necrosis and he was followed by nephrology.  He had subsequent improvement in renal function.  He also had new fever of 102.8°F on 1/20/21 and 1/21/21.  He was followed by infectious disease.  Blood cultures were negative. CT scan of the chest abdomen pelvis was done with results showing increasing pulmonary infiltrates/groundglass opacities and consolidations in his bilateral lower lobes.  He was treated empirically with Cefepime and Vancomycin to cover hospital-acquired pneumonia.  He was noted to be lethargic on 1/22/21.  Subsequently patient removed Dobbhoff tube on 1/25/21 and there is question of possible aspiration during that process.  He continued to have intermittent confusion on 1/26/21 was felt to be possible post ICU delirium.  He was followed by neurology.  MRI brain was unremarkable.  EEG showed mild slowing but no seizure activity was noted.  Antibiotics were discontinued after a 5 day course due to lethargy.  Repeat video swallow study was  "performed for dysphagia and patient was put on a soft diet with nectar thick liquids. On 2/3/21, hemoglobin was 11.2, WBC count 15.1, platelets were 275, BUN was 15, and creatinine was 0.7.  He has been needing assistance for mobility, transfers, self-care postoperatively. He is transferred to [facility] on 2/4/21 for further rehabilitation care.       SUBJ: He reports he slept well with Benadryl last night.  Tolerating therapies per endurance.  Denies left wrist pain today.    ROS: Denies chest pain or shortness of breath. Other ROS negative. Past, family, social history is unchanged.    Physical Exam      Blood pressure (!) 120/59, pulse 90, temperature 36.4 °C (97.5 °F), temperature source Oral, resp. rate 18, height 1.727 m (5' 8\"), weight 77.1 kg (170 lb), SpO2 95 %.  Body mass index is 25.85 kg/m².    General Appearance: Not in acute distress  Head/Ear/Nose/Throat: Normocephalic; Atraumatic.  On oxygen by nasal cannula.  Eye: EOMI; PERRL.   Neck: No JVD; No Bruits.   Respiratory: Decreased breath sounds at bases.   Cardiovascular: RRR; Normal S1, S2.   Gastrointestinal: Soft; NT; +BS.   Extremities: Bilateral lower extremity edema noted.   Musculoskeletal: Functional active range of motion in both upper and lower extremities.    Neurological: Awake alert oriented to person, had some difficulty naming the place and correct date.. Speech is fluent. Cranial nerve examination does not reveal any gross facial asymmetry. Strength testing about 4/5 strength in both upper extremities.  Bilateral hip flexion is 3+/5.  Bilateral quadriceps are 4/5 with the thighs supported.  Bilateral ankle dorsi and plantar flexion are 4/5.  He is grossly able to localize touch and position sense in great toes.  Deep tendon reflexes are hypoactive and symmetric bilaterally.  Plantars are flexor.  Coordination is functional upper extremities.  Neurologically stable.  Behavior/Emotional: Appropriate; Cooperative.   Skin: No " obvious rashes noted.        Current Facility-Administered Medications:   •  acetaminophen (TYLENOL) tablet 650 mg, 650 mg, oral, q4h PRN, Omari Spain MD, 650 mg at 02/06/21 2001  •  acetaminophen (TYLENOL) tablet 650 mg, 650 mg, oral, With meals & nightly, Isael Lang MD, 650 mg at 02/13/21 1251  •  albuterol HFA (VENTOLIN HFA) 90 mcg/actuation inhaler 2 puff, 2 puff, inhalation, q6h PRN, Omari Spain MD  •  albuterol HFA (VENTOLIN HFA) 90 mcg/actuation inhaler 2 puff, 2 puff, inhalation, q6h, Laure Bishop MD, 2 puff at 02/13/21 1252  •  alum-mag hydroxide-simeth (MAALOX) 200-200-20 mg/5 mL suspension 30 mL, 30 mL, oral, q6h PRN, Laure Bishop MD  •  amLODIPine (NORVASC) tablet 2.5 mg, 2.5 mg, oral, Nightly, Laure Bishop MD, 2.5 mg at 02/12/21 2115  •  aspirin enteric coated tablet 81 mg, 81 mg, oral, Daily, Omari Spain MD, 81 mg at 02/13/21 0828  •  bisacodyL (DULCOLAX) 10 mg suppository 10 mg, 10 mg, rectal, Daily PRN, Isael Lang MD, 10 mg at 02/09/21 1850  •  diclofenac sodium (VOLTAREN) 1 % topical gel 2 g, 2 g, Topical, TID, Laure Bishop MD, 2 g at 02/13/21 0828  •  diphenhydrAMINE (BENADRYL) capsule 50 mg, 50 mg, oral, Nightly, Isael Lang MD, 50 mg at 02/12/21 2115  •  docusate sodium (COLACE) capsule 100 mg, 100 mg, oral, TID, Isael Lang MD, 100 mg at 02/13/21 0827  •  enoxaparin (LOVENOX) syringe 40 mg, 40 mg, subcutaneous, q24h, Luare Bishop MD, 40 mg at 02/13/21 0827  •  furosemide (LASIX) tablet 20 mg, 20 mg, oral, BID (am, 4p), Laure Bishop MD, 20 mg at 02/13/21 0827  •  gemfibroziL (LOPID) tablet 600 mg, 600 mg, oral, BID AC, Omari Spain MD, 600 mg at 02/13/21 0828  •  guaiFENesin (MUCINEX) 12 hr ER tablet 600 mg, 600 mg, oral, BID, Laure Bishop MD, 600 mg at 02/13/21 0827  •  magnesium hydroxide (M.O.M.) 400 mg/5 mL suspension 30 mL, 30 mL, oral, 2x daily PRN, Laure Bishop MD, 30 mL at 02/09/21 0904  •  magnesium oxide (MAG-OX) tablet  400 mg, 400 mg, oral, BID, Laure Bishop MD, 400 mg at 02/13/21 0828  •  melatonin ODT 3 mg, 3 mg, oral, Nightly, Laure Bishop MD, 3 mg at 02/12/21 2115  •  metoprolol tartrate (LOPRESSOR) split tablet 12.5 mg, 12.5 mg, oral, BID, Omari Spain MD, 12.5 mg at 02/13/21 0828  •  mouth moisturizer (TOOTHETTE) cream, , mucous membrane, TID, Laure Bishop MD, Given at 02/13/21 0828  •  polyethylene glycol (MIRALAX) 17 gram packet 17 g, 17 g, oral, Daily PRN, Omari Spain MD, 17 g at 02/04/21 2157  •  senna (SENOKOT) tablet 3 tablet, 3 tablet, oral, Daily before lunch, Isael Lang MD, 3 tablet at 02/13/21 1251  •  sodium chloride tablet 1 g, 1 g, oral, BID with meals, Laure Bishop MD, 1 g at 02/13/21 0828       Labs / Radiology    Lab Results   Component Value Date    WBC 8.05 02/11/2021    HGB 10.7 (L) 02/11/2021    HCT 33.3 (L) 02/11/2021    MCV 95.7 02/11/2021     (H) 02/11/2021     Lab Results   Component Value Date    GLUCOSE 94 02/11/2021    CALCIUM 8.8 (L) 02/11/2021     (L) 02/11/2021    K 4.2 02/11/2021    CO2 27 02/11/2021    CL 92 (L) 02/11/2021    BUN 16 02/11/2021    CREATININE 0.7 (L) 02/11/2021       Assessment and Plan    ASSESSMENT PLAN:  1. 70-year-old right-handed white male with chronic conditions significant for coronary artery disease, hypertension, hyperlipidemia who was admitted to [facility] on 1/5/21 due to Covid 19 pneumonia and worsening respiratory status.  He required intubation on 1/9/21 and had ventilator-dependent respiratory failure.  He was treated with Decadron and Remdesivir.  He had dysphagia requiring Dobbhoff tube feeds.  He was extubated on 1/16/21 to OptiFlow.  On 1/23/21 he was transitioned to high flow nasal cannula.  Hospital course was significant for acute renal insufficiency likely secondary to acute tubular necrosis and he was followed by nephrology.  He had subsequent improvement in renal function.  He also had new fever of  102.8°F on 1/20/21 and 1/21/21.  He was followed by infectious disease.  Blood cultures were negative. CT scan of the chest abdomen pelvis was done with results showing increasing pulmonary infiltrates/groundglass opacities and consolidations in his bilateral lower lobes.  He was treated empirically with Cefepime and Vancomycin to cover hospital-acquired pneumonia.  He was noted to be lethargic on 1/22/21.  Subsequently patient removed Dobbhoff tube on 1/25/21 and there is question of possible aspiration during that process.  He continued to have intermittent confusion on 1/26/21 was felt to be possible post ICU delirium.  He was followed by neurology.  MRI brain was unremarkable.  EEG showed mild slowing but no seizure activity was noted.  Antibiotics were discontinued after a 5 day course due to lethargy.  Repeat video swallow study was performed for dysphagia and patient was put on a soft diet with nectar thick liquids. On 2/3/21, hemoglobin was 11.2, WBC count 15.1, platelets were 275, BUN was 15, and creatinine was 0.7.  He has been needing assistance for mobility, transfers, self-care postoperatively. He is transferred to [facility] on 2/4/21 for further rehabilitation care.       2. DVT prophylaxis - on subcutaneous Heparin.  On SCDs.  Platelets 296 on 2/5/2021.  Platelets 389 on 2/11/2021.     3.  Deconditioning - recent severe Covid 19 pneumonia, ventilator-dependent respiratory failure, dysphagia, multiple medical problems - He had ventilator-dependent respiratory failure.  He was treated with Decadron and Remdesivir.  Continue PT, OT, speech, psychology.  Follow falls precautions, cardiac precautions, aspiration precautions.     4. GI - On Colace, Senokot.  On PRN MiraLAX, MOM, Maalox.      5.  - voiding.  Denies dysuria.  Monitor post void residuals.     6.  Pulmonary - recent severe Covid 19 pneumonia, ventilator-dependent respiratory failure - on Ventolin HFA.  On Mucinex.     7. Pain -  on PRN Tylenol.  On scheduled Tylenol for left wrist pain.  On topical Voltaren gel.  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  K pad as needed for pain.     8. Hematology -hemoglobin 12.0, WBC 8.15 on 2/5/2021.  Hemoglobin 10.7, WBC 8.05 on 2/11/2021.     9. CVS - history of coronary artery disease and hypertension.  On Norvasc.  On Lopressor.  On Aspirin.  On Lasix.  On Magnesium oxide.     10.  Hyperlipidemia - on Lopid.     11.  Insomnia - on Melatonin.     12.  Dysphagia -on soft diet with nectar thick liquids.  Free water protocol with ice chips and sips of water per speech therapy.  Speech therapy and nutrition following.     13. Rehabilitation medicine - Continue comprehensive rehabilitation care. Continue PT, OT, speech, psychology.  We will follow falls precautions, cardiac precautions, monitor pulse oximetry in therapy and follow aspiration precautions.  Tolerating therapies per endurance.  Discussed with speech therapy.  Free water protocol ice chips was started.  Denies coughing with meals.He reported some pain in left wrist.  He is not sure if he accidentally banged the left wrist against the side rail of the bed.  Denies history of gout.  Left wrist does not appear warm or tender to touch.  K pad ordered.  We will also order scheduled Tylenol for 7 days.  Discussed results of wrist x-rays with patient which showed moderate degenerative change particularly about the radial aspect of the wrist.  He reports pain is less today.  He ambulated 25 feet with rolling walker with assistance of 2 people with physical therapy.Free water protocol with ice chips and sips of water per speech therapy.  He feels better today.  Left wrist pain is decreased significantly per patient.  Working on endurance with physical therapy.  He required minimal to moderate assistance for transfers.  Working on ADLs with occupational therapy.He feels better today.  Denies increased pain.  Discussed with him about  doing incentive spirometry.  We will try to wean off oxygen if possible.Trying to wean off oxygen as possible.  Tolerating therapies per endurance.  He reports decrease sleep at night.  Usually takes 2 Tylenol PM at night.  He is already on scheduled Tylenol.  Will order 50 mg of Benadryl at bedtime. He reports he slept well with Benadryl last night.  Tolerating therapies per endurance.  Denies left wrist pain today.     14. Reviewed labs today.  BUN 15, creatinine 0.6 on 2/5/2021.  BUN 16, creatinine 0.7 on 2/11/2021.               Isael Lang MD  2/13/2021

## 2021-02-13 NOTE — PROGRESS NOTES
Subjective    Patient seen and examined on rounds.  Chart reviewed.  Events overnight noted.  History reviewed briefly with patient.     CC:  Deficits in mobility, transfers, self-care status post deconditioning, severe Covid 19 pneumonia, ventilator-dependent respiratory failure, dysphagia, multiple medical problems.     HPI:  Mr. Denis Green is a 70-year-old right-handed white male with chronic conditions significant for coronary artery disease, hypertension, hyperlipidemia who was admitted to [facility] on 1/5/21 due to Covid 19 pneumonia and worsening respiratory status.  He required intubation on 1/9/21 and had ventilator-dependent respiratory failure.  He was treated with Decadron and Remdesivir.  He had dysphagia requiring Dobbhoff tube feeds.  He was extubated on 1/16/21 to OptiFlow.  On 1/23/21 he was transitioned to high flow nasal cannula.  Hospital course was significant for acute renal insufficiency likely secondary to acute tubular necrosis and he was followed by nephrology.  He had subsequent improvement in renal function.  He also had new fever of 102.8°F on 1/20/21 and 1/21/21.  He was followed by infectious disease.  Blood cultures were negative. CT scan of the chest abdomen pelvis was done with results showing increasing pulmonary infiltrates/groundglass opacities and consolidations in his bilateral lower lobes.  He was treated empirically with Cefepime and Vancomycin to cover hospital-acquired pneumonia.  He was noted to be lethargic on 1/22/21.  Subsequently patient removed Dobbhoff tube on 1/25/21 and there is question of possible aspiration during that process.  He continued to have intermittent confusion on 1/26/21 was felt to be possible post ICU delirium.  He was followed by neurology.  MRI brain was unremarkable.  EEG showed mild slowing but no seizure activity was noted.  Antibiotics were discontinued after a 5 day course due to lethargy.  Repeat video swallow study was  "performed for dysphagia and patient was put on a soft diet with nectar thick liquids. On 2/3/21, hemoglobin was 11.2, WBC count 15.1, platelets were 275, BUN was 15, and creatinine was 0.7.  He has been needing assistance for mobility, transfers, self-care postoperatively. He is transferred to [facility] on 2/4/21 for further rehabilitation care.       SUBJ: He reports decrease sleep at night.  Usually takes 2 Tylenol PM at night.  He is already on scheduled Tylenol.  Will order 50 mg of Benadryl at bedtime.    ROS: Denies chest pain or shortness of breath. Other ROS negative. Past, family, social history is unchanged.    Physical Exam      Blood pressure 138/73, pulse 89, temperature 36.4 °C (97.6 °F), temperature source Oral, resp. rate 18, height 1.727 m (5' 8\"), weight 77.1 kg (170 lb), SpO2 97 %.  Body mass index is 25.85 kg/m².    General Appearance: Not in acute distress  Head/Ear/Nose/Throat: Normocephalic; Atraumatic.  On oxygen by nasal cannula.  Eye: EOMI; PERRL.   Neck: No JVD; No Bruits.   Respiratory: Decreased breath sounds at bases.   Cardiovascular: RRR; Normal S1, S2.   Gastrointestinal: Soft; NT; +BS.   Extremities: Bilateral lower extremity edema noted.   Musculoskeletal: Functional active range of motion in both upper and lower extremities.    Neurological: Awake alert oriented to person, had some difficulty naming the place and correct date.. Speech is fluent. Cranial nerve examination does not reveal any gross facial asymmetry. Strength testing about 4/5 strength in both upper extremities.  Bilateral hip flexion is 3+/5.  Bilateral quadriceps are 4/5 with the thighs supported.  Bilateral ankle dorsi and plantar flexion are 4/5.  He is grossly able to localize touch and position sense in great toes.  Deep tendon reflexes are hypoactive and symmetric bilaterally.  Plantars are flexor.  Coordination is functional upper extremities.  Neurologically unchanged.  Behavior/Emotional: " Appropriate; Cooperative.   Skin: No obvious rashes noted.        Current Facility-Administered Medications:   •  acetaminophen (TYLENOL) tablet 650 mg, 650 mg, oral, q4h PRN, Omari Spain MD, 650 mg at 02/06/21 2001  •  acetaminophen (TYLENOL) tablet 650 mg, 650 mg, oral, With meals & nightly, Isael Lang MD, 650 mg at 02/12/21 2115  •  albuterol HFA (VENTOLIN HFA) 90 mcg/actuation inhaler 2 puff, 2 puff, inhalation, q6h PRN, Omari Spain MD  •  albuterol HFA (VENTOLIN HFA) 90 mcg/actuation inhaler 2 puff, 2 puff, inhalation, q6h, Laure Bishop MD, 2 puff at 02/12/21 1723  •  alum-mag hydroxide-simeth (MAALOX) 200-200-20 mg/5 mL suspension 30 mL, 30 mL, oral, q6h PRN, Laure Bishop MD  •  amLODIPine (NORVASC) tablet 2.5 mg, 2.5 mg, oral, Nightly, Laure Bishop MD, 2.5 mg at 02/12/21 2115  •  aspirin enteric coated tablet 81 mg, 81 mg, oral, Daily, Omari Spain MD, 81 mg at 02/12/21 0816  •  bisacodyL (DULCOLAX) 10 mg suppository 10 mg, 10 mg, rectal, Daily PRN, Isael Lang MD, 10 mg at 02/09/21 1850  •  diclofenac sodium (VOLTAREN) 1 % topical gel 2 g, 2 g, Topical, TID, Laure Bishop MD, 2 g at 02/12/21 2116  •  diphenhydrAMINE (BENADRYL) capsule 50 mg, 50 mg, oral, Nightly, Isael Lang MD, 50 mg at 02/12/21 2115  •  docusate sodium (COLACE) capsule 100 mg, 100 mg, oral, TID, Isael Lang MD, 100 mg at 02/12/21 2115  •  enoxaparin (LOVENOX) syringe 40 mg, 40 mg, subcutaneous, q24h, Laure Bishop MD, 40 mg at 02/12/21 0826  •  furosemide (LASIX) tablet 20 mg, 20 mg, oral, BID (am, 4p), Laure Bishop MD, 20 mg at 02/12/21 1718  •  gemfibroziL (LOPID) tablet 600 mg, 600 mg, oral, BID AC, Omari Spain MD, 600 mg at 02/12/21 1718  •  guaiFENesin (MUCINEX) 12 hr ER tablet 600 mg, 600 mg, oral, BID, Laure Bishop MD, 600 mg at 02/12/21 2115  •  magnesium hydroxide (M.O.M.) 400 mg/5 mL suspension 30 mL, 30 mL, oral, 2x daily PRN, Laure Bishop MD, 30 mL at 02/09/21  0904  •  magnesium oxide (MAG-OX) tablet 400 mg, 400 mg, oral, BID, Laure Bishop MD, 400 mg at 02/12/21 2115  •  melatonin ODT 3 mg, 3 mg, oral, Nightly, Laure Bishop MD, 3 mg at 02/12/21 2115  •  metoprolol tartrate (LOPRESSOR) split tablet 12.5 mg, 12.5 mg, oral, BID, Omari Spain MD, 12.5 mg at 02/12/21 2115  •  mouth moisturizer (TOOTHETTE) cream, , mucous membrane, TID, Laure Bishop MD, Given at 02/12/21 2116  •  polyethylene glycol (MIRALAX) 17 gram packet 17 g, 17 g, oral, Daily PRN, Omari Spain MD, 17 g at 02/04/21 2157  •  senna (SENOKOT) tablet 3 tablet, 3 tablet, oral, Daily before lunch, Isael Lang MD, 3 tablet at 02/12/21 1218  •  sodium chloride tablet 1 g, 1 g, oral, BID with meals, Laure Bishop MD, 1 g at 02/12/21 1721       Labs / Radiology    Lab Results   Component Value Date    WBC 8.05 02/11/2021    HGB 10.7 (L) 02/11/2021    HCT 33.3 (L) 02/11/2021    MCV 95.7 02/11/2021     (H) 02/11/2021     Lab Results   Component Value Date    GLUCOSE 94 02/11/2021    CALCIUM 8.8 (L) 02/11/2021     (L) 02/11/2021    K 4.2 02/11/2021    CO2 27 02/11/2021    CL 92 (L) 02/11/2021    BUN 16 02/11/2021    CREATININE 0.7 (L) 02/11/2021       Assessment and Plan    ASSESSMENT PLAN:  1. 70-year-old right-handed white male with chronic conditions significant for coronary artery disease, hypertension, hyperlipidemia who was admitted to [facility] on 1/5/21 due to Covid 19 pneumonia and worsening respiratory status.  He required intubation on 1/9/21 and had ventilator-dependent respiratory failure.  He was treated with Decadron and Remdesivir.  He had dysphagia requiring Dobbhoff tube feeds.  He was extubated on 1/16/21 to OptiFlow.  On 1/23/21 he was transitioned to high flow nasal cannula.  Hospital course was significant for acute renal insufficiency likely secondary to acute tubular necrosis and he was followed by nephrology.  He had subsequent improvement in renal  function.  He also had new fever of 102.8°F on 1/20/21 and 1/21/21.  He was followed by infectious disease.  Blood cultures were negative. CT scan of the chest abdomen pelvis was done with results showing increasing pulmonary infiltrates/groundglass opacities and consolidations in his bilateral lower lobes.  He was treated empirically with Cefepime and Vancomycin to cover hospital-acquired pneumonia.  He was noted to be lethargic on 1/22/21.  Subsequently patient removed Dobbhoff tube on 1/25/21 and there is question of possible aspiration during that process.  He continued to have intermittent confusion on 1/26/21 was felt to be possible post ICU delirium.  He was followed by neurology.  MRI brain was unremarkable.  EEG showed mild slowing but no seizure activity was noted.  Antibiotics were discontinued after a 5 day course due to lethargy.  Repeat video swallow study was performed for dysphagia and patient was put on a soft diet with nectar thick liquids. On 2/3/21, hemoglobin was 11.2, WBC count 15.1, platelets were 275, BUN was 15, and creatinine was 0.7.  He has been needing assistance for mobility, transfers, self-care postoperatively. He is transferred to [place] [facility] on 2/4/21 for further rehabilitation care.       2. DVT prophylaxis - on subcutaneous Heparin.  On SCDs.  Platelets 296 on 2/5/2021.     3.  Deconditioning - recent severe Covid 19 pneumonia, ventilator-dependent respiratory failure, dysphagia, multiple medical problems - He had ventilator-dependent respiratory failure.  He was treated with Decadron and Remdesivir.  Continue PT, OT, speech, psychology.  Follow falls precautions, cardiac precautions, aspiration precautions.     4. GI - On Colace, Senokot.  On PRN MiraLAX, MOM, Maalox.      5.  - voiding.  Denies dysuria.  Monitor post void residuals.     6.  Pulmonary - recent severe Covid 19 pneumonia, ventilator-dependent respiratory failure - on Ventolin HFA.  On Mucinex.     7.  Pain - on PRN Tylenol.  On scheduled Tylenol for left wrist pain.  On topical Voltaren gel.  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  K pad as needed for pain.     8. Hematology -hemoglobin 12.0, WBC 8.15 on 2/5/2021.     9. CVS - history of coronary artery disease and hypertension.  On Norvasc.  On Lopressor.  On Aspirin.  On Lasix.  On Magnesium oxide.     10.  Hyperlipidemia - on Lopid.     11.  Insomnia - on Melatonin.     12.  Dysphagia -on soft diet with nectar thick liquids.  Free water protocol with ice chips and sips of water per speech therapy.  Speech therapy and nutrition following.     13. Rehabilitation medicine - Continue comprehensive rehabilitation care. Continue PT, OT, speech, psychology.  We will follow falls precautions, cardiac precautions, monitor pulse oximetry in therapy and follow aspiration precautions.  Tolerating therapies per endurance.  Discussed with speech therapy.  Free water protocol ice chips was started.  Denies coughing with meals.He reported some pain in left wrist.  He is not sure if he accidentally banged the left wrist against the side rail of the bed.  Denies history of gout.  Left wrist does not appear warm or tender to touch.  K pad ordered.  We will also order scheduled Tylenol for 7 days.  Discussed results of wrist x-rays with patient which showed moderate degenerative change particularly about the radial aspect of the wrist.  He reports pain is less today.  He ambulated 25 feet with rolling walker with assistance of 2 people with physical therapy.Free water protocol with ice chips and sips of water per speech therapy.  He feels better today.  Left wrist pain is decreased significantly per patient.  Working on endurance with physical therapy.  He required minimal to moderate assistance for transfers.  Working on ADLs with occupational therapy.He feels better today.  Denies increased pain.  Discussed with him about doing incentive spirometry.  We  will try to wean off oxygen if possible.Trying to wean off oxygen as possible.  Tolerating therapies per endurance.  He reports decrease sleep at night.  Usually takes 2 Tylenol PM at night.  He is already on scheduled Tylenol.  Will order 50 mg of Benadryl at bedtime.     14. Reviewed labs today.  BUN 15, creatinine 0.6 on 2/5/2021.               Isael Lang MD  2/12/2021

## 2021-02-13 NOTE — PROGRESS NOTES
Patient: Denis Green  Location: [facility] [address]  MRN: [id number]  Today's date: 2/13/2021    History of Present Illness  Denis LOVE is a 70 y.o. male admitted on 2/4/2021 with Pneumonia due to COVID-19 virus. Principal problem is No Principal Problem: There is no principal problem currently on the Problem List. Please update the Problem List and refresh..   Pt admitted to outside hospital on 1/5/2021 for COVID-19 PNA. Worsening resp status intubated on 1/9- extubated 1/16 to OptiFlow. Transition to [facility] on 1/23.Eval by nephrology while in ICU due to ERNIE likely secondary to ATN. Creatinine has been improving with excellent urine output. Received Decadron and Remdesivir. Dobhoff tube was placed due to dysphagia. New fever of 102.8 1/20 and 1/21. ID was consulted. Bld cx's negative. CT scan of the chest abdomen pelvis was done with results showing increasing pulmonary infiltrates/groundglass opacities and consolidations in his bilateral lower lobes. Started empirically on cefepime and vancomycin to cover hospital-acquired pneumonia. Pt had increased lethargy on 1/22. Pt removed DHT 1/25; required higher O2 afterward - possible aspiration during the process. 1/26 patient continued to have intermittent confusion; ?post ICU delirium. Neurology is following, MRI brain was unremarkable. EEG showed mild slowing but no seizure activity. D/c'd abx after 5 days d/t lethargy.       Past Medical History  Denis LOVE has a past medical history of Coronary artery disease, Hypertension, and Lipid disorder.    PT Vitals    Date/Time Pulse HR Source SpO2 Pt Activity O2 Therapy BP BP Location BP Method Pt Position Observations [facility]   02/13/21 1131 88 Monitor 95 % At rest None (Room air) 113/50 Left upper arm Automatic Sitting -- [facility]   02/13/21 1140 -- -- 95 % At rest None (Room air) -- -- -- -- Post ambulation trial [facility]   02/13/21 1150 -- -- 95 % At rest None (Room air) -- -- -- -- post NuStep [facility]   02/13/21  1155 90 Monitor 95 % At rest None (Room air) 120/59 Left upper arm Automatic Sitting -- [facility]      PT Pain    Date/Time Pain Type Rating: Rest Who   02/13/21 1131 Pain Assessment 0 [facility]   02/13/21 1155 Pain Reassessment 0 [facility]          Prior Living Environment      Most Recent Value   Lives With  spouse, child(mindy), adult [adult son lives in home occasionally]   Living Arrangements  house   Home Accessibility  stairs to enter home (Group), stairs within home (Group)   Living Environment Comment  Lives with wife and intermittently with one son.  [bed and bath on 2nd ,  no powder room on first]   Number of Stairs, Main Entrance  1   Stair Railings, Main Entrance  none   Location, Patient Bedroom  second floor, must negotiate stairs to access   Location, Bathroom  second floor, must negotiate stairs to access   Bathroom Access Comment  Pt. reports has tub shower and stall shower, uses tub shower primarily, has grab bar in shower, has stanard height toilet c wall on both sides   Number of Stairs, Within Home, Primary  12   Surface of Stairs, Within Home, Primary  hardwood   Stair Railings, Within Home, Primary  railings on both sides of stairs          Prior Level of Function      Most Recent Value   Dominant Hand  right   Ambulation  independent   Transferring  independent   Toileting  independent   Bathing  independent   Dressing  independent   Eating  independent   Communication  understands/communicates without difficulty   Swallowing  swallows foods/liquids without difficulty   Baseline Diet/Method of Nutritional Intake  regular solids, thin liquids   Past History of Dysphagia  No previous reports    Prior Level of Function Comment  Pt is a cattle/,  previously IND for all IADLs.    Assistive Device/Animal Currently Used at Home  none          IRF PT Evaluation and Treatment - 02/13/21 1130        Time Calculation    Start Time  1130     Stop Time  1200     Time Calculation (min)  30 min        Session  Details    Document Type  daily treatment/progress note     Mode of Treatment  individual therapy;physical therapy        Bed Mobility    [place], Supine to Sit  minimum assist (75% or more patient effort)     Assistive Device (Bed Mobility)  bed rails     Comment (Bed Mobility)  Received in bed at start of session        Transfers    Transfers  stand pivot transfer        Sit to Stand Transfer    [place], Sit to Stand Transfer  minimum assist (75% or more patient effort)     Verbal Cues  safety;technique     Assistive Device  walker, front-wheeled     Comment  from NuStep, EOB and WC with Min A for safety and balance        Stand to Sit Transfer    [place], Stand to Sit Transfer  minimum assist (75% or more patient effort)     Verbal Cues  safety     Assistive Device  walker, front-wheeled     Comment  to WC and NuStep with Min A for controlled lowering        Stand Pivot Transfer    [place], Stand Pivot/Stand Step Transfer  minimum assist (75% or more patient effort)     Verbal Cues  safety     Assistive Device  walker, front-wheeled     Comment  to/from WC and NuStep with RW, and EOB to WC        Gait Training    [place], Gait  minimum assist (75% or more patient effort)     Assistive Device  walker, front-wheeled     Distance in Feet  88 feet     Gait Pattern Utilized  step-through     Deviations/Abnormal Patterns (Gait)  base of support, narrow;gait speed decreased;step length decreased     Bilateral Gait Deviations  heel strike decreased     Comment  88' with RW and Min A for postural control at hips. Performed on RA with SpO2 at 95% post ambulation.         Aerobic Exercise    Type (Aerobic Exercise)  recumbent elliptical      Time Performed (Aerobic Exercise)  5     Comment (Aerobic Exercise)  NuStep at Level 1 for a total of 5 minutes in 3 increments.         Daily Progress Summary (PT)    Daily Outcome Statement (PT)  Patient ambulated increased distance during session. Pt  tolerated staying on RA only throughout session including with therex (NuStep) and with ambulation. PT reported to nursing and nursing agreeable pt able to maintain off OT at this time.                             IRF PT Goals      Most Recent Value   Bed Mobility Goal 1   Activity/Assistive Device  rolling to left, rolling to right, sit to supine/supine to sit at 02/05/2021 1030   [place]  supervision required at 02/05/2021 1030   Time Frame  short-term goal (STG), 1 week at 02/11/2021 0824   Strategies/Barriers  Impaired strength, decreased endurance, safety considerations at 02/11/2021 0824   Progress/Outcome  goal ongoing at 02/11/2021 0824   Bed Mobility Goal 2   Activity/Assistive Device  rolling to left, rolling to right, sit to supine/supine to sit at 02/05/2021 1030   [place]  modified independence at 02/05/2021 1030   Time Frame  long-term goal (LTG), 3 weeks at 02/11/2021 0824   Strategies/Barriers  Impaired strength, decreased endurance, safety considerations at 02/11/2021 0824   Progress/Outcome  goal ongoing at 02/11/2021 0824   Transfer Goal 1   Activity/Assistive Device  sit-to-stand/stand-to-sit, bed-to-chair/chair-to-bed at 02/11/2021 0824   [place]  minimum assist (75% or more patient effort) at 02/11/2021 0824   Time Frame  short-term goal (STG), 1 week at 02/11/2021 0824   Strategies/Barriers  poor strength, decreased strength at 02/11/2021 0824   Progress/Outcome  goal met, goal revised this date at 02/11/2021 0824   Transfer Goal 2   Activity/Assistive Device  sit-to-stand/stand-to-sit, bed-to-chair/chair-to-bed at 02/11/2021 0824   [place]  modified independence at 02/11/2021 0824   Time Frame  long-term goal (LTG), 3 weeks at 02/11/2021 0824   Strategies/Barriers  poor strength, decreased strength at 02/11/2021 0824   Progress/Outcome  goal ongoing at 02/11/2021 0824   Gait/Walking Locomotion Goal 1   Activity/Assistive Device  gait (walking locomotion) at 02/11/2021 0824    Distance  50 feet at 02/11/2021 0824   [place]  minimum assist (75% or more patient effort) at 02/11/2021 0824   Time Frame  short-term goal (STG), 1 week at 02/11/2021 0824   Strategies/Barriers  impaired endurance, decreased strength at 02/11/2021 0824   Progress/Outcome  goal met, goal revised this date at 02/11/2021 0824   Gait/Walking Locomotion Goal 2   Activity/Assistive Device  gait (walking locomotion) at 02/11/2021 0824   Distance  150 feet at 02/11/2021 0824   [place]  supervision required at 02/11/2021 0824   Time Frame  long-term goal (LTG), 3 weeks at 02/11/2021 0824   Strategies/Barriers  impaired endurance, decreased strength at 02/11/2021 0824   Progress/Outcome  goal ongoing at 02/11/2021 0824   Stairs Goal 1   Activity/Assistive Device  stairs, all skills at 02/11/2021 0824   Number of Stairs  4 at 02/11/2021 0824   [place]  minimum assist (75% or more patient effort) at 02/11/2021 0824   Time Frame  short-term goal (STG), 1 week at 02/11/2021 0824   Progress/Outcome  goal ongoing, goal revised this date at 02/11/2021 0824   Stairs Goal 2   Activity/Assistive Device  stairs, all skills at 02/11/2021 0824   Number of Stairs  12 at 02/11/2021 0824   [place]  supervision required at 02/11/2021 0824   Time Frame  long-term goal (LTG), 3 weeks at 02/11/2021 0824   Progress/Outcome  goal ongoing, goal revised this date at 02/11/2021 0824

## 2021-02-14 ENCOUNTER — APPOINTMENT (INPATIENT)
Dept: OCCUPATIONAL THERAPY | Facility: REHABILITATION | Age: 71
DRG: 948 | End: 2021-02-14
Payer: COMMERCIAL

## 2021-02-14 PROCEDURE — 63700000 HC SELF-ADMINISTRABLE DRUG: Performed by: INTERNAL MEDICINE

## 2021-02-14 PROCEDURE — 63700000 HC SELF-ADMINISTRABLE DRUG: Performed by: PHYSICAL MEDICINE & REHABILITATION

## 2021-02-14 PROCEDURE — 97110 THERAPEUTIC EXERCISES: CPT | Mod: GO

## 2021-02-14 PROCEDURE — 12800000 HC ROOM AND CARE SEMIPRIVATE REHAB

## 2021-02-14 PROCEDURE — 63600000 HC DRUGS/DETAIL CODE: Performed by: INTERNAL MEDICINE

## 2021-02-14 RX ADMIN — DICLOFENAC SODIUM 2 G: 10 GEL TOPICAL at 08:04

## 2021-02-14 RX ADMIN — ALBUTEROL SULFATE 2 PUFF: 90 AEROSOL, METERED RESPIRATORY (INHALATION) at 12:46

## 2021-02-14 RX ADMIN — METOPROLOL TARTRATE 12.5 MG: 25 TABLET, FILM COATED ORAL at 08:05

## 2021-02-14 RX ADMIN — ALBUTEROL SULFATE 2 PUFF: 90 AEROSOL, METERED RESPIRATORY (INHALATION) at 17:49

## 2021-02-14 RX ADMIN — DOCUSATE SODIUM 100 MG: 100 CAPSULE, LIQUID FILLED ORAL at 08:05

## 2021-02-14 RX ADMIN — METOPROLOL TARTRATE 12.5 MG: 25 TABLET, FILM COATED ORAL at 21:07

## 2021-02-14 RX ADMIN — Medication 3 MG: at 21:07

## 2021-02-14 RX ADMIN — FUROSEMIDE 20 MG: 20 TABLET ORAL at 08:05

## 2021-02-14 RX ADMIN — SODIUM CHLORIDE 1 G: 1 TABLET ORAL at 16:57

## 2021-02-14 RX ADMIN — GEMFIBROZIL 600 MG: 600 TABLET ORAL at 16:57

## 2021-02-14 RX ADMIN — DIPHENHYDRAMINE HYDROCHLORIDE 50 MG: 25 CAPSULE ORAL at 21:07

## 2021-02-14 RX ADMIN — GUAIFENESIN 600 MG: 600 TABLET ORAL at 21:07

## 2021-02-14 RX ADMIN — FUROSEMIDE 20 MG: 20 TABLET ORAL at 16:57

## 2021-02-14 RX ADMIN — Medication 400 MG: at 08:05

## 2021-02-14 RX ADMIN — GUAIFENESIN 600 MG: 600 TABLET ORAL at 08:05

## 2021-02-14 RX ADMIN — ACETAMINOPHEN 650 MG: 325 TABLET, FILM COATED ORAL at 08:05

## 2021-02-14 RX ADMIN — SODIUM CHLORIDE 1 G: 1 TABLET ORAL at 08:05

## 2021-02-14 RX ADMIN — ENOXAPARIN SODIUM 40 MG: 40 INJECTION SUBCUTANEOUS at 08:04

## 2021-02-14 RX ADMIN — ACETAMINOPHEN 650 MG: 325 TABLET, FILM COATED ORAL at 12:45

## 2021-02-14 RX ADMIN — Medication 400 MG: at 21:07

## 2021-02-14 RX ADMIN — ASPIRIN 81 MG: 81 TABLET ORAL at 08:05

## 2021-02-14 RX ADMIN — AMLODIPINE BESYLATE 2.5 MG: 2.5 TABLET ORAL at 21:08

## 2021-02-14 RX ADMIN — GEMFIBROZIL 600 MG: 600 TABLET ORAL at 08:05

## 2021-02-14 NOTE — PROGRESS NOTES
Patient: Denis Green  Location: [facility] [address]  MRN: [id number]  Today's date: 2/14/2021    History of Present Illness  Denis LOVE is a 70 y.o. male admitted on 2/4/2021 with Pneumonia due to COVID-19 virus. Principal problem is No Principal Problem: There is no principal problem currently on the Problem List. Please update the Problem List and refresh..   Pt admitted to outside hospital on 1/5/2021 for COVID-19 PNA. Worsening resp status intubated on 1/9- extubated 1/16 to OptiFlow. Transition to HFNC on 1/23.Eval by nephrology while in ICU due to ERNIE likely secondary to ATN. Creatinine has been improving with excellent urine output. Received Decadron and Remdesivir. Dobhoff tube was placed due to dysphagia. New fever of 102.8 1/20 and 1/21. ID was consulted. Bld cx's negative. CT scan of the chest abdomen pelvis was done with results showing increasing pulmonary infiltrates/groundglass opacities and consolidations in his bilateral lower lobes. Started empirically on cefepime and vancomycin to cover hospital-acquired pneumonia. Pt had increased lethargy on 1/22. Pt removed DHT 1/25; required higher O2 afterward - possible aspiration during the process. 1/26 patient continued to have intermittent confusion; ?post ICU delirium. Neurology is following, MRI brain was unremarkable. EEG showed mild slowing but no seizure activity. D/c'd abx after 5 days d/t lethargy.       Past Medical History  Denis LOVE has a past medical history of Coronary artery disease, Hypertension, and Lipid disorder.    OT Vitals    Date/Time Pulse HR Source SpO2 Pt Activity O2 Therapy O2 Del Method BP BP Location BP Method Pt Position [facility]   02/14/21 1138 76 Monitor 94 % At rest Supplemental oxygen Nasal cannula 134/82 Left upper arm Automatic Sitting AEB      OT Pain    Date/Time Pain Type Pref Pain Scale Rating: Rest [facility]   02/14/21 1138 Pain Assessment number (Numeric Rating Pain Scale) 0 AEB   02/14/21 1155 Pain  "Reassessment number (Numeric Rating Pain Scale) 0 AEB          Prior Living Environment      Most Recent Value   Lives With  spouse, child(mindy), adult [adult son lives in home occasionally]   Living Arrangements  house   Home Accessibility  stairs to enter home (Group), stairs within home (Group)   Living Environment Comment  Lives with wife and intermittently with one son.  [bed and bath on 2nd ,  no powder room on first]   Number of Stairs, Main Entrance  1   Stair Railings, Main Entrance  none   Location, Patient Bedroom  second floor, must negotiate stairs to access   Location, Bathroom  second floor, must negotiate stairs to access   Bathroom Access Comment  Pt. reports has tub shower and stall shower, uses tub shower primarily, has grab bar in shower, has stanard height toilet c wall on both sides   Number of Stairs, Within Home, Primary  12   Surface of Stairs, Within Home, Primary  hardwood   Stair Railings, Within Home, Primary  railings on both sides of stairs          Prior Level of Function      Most Recent Value   Dominant Hand  right   Ambulation  independent   Transferring  independent   Toileting  independent   Bathing  independent   Dressing  independent   Eating  independent   Communication  understands/communicates without difficulty   Swallowing  swallows foods/liquids without difficulty   Baseline Diet/Method of Nutritional Intake  regular solids, thin liquids   Past History of Dysphagia  No previous reports    Prior Level of Function Comment  Pt is a cattle/,  previously IND for all IADLs.    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, (+) , enjoys farming and being outside   Patient Goals  \"I want to get back up walking, get stronger, and be self sufficient\"          IRF OT Evaluation and Treatment - 02/14/21 0888        Time Calculation    " Start Time  1133     Stop Time  1205     Time Calculation (min)  32 min        Session Details    Document Type  daily treatment/progress note     Mode of Treatment  occupational therapy;individual therapy        General Information    General Observations of Patient  Pt received reclined in w/c seated in hallway        Coping/Community Reintegration    Healthy Lifestyle (Self-Efficacy)  self-reports important symptoms to medical professional        Coping Strategies    Counseling (Coping Strategies)  active listening utilized     Comment  Pt presenting with less complaints during therapy, more engaged with exercise routine        Upper Extremity (Therapeutic Exercise)    Exercise Position/Type (UE Therapeutic Exercise)  seated;AROM (active range of motion);resistive exercises     General Exercise (Upper Extremity Therapeutic Exercise)  bilateral     Range of Motion Exercises (Upper Extremity Therapeutic Exercise)  shoulder flexion/extension;shoulder abduction/adduction;elbow flexion/extension;wrist flexion/extension     Weight/Resistance (Upper Extremity Therapeutic Exercise)  2 pounds;resistance band     Reps and Sets (Upper Extremity Therapeutic Exercise)  Orange theraband (modified handles) shld press/abd/flex: 2 set x10  Curles: 2 sets x10  UBE 10 minutes combined forward/back     Comment (UE Therapeutic Exercise)  Rest break needed between sets        Daily Progress Summary (OT)    Symptoms Noted During/After Treatment  none     Progress Toward Functional Goals (OT)  progressing toward functional goals as expected     Daily Outcome Statement (OT)  Patient more engaged in exercises with less c/o pain or discomfort. Patient motivated for UE exercises, rest breaks required between sets.      Barriers to Overall Progress (OT)  General deconditioning, 02 stats requiring rest breaks and supplimental.     Recommendations  continue POC                            IRF OT Goals      Most Recent Value   Transfer Goal 1    Activity/Assistive Device  toilet at 02/10/2021 1632   [place]  tactile cues required, set-up required at 02/10/2021 1632   Time Frame  short-term goal (STG), 5 - 7 days at 02/10/2021 1632   Strategies/Barriers  DME at 02/10/2021 1632   Progress/Outcome  good progress toward goal, goal partially met, goal revised this date at 02/10/2021 1632   Transfer Goal 2   Activity/Assistive Device  toilet at 02/10/2021 1632   [place]  supervision required, set-up required at 02/10/2021 1632   Time Frame  long-term goal (LTG), 3 weeks at 02/10/2021 1632   Strategies/Barriers  DME at 02/10/2021 1632   Transfer Goal 3   Activity/Assistive Device  shower at 02/10/2021 1632   [place]  tactile cues required at 02/10/2021 1632   Time Frame  short-term goal (STG), 5 - 7 days at 02/10/2021 1632   Strategies/Barriers  DME at 02/10/2021 1632   Progress/Outcome  good progress toward goal, goal partially met, goal revised this date at 02/10/2021 1632   Transfer Goal 4   Activity/Assistive Device  shower at 02/10/2021 1632   [place]  supervision required, set-up required at 02/10/2021 1632   Time Frame  long-term goal (LTG), 3 weeks at 02/10/2021 1632   Strategies/Barriers  DME at 02/10/2021 1632   Bathing Goal 1   Activity/Assistive Device  bathing skills, all at 02/10/2021 1632   [place]  supervision required at 02/10/2021 1632   Time Frame  short-term goal (STG), 5 - 7 days at 02/10/2021 1632   Progress/Outcome  good progress toward goal, goal partially met, goal revised this date at 02/10/2021 1632   Bathing Goal 2   Activity/Assistive Device  bathing skills, all at 02/05/2021 0726   [place]  supervision required at 02/05/2021 0726   Time Frame  long-term goal (LTG), 3 weeks at 02/05/2021 0726   Strategies/Barriers  DME TBD at 02/05/2021 0726   UB Dressing Goal 1   Activity/Assistive Device  upper body dressing at 02/10/2021 1632   [place]  supervision required at 02/10/2021 1632   Time Frame   short-term goal (STG), 5 - 7 days at 02/10/2021 1632   Progress/Outcome  good progress toward goal, goal partially met, goal ongoing at 02/10/2021 1632   UB Dressing Goal 2   Activity/Assistive Device  upper body dressing at 02/05/2021 0726   [place]  modified independence at 02/05/2021 0726   Time Frame  long-term goal (LTG), 3 weeks at 02/05/2021 0726   LB Dressing Goal 1   Activity/Assistive Device  lower body dressing at 02/10/2021 1632   [place]  supervision required at 02/10/2021 1632   Time Frame  short-term goal (STG), 5 - 7 days at 02/10/2021 1632   Strategies/Barriers  AD at 02/10/2021 1632   Progress/Outcome  good progress toward goal, goal partially met, goal revised this date at 02/10/2021 1632   LB Dressing Goal 2   Activity/Assistive Device  lower body dressing at 02/05/2021 0726   [place]  supervision required at 02/05/2021 0726   Time Frame  long-term goal (LTG), 3 weeks at 02/05/2021 0726   Grooming Goal 1   Activity/Assistive Device  grooming skills, all at 02/10/2021 1632   [place]  supervision required at 02/10/2021 1632   Time Frame  short-term goal (STG), 5 - 7 days at 02/10/2021 1632   Progress/Outcome  good progress toward goal, goal partially met, goal ongoing at 02/10/2021 1632   Grooming Goal 2   Activity/Assistive Device  grooming skills, all at 02/05/2021 0726   [place]  modified independence at 02/05/2021 0726   Time Frame  long-term goal (LTG), 3 weeks at 02/05/2021 0726   Toileting Goal 1   Activity/Assistive Device  toileting skills, all at 02/10/2021 1632   [place]  tactile cues required at 02/10/2021 1632   Time Frame  short-term goal (STG), 5 - 7 days at 02/10/2021 1632   Strategies/Barriers  DME at 02/10/2021 1632   Progress/Outcome  good progress toward goal, goal partially met, goal revised this date at 02/10/2021 1632   Toileting Goal 2   Activity/Assistive Device  toileting skills, all at 02/10/2021 1632   [place]  supervision required at  02/10/2021 1632   Time Frame  long-term goal (LTG), 3 weeks at 02/10/2021 1632   Strategies/Barriers  DME at 02/10/2021 1632   Progress/Outcome  goal revised this date at 02/10/2021 1632

## 2021-02-14 NOTE — PLAN OF CARE
Problem: Rehabilitation (IRF) Plan of Care  Goal: Plan of Care Review  Flowsheets (Taken 2/14/2021 1250)  Plan of Care Reviewed With: patient  IRF Plan of Care Review: progress ongoing, continue  Outcome Summary: Patient more engaged in exercises with less c/o pain or discomfort. Patient motivated for UE exercises, rest breaks required between sets.

## 2021-02-15 ENCOUNTER — APPOINTMENT (INPATIENT)
Dept: PHYSICAL THERAPY | Facility: REHABILITATION | Age: 71
DRG: 948 | End: 2021-02-15
Payer: COMMERCIAL

## 2021-02-15 ENCOUNTER — APPOINTMENT (INPATIENT)
Dept: OCCUPATIONAL THERAPY | Facility: REHABILITATION | Age: 71
DRG: 948 | End: 2021-02-15
Payer: COMMERCIAL

## 2021-02-15 ENCOUNTER — APPOINTMENT (INPATIENT)
Dept: SPEECH THERAPY | Facility: REHABILITATION | Age: 71
DRG: 948 | End: 2021-02-15
Payer: COMMERCIAL

## 2021-02-15 ENCOUNTER — APPOINTMENT (INPATIENT)
Dept: OTHER | Facility: REHABILITATION | Age: 71
DRG: 948 | End: 2021-02-15
Payer: COMMERCIAL

## 2021-02-15 LAB
ANION GAP SERPL CALC-SCNC: 16 MEQ/L (ref 3–15)
BACTERIA URNS QL MICRO: 3 /HPF
BASOPHILS # BLD: 0.12 K/UL (ref 0.01–0.1)
BASOPHILS NFR BLD: 1 %
BILIRUB UR QL STRIP.AUTO: NEGATIVE MG/DL
BUN SERPL-MCNC: 12 MG/DL (ref 8–20)
CALCIUM SERPL-MCNC: 8.9 MG/DL (ref 8.9–10.3)
CHLORIDE SERPL-SCNC: 91 MEQ/L (ref 98–109)
CLARITY UR REFRACT.AUTO: ABNORMAL
CO2 SERPL-SCNC: 26 MEQ/L (ref 22–32)
COLOR UR AUTO: YELLOW
CREAT SERPL-MCNC: 0.8 MG/DL (ref 0.8–1.3)
DIFFERENTIAL METHOD BLD: ABNORMAL
EOSINOPHIL # BLD: 0.39 K/UL (ref 0.04–0.54)
EOSINOPHIL NFR BLD: 3.4 %
ERYTHROCYTE [DISTWIDTH] IN BLOOD BY AUTOMATED COUNT: 14.4 % (ref 11.6–14.4)
GFR SERPL CREATININE-BSD FRML MDRD: >60 ML/MIN/1.73M*2
GLUCOSE SERPL-MCNC: 82 MG/DL (ref 70–99)
GLUCOSE UR STRIP.AUTO-MCNC: NEGATIVE MG/DL
HCT VFR BLDCO AUTO: 38.3 % (ref 40.1–51)
HGB BLD-MCNC: 12 G/DL (ref 13.7–17.5)
HGB UR QL STRIP.AUTO: 1
HYALINE CASTS #/AREA URNS LPF: ABNORMAL /LPF
IMM GRANULOCYTES # BLD AUTO: 0.24 K/UL (ref 0–0.08)
IMM GRANULOCYTES NFR BLD AUTO: 2.1 %
KETONES UR STRIP.AUTO-MCNC: NEGATIVE MG/DL
LEUKOCYTE ESTERASE UR QL STRIP.AUTO: 3
LYMPHOCYTES # BLD: 3.25 K/UL (ref 1.2–3.5)
LYMPHOCYTES NFR BLD: 28.1 %
MCH RBC QN AUTO: 30.5 PG (ref 28–33.2)
MCHC RBC AUTO-ENTMCNC: 31.3 G/DL (ref 32.2–36.5)
MCV RBC AUTO: 97.2 FL (ref 83–98)
MONOCYTES # BLD: 1.15 K/UL (ref 0.3–1)
MONOCYTES NFR BLD: 9.9 %
NEUTROPHILS # BLD: 6.41 K/UL (ref 1.7–7)
NEUTS SEG NFR BLD: 55.5 %
NITRITE UR QL STRIP.AUTO: POSITIVE
NRBC BLD-RTO: 0 %
PDW BLD AUTO: 11.1 FL (ref 9.4–12.4)
PH UR STRIP.AUTO: 6.5 [PH]
PLATELET # BLD AUTO: 470 K/UL (ref 150–350)
POTASSIUM SERPL-SCNC: 3.9 MEQ/L (ref 3.6–5.1)
PROT UR QL STRIP.AUTO: 1
RBC # BLD AUTO: 3.94 M/UL (ref 4.5–5.8)
RBC #/AREA URNS HPF: ABNORMAL /HPF
SODIUM SERPL-SCNC: 133 MEQ/L (ref 136–144)
SP GR UR REFRACT.AUTO: 1.01
SQUAMOUS URNS QL MICRO: ABNORMAL /HPF
UROBILINOGEN UR STRIP-ACNC: 0.2 EU/DL
WBC # BLD AUTO: 11.56 K/UL (ref 3.8–10.5)
WBC #/AREA URNS HPF: ABNORMAL /HPF

## 2021-02-15 PROCEDURE — 81003 URINALYSIS AUTO W/O SCOPE: CPT | Performed by: INTERNAL MEDICINE

## 2021-02-15 PROCEDURE — 12800000 HC ROOM AND CARE SEMIPRIVATE REHAB

## 2021-02-15 PROCEDURE — 97116 GAIT TRAINING THERAPY: CPT | Mod: GP

## 2021-02-15 PROCEDURE — 92526 ORAL FUNCTION THERAPY: CPT | Mod: GN

## 2021-02-15 PROCEDURE — 97110 THERAPEUTIC EXERCISES: CPT | Mod: GO,59

## 2021-02-15 PROCEDURE — 63700000 HC SELF-ADMINISTRABLE DRUG: Performed by: INTERNAL MEDICINE

## 2021-02-15 PROCEDURE — 97530 THERAPEUTIC ACTIVITIES: CPT | Mod: GO,59

## 2021-02-15 PROCEDURE — 97530 THERAPEUTIC ACTIVITIES: CPT | Mod: GP

## 2021-02-15 PROCEDURE — 63600000 HC DRUGS/DETAIL CODE: Performed by: INTERNAL MEDICINE

## 2021-02-15 PROCEDURE — 97530 THERAPEUTIC ACTIVITIES: CPT | Mod: 59

## 2021-02-15 PROCEDURE — 80048 BASIC METABOLIC PNL TOTAL CA: CPT | Mod: 59 | Performed by: PHYSICAL MEDICINE & REHABILITATION

## 2021-02-15 PROCEDURE — 36415 COLL VENOUS BLD VENIPUNCTURE: CPT | Performed by: PHYSICAL MEDICINE & REHABILITATION

## 2021-02-15 PROCEDURE — 87086 URINE CULTURE/COLONY COUNT: CPT | Performed by: INTERNAL MEDICINE

## 2021-02-15 PROCEDURE — 92507 TX SP LANG VOICE COMM INDIV: CPT | Mod: GN

## 2021-02-15 PROCEDURE — 63700000 HC SELF-ADMINISTRABLE DRUG: Performed by: PHYSICAL MEDICINE & REHABILITATION

## 2021-02-15 PROCEDURE — 85025 COMPLETE CBC W/AUTO DIFF WBC: CPT | Performed by: PHYSICAL MEDICINE & REHABILITATION

## 2021-02-15 PROCEDURE — 81001 URINALYSIS AUTO W/SCOPE: CPT | Performed by: INTERNAL MEDICINE

## 2021-02-15 RX ORDER — SODIUM CHLORIDE 1000 MG
1 TABLET, SOLUBLE MISCELLANEOUS 2 TIMES DAILY WITH MEALS
Status: DISCONTINUED | OUTPATIENT
Start: 2021-02-15 | End: 2021-02-24 | Stop reason: HOSPADM

## 2021-02-15 RX ORDER — ALBUTEROL SULFATE 90 UG/1
2 INHALANT RESPIRATORY (INHALATION) EVERY 4 HOURS PRN
Status: DISCONTINUED | OUTPATIENT
Start: 2021-02-15 | End: 2021-02-24 | Stop reason: HOSPADM

## 2021-02-15 RX ORDER — SODIUM CHLORIDE 1000 MG
1 TABLET, SOLUBLE MISCELLANEOUS
Status: DISCONTINUED | OUTPATIENT
Start: 2021-02-15 | End: 2021-02-15

## 2021-02-15 RX ADMIN — DIPHENHYDRAMINE HYDROCHLORIDE 50 MG: 25 CAPSULE ORAL at 21:03

## 2021-02-15 RX ADMIN — Medication 400 MG: at 21:03

## 2021-02-15 RX ADMIN — GUAIFENESIN 600 MG: 600 TABLET ORAL at 21:03

## 2021-02-15 RX ADMIN — GEMFIBROZIL 600 MG: 600 TABLET ORAL at 08:24

## 2021-02-15 RX ADMIN — GEMFIBROZIL 600 MG: 600 TABLET ORAL at 17:02

## 2021-02-15 RX ADMIN — RIVAROXABAN 10 MG: 10 TABLET, FILM COATED ORAL at 17:03

## 2021-02-15 RX ADMIN — METOPROLOL TARTRATE 12.5 MG: 25 TABLET, FILM COATED ORAL at 21:03

## 2021-02-15 RX ADMIN — FUROSEMIDE 20 MG: 20 TABLET ORAL at 08:25

## 2021-02-15 RX ADMIN — ENOXAPARIN SODIUM 40 MG: 40 INJECTION SUBCUTANEOUS at 08:25

## 2021-02-15 RX ADMIN — ASPIRIN 81 MG: 81 TABLET ORAL at 08:25

## 2021-02-15 RX ADMIN — Medication 400 MG: at 08:25

## 2021-02-15 RX ADMIN — METOPROLOL TARTRATE 12.5 MG: 25 TABLET, FILM COATED ORAL at 08:25

## 2021-02-15 RX ADMIN — SODIUM CHLORIDE 1 G: 1 TABLET ORAL at 17:03

## 2021-02-15 RX ADMIN — FUROSEMIDE 20 MG: 20 TABLET ORAL at 17:03

## 2021-02-15 RX ADMIN — Medication 3 MG: at 21:03

## 2021-02-15 RX ADMIN — AMLODIPINE BESYLATE 2.5 MG: 2.5 TABLET ORAL at 21:03

## 2021-02-15 RX ADMIN — SODIUM CHLORIDE 1 G: 1 TABLET ORAL at 08:24

## 2021-02-15 RX ADMIN — GUAIFENESIN 600 MG: 600 TABLET ORAL at 08:24

## 2021-02-15 RX ADMIN — ALBUTEROL SULFATE 2 PUFF: 90 AEROSOL, METERED RESPIRATORY (INHALATION) at 05:10

## 2021-02-15 ASSESSMENT — BALANCE ASSESSMENTS: TOTAL SCORE: 19

## 2021-02-15 NOTE — PROGRESS NOTES
Patient: Denis Green  Location: [facility] [address]  MRN: [id number]  Today's date: 2/15/2021    History of Present Illness  Denis LOVE is a 70 y.o. male admitted on 2/4/2021 with Pneumonia due to COVID-19 virus. Principal problem is No Principal Problem: There is no principal problem currently on the Problem List. Please update the Problem List and refresh..   Pt admitted to outside hospital on 1/5/2021 for COVID-19 PNA. Worsening resp status intubated on 1/9- extubated 1/16 to OptiFlow. Transition to HFNC on 1/23.Eval by nephrology while in ICU due to ERNIE likely secondary to ATN. Creatinine has been improving with excellent urine output. Received Decadron and Remdesivir. Dobhoff tube was placed due to dysphagia. New fever of 102.8 1/20 and 1/21. ID was consulted. Bld cx's negative. CT scan of the chest abdomen pelvis was done with results showing increasing pulmonary infiltrates/groundglass opacities and consolidations in his bilateral lower lobes. Started empirically on cefepime and vancomycin to cover hospital-acquired pneumonia. Pt had increased lethargy on 1/22. Pt removed DHT 1/25; required higher O2 afterward - possible aspiration during the process. 1/26 patient continued to have intermittent confusion; ?post ICU delirium. Neurology is following, MRI brain was unremarkable. EEG showed mild slowing but no seizure activity. D/c'd abx after 5 days d/t lethargy.       Past Medical History  Denis LOVE has a past medical history of Coronary artery disease, Hypertension, and Lipid disorder.    PT Vitals    Date/Time Pulse HR Source SpO2 Pt Activity O2 Therapy BP BP Location BP Method Pt Position [facility]   02/15/21 1404 84 Monitor 95 % At rest None (Room air) 124/66 Left upper arm Automatic Sitting CINDY   02/15/21 1456 90 Monitor 95 % At rest None (Room air) 133/78 Left upper arm Automatic Sitting CINDY      PT Pain    Date/Time Pain Type Rating: Activity [facility]   02/15/21 1404 Pain Assessment 0 CINDY    02/15/21 1456 Pain Reassessment 0 CINDY          Prior Living Environment      Most Recent Value   Lives With  spouse, child(mindy), adult [adult son lives in home occasionally]   Living Arrangements  house   Home Accessibility  stairs to enter home (Group), stairs within home (Group)   Living Environment Comment  Lives with wife and intermittently with one son.  [bed and bath on 2nd ,  no powder room on first]   Number of Stairs, Main Entrance  1   Stair Railings, Main Entrance  none   Location, Patient Bedroom  second floor, must negotiate stairs to access   Location, Bathroom  second floor, must negotiate stairs to access   Bathroom Access Comment  Pt. reports has tub shower and stall shower, uses tub shower primarily, has grab bar in shower, has stanard height toilet c wall on both sides   Number of Stairs, Within Home, Primary  12   Surface of Stairs, Within Home, Primary  hardwood   Stair Railings, Within Home, Primary  railings on both sides of stairs          Prior Level of Function      Most Recent Value   Dominant Hand  right   Ambulation  independent   Transferring  independent   Toileting  independent   Bathing  independent   Dressing  independent   Eating  independent   Communication  understands/communicates without difficulty   Swallowing  swallows foods/liquids without difficulty   Baseline Diet/Method of Nutritional Intake  regular solids, thin liquids   Past History of Dysphagia  No previous reports    Prior Level of Function Comment  Pt is a cattle/,  previously IND for all IADLs.    Assistive Device/Animal Currently Used at Home  none          IRF PT Evaluation and Treatment - 02/15/21 1410        Time Calculation    Start Time  1400     Stop Time  1500     Time Calculation (min)  60 min        Session Details    Document Type  daily treatment/progress note     Mode of Treatment  individual therapy;physical therapy        Transfers    Transfers  stand pivot transfer        Sit to Stand  Transfer    [place], Sit to Stand Transfer  minimum assist (75% or more patient effort)     Verbal Cues  safety;technique     Assistive Device  walker, front-wheeled     Comment  from WC to RW, Min A to touching for balance        Stand to Sit Transfer    [place], Stand to Sit Transfer  minimum assist (75% or more patient effort)     Verbal Cues  safety     Assistive Device  walker, front-wheeled     Comment  to WC, Min A to touching assist for safety        Stand Pivot Transfer    [place], Stand Pivot/Stand Step Transfer  minimum assist (75% or more patient effort)     Verbal Cues  safety;technique     Assistive Device  walker, front-wheeled     Comment  via ambulatory approach to/frm WC with Min A for postural control and balance        Gait Training    [place], Gait  minimum assist (75% or more patient effort)     Assistive Device  walker, front-wheeled     Distance in Feet  90 feet     Gait Pattern Utilized  step-through     Deviations/Abnormal Patterns (Gait)  base of support, narrow;bilateral deviations;gait speed decreased     Bilateral Gait Deviations  heel strike decreased     Comment  90' (2x) with use of RW and Min A for posutral control at hips        Balance    Balance Test Results  Timed Up and Go Test (TUG);Rajan Balance Scale        Rajan Balance Scale    Sitting to Standing  Able to stand using hands after several tries     Standing Unsupported  Able to stand 2 minutes with supervision     Sitting with Back Unsupported but Feet Supported on Floor or on a Stool  Able to sit 2 minutes under supervision     Standing to Sitting  Uses back of legs against chair to control descent     Transfers  Needs one person to assist     Standing Unsupported with Eyes Closed  Able to stand 10 seconds with supervision     Standing Unsupported with Feet Together  Needs help to attain position and unable to hold for 15 seconds     Reach Forward with Outstretched Arm While Standing  Reaches forward but  needs supervision      Object from Floor from a Standing Position  Unable to try/needs assist to keep from losing balance or falling     Turning to Look Behind Over Left and Right Shoulders While Standing  Needs supervision when turning     Turn 360 Degrees  Needs close supervision or verbal cueing     Place Alternate Foot on Step or Stool While Standing Unsupported  Able to complete greater than 2 steps needs minimal assist     Standing Unsupported One Foot in Front  Needs help to step but can hold 15 seconds     Standing on One Leg  Unable to try needs assist to prevent fall     Rajan Balance Score  19        Timed Up and Go Test    Trial One: Timed Up and Go Test  22.2     Trial Two: Timed Up and Go Test  23.5     Trial Three: Timed Up and Go Test  23.1     Mean of 3 Trials: Timed Up and Go Test  [id number]     Comment, Timed Up and Go Test  Performed with RW and Touching Assist     Results, Timed Up and Go Test (Balance)  Score indicates risk of falls (>13.5 seconds)         Daily Progress Summary (PT)    Daily Outcome Statement (PT)  Pt's score (19/56) on the Rajan Balance Scale indicate high risk of falls (<20/56) at this time. He additionally scored (>13.5 sec) on the TUG with use of the RW. Patient will benefit from ongoing training to maximize independence with ambulation.                             IRF PT Goals      Most Recent Value   Bed Mobility Goal 1   Activity/Assistive Device  rolling to left, rolling to right, sit to supine/supine to sit at 02/05/2021 1030   [place]  supervision required at 02/05/2021 1030   Time Frame  short-term goal (STG), 1 week at 02/11/2021 0824   Strategies/Barriers  Impaired strength, decreased endurance, safety considerations at 02/11/2021 0824   Progress/Outcome  goal ongoing at 02/11/2021 0824   Bed Mobility Goal 2   Activity/Assistive Device  rolling to left, rolling to right, sit to supine/supine to sit at 02/05/2021 1030   [place]  modified  independence at 02/05/2021 1030   Time Frame  long-term goal (LTG), 3 weeks at 02/11/2021 0824   Strategies/Barriers  Impaired strength, decreased endurance, safety considerations at 02/11/2021 0824   Progress/Outcome  goal ongoing at 02/11/2021 0824   Transfer Goal 1   Activity/Assistive Device  sit-to-stand/stand-to-sit, bed-to-chair/chair-to-bed at 02/11/2021 0824   [place]  minimum assist (75% or more patient effort) at 02/11/2021 0824   Time Frame  short-term goal (STG), 1 week at 02/11/2021 0824   Strategies/Barriers  poor strength, decreased strength at 02/11/2021 0824   Progress/Outcome  goal met, goal revised this date at 02/11/2021 0824   Transfer Goal 2   Activity/Assistive Device  sit-to-stand/stand-to-sit, bed-to-chair/chair-to-bed at 02/11/2021 0824   [place]  modified independence at 02/11/2021 0824   Time Frame  long-term goal (LTG), 3 weeks at 02/11/2021 0824   Strategies/Barriers  poor strength, decreased strength at 02/11/2021 0824   Progress/Outcome  goal ongoing at 02/11/2021 0824   Gait/Walking Locomotion Goal 1   Activity/Assistive Device  gait (walking locomotion) at 02/11/2021 0824   Distance  50 feet at 02/11/2021 0824   [place]  minimum assist (75% or more patient effort) at 02/11/2021 0824   Time Frame  short-term goal (STG), 1 week at 02/11/2021 0824   Strategies/Barriers  impaired endurance, decreased strength at 02/11/2021 0824   Progress/Outcome  goal met, goal revised this date at 02/11/2021 0824   Gait/Walking Locomotion Goal 2   Activity/Assistive Device  gait (walking locomotion) at 02/11/2021 0824   Distance  150 feet at 02/11/2021 0824   [place]  supervision required at 02/11/2021 0824   Time Frame  long-term goal (LTG), 3 weeks at 02/11/2021 0824   Strategies/Barriers  impaired endurance, decreased strength at 02/11/2021 0824   Progress/Outcome  goal ongoing at 02/11/2021 0824   Stairs Goal 1   Activity/Assistive Device  stairs, all skills at 02/11/2021 0868    Number of Stairs  4 at 02/11/2021 0824   [place]  minimum assist (75% or more patient effort) at 02/11/2021 0824   Time Frame  short-term goal (STG), 1 week at 02/11/2021 0824   Progress/Outcome  goal ongoing, goal revised this date at 02/11/2021 0824   Stairs Goal 2   Activity/Assistive Device  stairs, all skills at 02/11/2021 0824   Number of Stairs  12 at 02/11/2021 0824   [place]  supervision required at 02/11/2021 0824   Time Frame  long-term goal (LTG), 3 weeks at 02/11/2021 0824   Progress/Outcome  goal ongoing, goal revised this date at 02/11/2021 0824

## 2021-02-15 NOTE — PLAN OF CARE
Problem: Rehabilitation (IRF) Plan of Care  Goal: Plan of Care Review  Flowsheets (Taken 2/15/2021 2297)  Plan of Care Reviewed With: patient  IRF Plan of Care Review: progress ongoing, continue  Outcome Summary: Pt would benefit from ongoing endurance training through ambulation and exercise, as well as participation in activities of daily living skills.

## 2021-02-15 NOTE — PLAN OF CARE
Problem: Rehabilitation (IRF) Plan of Care  Goal: Plan of Care Review  Flowsheets (Taken 2/15/2021 3368)  Plan of Care Reviewed With: patient  IRF Plan of Care Review: progress ongoing, continue  Outcome Summary: Patient ambulated 95' with RW and Min A. On RA only throughout session.

## 2021-02-15 NOTE — PROGRESS NOTES
Patient: Denis Green  Location: [facility] [address]  MRN: [id number]  Today's date: 2/15/2021    History of Present Illness  Denis LOVE is a 70 y.o. male admitted on 2/4/2021 with Pneumonia due to COVID-19 virus. Principal problem is No Principal Problem: There is no principal problem currently on the Problem List. Please update the Problem List and refresh..   Pt admitted to outside hospital on 1/5/2021 for COVID-19 PNA. Worsening resp status intubated on 1/9- extubated 1/16 to OptiFlow. Transition to [facility]NC on 1/23.Eval by nephrology while in ICU due to ERNIE likely secondary to ATN. Creatinine has been improving with excellent urine output. Received Decadron and Remdesivir. Dobhoff tube was placed due to dysphagia. New fever of 102.8 1/20 and 1/21. ID was consulted. Bld cx's negative. CT scan of the chest abdomen pelvis was done with results showing increasing pulmonary infiltrates/groundglass opacities and consolidations in his bilateral lower lobes. Started empirically on cefepime and vancomycin to cover hospital-acquired pneumonia. Pt had increased lethargy on 1/22. Pt removed DHT 1/25; required higher O2 afterward - possible aspiration during the process. 1/26 patient continued to have intermittent confusion; ?post ICU delirium. Neurology is following, MRI brain was unremarkable. EEG showed mild slowing but no seizure activity. D/c'd abx after 5 days d/t lethargy.       Past Medical History  Denis LOVE has a past medical history of Coronary artery disease, Hypertension, and Lipid disorder.    SLP Pain    Date/Time Pain Type Pref Pain Scale Location Rating: Rest Who   02/15/21 1103 Pain Assessment number (Numeric Rating Pain Scale) back 3 RS   02/15/21 1129 Pain Reassessment word (verbal rating pain scale) back 3 RS          Prior Living Environment      Most Recent Value   Lives With  spouse, child(mindy), adult [adult son lives in home occasionally]   Living Arrangements  house   Home  Accessibility  stairs to enter home (Group), stairs within home (Group)   Living Environment Comment  Lives with wife and intermittently with one son.  [bed and bath on 2nd ,  no powder room on first]   Number of Stairs, Main Entrance  1   Stair Railings, Main Entrance  none   Location, Patient Bedroom  second floor, must negotiate stairs to access   Location, Bathroom  second floor, must negotiate stairs to access   Bathroom Access Comment  Pt. reports has tub shower and stall shower, uses tub shower primarily, has grab bar in shower, has stanard height toilet c wall on both sides   Number of Stairs, Within Home, Primary  12   Surface of Stairs, Within Home, Primary  hardwood   Stair Railings, Within Home, Primary  railings on both sides of stairs          Prior Level of Function      Most Recent Value   Dominant Hand  right   Ambulation  independent   Transferring  independent   Toileting  independent   Bathing  independent   Dressing  independent   Eating  independent   Communication  understands/communicates without difficulty   Swallowing  swallows foods/liquids without difficulty   Baseline Diet/Method of Nutritional Intake  regular solids, thin liquids   Past History of Dysphagia  No previous reports    Prior Level of Function Comment  Pt is a cattle/,  previously IND for all IADLs.    Assistive Device/Animal Currently Used at Home  none          IRF SLP Evaluation and Treatment - 02/15/21 1104        Time Calculation    Start Time  1100     Stop Time  1130     Time Calculation (min)  30 min        Session Details    Document Type  daily treatment/progress note     Mode of Treatment  speech language pathology;individual therapy        General Information    General Observations of Patient  Pleasant and cooperative, pt received in ST office        Cognition/Psychosocial    Executive Function Deficit (Cognition)  insight/awareness of deficits;information processing;abstract thinking     Comment,  "Executive Function  Sequencing 6-step activities, 100% acc i\"ly, performance timely. Abstract category exclusion, Pt was verbally presented 4 words, he named the excluded item with 100% acc i\"ly. Pt named the rationale for for excluded item I'ly.      Memory Deficit (Cognitive)  immediate recall     Comment, Short Term Memory  IND recall of 4-units of information immediately.         Swallowing Intervention    Comment, Swallowing Interventions  Discussed current diet and appropriate items for his wife to bring in. Discussed scheduled VFSS for Wednesday including expectations and potential change in POC pending results.         Daily Progress Summary (SLP)    Daily Outcome Statement (SLP)  Good participation, great mental flexibility and functional immediate recall for exclusion exercise noted. ST will continue to address mod level verbal reasoning.                        IRF SLP Goals      Most Recent Value   Verbal Expression Goal 1   Verbal Expression Goal 1  STG: pt will complete mod level verbal expression tasks x 90% c min cues. at 02/06/2021 1405   Time Frame  short-term goal (STG), 1 week at 02/06/2021 1405   Verbal Expression Goal 2   Verbal Expression Goal 2  LTG: pt will complete complex/abstract verbal expression tasks x 90% c independent use of WF strategies. at 02/06/2021 1405   Time Frame  long-term goal (LTG), 3 weeks at 02/06/2021 1405   Oral Nutrition/Hydration Goal 1   Activity  effective/safe/independent, use of swallowing techniques [regular/NTL, no overt s/s of aspiration ] at 02/05/2021 1202   Time Frame  short-term goal (STG), 1 week at 02/05/2021 1202   Oral Nutrition/Hydration Goal 2   Activity  effective/safe/independent, use of swallowing techniques [regular/thin, no overt s/s of aspiration ] at 02/05/2021 1202   Time Frame  long-term goal (LTG), 3 weeks at 02/05/2021 1202   Executive Function Goal 1   Activity  complete, abstract thinking tasks, executive function tasks, " organization/sequencing tasks, problem solving/reasoning tasks, other (see comments) [STG,  simple-mod level tasks.] at 02/06/2021 1405   [place]/Accuracy  with minimum, verbal cues/redirection, with additional processing time, with repetition of directions, with 90% accuracy at 02/06/2021 1405   Time Frame  1 week at 02/06/2021 1405   Executive Function Goal 2   Activity  complete, abstract thinking tasks, exhibit impulse control, organization/sequencing tasks, problem solving/reasoning tasks, other (see comments) [LTG] at 02/06/2021 1405   [place]/Accuracy  independently, with 90% accuracy, other (see comments) [mod-complex tasks.] at 02/06/2021 1405   Time Frame  3 weeks at 02/06/2021 1405   Memory Goal 1   Activity  short-term memory tasks, immediate recall tasks, working memory tasks, recall recent events, other (see comments) [STG,  simple-mod level tasks.] at 02/06/2021 1405   [place]/Accuracy  minimal cues for use of strategies, with 90% accuracy at 02/06/2021 1405   Time Frame  short-term goal (STG), 1 week at 02/06/2021 1405   Memory Goal 2   Activity  short-term memory tasks, immediate recall tasks, working memory tasks, recall recent events, other (see comments) [LTG,  mod complex information.] at 02/06/2021 1405   [place]/Accuracy  independent use of environmental cues/strategies, with 90% accuracy at 02/06/2021 1405   Time Frame  long-term goal (LTG), 3 weeks at 02/06/2021 1405

## 2021-02-15 NOTE — PLAN OF CARE
Left a vm for pts wife, Dana, to discuss dispo & inez family training.  No anwer, left a vm.  Wife is an RN & works night shift.

## 2021-02-15 NOTE — PLAN OF CARE
Problem: Rehabilitation (IRF) Plan of Care  Goal: Plan of Care Review  Outcome: Progressing  Flowsheets (Taken 2/15/2021 1608)  Plan of Care Reviewed With: patient  IRF Plan of Care Review: progress ongoing, continue  Outcome Summary: Medications discussed and verbalized an understanding. Pt on a free water protocol. Appetite poor for meals offered to have dietician come speak to pt regarding meal choices pt declined stating the food is just bad here Ill eat when I go home. Suggested if family could bring in food if he prefered. Fall safety measures in place.   Plan of Care Review  IRF Plan of Care Review: progress ongoing, continue  Plan of Care Reviewed With: patient  Outcome Summary: Medications discussed and verbalized an understanding. Pt on a free water protocol. Appetite poor for meals offered to have dietician come speak to pt regarding meal choices pt declined stating the food is just bad here Ill eat when I go home. Suggested if family could bring in food if he prefered. Fall safety measures in place.

## 2021-02-15 NOTE — PLAN OF CARE
Problem: Rehabilitation (IRF) Plan of Care  Goal: Plan of Care Review  Flowsheets (Taken 2/15/2021 1219)  Plan of Care Reviewed With: patient  IRF Plan of Care Review: progress ongoing, continue  Outcome Summary: Good participation, great mental flexibility and functional immediate recall for exclusion exercise noted.

## 2021-02-15 NOTE — PROGRESS NOTES
Patient: Denis Green  Location: [facility] [address]  MRN: [id number]  Today's date: 2/15/2021    Hospital Course  History of Present Illness  Denis LOVE is a 70 y.o. male admitted on 2/4/2021 with Pneumonia due to COVID-19 virus. Principal problem is No Principal Problem: There is no principal problem currently on the Problem List. Please update the Problem List and refresh..   Pt admitted to outside hospital on 1/5/2021 for COVID-19 PNA. Worsening resp status intubated on 1/9- extubated 1/16 to OptiFlow. Transition to HFNC on 1/23.Eval by nephrology while in ICU due to ERNIE likely secondary to ATN. Creatinine has been improving with excellent urine output. Received Decadron and Remdesivir. Dobhoff tube was placed due to dysphagia. New fever of 102.8 1/20 and 1/21. ID was consulted. Bld cx's negative. CT scan of the chest abdomen pelvis was done with results showing increasing pulmonary infiltrates/groundglass opacities and consolidations in his bilateral lower lobes. Started empirically on cefepime and vancomycin to cover hospital-acquired pneumonia. Pt had increased lethargy on 1/22. Pt removed DHT 1/25; required higher O2 afterward - possible aspiration during the process. 1/26 patient continued to have intermittent confusion; ?post ICU delirium. Neurology is following, MRI brain was unremarkable. EEG showed mild slowing but no seizure activity. D/c'd abx after 5 days d/t lethargy.       Past Medical History  Denis LOVE has a past medical history of Coronary artery disease, Hypertension, and Lipid disorder.    Therapy Pain/Vitals - 02/15/21 1129        Pain/Comfort/Sleep    Pain Charting Type  Pain Reassessment     Preferred Pain Scale  word (verbal rating pain scale)     Pain Body Location  back     Pain Rating (0-10): Rest  3           Prior Living Environment      Most Recent Value   Lives With  spouse, child(mindy), adult [adult son lives in home occasionally]   Living Arrangements   house   Home Accessibility  stairs to enter home (Group), stairs within home (Group)   Living Environment Comment  Lives with wife and intermittently with one son.  [bed and bath on 2nd ,  no powder room on first]   Number of Stairs, Main Entrance  1   Stair Railings, Main Entrance  none   Location, Patient Bedroom  second floor, must negotiate stairs to access   Location, Bathroom  second floor, must negotiate stairs to access   Bathroom Access Comment  Pt. reports has tub shower and stall shower, uses tub shower primarily, has grab bar in shower, has stanard height toilet c wall on both sides   Number of Stairs, Within Home, Primary  12   Surface of Stairs, Within Home, Primary  hardwood   Stair Railings, Within Home, Primary  railings on both sides of stairs          Prior Level of Function      Most Recent Value   Dominant Hand  right   Ambulation  independent   Transferring  independent   Toileting  independent   Bathing  independent   Dressing  independent   Eating  independent   Communication  understands/communicates without difficulty   Swallowing  swallows foods/liquids without difficulty   Baseline Diet/Method of Nutritional Intake  regular solids, thin liquids   Past History of Dysphagia  No previous reports    Prior Level of Function Comment  Pt is a cattle/,  previously IND for all IADLs.    Assistive Device/Animal Currently Used at Home  none          Recreational Therapy Evaluation and Treatment - 02/15/21 1000        Session Details    Document Type  daily treatment/progress note     Mode of Treatment  individual therapy;recreational therapy        Time Calculation    Start Time  1000     Stop Time  1100     Time Calculation (min)  60 min        General Information    Patient Profile Reviewed?  yes     General Observations of Patient  pt received in bed c LB pain 4/10        Coping/Community Reintegration    Observed Emotional State  cooperative;accepting;calm     Verbalized Emotional  "State  acceptance        Coping Strategies    Counseling (Coping Strategies)  active listening utilized;counseling provided;decision-making supported;personal strengths utilized;positive reinforcement provided;resources utilized;self-care encouraged     Comment  pt begun to feel poor about his stamina, \"I should be able to do more, I'm sorry I can't stay up longer.\" Therapist provided use of self listening and reassurance that his rehabbing will take him and that focusing on one thing at a time (e.g. not talking and doing standing activity) he may be able to accomplish more.     Therapeutic Recreational Activities (Coping Strategies)  gardening     Quality of Life Promotion (Coping Strategies)  balance between activity/rest encouraged        Therapeutic Interventions    Comment, Therapeutic Intervention  RT: min A for SPT to w/c c HHA. In greenhouse; pt c multiple STS c Min A and RW; pt laterally bending at hips to \"dead head\" and water plants in greenhouse, or at/above shoulder height for 2-3' at a time, SpO2 96% at rest break. Pt c good energy conservation t/o session, knowing when to rest. 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 Proves to be therapeutic for pt to be able to discuss his leisure and livelihood c someone who understands, though pt still needs mod cues to return to task at hand.         Rec Therapy Clinical Impression    Rehab Potential/Prognosis  good, to achieve stated therapy goals     Daily Outcome Statement  pt participated in pre-morbid leisure activity to engage in dynamic balance and LE endurance            Pain Assessment/Intervention  Pain Charting Type: Pain Assessment           Rec Care Plan Goals      Most Recent Value   Community Goal, Recreation   Recreation STG Activity: Community  Participate in simulated community integration to maximize on functional mobility and independence c min A and appropriate AD (at time of CI), in order to return to " pre-morbid community involvement.   Recreation STG: Community  minimum assist (75% or less patient effort)   Recreation STG Date Established: Community  02/11/21   Recreation STG Duration: Community  10 days or less   Leisure Goal, Recreation   Recreation STG Activity: Leisure  Participate in chosen therapeutic activity incorporating strength and coordination in B LE's c min A and appropriate AD to promote independence and QOL at discharge.    Recreation STG: Leisure  minimum assist (75% or less patient effort)   Recreation STG Date Established: Leisure  02/11/21   Recreation STG Duration: Leisure  10 days or less   Additional Goal, Recreation   Recreation STG Activity: Additional Goal  Participate in pre-morbid leisure interests at least 2-3x/week to increase affect and coping mechanisms in hospital environment with global pandemic precautions in place.   Recreation STG: Additional  other (see comments) [2-3x/wk]   Recreation STG Date Established: Additional  02/11/21   Recreation STG Duration: Additional  10 days or less

## 2021-02-15 NOTE — PLAN OF CARE
Problem: Rehabilitation (IRF) Plan of Care  Goal: Plan of Care Review  Flowsheets (Taken 2/15/2021 1741)  Plan of Care Reviewed With: patient  IRF Plan of Care Review: progress ongoing, continue  Outcome Summary: pt participated in pre-morbid leisure activity to engage in dynamic balance and LE endurance

## 2021-02-15 NOTE — PROGRESS NOTES
[facility] Progress Note          Patient was seen and examined at bedside.    Subjective:  2/12/2021:  RN reported yesterday afternoon, he had one episode of symptomatic orthostatic hypotension in bathroom, after lying down in bid, his symptom resolved.  His BP has been normal, adjusted his antihypertensives with holding parameter.   He dislike urea-Na, change to salt tablets for his hyponatremia, d/w him about re challenge statin for his hypercholesterolemia, he will f/u with his PCP and ideally a dyslipidemia specialist as outpt for that perstective.       2/15/2021:  Stable, pleasant, no new complaints or O/N events, his resp status and SO2 are good, reviewed his am CBC and BMP, not remarkable.   Objective   Vital signs in last 24 hours:  Temp:  [36.3 °C (97.3 °F)-36.8 °C (98.3 °F)] 36.3 °C (97.3 °F)  Heart Rate:  [68-93] 93  Resp:  [18-20] 18  BP: (116-134)/(63-85) 133/85    No intake or output data in the 24 hours ending 02/15/21 0958  Intake/Output this shift:  No intake/output data recorded.   Review of Systems:  All other systems reviewed and negative except as noted in the HPI.   Objective      Labs  reviewed his labs thoroughly   Lab Results   Component Value Date    WBC 11.56 (H) 02/15/2021    HGB 12.0 (L) 02/15/2021    HCT 38.3 (L) 02/15/2021    MCV 97.2 02/15/2021     (H) 02/15/2021     Lab Results   Component Value Date    GLUCOSE 82 02/15/2021    CALCIUM 8.9 02/15/2021     (L) 02/15/2021    K 3.9 02/15/2021    CO2 26 02/15/2021    CL 91 (L) 02/15/2021    BUN 12 02/15/2021    CREATININE 0.8 02/15/2021       Imaging  OSH imaging study reports reviewed:    CXR 2/5/2021:  IMPRESSION:  Bilateral airspace opacities most prominent in the left upper lobe and left  lower lobe likely representing COVID19 pneumonia.         Full Code    Physical Exam:  Head/Ear/Nose/Throat: normocephalic; atraumatic; moisture mouth mm, no oropharyngeal thrush noted.   Eyes: anicteric  sclera, EOMI; PERRL.   Neck : supple, no JVD, no carotid bruits appeciated.   Respiratory: no evidence of labored breathing, lung sounds a little coarse, mild bibasilar diminished BS, no remarkable w/r/c.   Cardiovascular: RRR; normal S1, S2; no m/r/g; no S3 or S4.   Gastrointestinal: soft; NT; BS normal; mildly distended; no CVAT b/l.   Genitourinary: no lim.   Extremities : no c/c/e .   Neurological: AO x 3, fluent speeches, following commands, CNS II-XII grossly intact; no focal neurologic deficits.   Behavior/Emotional: in NAD, appropriate; cooperative.   Skin: clean, dry and intact.     Plan of care was discussed with patient, RN, and PMR attending.     Assessment     CC: S/p severe covid-19 PNA complicated by VDRF, dysphagia, ERNIE, bacterial PNA, and  hospital delirium, physical deconditioning with ADL and ambulatory dysfunction.       70 y.o. male, I PTA, with PMH of HTN, HLD, and CAD, admitted to [facility] on 1/5/2021 for severeCOVID-19 PNA. Worsening resp status intubated on 1/9- extubated 1/16 to OptiFlow. Transition to HFNC on 1/23.Eval by nephrology while in ICU due to ERNIE likely secondary to ATN. Creatinine has been improving with excellent urine output. Received Decadron and Remdesivir. Dobhoff tube was placed due to dysphagia. New fever of 102.8 on 1/20 and 1/21. ID was consulted. Bld cx's negative. CT scan of the chest abdomen pelvis was done with results showing increasing pulmonary infiltrates/groundglass opacities and consolidations in his bilateral lower lobes. Started empirically on cefepime and vancomycin to cover hospital-acquired pneumonia. Pt had increased lethargy on 1/22. Pt removed DHT 1/25; required higher O2 afterward - possible aspiration during the process. 1/26 patient continued to have intermittent confusion; ?post ICU delirium. Neurology is following, MRI brain was unremarkable. EEG showed mild slowing but no seizure activity. D/c'd abx after 5 days d/t lethargy. Repeat VFSS performed-  now on soft nectar thick liquid diet .  Pt is AA&Ox3 with periods of confusion. Tolerates soft diet with NTL, voiding in urinal, O2 @ 2-3L via nc. Participating in therapies with marked improvement, functionally, he has residual ADL and ambulatory dysfunction, rec's for acute rehab prior to home with SO. Transferred to [facility] on 2/4/2021.      1.S/p severe covid-19 PNA complicated by VDRF, dysphagia, ERNIE, bacterial PNA, and  hospital delirium, physical deconditioning with ADL and ambulatory dysfunction : inpt acute rehab, gait and balancing training, SLP therapy, nutrition support,  Fall/aspiration precaution, medical management, DVT prophylaxis, dermal defense, f/u with PCP after inpt rehab.     2. Pulm-S/p severe covid-19 PNA complicated by VDRF, s/p possible bacteria PNA,  hypoxemia with mild exertion, atelectasis: monitor SO2, prn NC O2, keep SO2>92%, incentive spirometry, bronchodilator inhaler, mucolytic agent, pulm toileting. Check CXR.      3. Psych-s/p hospital acute delirium, intermittent confusion: his metal status has much improved, monitor his mood and mentation, help his orientation, psychology CBT, avoid unnecessary sedatives, SW support.      4. GI-on soft nectar thick liquid diet, constipation:SLP evaluation to advance his diet as tolerated; provide constipation bowel regimen, keep adequate hydration, timed toilet visits.     5. DVT prophy: SCD, early ambulation, SQ heparin, check LE venous DUS to r/o DVT.       6. HTN, dyslipidemia: cont home HTN meds with holding parameter, orthostatic hypotension precaution, monitor BP . On gemfibrozil, f/u with his PCP.      Mixed significant dyslipidemia: LCL-C 185, HDL-C 30, , h/o HTN, HLD, and CAD, only gemfibrozil is far from adequate, labeled Lipitor allergy, still worth retrial with hydrophilic formula Crestor from low dose, which is absolutely necessary and also guideline recommended    7. Renal-s/p ERNIE recovered, electrolytes imbalance: monitor renal  function and volume status, avoid nephrotoxic agents and low BP,  monitor and keep electrolytes balance.     8. - urinary retention: timed voiding trial, monitor PVR with prn cic, check UA/UCX.     Billing code: [id number]  Diagnoses:  Patient Active Problem List   Diagnosis   • Acute metabolic encephalopathy   • ARDS (adult respiratory distress syndrome) (CMS/HCC)   • Coronary artery disease involving native coronary artery of native heart without angina pectoris   • Hypoxia   • Pneumonia due to COVID-19 virus   • Transaminitis   • Essential hypertension   • Dyslipidemia   • Atelectasis   • Physical deconditioning     complicated case with multiple comorbidities as mentioned in subjective section, spent 35 min to manage the case, >50% of the time consulting the patient about current medical condition, existing comorbidities, new findings/concerns and care/management plan.       Laure Bishop MD  2/15/2021

## 2021-02-15 NOTE — PROGRESS NOTES
Subjective    Patient seen and examined on rounds.  Chart reviewed.  Events overnight noted.  History reviewed briefly with patient.     CC:  Deficits in mobility, transfers, self-care status post deconditioning, severe Covid 19 pneumonia, ventilator-dependent respiratory failure, dysphagia, multiple medical problems.     HPI:  Mr. Denis Green is a 70-year-old right-handed white male with chronic conditions significant for coronary artery disease, hypertension, hyperlipidemia who was admitted to [facility] on 1/5/21 due to Covid 19 pneumonia and worsening respiratory status.  He required intubation on 1/9/21 and had ventilator-dependent respiratory failure.  He was treated with Decadron and Remdesivir.  He had dysphagia requiring Dobbhoff tube feeds.  He was extubated on 1/16/21 to OptiFlow.  On 1/23/21 he was transitioned to high flow nasal cannula.  Hospital course was significant for acute renal insufficiency likely secondary to acute tubular necrosis and he was followed by nephrology.  He had subsequent improvement in renal function.  He also had new fever of 102.8°F on 1/20/21 and 1/21/21.  He was followed by infectious disease.  Blood cultures were negative. CT scan of the chest abdomen pelvis was done with results showing increasing pulmonary infiltrates/groundglass opacities and consolidations in his bilateral lower lobes.  He was treated empirically with Cefepime and Vancomycin to cover hospital-acquired pneumonia.  He was noted to be lethargic on 1/22/21.  Subsequently patient removed Dobbhoff tube on 1/25/21 and there is question of possible aspiration during that process.  He continued to have intermittent confusion on 1/26/21 was felt to be possible post ICU delirium.  He was followed by neurology.  MRI brain was unremarkable.  EEG showed mild slowing but no seizure activity was noted.  Antibiotics were discontinued after a 5 day course due to lethargy.  Repeat video swallow study was  "performed for dysphagia and patient was put on a soft diet with nectar thick liquids. On 2/3/21, hemoglobin was 11.2, WBC count 15.1, platelets were 275, BUN was 15, and creatinine was 0.7.  He has been needing assistance for mobility, transfers, self-care postoperatively. He is transferred to [facility] on 2/4/21 for further rehabilitation care.       SUBJ: He has video swallow for evaluation of dysphagia this week on Wednesday.  Discussed with patient.  Requiring minimal assistance for transfers.  Reviewed labs today.  Trying to wean off oxygen.    ROS: Denies chest pain or shortness of breath. Other ROS negative. Past, family, social history is unchanged.    Physical Exam      Blood pressure 104/64, pulse 76, temperature 36.3 °C (97.3 °F), temperature source Oral, resp. rate 18, height 1.727 m (5' 8\"), weight 77.1 kg (170 lb), SpO2 92 %.  Body mass index is 25.85 kg/m².    General Appearance: Not in acute distress  Head/Ear/Nose/Throat: Normocephalic; Atraumatic.  On oxygen by nasal cannula.  Eye: EOMI; PERRL.   Neck: No JVD; No Bruits.   Respiratory: Decreased breath sounds at bases.   Cardiovascular: RRR; Normal S1, S2.   Gastrointestinal: Soft; NT; +BS.   Extremities: Bilateral lower extremity edema noted.   Musculoskeletal: Functional active range of motion in both upper and lower extremities.    Neurological: Awake alert oriented to person, had some difficulty naming the place and correct date.. Speech is fluent. Cranial nerve examination does not reveal any gross facial asymmetry. Strength testing about 4/5 strength in both upper extremities.  Bilateral hip flexion is 3+/5.  Bilateral quadriceps are 4/5 with the thighs supported.  Bilateral ankle dorsi and plantar flexion are 4/5.  He is grossly able to localize touch and position sense in great toes.  Deep tendon reflexes are hypoactive and symmetric bilaterally.  Plantars are flexor.  Coordination is functional upper extremities. "  Neurologically unchanged.  Behavior/Emotional: Appropriate; Cooperative.   Skin: No obvious rashes noted.        Current Facility-Administered Medications:   •  acetaminophen (TYLENOL) tablet 650 mg, 650 mg, oral, q4h PRN, Omari Spain MD, 650 mg at 02/06/21 2001  •  albuterol HFA (VENTOLIN HFA) 90 mcg/actuation inhaler 2 puff, 2 puff, inhalation, q4h PRN, Laure Bishop MD  •  alum-mag hydroxide-simeth (MAALOX) 200-200-20 mg/5 mL suspension 30 mL, 30 mL, oral, q6h PRN, Laure Bishop MD  •  amLODIPine (NORVASC) tablet 2.5 mg, 2.5 mg, oral, Nightly, Laure Bishop MD, 2.5 mg at 02/14/21 2108  •  aspirin enteric coated tablet 81 mg, 81 mg, oral, Daily, Omari Spain MD, 81 mg at 02/15/21 0825  •  bisacodyL (DULCOLAX) 10 mg suppository 10 mg, 10 mg, rectal, Daily PRN, Isael Lang MD, 10 mg at 02/09/21 1850  •  diphenhydrAMINE (BENADRYL) capsule 50 mg, 50 mg, oral, Nightly, Isael Lang MD, 50 mg at 02/14/21 2107  •  docusate sodium (COLACE) capsule 100 mg, 100 mg, oral, TID, Isael Lang MD, 100 mg at 02/14/21 0805  •  furosemide (LASIX) tablet 20 mg, 20 mg, oral, BID (am, 4p), Laure Bishop MD, 20 mg at 02/15/21 0825  •  gemfibroziL (LOPID) tablet 600 mg, 600 mg, oral, BID AC, Omari Spain MD, 600 mg at 02/15/21 0824  •  guaiFENesin (MUCINEX) 12 hr ER tablet 600 mg, 600 mg, oral, BID, Laure Bishop MD, 600 mg at 02/15/21 0824  •  magnesium hydroxide (M.O.M.) 400 mg/5 mL suspension 30 mL, 30 mL, oral, 2x daily PRN, Laure Bishop MD, 30 mL at 02/09/21 0904  •  magnesium oxide (MAG-OX) tablet 400 mg, 400 mg, oral, BID, Laure Bishop MD, 400 mg at 02/15/21 0825  •  melatonin ODT 3 mg, 3 mg, oral, Nightly, Laure Bishop MD, 3 mg at 02/14/21 2107  •  metoprolol tartrate (LOPRESSOR) split tablet 12.5 mg, 12.5 mg, oral, BID, Omari Spain MD, 12.5 mg at 02/15/21 0825  •  mouth moisturizer (TOOTHETTE) cream, , mucous membrane, TID, Laure Bishop MD, Given at 02/15/21 0828  •   polyethylene glycol (MIRALAX) 17 gram packet 17 g, 17 g, oral, Daily PRN, Omari Spain MD, 17 g at 02/04/21 2157  •  rivaroxaban (XARELTO) tablet 10 mg, 10 mg, oral, Daily with dinner, Laure Bishop MD  •  senna (SENOKOT) tablet 3 tablet, 3 tablet, oral, Daily before lunch, Isael Lang MD, 3 tablet at 02/13/21 1251  •  sodium chloride tablet 1 g, 1 g, oral, BID with meals, Laure Bishop MD       Labs / Radiology    Lab Results   Component Value Date    WBC 11.56 (H) 02/15/2021    HGB 12.0 (L) 02/15/2021    HCT 38.3 (L) 02/15/2021    MCV 97.2 02/15/2021     (H) 02/15/2021     Lab Results   Component Value Date    GLUCOSE 82 02/15/2021    CALCIUM 8.9 02/15/2021     (L) 02/15/2021    K 3.9 02/15/2021    CO2 26 02/15/2021    CL 91 (L) 02/15/2021    BUN 12 02/15/2021    CREATININE 0.8 02/15/2021       Assessment and Plan    ASSESSMENT PLAN:  1. 70-year-old right-handed white male with chronic conditions significant for coronary artery disease, hypertension, hyperlipidemia who was admitted to [facility] on 1/5/21 due to Covid 19 pneumonia and worsening respiratory status.  He required intubation on 1/9/21 and had ventilator-dependent respiratory failure.  He was treated with Decadron and Remdesivir.  He had dysphagia requiring Dobbhoff tube feeds.  He was extubated on 1/16/21 to OptiFlow.  On 1/23/21 he was transitioned to high flow nasal cannula.  Hospital course was significant for acute renal insufficiency likely secondary to acute tubular necrosis and he was followed by nephrology.  He had subsequent improvement in renal function.  He also had new fever of 102.8°F on 1/20/21 and 1/21/21.  He was followed by infectious disease.  Blood cultures were negative. CT scan of the chest abdomen pelvis was done with results showing increasing pulmonary infiltrates/groundglass opacities and consolidations in his bilateral lower lobes.  He was treated empirically with Cefepime and Vancomycin  to cover hospital-acquired pneumonia.  He was noted to be lethargic on 1/22/21.  Subsequently patient removed Dobbhoff tube on 1/25/21 and there is question of possible aspiration during that process.  He continued to have intermittent confusion on 1/26/21 was felt to be possible post ICU delirium.  He was followed by neurology.  MRI brain was unremarkable.  EEG showed mild slowing but no seizure activity was noted.  Antibiotics were discontinued after a 5 day course due to lethargy.  Repeat video swallow study was performed for dysphagia and patient was put on a soft diet with nectar thick liquids. On 2/3/21, hemoglobin was 11.2, WBC count 15.1, platelets were 275, BUN was 15, and creatinine was 0.7.  He has been needing assistance for mobility, transfers, self-care postoperatively. He is transferred to [facility] on 2/4/21 for further rehabilitation care.       2. DVT prophylaxis - on subcutaneous Heparin.  On SCDs.  Platelets 296 on 2/5/2021.  Platelets 389 on 2/11/2021.  Platelets 470 on 2/15/2021.     3.  Deconditioning - recent severe Covid 19 pneumonia, ventilator-dependent respiratory failure, dysphagia, multiple medical problems - He had ventilator-dependent respiratory failure.  He was treated with Decadron and Remdesivir.  Continue PT, OT, speech, psychology.  Follow falls precautions, cardiac precautions, aspiration precautions.     4. GI - On Colace, Senokot.  On PRN MiraLAX, MOM, Maalox.      5.  - voiding.  Denies dysuria.  Monitor post void residuals.     6.  Pulmonary - recent severe Covid 19 pneumonia, ventilator-dependent respiratory failure - on Ventolin HFA.  On Mucinex.     7. Pain - on PRN Tylenol.  On scheduled Tylenol for left wrist pain.  On topical Voltaren gel.  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  K pad as needed for pain.     8. Hematology -hemoglobin 12.0, WBC 8.15 on 2/5/2021.  Hemoglobin 10.7, WBC 8.05 on 2/11/2021.  Hemoglobin 12.0, WBC 11.56  on 2/15/2021.     9. CVS - history of coronary artery disease and hypertension.  On Norvasc.  On Lopressor.  On Aspirin.  On Lasix.  On Magnesium oxide.     10.  Hyperlipidemia - on Lopid.     11.  Insomnia - on Melatonin.     12.  Dysphagia -on soft diet with nectar thick liquids.  Free water protocol with ice chips and sips of water per speech therapy.  Speech therapy and nutrition following.     13. Rehabilitation medicine - Continue comprehensive rehabilitation care. Continue PT, OT, speech, psychology.  We will follow falls precautions, cardiac precautions, monitor pulse oximetry in therapy and follow aspiration precautions.  Tolerating therapies per endurance.  Discussed with speech therapy.  Free water protocol ice chips was started.  Denies coughing with meals.He reported some pain in left wrist.  He is not sure if he accidentally banged the left wrist against the side rail of the bed.  Denies history of gout.  Left wrist does not appear warm or tender to touch.  K pad ordered.  We will also order scheduled Tylenol for 7 days.  Discussed results of wrist x-rays with patient which showed moderate degenerative change particularly about the radial aspect of the wrist.  He reports pain is less today.  He ambulated 25 feet with rolling walker with assistance of 2 people with physical therapy.Free water protocol with ice chips and sips of water per speech therapy.  He feels better today.  Left wrist pain is decreased significantly per patient.  Working on endurance with physical therapy.  He required minimal to moderate assistance for transfers.  Working on ADLs with occupational therapy.He feels better today.  Denies increased pain.  Discussed with him about doing incentive spirometry.  We will try to wean off oxygen if possible.Trying to wean off oxygen as possible.  Tolerating therapies per endurance.  He reports decrease sleep at night.  Usually takes 2 Tylenol PM at night.  He is already on scheduled Tylenol.   Will order 50 mg of Benadryl at bedtime. He reports he slept well with Benadryl last night.  Tolerating therapies per endurance.  Denies left wrist pain today.     14. Reviewed labs today.  BUN 15, creatinine 0.6 on 2/5/2021.  BUN 16, creatinine 0.7 on 2/11/2021.  BUN 12, creatinine 0.8 on 2/15/2021.               Isael Lang MD  2/15/2021

## 2021-02-15 NOTE — PROGRESS NOTES
Patient: Denis Green  Location: [facility] [address]  MRN: [id number]  Today's date: 2/15/2021    History of Present Illness  Denis LOVE is a 70 y.o. male admitted on 2/4/2021 with Pneumonia due to COVID-19 virus. Principal problem is No Principal Problem: There is no principal problem currently on the Problem List. Please update the Problem List and refresh..   Pt admitted to outside hospital on 1/5/2021 for COVID-19 PNA. Worsening resp status intubated on 1/9- extubated 1/16 to OptiFlow. Transition to [facility] on 1/23.Eval by nephrology while in ICU due to ERNIE likely secondary to ATN. Creatinine has been improving with excellent urine output. Received Decadron and Remdesivir. Dobhoff tube was placed due to dysphagia. New fever of 102.8 1/20 and 1/21. ID was consulted. Bld cx's negative. CT scan of the chest abdomen pelvis was done with results showing increasing pulmonary infiltrates/groundglass opacities and consolidations in his bilateral lower lobes. Started empirically on cefepime and vancomycin to cover hospital-acquired pneumonia. Pt had increased lethargy on 1/22. Pt removed DHT 1/25; required higher O2 afterward - possible aspiration during the process. 1/26 patient continued to have intermittent confusion; ?post ICU delirium. Neurology is following, MRI brain was unremarkable. EEG showed mild slowing but no seizure activity. D/c'd abx after 5 days d/t lethargy.       Past Medical History  Denis LOVE has a past medical history of Coronary artery disease, Hypertension, and Lipid disorder.    PT Vitals    Date/Time Pulse HR Source SpO2 Pt Activity O2 Therapy BP BP Location BP Method Pt Position [facility]   02/15/21 0904 89 Monitor 95 % At rest None (Room air) 116/77 Left upper arm Automatic -- CINDY   02/15/21 0928 93 Monitor 95 % At rest None (Room air) 133/85 Left upper arm Automatic Sitting CINDY      PT Pain    Date/Time Pain Type Rating: Rest [facility]   02/15/21 0904 Pain Assessment 0 CINDY    02/15/21 0928 Pain Reassessment 0 CINDY          Prior Living Environment      Most Recent Value   Lives With  spouse, child(mindy), adult [adult son lives in home occasionally]   Living Arrangements  house   Home Accessibility  stairs to enter home (Group), stairs within home (Group)   Living Environment Comment  Lives with wife and intermittently with one son.  [bed and bath on 2nd ,  no powder room on first]   Number of Stairs, Main Entrance  1   Stair Railings, Main Entrance  none   Location, Patient Bedroom  second floor, must negotiate stairs to access   Location, Bathroom  second floor, must negotiate stairs to access   Bathroom Access Comment  Pt. reports has tub shower and stall shower, uses tub shower primarily, has grab bar in shower, has stanard height toilet c wall on both sides   Number of Stairs, Within Home, Primary  12   Surface of Stairs, Within Home, Primary  hardwood   Stair Railings, Within Home, Primary  railings on both sides of stairs          Prior Level of Function      Most Recent Value   Dominant Hand  right   Ambulation  independent   Transferring  independent   Toileting  independent   Bathing  independent   Dressing  independent   Eating  independent   Communication  understands/communicates without difficulty   Swallowing  swallows foods/liquids without difficulty   Baseline Diet/Method of Nutritional Intake  regular solids, thin liquids   Past History of Dysphagia  No previous reports    Prior Level of Function Comment  Pt is a cattle/,  previously IND for all IADLs.    Assistive Device/Animal Currently Used at Home  none          IRF PT Evaluation and Treatment - 02/15/21 0916        Time Calculation    Start Time  0900     Stop Time  0930     Time Calculation (min)  30 min        Session Details    Document Type  daily treatment/progress note     Mode of Treatment  individual therapy;physical therapy        Transfers    Transfers  stand pivot transfer        Sit to Stand  "Transfer    [place], Sit to Stand Transfer  minimum assist (75% or more patient effort)     Verbal Cues  technique;safety     Assistive Device  walker, front-wheeled     Comment  from WC to RW, Min A for balance        Stand to Sit Transfer    [place], Stand to Sit Transfer  minimum assist (75% or more patient effort)     Verbal Cues  safety     Assistive Device  walker, front-wheeled     Comment  to WC, min A for balance        Stand Pivot Transfer    [place], Stand Pivot/Stand Step Transfer  minimum assist (75% or more patient effort)     Verbal Cues  safety     Assistive Device  walker, front-wheeled     Comment  via ambulatory approach to/from WC with Min A for balance and safety        Car Transfer    Transfer Technique  stand pivot     [place], Car Transfer  minimum assist (75% or more patient effort)     Verbal Cues  technique;safety     Assistive Device  walker, front-wheeled     Comment  via ambulatory appraoch to/from simulated car        Gait Training    [place], Gait  minimum assist (75% or more patient effort)     Assistive Device  walker, front-wheeled     Distance in Feet  95 feet     Gait Pattern Utilized  step-through     Deviations/Abnormal Patterns (Gait)  base of support, narrow;gait speed decreased;step length decreased     Bilateral Gait Deviations  heel strike decreased     Comment  95' (1x) and 50' (2x) with RW and Min A for balance and safety        Motor Skills    Motor Control/Coordination Interventions  stepping/walking        Advanced Stepping/Walking Interventions    Stepping/Walking Interventions  box stepping     Box Stepping (Stepping/Walking Interventions)  Alternating tap ups to 6\" block with use of RW for UE support with Min A for balance and safety        Daily Progress Summary (PT)    Daily Outcome Statement (PT)  Patient was able to progress ambulation distance during session. Patient was able to initiate car transfer without significant inc in assist " from lower surface. Cont POC.                             IRF PT Goals      Most Recent Value   Bed Mobility Goal 1   Activity/Assistive Device  rolling to left, rolling to right, sit to supine/supine to sit at 02/05/2021 1030   [place]  supervision required at 02/05/2021 1030   Time Frame  short-term goal (STG), 1 week at 02/11/2021 0824   Strategies/Barriers  Impaired strength, decreased endurance, safety considerations at 02/11/2021 0824   Progress/Outcome  goal ongoing at 02/11/2021 0824   Bed Mobility Goal 2   Activity/Assistive Device  rolling to left, rolling to right, sit to supine/supine to sit at 02/05/2021 1030   [place]  modified independence at 02/05/2021 1030   Time Frame  long-term goal (LTG), 3 weeks at 02/11/2021 0824   Strategies/Barriers  Impaired strength, decreased endurance, safety considerations at 02/11/2021 0824   Progress/Outcome  goal ongoing at 02/11/2021 0824   Transfer Goal 1   Activity/Assistive Device  sit-to-stand/stand-to-sit, bed-to-chair/chair-to-bed at 02/11/2021 0824   [place]  minimum assist (75% or more patient effort) at 02/11/2021 0824   Time Frame  short-term goal (STG), 1 week at 02/11/2021 0824   Strategies/Barriers  poor strength, decreased strength at 02/11/2021 0824   Progress/Outcome  goal met, goal revised this date at 02/11/2021 0824   Transfer Goal 2   Activity/Assistive Device  sit-to-stand/stand-to-sit, bed-to-chair/chair-to-bed at 02/11/2021 0824   [place]  modified independence at 02/11/2021 0824   Time Frame  long-term goal (LTG), 3 weeks at 02/11/2021 0824   Strategies/Barriers  poor strength, decreased strength at 02/11/2021 0824   Progress/Outcome  goal ongoing at 02/11/2021 0824   Gait/Walking Locomotion Goal 1   Activity/Assistive Device  gait (walking locomotion) at 02/11/2021 0824   Distance  50 feet at 02/11/2021 0824   [place]  minimum assist (75% or more patient effort) at 02/11/2021 0824   Time Frame  short-term goal (STG),  1 week at 02/11/2021 0824   Strategies/Barriers  impaired endurance, decreased strength at 02/11/2021 0824   Progress/Outcome  goal met, goal revised this date at 02/11/2021 0824   Gait/Walking Locomotion Goal 2   Activity/Assistive Device  gait (walking locomotion) at 02/11/2021 0824   Distance  150 feet at 02/11/2021 0824   [place]  supervision required at 02/11/2021 0824   Time Frame  long-term goal (LTG), 3 weeks at 02/11/2021 0824   Strategies/Barriers  impaired endurance, decreased strength at 02/11/2021 0824   Progress/Outcome  goal ongoing at 02/11/2021 0824   Stairs Goal 1   Activity/Assistive Device  stairs, all skills at 02/11/2021 0824   Number of Stairs  4 at 02/11/2021 0824   [place]  minimum assist (75% or more patient effort) at 02/11/2021 0824   Time Frame  short-term goal (STG), 1 week at 02/11/2021 0824   Progress/Outcome  goal ongoing, goal revised this date at 02/11/2021 0824   Stairs Goal 2   Activity/Assistive Device  stairs, all skills at 02/11/2021 0824   Number of Stairs  12 at 02/11/2021 0824   [place]  supervision required at 02/11/2021 0824   Time Frame  long-term goal (LTG), 3 weeks at 02/11/2021 0824   Progress/Outcome  goal ongoing, goal revised this date at 02/11/2021 0824

## 2021-02-15 NOTE — PROGRESS NOTES
Patient: Denis Green  Location: [facility] [address]  MRN: [id number]  Today's date: 2/15/2021    History of Present Illness  Denis LOVE is a 70 y.o. male admitted on 2/4/2021 with Pneumonia due to COVID-19 virus. Principal problem is No Principal Problem: There is no principal problem currently on the Problem List. Please update the Problem List and refresh..   Pt admitted to outside hospital on 1/5/2021 for COVID-19 PNA. Worsening resp status intubated on 1/9- extubated 1/16 to OptiFlow. Transition to [facility]NC on 1/23.Eval by nephrology while in ICU due to ERNIE likely secondary to ATN. Creatinine has been improving with excellent urine output. Received Decadron and Remdesivir. Dobhoff tube was placed due to dysphagia. New fever of 102.8 1/20 and 1/21. ID was consulted. Bld cx's negative. CT scan of the chest abdomen pelvis was done with results showing increasing pulmonary infiltrates/groundglass opacities and consolidations in his bilateral lower lobes. Started empirically on cefepime and vancomycin to cover hospital-acquired pneumonia. Pt had increased lethargy on 1/22. Pt removed DHT 1/25; required higher O2 afterward - possible aspiration during the process. 1/26 patient continued to have intermittent confusion; ?post ICU delirium. Neurology is following, MRI brain was unremarkable. EEG showed mild slowing but no seizure activity. D/c'd abx after 5 days d/t lethargy.       Past Medical History  Denis LOVE has a past medical history of Coronary artery disease, Hypertension, and Lipid disorder.    OT Vitals    Date/Time Pulse HR Source SpO2 Pt Activity O2 Therapy BP BP Location BP Method [facility]   02/15/21 1303 86 Monitor 93 % At rest None (Room air) 128/68 Left upper arm Automatic AEB   02/15/21 1353 -- -- 92 % At rest None (Room air) -- -- -- AEB      OT Pain    Date/Time Pain Type Pref Pain Scale Rating: Rest [facility]   02/15/21 1303 Pain Assessment number (Numeric Rating Pain Scale) 0 - no pain AEB  "  02/15/21 4853 Pain Assessment number (Numeric Rating Pain Scale) 0 - no pain AEB          Prior Living Environment      Most Recent Value   Lives With  spouse, child(mindy), adult [adult son lives in home occasionally]   Living Arrangements  house   Home Accessibility  stairs to enter home (Group), stairs within home (Group)   Living Environment Comment  Lives with wife and intermittently with one son.  [bed and bath on 2nd ,  no powder room on first]   Number of Stairs, Main Entrance  1   Stair Railings, Main Entrance  none   Location, Patient Bedroom  second floor, must negotiate stairs to access   Location, Bathroom  second floor, must negotiate stairs to access   Bathroom Access Comment  Pt. reports has tub shower and stall shower, uses tub shower primarily, has grab bar in shower, has stanard height toilet c wall on both sides   Number of Stairs, Within Home, Primary  12   Surface of Stairs, Within Home, Primary  hardwood   Stair Railings, Within Home, Primary  railings on both sides of stairs          Prior Level of Function      Most Recent Value   Dominant Hand  right   Ambulation  independent   Transferring  independent   Toileting  independent   Bathing  independent   Dressing  independent   Eating  independent   Communication  understands/communicates without difficulty   Swallowing  swallows foods/liquids without difficulty   Baseline Diet/Method of Nutritional Intake  regular solids, thin liquids   Past History of Dysphagia  No previous reports    Prior Level of Function Comment  Pt is a cattle/,  previously IND for all IADLs.    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, (+) , enjoys farming and being outside   Patient Goals  \"I want to get back up walking, get stronger, and be self sufficient\"          IRF OT Evaluation and Treatment - 02/15/21 1308  "       Time Calculation    Start Time  1300     Stop Time  1400     Time Calculation (min)  60 min        Session Details    Document Type  daily treatment/progress note     Mode of Treatment  occupational therapy;individual therapy        General Information    General Observations of Patient  Pt received seated in bed        Transfers    Transfers  stand pivot transfer        Stand Pivot Transfer    [place], Stand Pivot/Stand Step Transfer  touching/steadying assist     Assistive Device  walker, front-wheeled     Comment  bed > w/c <> mat w concentration on postural control when coming to stand with and w/o use of RW for support. Able to stand unsupported briefly in static position. Less stable when becoming dynamic        Safety Issues, Functional Mobility    Comment, Safety Issues/Impairments (Mobility)  Improving endurance for ambulating using RW on level surface for household distance with min to touching assist. Cues for postural control as pt has tendency to lean on walker. Pt monitored for desaturation and benefitted from frequent rest breaks in standing with cues to deep breath. Patient amb 2 x approx 125 feet each with touching assist.        Balance    Balance Assessment  standing static balance;standing dynamic balance     Static Standing Balance  mild impairment;supported;unsupported;standing     Dynamic Standing Balance  mild impairment;supported;unsupported;standing     Comment, Balance  Pt continues to rely on walker for support during short walks on level surfacce. When walker is removed, pt appears to stand taller but needs walker to compensate for general debility.         Upper Extremity (Therapeutic Exercise)    Exercise Position/Type (UE Therapeutic Exercise)  AROM (active range of motion)     Range of Motion Exercises (Upper Extremity Therapeutic Exercise)  shoulder flexion/extension;shoulder abduction/adduction;elbow flexion/extension;wrist flexion/extension     Weight/Resistance (Upper  Extremity Therapeutic Exercise)  2 pounds     Reps and Sets (Upper Extremity Therapeutic Exercise)  Supine on mat 3 set x15 each: Chest Press: 1#  Shld Flex: 1#  Shld Abd: 1#       Comment (UE Therapeutic Exercise)  Pt reporfeeling wiped out after UE exting         Daily Progress Summary (OT)    Symptoms Noted During/After Treatment  fatigue     Progress Toward Functional Goals (OT)  progressing toward functional goals as expected     Daily Outcome Statement (OT)  Pt would benefit from ongoing endurance training through ambulation and exercise, as well as participation in activities of daily living skills.      Barriers to Overall Progress (OT)  general debility as noted during all tasks, decreased postural control when standing with increased reliance on walker for support.     Recommendations  continue POC                            IRF OT Goals      Most Recent Value   Transfer Goal 1   Activity/Assistive Device  toilet at 02/15/2021 1308   [place]  supervision required at 02/15/2021 1308   Time Frame  short-term goal (STG), 5 - 7 days at 02/15/2021 1308   Strategies/Barriers  DME at 02/15/2021 1308   Progress/Outcome  good progress toward goal, goal partially met, goal revised this date at 02/15/2021 1308   Transfer Goal 2   Activity/Assistive Device  toilet at 02/10/2021 1632   [place]  supervision required, set-up required at 02/10/2021 1632   Time Frame  long-term goal (LTG), 3 weeks at 02/10/2021 1632   Strategies/Barriers  DME at 02/10/2021 1632   Transfer Goal 3   Activity/Assistive Device  shower at 02/15/2021 1308   [place]  supervision required at 02/15/2021 1308   Time Frame  short-term goal (STG), 5 - 7 days at 02/15/2021 1308   Strategies/Barriers  DME at 02/15/2021 1308   Progress/Outcome  good progress toward goal, goal partially met, goal revised this date at 02/15/2021 1308   Transfer Goal 4   Activity/Assistive Device  shower at 02/10/2021 1632   [place]  supervision  required, set-up required at 02/10/2021 1632   Time Frame  long-term goal (LTG), 3 weeks at 02/10/2021 1632   Strategies/Barriers  DME at 02/10/2021 1632   Bathing Goal 1   Activity/Assistive Device  bathing skills, all at 02/15/2021 1308   [place]  modified independence at 02/15/2021 1308   Time Frame  short-term goal (STG), 5 - 7 days at 02/15/2021 1308   Strategies/Barriers  DME at 02/15/2021 1308   Progress/Outcome  good progress toward goal, goal partially met, goal revised this date at 02/15/2021 1308   Bathing Goal 2   Activity/Assistive Device  bathing skills, all at 02/05/2021 0726   [place]  supervision required at 02/05/2021 0726   Time Frame  long-term goal (LTG), 3 weeks at 02/05/2021 0726   Strategies/Barriers  DME TBD at 02/05/2021 0726   UB Dressing Goal 1   Activity/Assistive Device  upper body dressing at 02/15/2021 1308   [place]  modified independence at 02/15/2021 1308   Time Frame  short-term goal (STG), 5 - 7 days at 02/15/2021 1308   Strategies/Barriers  including set up at 02/15/2021 1308   Progress/Outcome  good progress toward goal, goal partially met, goal revised this date at 02/15/2021 1308   UB Dressing Goal 2   Activity/Assistive Device  upper body dressing at 02/05/2021 0726   [place]  modified independence at 02/05/2021 0726   Time Frame  long-term goal (LTG), 3 weeks at 02/05/2021 0726   LB Dressing Goal 1   Activity/Assistive Device  lower body dressing at 02/15/2021 1308   [place]  modified independence at 02/15/2021 1308   Time Frame  short-term goal (STG), 5 - 7 days at 02/15/2021 1308   Strategies/Barriers  AD including clothing retrieval at 02/15/2021 1308   Progress/Outcome  good progress toward goal, goal partially met, goal revised this date at 02/10/2021 1632   LB Dressing Goal 2   Activity/Assistive Device  lower body dressing at 02/05/2021 0726   [place]  supervision required at 02/05/2021 0726   Time Frame  long-term goal (LTG), 3 weeks at  02/05/2021 0726   Grooming Goal 1   Activity/Assistive Device  grooming skills, all at 02/15/2021 1308   [place]  modified independence at 02/15/2021 1308   Time Frame  short-term goal (STG), 5 - 7 days at 02/15/2021 1308   Strategies/Barriers  standing @ sink at 02/15/2021 1308   Progress/Outcome  good progress toward goal, goal partially met, goal revised this date at 02/15/2021 1308   Grooming Goal 2   Activity/Assistive Device  grooming skills, all at 02/05/2021 0726   [place]  modified independence at 02/05/2021 0726   Time Frame  long-term goal (LTG), 3 weeks at 02/05/2021 0726   Toileting Goal 1   Activity/Assistive Device  toileting skills, all at 02/15/2021 1308   [place]  supervision required at 02/15/2021 1308   Time Frame  short-term goal (STG), 5 - 7 days at 02/15/2021 1308   Strategies/Barriers  DME at 02/15/2021 1308   Progress/Outcome  good progress toward goal, goal partially met, goal revised this date at 02/15/2021 1308   Toileting Goal 2   Activity/Assistive Device  toileting skills, all at 02/10/2021 1632   [place]  supervision required at 02/10/2021 1632   Time Frame  long-term goal (LTG), 3 weeks at 02/10/2021 1632   Strategies/Barriers  DME at 02/10/2021 1632   Progress/Outcome  goal revised this date at 02/10/2021 1632

## 2021-02-16 ENCOUNTER — APPOINTMENT (INPATIENT)
Dept: SPEECH THERAPY | Facility: REHABILITATION | Age: 71
DRG: 948 | End: 2021-02-16
Payer: COMMERCIAL

## 2021-02-16 ENCOUNTER — APPOINTMENT (INPATIENT)
Dept: OCCUPATIONAL THERAPY | Facility: REHABILITATION | Age: 71
DRG: 948 | End: 2021-02-16
Payer: COMMERCIAL

## 2021-02-16 ENCOUNTER — APPOINTMENT (INPATIENT)
Dept: PHYSICAL THERAPY | Facility: REHABILITATION | Age: 71
DRG: 948 | End: 2021-02-16
Payer: COMMERCIAL

## 2021-02-16 PROBLEM — N30.00 ACUTE CYSTITIS: Status: ACTIVE | Noted: 2021-02-16

## 2021-02-16 PROCEDURE — 97116 GAIT TRAINING THERAPY: CPT | Mod: GP

## 2021-02-16 PROCEDURE — 63700000 HC SELF-ADMINISTRABLE DRUG: Performed by: INTERNAL MEDICINE

## 2021-02-16 PROCEDURE — 63700000 HC SELF-ADMINISTRABLE DRUG: Performed by: PHYSICAL MEDICINE & REHABILITATION

## 2021-02-16 PROCEDURE — 63700000 HC SELF-ADMINISTRABLE DRUG: Performed by: SURGERY

## 2021-02-16 PROCEDURE — 97530 THERAPEUTIC ACTIVITIES: CPT | Mod: GO,59

## 2021-02-16 PROCEDURE — 97530 THERAPEUTIC ACTIVITIES: CPT | Mod: GP,59

## 2021-02-16 PROCEDURE — 12800000 HC ROOM AND CARE SEMIPRIVATE REHAB

## 2021-02-16 PROCEDURE — 97535 SELF CARE MNGMENT TRAINING: CPT | Mod: GO

## 2021-02-16 PROCEDURE — 92507 TX SP LANG VOICE COMM INDIV: CPT | Mod: GN

## 2021-02-16 PROCEDURE — 92526 ORAL FUNCTION THERAPY: CPT | Mod: GN

## 2021-02-16 RX ORDER — NITROFURANTOIN 25; 75 MG/1; MG/1
100 CAPSULE ORAL EVERY 12 HOURS
Status: DISCONTINUED | OUTPATIENT
Start: 2021-02-16 | End: 2021-02-18

## 2021-02-16 RX ADMIN — Medication 3 MG: at 21:11

## 2021-02-16 RX ADMIN — GUAIFENESIN 600 MG: 600 TABLET ORAL at 21:11

## 2021-02-16 RX ADMIN — NITROFURANTOIN (MONOHYDRATE/MACROCRYSTALS) 100 MG: 75; 25 CAPSULE ORAL at 21:15

## 2021-02-16 RX ADMIN — ASPIRIN 81 MG: 81 TABLET ORAL at 08:31

## 2021-02-16 RX ADMIN — ACETAMINOPHEN 650 MG: 325 TABLET, FILM COATED ORAL at 21:10

## 2021-02-16 RX ADMIN — DIPHENHYDRAMINE HYDROCHLORIDE 50 MG: 25 CAPSULE ORAL at 21:10

## 2021-02-16 RX ADMIN — NITROFURANTOIN (MONOHYDRATE/MACROCRYSTALS) 100 MG: 75; 25 CAPSULE ORAL at 12:24

## 2021-02-16 RX ADMIN — FUROSEMIDE 20 MG: 20 TABLET ORAL at 08:31

## 2021-02-16 RX ADMIN — RIVAROXABAN 10 MG: 10 TABLET, FILM COATED ORAL at 16:28

## 2021-02-16 RX ADMIN — DOCUSATE SODIUM 100 MG: 100 CAPSULE, LIQUID FILLED ORAL at 16:15

## 2021-02-16 RX ADMIN — AMLODIPINE BESYLATE 2.5 MG: 2.5 TABLET ORAL at 21:10

## 2021-02-16 RX ADMIN — FUROSEMIDE 20 MG: 20 TABLET ORAL at 16:15

## 2021-02-16 RX ADMIN — Medication 400 MG: at 21:11

## 2021-02-16 RX ADMIN — DOCUSATE SODIUM 100 MG: 100 CAPSULE, LIQUID FILLED ORAL at 21:10

## 2021-02-16 RX ADMIN — Medication 400 MG: at 08:31

## 2021-02-16 RX ADMIN — GEMFIBROZIL 600 MG: 600 TABLET ORAL at 08:31

## 2021-02-16 RX ADMIN — SODIUM CHLORIDE 1 G: 1 TABLET ORAL at 16:28

## 2021-02-16 RX ADMIN — SODIUM CHLORIDE 1 G: 1 TABLET ORAL at 08:31

## 2021-02-16 RX ADMIN — GUAIFENESIN 600 MG: 600 TABLET ORAL at 08:31

## 2021-02-16 RX ADMIN — GEMFIBROZIL 600 MG: 600 TABLET ORAL at 16:15

## 2021-02-16 RX ADMIN — METOPROLOL TARTRATE 12.5 MG: 25 TABLET, FILM COATED ORAL at 21:11

## 2021-02-16 RX ADMIN — METOPROLOL TARTRATE 12.5 MG: 25 TABLET, FILM COATED ORAL at 08:31

## 2021-02-16 NOTE — PLAN OF CARE
Plan of Care Review  IRF Plan of Care Review: progress ongoing, continue  Plan of Care Reviewed With: patient  Outcome Summary: OOB with therapy but taking rest in bed between. Denies pain. Started on Macrobid for uti. only c/o mild urinary freq. appetite fair d/t food selection & doesn't typically eat breakfast.

## 2021-02-16 NOTE — PROGRESS NOTES
Patient: Denis Green  Location: [facility] [address]  MRN: [id number]  Today's date: 2/16/2021    History of Present Illness  Denis LOVE is a 70 y.o. male admitted on 2/4/2021 with Pneumonia due to COVID-19 virus. Principal problem is No Principal Problem: There is no principal problem currently on the Problem List. Please update the Problem List and refresh..   Pt admitted to outside hospital on 1/5/2021 for COVID-19 PNA. Worsening resp status intubated on 1/9- extubated 1/16 to OptiFlow. Transition to [facility]NC on 1/23.Eval by nephrology while in ICU due to ERNIE likely secondary to ATN. Creatinine has been improving with excellent urine output. Received Decadron and Remdesivir. Dobhoff tube was placed due to dysphagia. New fever of 102.8 1/20 and 1/21. ID was consulted. Bld cx's negative. CT scan of the chest abdomen pelvis was done with results showing increasing pulmonary infiltrates/groundglass opacities and consolidations in his bilateral lower lobes. Started empirically on cefepime and vancomycin to cover hospital-acquired pneumonia. Pt had increased lethargy on 1/22. Pt removed DHT 1/25; required higher O2 afterward - possible aspiration during the process. 1/26 patient continued to have intermittent confusion; ?post ICU delirium. Neurology is following, MRI brain was unremarkable. EEG showed mild slowing but no seizure activity. D/c'd abx after 5 days d/t lethargy.       Past Medical History  Denis LOVE has a past medical history of Coronary artery disease, Hypertension, and Lipid disorder.    OT Vitals    Date/Time Pulse SpO2 Pt Activity O2 Therapy BP BP Location BP Method Pt Position Who   02/16/21 1038 78 94 % At rest None (Room air) 128/76 Left upper arm Automatic Sitting AEB   02/16/21 1155 -- 93 % Other (Comment) None (Room air) after shower -- -- -- -- AEB      OT Pain    Date/Time Pain Type Pref Pain Scale Acc Pain Level Side Orientation Location Freq Qual Interventions Who    02/16/21 1038 Pain Assessment number (Numeric Rating Pain Scale) 4 Bilateral lower back frequent aching premedicated for activity AEB   02/16/21 1155 Pain Assessment number (Numeric Rating Pain Scale) 4 Bilateral lower back occasional aching position adjusted AEB          Prior Living Environment      Most Recent Value   Lives With  spouse, child(mindy), adult [adult son lives in home occasionally]   Living Arrangements  house   Home Accessibility  stairs to enter home (Group), stairs within home (Group)   Living Environment Comment  Lives with wife and intermittently with one son.  [bed and bath on 2nd ,  no powder room on first]   Number of Stairs, Main Entrance  1   Stair Railings, Main Entrance  none   Location, Patient Bedroom  second floor, must negotiate stairs to access   Location, Bathroom  second floor, must negotiate stairs to access   Bathroom Access Comment  Pt. reports has tub shower and stall shower, uses tub shower primarily, has grab bar in shower, has stanard height toilet c wall on both sides   Number of Stairs, Within Home, Primary  12   Surface of Stairs, Within Home, Primary  hardwood   Stair Railings, Within Home, Primary  railings on both sides of stairs          Prior Level of Function      Most Recent Value   Dominant Hand  right   Ambulation  independent   Transferring  independent   Toileting  independent   Bathing  independent   Dressing  independent   Eating  independent   Communication  understands/communicates without difficulty   Swallowing  swallows foods/liquids without difficulty   Baseline Diet/Method of Nutritional Intake  regular solids, thin liquids   Past History of Dysphagia  No previous reports    Prior Level of Function Comment  Pt is a cattle/,  previously IND for all IADLs.    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, (+)  ", enjoys farming and being outside   Patient Goals  \"I want to get back up walking, get stronger, and be self sufficient\"          IRF OT Evaluation and Treatment - 02/16/21 1038        Time Calculation    Start Time  1035     Stop Time  1205     Time Calculation (min)  90 min        Session Details    Document Type  daily treatment/progress note     Mode of Treatment  occupational therapy;individual therapy        General Information    General Observations of Patient  Pt received long sitting in bed        Safety Awareness/Health Promotion    Fall Prevention  demonstrates fall risk prevention techniques;demonstrates safety awareness related to fall risk        Bed Mobility    Bed Mobility  bed mobility (all) activities     [place], All Bed Mobility Activities  supervision     Comment (Bed Mobility)  hosp bed HOB elevated        Transfers    Transfers  shower transfer     Maintains Weight-Bearing Status (Transfers)  able to maintain weight-bearing status     Comment  improving balance        Shower Transfer    Transfer Technique  stand pivot     [place], Shower Transfer  touching/steadying assist;1 person assist;safety considerations     Verbal Cues  safety     Assistive Device  grab bars/tub rail     Comment  commode in barrier free shwr        Safety Issues, Functional Mobility    Safety Issues Affecting Function (Mobility)  impulsivity;positioning of assistive device     Impairments Affecting Function (Mobility)  balance;endurance/activity tolerance;pain     Comment, Safety Issues/Impairments (Mobility)  Ambulated to stall shower using RW then back to bed for rest break        Upper Extremity (Therapeutic Exercise)    Comment (UE Therapeutic Exercise)  Issued handout with instructions for energy conservation during exercise and daily tasks.        Bathing    Self-Performance  chest;left arm;right arm;abdomen;front perineal area;buttocks;left upper leg;right upper leg;left lower leg, including " foot;right lower leg, including foot     [place] Assistance  left lower leg, including foot;right lower leg, including foot     [place]  touching/steadying assist;1 person assist;safety considerations     Position  supported sitting;supported standing     Setup Assistance  adaptive equipment setup     Adaptive Equipment  grab bar/tub rail     Comment  commode in stall shwr        Upper Body Dressing    Tasks  pull over garment     Self-Performance  threads left arm, shirt;threads right arm, shirt;pulls shirt over head/around back;pulls shirt down/adjusts     [place] Assistance  obtains clothes     [place]  supervision     Position  supported sitting     Adaptive Equipment  none        Lower Body Dressing    Self-Performance  threads left leg, underpants;threads right leg, underpants;pulls underpants up or down;threads left leg, pants/shorts;threads right leg, pants/shorts;pulls pants/shorts up or down     [place] Assistance  dons/doffs left sock;dons/doffs right sock;dons/doffs left shoe;dons/doffs right shoe;shoelaces, left;shoelaces, right     [place]  minimum assist (75% or more patient effort);1 person assist;safety considerations     Position  supported sitting     Adaptive Equipment  dressing stick;sock aid     [place], Footwear  minimum assist (75% or more patient effort)     Comment  ongoing instruction in distal dressing using AD, has new stiff shoes that need assistance        Daily Progress Summary (OT)    Symptoms Noted During/After Treatment  fatigue     Progress Toward Functional Goals (OT)  progressing toward functional goals as expected     Daily Outcome Statement (OT)  Pt was seen for shower and adl assessment. Increased balance and endurance for ambulating to BR. Improving ability to self bath n8ut still unable to reach distal LEs for dressing. Will continue to monitor for need for AD. 02 stable without supplimental 02 but continues to require energy conservation and frquent  reminders for rest break.     Barriers to Overall Progress (OT)  balance, endurance     Recommendations  continue POC                       Education provided this session. See the Patient Education summary report for full details.    IRF OT Goals      Most Recent Value   Transfer Goal 1   Activity/Assistive Device  toilet at 02/15/2021 1308   [place]  supervision required at 02/15/2021 1308   Time Frame  short-term goal (STG), 5 - 7 days at 02/15/2021 1308   Strategies/Barriers  DME at 02/15/2021 1308   Progress/Outcome  good progress toward goal, goal partially met, goal revised this date at 02/15/2021 1308   Transfer Goal 2   Activity/Assistive Device  toilet at 02/10/2021 1632   [place]  supervision required, set-up required at 02/10/2021 1632   Time Frame  long-term goal (LTG), 3 weeks at 02/10/2021 1632   Strategies/Barriers  DME at 02/10/2021 1632   Transfer Goal 3   Activity/Assistive Device  shower at 02/15/2021 1308   [place]  supervision required at 02/15/2021 1308   Time Frame  short-term goal (STG), 5 - 7 days at 02/15/2021 1308   Strategies/Barriers  DME at 02/15/2021 1308   Progress/Outcome  good progress toward goal, goal partially met, goal revised this date at 02/15/2021 1308   Transfer Goal 4   Activity/Assistive Device  shower at 02/10/2021 1632   [place]  supervision required, set-up required at 02/10/2021 1632   Time Frame  long-term goal (LTG), 3 weeks at 02/10/2021 1632   Strategies/Barriers  DME at 02/10/2021 1632   Bathing Goal 1   Activity/Assistive Device  bathing skills, all at 02/15/2021 1308   [place]  modified independence at 02/15/2021 1308   Time Frame  short-term goal (STG), 5 - 7 days at 02/15/2021 1308   Strategies/Barriers  DME at 02/15/2021 1308   Progress/Outcome  good progress toward goal, goal partially met, goal revised this date at 02/15/2021 1308   Bathing Goal 2   Activity/Assistive Device  bathing skills, all at 02/05/2021 0726   [place]   supervision required at 02/05/2021 0726   Time Frame  long-term goal (LTG), 3 weeks at 02/05/2021 0726   Strategies/Barriers  DME TBD at 02/05/2021 0726   UB Dressing Goal 1   Activity/Assistive Device  upper body dressing at 02/15/2021 1308   [place]  modified independence at 02/15/2021 1308   Time Frame  short-term goal (STG), 5 - 7 days at 02/15/2021 1308   Strategies/Barriers  including set up at 02/15/2021 1308   Progress/Outcome  good progress toward goal, goal partially met, goal revised this date at 02/15/2021 1308   UB Dressing Goal 2   Activity/Assistive Device  upper body dressing at 02/05/2021 0726   [place]  modified independence at 02/05/2021 0726   Time Frame  long-term goal (LTG), 3 weeks at 02/05/2021 0726   LB Dressing Goal 1   Activity/Assistive Device  lower body dressing at 02/15/2021 1308   [place]  modified independence at 02/15/2021 1308   Time Frame  short-term goal (STG), 5 - 7 days at 02/15/2021 1308   Strategies/Barriers  AD including clothing retrieval at 02/15/2021 1308   Progress/Outcome  good progress toward goal, goal partially met, goal revised this date at 02/10/2021 1632   LB Dressing Goal 2   Activity/Assistive Device  lower body dressing at 02/05/2021 0726   [place]  supervision required at 02/05/2021 0726   Time Frame  long-term goal (LTG), 3 weeks at 02/05/2021 0726   Grooming Goal 1   Activity/Assistive Device  grooming skills, all at 02/15/2021 1308   [place]  modified independence at 02/15/2021 1308   Time Frame  short-term goal (STG), 5 - 7 days at 02/15/2021 1308   Strategies/Barriers  standing @ sink at 02/15/2021 1308   Progress/Outcome  good progress toward goal, goal partially met, goal revised this date at 02/15/2021 1308   Grooming Goal 2   Activity/Assistive Device  grooming skills, all at 02/05/2021 0726   [place]  modified independence at 02/05/2021 0726   Time Frame  long-term goal (LTG), 3 weeks at 02/05/2021 0726   Toileting Goal  1   Activity/Assistive Device  toileting skills, all at 02/15/2021 1308   [place]  supervision required at 02/15/2021 1308   Time Frame  short-term goal (STG), 5 - 7 days at 02/15/2021 1308   Strategies/Barriers  DME at 02/15/2021 1308   Progress/Outcome  good progress toward goal, goal partially met, goal revised this date at 02/15/2021 1308   Toileting Goal 2   Activity/Assistive Device  toileting skills, all at 02/10/2021 1632   [place]  supervision required at 02/10/2021 1632   Time Frame  long-term goal (LTG), 3 weeks at 02/10/2021 1632   Strategies/Barriers  DME at 02/10/2021 1632   Progress/Outcome  goal revised this date at 02/10/2021 1632

## 2021-02-16 NOTE — PROGRESS NOTES
Patient: Denis Green  Location: [facility] [address]  MRN: [id number]  Today's date: 2/16/2021    History of Present Illness  Denis LOVE is a 70 y.o. male admitted on 2/4/2021 with Pneumonia due to COVID-19 virus. Principal problem is No Principal Problem: There is no principal problem currently on the Problem List. Please update the Problem List and refresh..   Pt admitted to outside hospital on 1/5/2021 for COVID-19 PNA. Worsening resp status intubated on 1/9- extubated 1/16 to OptiFlow. Transition to [facility]NC on 1/23.Eval by nephrology while in ICU due to ERNIE likely secondary to ATN. Creatinine has been improving with excellent urine output. Received Decadron and Remdesivir. Dobhoff tube was placed due to dysphagia. New fever of 102.8 1/20 and 1/21. ID was consulted. Bld cx's negative. CT scan of the chest abdomen pelvis was done with results showing increasing pulmonary infiltrates/groundglass opacities and consolidations in his bilateral lower lobes. Started empirically on cefepime and vancomycin to cover hospital-acquired pneumonia. Pt had increased lethargy on 1/22. Pt removed DHT 1/25; required higher O2 afterward - possible aspiration during the process. 1/26 patient continued to have intermittent confusion; ?post ICU delirium. Neurology is following, MRI brain was unremarkable. EEG showed mild slowing but no seizure activity. D/c'd abx after 5 days d/t lethargy.       Past Medical History  Denis LOVE has a past medical history of Coronary artery disease, Hypertension, and Lipid disorder.    PT Vitals    Date/Time Pulse HR Source SpO2 Pt Activity O2 Therapy BP BP Location BP Method Pt Position [facility]   02/16/21 1308 78 Monitor 94 % At rest None (Room air) 140/71 Left upper arm Automatic Sitting CINDY   02/16/21 1355 96 Monitor 95 % At rest None (Room air) 137/79 Left upper arm Automatic Sitting CINDY      PT Pain    Date/Time Pain Type Rating: Rest [facility]   02/16/21 1308 Pain Assessment 0 CINDY    02/16/21 1355 Pain Assessment 0 CINDY          Prior Living Environment      Most Recent Value   Lives With  spouse, child(mindy), adult [adult son lives in home occasionally]   Living Arrangements  house   Home Accessibility  stairs to enter home (Group), stairs within home (Group)   Living Environment Comment  Lives with wife and intermittently with one son.  [bed and bath on 2nd ,  no powder room on first]   Number of Stairs, Main Entrance  1   Stair Railings, Main Entrance  none   Location, Patient Bedroom  second floor, must negotiate stairs to access   Location, Bathroom  second floor, must negotiate stairs to access   Bathroom Access Comment  Pt. reports has tub shower and stall shower, uses tub shower primarily, has grab bar in shower, has stanard height toilet c wall on both sides   Number of Stairs, Within Home, Primary  12   Surface of Stairs, Within Home, Primary  hardwood   Stair Railings, Within Home, Primary  railings on both sides of stairs          Prior Level of Function      Most Recent Value   Dominant Hand  right   Ambulation  independent   Transferring  independent   Toileting  independent   Bathing  independent   Dressing  independent   Eating  independent   Communication  understands/communicates without difficulty   Swallowing  swallows foods/liquids without difficulty   Baseline Diet/Method of Nutritional Intake  regular solids, thin liquids   Past History of Dysphagia  No previous reports    Prior Level of Function Comment  Pt is a cattle/,  previously IND for all IADLs.    Assistive Device/Animal Currently Used at Home  none          IRF PT Evaluation and Treatment - 02/16/21 1301        Time Calculation    Start Time  1300     Stop Time  1400     Time Calculation (min)  60 min        Session Details    Document Type  daily treatment/progress note     Mode of Treatment  individual therapy;physical therapy        Transfers    Transfers  stand pivot transfer        Sit to Stand  "Transfer    [place], Sit to Stand Transfer  minimum assist (75% or more patient effort)     Verbal Cues  safety;technique     Assistive Device  walker, front-wheeled     Comment  frm WC to RW, Min A for balance        Stand to Sit Transfer    [place], Stand to Sit Transfer  minimum assist (75% or more patient effort)     Verbal Cues  safety     Assistive Device  walker, front-wheeled     Comment  to WC, Min A for safety        Stand Pivot Transfer    [place], Stand Pivot/Stand Step Transfer  minimum assist (75% or more patient effort)     Verbal Cues  safety     Assistive Device  walker, front-wheeled     Comment  via ambulatory approach to/from WC with Min A for balance        Gait Training    [place], Gait  minimum assist (75% or more patient effort)     Assistive Device  walker, front-wheeled     Distance in Feet  150 feet     Gait Pattern Utilized  step-through     Deviations/Abnormal Patterns (Gait)  base of support, narrow;gait speed decreased     Bilateral Gait Deviations  heel strike decreased     Advanced Gait Activity  curb negotiation;sloped surfaces     Comment  150' (2x) with RW and Min A for safety and balance        Curb Negotiation (Mobility)    [place]  minimum assist (75% or more patient effort)     Assistive Device  walker, front-wheeled     Curb Height  6 inches     Comment  6\" + 2\" curb step with RW and Min A for balance at hips        Sloped Surface Gait Skills (Mobility)    [place]  minimum assist (75% or more patient effort)     Comment  5' indoor simulated ramp with RW and Min A for balance        Stairs Training    [place], Stairs  minimum assist (75% or more patient effort)     Assistive Device  railing     Handrail Location  both sides     Number of Stairs  12     Stair Height  7 inches     Ascending Stairs Technique  step-to-step     Descending Stairs Technique  step-to-step     Comment  Min A to ascend/descend FF. Seated rest break at session.         " Wheelchair Mobility/Management    Method of Locomotion  bimanual (upper extremity) propulsion     Wheelchair Type  manual, lightweight     Functional Mobility Training  forward propulsion;steering;backward propulsion;stopping     [place], All Mobility  supervision     [place], Forward Propulsion  supervision     [place], Backward Propulsion  supervision     [place], Steering Activities  supervision     [place], Stopping Activities  supervision     Distance Propelled in Feet  150 feet     Comment, Functional Mobility  Increased time to complete as pt is a novice user of WC        Daily Progress Summary (PT)    Daily Outcome Statement (PT)  Pt was able to progress to completing a FF of steps. He required seated rest break at the top for safety. Continue to progress ambulation distance as tolerated. Pt transitioned into manual WC during session. Cont POC.                            IRF PT Goals      Most Recent Value   Bed Mobility Goal 1   Activity/Assistive Device  rolling to left, rolling to right, sit to supine/supine to sit at 02/05/2021 1030   [place]  supervision required at 02/05/2021 1030   Time Frame  short-term goal (STG), 1 week at 02/11/2021 0824   Strategies/Barriers  Impaired strength, decreased endurance, safety considerations at 02/11/2021 0824   Progress/Outcome  goal ongoing at 02/11/2021 0824   Bed Mobility Goal 2   Activity/Assistive Device  rolling to left, rolling to right, sit to supine/supine to sit at 02/05/2021 1030   [place]  modified independence at 02/05/2021 1030   Time Frame  long-term goal (LTG), 3 weeks at 02/11/2021 0824   Strategies/Barriers  Impaired strength, decreased endurance, safety considerations at 02/11/2021 0824   Progress/Outcome  goal ongoing at 02/11/2021 0824   Transfer Goal 1   Activity/Assistive Device  sit-to-stand/stand-to-sit, bed-to-chair/chair-to-bed at 02/11/2021 0824   [place]  minimum assist (75% or more patient effort)  at 02/11/2021 0824   Time Frame  short-term goal (STG), 1 week at 02/11/2021 0824   Strategies/Barriers  poor strength, decreased strength at 02/11/2021 0824   Progress/Outcome  goal met, goal revised this date at 02/11/2021 0824   Transfer Goal 2   Activity/Assistive Device  sit-to-stand/stand-to-sit, bed-to-chair/chair-to-bed at 02/11/2021 0824   [place]  modified independence at 02/11/2021 0824   Time Frame  long-term goal (LTG), 3 weeks at 02/11/2021 0824   Strategies/Barriers  poor strength, decreased strength at 02/11/2021 0824   Progress/Outcome  goal ongoing at 02/11/2021 0824   Gait/Walking Locomotion Goal 1   Activity/Assistive Device  gait (walking locomotion) at 02/11/2021 0824   Distance  50 feet at 02/11/2021 0824   [place]  minimum assist (75% or more patient effort) at 02/11/2021 0824   Time Frame  short-term goal (STG), 1 week at 02/11/2021 0824   Strategies/Barriers  impaired endurance, decreased strength at 02/11/2021 0824   Progress/Outcome  goal met, goal revised this date at 02/11/2021 0824   Gait/Walking Locomotion Goal 2   Activity/Assistive Device  gait (walking locomotion) at 02/11/2021 0824   Distance  150 feet at 02/11/2021 0824   [place]  supervision required at 02/11/2021 0824   Time Frame  long-term goal (LTG), 3 weeks at 02/11/2021 0824   Strategies/Barriers  impaired endurance, decreased strength at 02/11/2021 0824   Progress/Outcome  goal ongoing at 02/11/2021 0824   Stairs Goal 1   Activity/Assistive Device  stairs, all skills at 02/11/2021 0824   Number of Stairs  4 at 02/11/2021 0824   [place]  minimum assist (75% or more patient effort) at 02/11/2021 0824   Time Frame  short-term goal (STG), 1 week at 02/11/2021 0824   Progress/Outcome  goal ongoing, goal revised this date at 02/11/2021 0824   Stairs Goal 2   Activity/Assistive Device  stairs, all skills at 02/11/2021 0824   Number of Stairs  12 at 02/11/2021 0824   [place]  supervision required at  02/11/2021 0824   Time Frame  long-term goal (LTG), 3 weeks at 02/11/2021 0824   Progress/Outcome  goal ongoing, goal revised this date at 02/11/2021 0824

## 2021-02-16 NOTE — PLAN OF CARE
Problem: Rehabilitation (IRF) Plan of Care  Goal: Plan of Care Review  Flowsheets (Taken 2/16/2021 1119)  Plan of Care Reviewed With: patient  IRF Plan of Care Review: progress ongoing, continue  Outcome Summary: Good participation. Suspect voice disorder as pt presents with moderate to severe deficits in prolonged phonation in which she has voice breaks after 2-3 seconds. He presents with hoars breathy vocal quality and reduced volume. MD ordered ENT evaluation of voice this date. Pt to have a VFSS tomorrow to r/o aspiration.

## 2021-02-16 NOTE — PLAN OF CARE
Problem: Rehabilitation (IRF) Plan of Care  Goal: Plan of Care Review  Flowsheets (Taken 2/16/2021 7262)  Plan of Care Reviewed With: patient  IRF Plan of Care Review: progress ongoing, continue  Outcome Summary: Pt was seen for shower and adl assessment. Increased balance and endurance for ambulating to BR. Improving ability to self bath n8ut still unable to reach distal LEs for dressing. Will continue to monitor for need for AD. 02 stable without supplimental 02 but continues to require energy conservation and frquent reminders for rest break.

## 2021-02-16 NOTE — PROGRESS NOTES
Subjective    Patient seen and examined on rounds.  Chart reviewed.  Events overnight noted.  History reviewed briefly with patient.     CC:  Deficits in mobility, transfers, self-care status post deconditioning, severe Covid 19 pneumonia, ventilator-dependent respiratory failure, dysphagia, multiple medical problems.     HPI:  Mr. Denis Green is a 70-year-old right-handed white male with chronic conditions significant for coronary artery disease, hypertension, hyperlipidemia who was admitted to [facility] on 1/5/21 due to Covid 19 pneumonia and worsening respiratory status.  He required intubation on 1/9/21 and had ventilator-dependent respiratory failure.  He was treated with Decadron and Remdesivir.  He had dysphagia requiring Dobbhoff tube feeds.  He was extubated on 1/16/21 to OptiFlow.  On 1/23/21 he was transitioned to high flow nasal cannula.  Hospital course was significant for acute renal insufficiency likely secondary to acute tubular necrosis and he was followed by nephrology.  He had subsequent improvement in renal function.  He also had new fever of 102.8°F on 1/20/21 and 1/21/21.  He was followed by infectious disease.  Blood cultures were negative. CT scan of the chest abdomen pelvis was done with results showing increasing pulmonary infiltrates/groundglass opacities and consolidations in his bilateral lower lobes.  He was treated empirically with Cefepime and Vancomycin to cover hospital-acquired pneumonia.  He was noted to be lethargic on 1/22/21.  Subsequently patient removed Dobbhoff tube on 1/25/21 and there is question of possible aspiration during that process.  He continued to have intermittent confusion on 1/26/21 was felt to be possible post ICU delirium.  He was followed by neurology.  MRI brain was unremarkable.  EEG showed mild slowing but no seizure activity was noted.  Antibiotics were discontinued after a 5 day course due to lethargy.  Repeat video swallow study was  "performed for dysphagia and patient was put on a soft diet with nectar thick liquids. On 2/3/21, hemoglobin was 11.2, WBC count 15.1, platelets were 275, BUN was 15, and creatinine was 0.7.  He has been needing assistance for mobility, transfers, self-care postoperatively. He is transferred to [place] [facility] on 2/4/21 for further rehabilitation care.       SUBJ: Video swallow to be done tomorrow.  Discussed with patient and with speech therapy.  Voice evaluation by ENT ordered for speech therapy request.  Discussed with patient.    ROS: Denies chest pain or shortness of breath. Other ROS negative. Past, family, social history is unchanged.    Physical Exam      Blood pressure 137/79, pulse 96, temperature 36.6 °C (97.9 °F), temperature source Oral, resp. rate 18, height 1.727 m (5' 8\"), weight 77.1 kg (170 lb), SpO2 95 %.  Body mass index is 25.85 kg/m².    General Appearance: Not in acute distress  Head/Ear/Nose/Throat: Normocephalic; Atraumatic.  On oxygen by nasal cannula.  Eye: EOMI; PERRL.   Neck: No JVD; No Bruits.   Respiratory: Decreased breath sounds at bases.   Cardiovascular: RRR; Normal S1, S2.   Gastrointestinal: Soft; NT; +BS.   Extremities: Bilateral lower extremity edema noted.   Musculoskeletal: Functional active range of motion in both upper and lower extremities.    Neurological: Awake alert oriented to person, had some difficulty naming the place and correct date.. Speech is fluent. Cranial nerve examination does not reveal any gross facial asymmetry. Strength testing about 4/5 strength in both upper extremities.  Bilateral hip flexion is 3+/5.  Bilateral quadriceps are 4/5 with the thighs supported.  Bilateral ankle dorsi and plantar flexion are 4/5.  He is grossly able to localize touch and position sense in great toes.  Deep tendon reflexes are hypoactive and symmetric bilaterally.  Plantars are flexor.  Coordination is functional upper extremities.  Neurologically " stable.  Behavior/Emotional: Appropriate; Cooperative.   Skin: No obvious rashes noted.        Current Facility-Administered Medications:   •  acetaminophen (TYLENOL) tablet 650 mg, 650 mg, oral, q4h PRN, Omari Spain MD, 650 mg at 02/06/21 2001  •  albuterol HFA (VENTOLIN HFA) 90 mcg/actuation inhaler 2 puff, 2 puff, inhalation, q4h PRN, Laure Bishop MD  •  alum-mag hydroxide-simeth (MAALOX) 200-200-20 mg/5 mL suspension 30 mL, 30 mL, oral, q6h PRN, Laure Bishop MD  •  amLODIPine (NORVASC) tablet 2.5 mg, 2.5 mg, oral, Nightly, Laure Bishop MD, 2.5 mg at 02/15/21 2103  •  aspirin enteric coated tablet 81 mg, 81 mg, oral, Daily, Omari Spain MD, 81 mg at 02/16/21 0831  •  bisacodyL (DULCOLAX) 10 mg suppository 10 mg, 10 mg, rectal, Daily PRN, Isael Lang MD, 10 mg at 02/09/21 1850  •  diphenhydrAMINE (BENADRYL) capsule 50 mg, 50 mg, oral, Nightly, Isael Lang MD, 50 mg at 02/15/21 2103  •  docusate sodium (COLACE) capsule 100 mg, 100 mg, oral, TID, Isael Lang MD, 100 mg at 02/14/21 0805  •  furosemide (LASIX) tablet 20 mg, 20 mg, oral, BID (am, 4p), Laure Bishop MD, 20 mg at 02/16/21 0831  •  gemfibroziL (LOPID) tablet 600 mg, 600 mg, oral, BID AC, Omari Spain MD, 600 mg at 02/16/21 0831  •  guaiFENesin (MUCINEX) 12 hr ER tablet 600 mg, 600 mg, oral, BID, Laure Bishop MD, 600 mg at 02/16/21 0831  •  magnesium hydroxide (M.O.M.) 400 mg/5 mL suspension 30 mL, 30 mL, oral, 2x daily PRN, Laure Bishop MD, 30 mL at 02/09/21 0904  •  magnesium oxide (MAG-OX) tablet 400 mg, 400 mg, oral, BID, Laure Bishop MD, 400 mg at 02/16/21 0831  •  melatonin ODT 3 mg, 3 mg, oral, Nightly, Laure Bishop MD, 3 mg at 02/15/21 2103  •  metoprolol tartrate (LOPRESSOR) split tablet 12.5 mg, 12.5 mg, oral, BID, Omari Spain MD, 12.5 mg at 02/16/21 0831  •  mouth moisturizer (TOOTHETTE) cream, , mucous membrane, TID, Laure Bishop MD, Given at 02/16/21 0832  •  nitrofurantoin  (macrocrystal-monohydrate) (MACROBID) capsule 100 mg, 100 mg, oral, q12h EBER, Laure Bishop MD, 100 mg at 02/16/21 1224  •  polyethylene glycol (MIRALAX) 17 gram packet 17 g, 17 g, oral, Daily PRN, Omari Spain MD, 17 g at 02/04/21 2157  •  rivaroxaban (XARELTO) tablet 10 mg, 10 mg, oral, Daily with dinner, Laure Bishop MD, 10 mg at 02/15/21 1703  •  senna (SENOKOT) tablet 3 tablet, 3 tablet, oral, Daily before lunch, Isael Lang MD, 3 tablet at 02/13/21 1251  •  sodium chloride tablet 1 g, 1 g, oral, BID with meals, Laure Bishop MD, 1 g at 02/16/21 0831       Labs / Radiology    Lab Results   Component Value Date    WBC 11.56 (H) 02/15/2021    HGB 12.0 (L) 02/15/2021    HCT 38.3 (L) 02/15/2021    MCV 97.2 02/15/2021     (H) 02/15/2021     Lab Results   Component Value Date    GLUCOSE 82 02/15/2021    CALCIUM 8.9 02/15/2021     (L) 02/15/2021    K 3.9 02/15/2021    CO2 26 02/15/2021    CL 91 (L) 02/15/2021    BUN 12 02/15/2021    CREATININE 0.8 02/15/2021       Assessment and Plan    ASSESSMENT PLAN:  1. 70-year-old right-handed white male with chronic conditions significant for coronary artery disease, hypertension, hyperlipidemia who was admitted to [facility] on 1/5/21 due to Covid 19 pneumonia and worsening respiratory status.  He required intubation on 1/9/21 and had ventilator-dependent respiratory failure.  He was treated with Decadron and Remdesivir.  He had dysphagia requiring Dobbhoff tube feeds.  He was extubated on 1/16/21 to OptiFlow.  On 1/23/21 he was transitioned to high flow nasal cannula.  Hospital course was significant for acute renal insufficiency likely secondary to acute tubular necrosis and he was followed by nephrology.  He had subsequent improvement in renal function.  He also had new fever of 102.8°F on 1/20/21 and 1/21/21.  He was followed by infectious disease.  Blood cultures were negative. CT scan of the chest abdomen pelvis was done with  results showing increasing pulmonary infiltrates/groundglass opacities and consolidations in his bilateral lower lobes.  He was treated empirically with Cefepime and Vancomycin to cover hospital-acquired pneumonia.  He was noted to be lethargic on 1/22/21.  Subsequently patient removed Dobbhoff tube on 1/25/21 and there is question of possible aspiration during that process.  He continued to have intermittent confusion on 1/26/21 was felt to be possible post ICU delirium.  He was followed by neurology.  MRI brain was unremarkable.  EEG showed mild slowing but no seizure activity was noted.  Antibiotics were discontinued after a 5 day course due to lethargy.  Repeat video swallow study was performed for dysphagia and patient was put on a soft diet with nectar thick liquids. On 2/3/21, hemoglobin was 11.2, WBC count 15.1, platelets were 275, BUN was 15, and creatinine was 0.7.  He has been needing assistance for mobility, transfers, self-care postoperatively. He is transferred to [facility] on 2/4/21 for further rehabilitation care.       2. DVT prophylaxis - on subcutaneous Heparin.  On SCDs.  Platelets 296 on 2/5/2021.  Platelets 389 on 2/11/2021.  Platelets 470 on 2/15/2021.     3.  Deconditioning - recent severe Covid 19 pneumonia, ventilator-dependent respiratory failure, dysphagia, multiple medical problems - He had ventilator-dependent respiratory failure.  He was treated with Decadron and Remdesivir.  Continue PT, OT, speech, psychology.  Follow falls precautions, cardiac precautions, aspiration precautions.     4. GI - On Colace, Senokot.  On PRN MiraLAX, MOM, Maalox.      5.  - voiding.  Denies dysuria.  Monitor post void residuals.     6.  Pulmonary - recent severe Covid 19 pneumonia, ventilator-dependent respiratory failure - on Ventolin HFA.  On Mucinex.     7. Pain - on PRN Tylenol.  On scheduled Tylenol for left wrist pain.  On topical Voltaren gel.  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  K pad as needed for pain.     8. Hematology -hemoglobin 12.0, WBC 8.15 on 2/5/2021.  Hemoglobin 10.7, WBC 8.05 on 2/11/2021.  Hemoglobin 12.0, WBC 11.56 on 2/15/2021.     9. CVS - history of coronary artery disease and hypertension.  On Norvasc.  On Lopressor.  On Aspirin.  On Lasix.  On Magnesium oxide.     10.  Hyperlipidemia - on Lopid.     11.  Insomnia - on Melatonin.     12.  Dysphagia -on soft diet with nectar thick liquids.  Free water protocol with ice chips and sips of water per speech therapy.  Speech therapy and nutrition following.     13. Rehabilitation medicine - Continue comprehensive rehabilitation care. Continue PT, OT, speech, psychology.  We will follow falls precautions, cardiac precautions, monitor pulse oximetry in therapy and follow aspiration precautions.  Tolerating therapies per endurance.  Discussed with speech therapy.  Free water protocol ice chips was started.  Denies coughing with meals.He reported some pain in left wrist.  He is not sure if he accidentally banged the left wrist against the side rail of the bed.  Denies history of gout.  Left wrist does not appear warm or tender to touch.  K pad ordered.  We will also order scheduled Tylenol for 7 days.  Discussed results of wrist x-rays with patient which showed moderate degenerative change particularly about the radial aspect of the wrist.  He reports pain is less today.  He ambulated 25 feet with rolling walker with assistance of 2 people with physical therapy.Free water protocol with ice chips and sips of water per speech therapy.  He feels better today.  Left wrist pain is decreased significantly per patient.  Working on endurance with physical therapy.  He required minimal to moderate assistance for transfers.  Working on ADLs with occupational therapy.He feels better today.  Denies increased pain.  Discussed with him about doing incentive spirometry.  We will try to wean off oxygen if possible.Trying to  wean off oxygen as possible.  Tolerating therapies per endurance.  He reports decrease sleep at night.  Usually takes 2 Tylenol PM at night.  He is already on scheduled Tylenol.  Will order 50 mg of Benadryl at bedtime. He reports he slept well with Benadryl last night.  Tolerating therapies per endurance.  Denies left wrist pain today.Video swallow to be done tomorrow.  Discussed with patient and with speech therapy.  Voice evaluation by ENT ordered for speech therapy request.  Discussed with patient.     14. Reviewed labs today.  BUN 15, creatinine 0.6 on 2/5/2021.  BUN 16, creatinine 0.7 on 2/11/2021.  BUN 12, creatinine 0.8 on 2/15/2021.               Isael Lang MD  2/16/2021

## 2021-02-16 NOTE — PROGRESS NOTES
Patient: Denis Green  Location: [facility] [address]  MRN: [id number]  Today's date: 2/16/2021    History of Present Illness  Denis LOVE is a 70 y.o. male admitted on 2/4/2021 with Pneumonia due to COVID-19 virus. Principal problem is No Principal Problem: There is no principal problem currently on the Problem List. Please update the Problem List and refresh..   Pt admitted to outside hospital on 1/5/2021 for COVID-19 PNA. Worsening resp status intubated on 1/9- extubated 1/16 to OptiFlow. Transition to [facility] on 1/23.Eval by nephrology while in ICU due to ERNIE likely secondary to ATN. Creatinine has been improving with excellent urine output. Received Decadron and Remdesivir. Dobhoff tube was placed due to dysphagia. New fever of 102.8 1/20 and 1/21. ID was consulted. Bld cx's negative. CT scan of the chest abdomen pelvis was done with results showing increasing pulmonary infiltrates/groundglass opacities and consolidations in his bilateral lower lobes. Started empirically on cefepime and vancomycin to cover hospital-acquired pneumonia. Pt had increased lethargy on 1/22. Pt removed DHT 1/25; required higher O2 afterward - possible aspiration during the process. 1/26 patient continued to have intermittent confusion; ?post ICU delirium. Neurology is following, MRI brain was unremarkable. EEG showed mild slowing but no seizure activity. D/c'd abx after 5 days d/t lethargy.       Past Medical History  Denis LOVE has a past medical history of Coronary artery disease, Hypertension, and Lipid disorder.    SLP Pain    Date/Time Pain Type Pref Pain Scale Location Rating: Rest Who   02/16/21 0934 Pain Assessment word (verbal rating pain scale) -- 0 - no pain RS   02/16/21 0958 Pain Reassessment word (verbal rating pain scale) back 2 - mild pain RS          Prior Living Environment      Most Recent Value   Lives With  spouse, child(mindy), adult [adult son lives in home occasionally]   Living Arrangements   house   Home Accessibility  stairs to enter home (Group), stairs within home (Group)   Living Environment Comment  Lives with wife and intermittently with one son.  [bed and bath on 2nd ,  no powder room on first]   Number of Stairs, Main Entrance  1   Stair Railings, Main Entrance  none   Location, Patient Bedroom  second floor, must negotiate stairs to access   Location, Bathroom  second floor, must negotiate stairs to access   Bathroom Access Comment  Pt. reports has tub shower and stall shower, uses tub shower primarily, has grab bar in shower, has stanard height toilet c wall on both sides   Number of Stairs, Within Home, Primary  12   Surface of Stairs, Within Home, Primary  hardwood   Stair Railings, Within Home, Primary  railings on both sides of stairs          Prior Level of Function      Most Recent Value   Dominant Hand  right   Ambulation  independent   Transferring  independent   Toileting  independent   Bathing  independent   Dressing  independent   Eating  independent   Communication  understands/communicates without difficulty   Swallowing  swallows foods/liquids without difficulty   Baseline Diet/Method of Nutritional Intake  regular solids, thin liquids   Past History of Dysphagia  No previous reports    Prior Level of Function Comment  Pt is a cattle/,  previously IND for all IADLs.    Assistive Device/Animal Currently Used at Home  none          IRF SLP Evaluation and Treatment - 02/16/21 0934        Time Calculation    Start Time  0930     Stop Time  1000     Time Calculation (min)  30 min        Session Details    Document Type  daily treatment/progress note     Mode of Treatment  speech language pathology;individual therapy        General Information    General Observations of Patient  Pleasant and cooperative, pt received in ST office         Cognition/Psychosocial    Attention Deficit (Cognitive)  alternating attention     Comment, Attention  Great sustained/alternating attention  to cognitive and swallowing exercises      Executive Function Deficit (Cognition)  information processing;problem solving/reasoning    Fxl Math     Comment, Executive Function  Functional math calculations related to time word problems, 100% acc I'ly         Swallowing Intervention    Pharyngeal Therapeutic Exercise Program  effortful phonation of eee;laryngeal adduction exercises     Comment, Swallowing Interventions  Sustained phonation /ee/ average of 2.5 seconds over 5 trials, sustained /ah/ 3 sconds voer 5 trials.         Daily Progress Summary (SLP)    Daily Outcome Statement (SLP)  Good participation. Suspect voice disorder as pt presents with moderate to severe deficits in prolonged phonation in which she has voice breaks after 2-3 seconds. He presents with hoars breathy vocal quality and reduced volume. MD ordered ENT evaluation of voice this date. Pt to have a VFSS tomorrow to r/o aspiration.                        IRF SLP Goals      Most Recent Value   Verbal Expression Goal 1   Verbal Expression Goal 1  STG: pt will complete mod level verbal expression tasks x 90% c min cues. at 02/06/2021 1405   Time Frame  short-term goal (STG), 1 week at 02/06/2021 1405   Verbal Expression Goal 2   Verbal Expression Goal 2  LTG: pt will complete complex/abstract verbal expression tasks x 90% c independent use of WF strategies. at 02/06/2021 1405   Time Frame  long-term goal (LTG), 3 weeks at 02/06/2021 1405   Oral Nutrition/Hydration Goal 1   Activity  effective/safe/independent, use of swallowing techniques [regular/NTL, no overt s/s of aspiration ] at 02/05/2021 1202   Time Frame  short-term goal (STG), 1 week at 02/05/2021 1202   Oral Nutrition/Hydration Goal 2   Activity  effective/safe/independent, use of swallowing techniques [regular/thin, no overt s/s of aspiration ] at 02/05/2021 1202   Time Frame  long-term goal (LTG), 3 weeks at 02/05/2021 1202   Executive Function Goal 1   Activity  complete, abstract  thinking tasks, executive function tasks, organization/sequencing tasks, problem solving/reasoning tasks, other (see comments) [STG,  simple-mod level tasks.] at 02/06/2021 1405   [place]/Accuracy  with minimum, verbal cues/redirection, with additional processing time, with repetition of directions, with 90% accuracy at 02/06/2021 1405   Time Frame  1 week at 02/06/2021 1405   Executive Function Goal 2   Activity  complete, abstract thinking tasks, exhibit impulse control, organization/sequencing tasks, problem solving/reasoning tasks, other (see comments) [LTG] at 02/06/2021 1405   [place]/Accuracy  independently, with 90% accuracy, other (see comments) [mod-complex tasks.] at 02/06/2021 1405   Time Frame  3 weeks at 02/06/2021 1405   Memory Goal 1   Activity  short-term memory tasks, immediate recall tasks, working memory tasks, recall recent events, other (see comments) [STG,  simple-mod level tasks.] at 02/06/2021 1405   [place]/Accuracy  minimal cues for use of strategies, with 90% accuracy at 02/06/2021 1405   Time Frame  short-term goal (STG), 1 week at 02/06/2021 1405   Memory Goal 2   Activity  short-term memory tasks, immediate recall tasks, working memory tasks, recall recent events, other (see comments) [LTG,  mod complex information.] at 02/06/2021 1405   [place]/Accuracy  independent use of environmental cues/strategies, with 90% accuracy at 02/06/2021 1405   Time Frame  long-term goal (LTG), 3 weeks at 02/06/2021 1405

## 2021-02-16 NOTE — PLAN OF CARE
"  Problem: Rehabilitation (IRF) Plan of Care  Goal: Plan of Care Review  Flowsheets (Taken 2/16/2021 3411)  Plan of Care Reviewed With: patient  IRF Plan of Care Review: progress ongoing, continue  Outcome Summary: Patient completed 12(7\") steps with BHR with Min A with seated break at top.     "

## 2021-02-16 NOTE — PROGRESS NOTES
[facility] Progress Note          Patient was seen and examined at bedside.    Subjective:  Yesterday UA c/w UTI, had smelly and cloudy urine, highly suspect UTI, added macrobid 100 mg bid, requested UCX with the same urine sample sent yesterday. Clinically, he is stable, no clinical S/S of sepsis.   Stable, pleasant, no new complaints or O/N events.   Objective   Vital signs in last 24 hours:  Temp:  [36.2 °C (97.1 °F)-36.6 °C (97.9 °F)] 36.6 °C (97.9 °F)  Heart Rate:  [74-90] 78  Resp:  [18] 18  BP: (118-140)/(66-78) 140/71    No intake or output data in the 24 hours ending 02/16/21 1311  Intake/Output this shift:  No intake/output data recorded.   Review of Systems:  All other systems reviewed and negative except as noted in the HPI.   Objective      Labs  reviewed his labs thoroughly   Lab Results   Component Value Date    WBC 11.56 (H) 02/15/2021    HGB 12.0 (L) 02/15/2021    HCT 38.3 (L) 02/15/2021    MCV 97.2 02/15/2021     (H) 02/15/2021     Lab Results   Component Value Date    GLUCOSE 82 02/15/2021    CALCIUM 8.9 02/15/2021     (L) 02/15/2021    K 3.9 02/15/2021    CO2 26 02/15/2021    CL 91 (L) 02/15/2021    BUN 12 02/15/2021    CREATININE 0.8 02/15/2021       Imaging  OSH imaging study reports reviewed:    CXR 2/5/2021:  IMPRESSION:  Bilateral airspace opacities most prominent in the left upper lobe and left  lower lobe likely representing COVID19 pneumonia.         Full Code    Physical Exam:  Head/Ear/Nose/Throat: normocephalic; atraumatic; moisture mouth mm, no oropharyngeal thrush noted.   Eyes: anicteric sclera, EOMI; PERRL.   Neck : supple, no JVD, no carotid bruits appeciated.   Respiratory: no evidence of labored breathing, lung sounds a little coarse, mild bibasilar diminished BS, no remarkable w/r/c.   Cardiovascular: RRR; normal S1, S2; no m/r/g; no S3 or S4.   Gastrointestinal: soft; NT; BS normal; mildly distended; no CVAT b/l.   Genitourinary: no  lim.   Extremities : no c/c/e .   Neurological: AO x 3, fluent speeches, following commands, CNS II-XII grossly intact; no focal neurologic deficits.   Behavior/Emotional: in NAD, appropriate; cooperative.   Skin: clean, dry and intact.     Plan of care was discussed with patient, RN, and PMR attending.     Assessment     CC: S/p severe covid-19 PNA complicated by VDRF, dysphagia, ERNIE, bacterial PNA, and  hospital delirium, physical deconditioning with ADL and ambulatory dysfunction.       70 y.o. male, I PTA, with PMH of HTN, HLD, and CAD, admitted to [facility] on 1/5/2021 for severeCOVID-19 PNA. Worsening resp status intubated on 1/9- extubated 1/16 to OptiFlow. Transition to [facility] on 1/23.Eval by nephrology while in ICU due to ERNIE likely secondary to ATN. Creatinine has been improving with excellent urine output. Received Decadron and Remdesivir. Dobhoff tube was placed due to dysphagia. New fever of 102.8 on 1/20 and 1/21. ID was consulted. Bld cx's negative. CT scan of the chest abdomen pelvis was done with results showing increasing pulmonary infiltrates/groundglass opacities and consolidations in his bilateral lower lobes. Started empirically on cefepime and vancomycin to cover hospital-acquired pneumonia. Pt had increased lethargy on 1/22. Pt removed DHT 1/25; required higher O2 afterward - possible aspiration during the process. 1/26 patient continued to have intermittent confusion; ?post ICU delirium. Neurology is following, MRI brain was unremarkable. EEG showed mild slowing but no seizure activity. D/c'd abx after 5 days d/t lethargy. Repeat VFSS performed- now on soft nectar thick liquid diet .  Pt is AA&Ox3 with periods of confusion. Tolerates soft diet with NTL, voiding in urinal, O2 @ 2-3L via nc. Participating in therapies with marked improvement, functionally, he has residual ADL and ambulatory dysfunction, rec's for acute rehab prior to home with SO. Transferred to [facility] on 2/4/2021.      1.S/p severe  covid-19 PNA complicated by VDRF, dysphagia, ERNIE, bacterial PNA, and  hospital delirium, physical deconditioning with ADL and ambulatory dysfunction : inpt acute rehab, gait and balancing training, SLP therapy, nutrition support,  Fall/aspiration precaution, medical management, DVT prophylaxis, dermal defense, f/u with PCP after inpt rehab.     2. Pulm-S/p severe covid-19 PNA complicated by VDRF, s/p possible bacteria PNA,  hypoxemia with mild exertion, atelectasis: monitor SO2, prn NC O2, keep SO2>92%, incentive spirometry, bronchodilator inhaler, mucolytic agent, pulm toileting. Check CXR.      3. Psych-s/p hospital acute delirium, intermittent confusion: his metal status has much improved, monitor his mood and mentation, help his orientation, psychology CBT, avoid unnecessary sedatives, SW support.      4. GI-on soft nectar thick liquid diet, constipation:SLP evaluation to advance his diet as tolerated; provide constipation bowel regimen, keep adequate hydration, timed toilet visits.     5. DVT prophy: SCD, early ambulation, SQ heparin, check LE venous DUS to r/o DVT.       6. HTN, dyslipidemia: cont home HTN meds with holding parameter, orthostatic hypotension precaution, monitor BP . On gemfibrozil, f/u with his PCP.      Mixed significant dyslipidemia: LCL-C 185, HDL-C 30, , h/o HTN, HLD, and CAD, only gemfibrozil is far from adequate, labeled Lipitor allergy, still worth retrial with hydrophilic formula Crestor from low dose, which is absolutely necessary and also guideline recommended    7. Renal-s/p ERNIE recovered, electrolytes imbalance: monitor renal function and volume status, avoid nephrotoxic agents and low BP,  monitor and keep electrolytes balance.     8. - urinary retention: timed voiding trial, monitor PVR with prn cic, check UA/UCX.     Billing code: [id number]  Diagnoses:  Patient Active Problem List   Diagnosis   • Acute metabolic encephalopathy   • ARDS (adult respiratory distress syndrome)  (CMS/HCC)   • Coronary artery disease involving native coronary artery of native heart without angina pectoris   • Hypoxia   • Pneumonia due to COVID-19 virus   • Transaminitis   • Essential hypertension   • Dyslipidemia   • Atelectasis   • Physical deconditioning   • Acute cystitis     complicated case with multiple comorbidities as mentioned in subjective section, spent 35 min to manage the case, >50% of the time consulting the patient about current medical condition, existing comorbidities, new findings/concerns and care/management plan.       Laure Bishop MD  2/16/2021

## 2021-02-17 ENCOUNTER — APPOINTMENT (INPATIENT)
Dept: OCCUPATIONAL THERAPY | Facility: REHABILITATION | Age: 71
DRG: 948 | End: 2021-02-17
Payer: COMMERCIAL

## 2021-02-17 ENCOUNTER — HOSPITAL ENCOUNTER (OUTPATIENT)
Dept: SPEECH THERAPY | Facility: REHABILITATION | Age: 71
Discharge: HOME | End: 2021-02-17
Payer: COMMERCIAL

## 2021-02-17 ENCOUNTER — APPOINTMENT (INPATIENT)
Dept: PHYSICAL THERAPY | Facility: REHABILITATION | Age: 71
DRG: 948 | End: 2021-02-17
Payer: COMMERCIAL

## 2021-02-17 ENCOUNTER — HOSPITAL ENCOUNTER (OUTPATIENT)
Dept: RADIOLOGY | Facility: HOSPITAL | Age: 71
Discharge: HOME | End: 2021-02-17
Attending: PHYSICAL MEDICINE & REHABILITATION
Payer: COMMERCIAL

## 2021-02-17 ENCOUNTER — APPOINTMENT (INPATIENT)
Dept: OTHER | Facility: REHABILITATION | Age: 71
DRG: 948 | End: 2021-02-17
Payer: COMMERCIAL

## 2021-02-17 DIAGNOSIS — R13.10 DYSPHAGIA, UNSPECIFIED TYPE: ICD-10-CM

## 2021-02-17 PROCEDURE — 63700000 HC SELF-ADMINISTRABLE DRUG: Performed by: INTERNAL MEDICINE

## 2021-02-17 PROCEDURE — 97530 THERAPEUTIC ACTIVITIES: CPT | Mod: GO

## 2021-02-17 PROCEDURE — 97530 THERAPEUTIC ACTIVITIES: CPT

## 2021-02-17 PROCEDURE — 63700000 HC SELF-ADMINISTRABLE DRUG: Performed by: PHYSICAL MEDICINE & REHABILITATION

## 2021-02-17 PROCEDURE — 97116 GAIT TRAINING THERAPY: CPT | Mod: GP

## 2021-02-17 PROCEDURE — 97110 THERAPEUTIC EXERCISES: CPT | Mod: GO

## 2021-02-17 PROCEDURE — 92611 MOTION FLUOROSCOPY/SWALLOW: CPT | Mod: GN

## 2021-02-17 PROCEDURE — 12800000 HC ROOM AND CARE SEMIPRIVATE REHAB

## 2021-02-17 PROCEDURE — 97530 THERAPEUTIC ACTIVITIES: CPT | Mod: GP

## 2021-02-17 PROCEDURE — 74230 X-RAY XM SWLNG FUNCJ C+: CPT

## 2021-02-17 PROCEDURE — 97110 THERAPEUTIC EXERCISES: CPT | Mod: GP

## 2021-02-17 PROCEDURE — 97535 SELF CARE MNGMENT TRAINING: CPT | Mod: GO

## 2021-02-17 RX ADMIN — GUAIFENESIN 600 MG: 600 TABLET ORAL at 07:56

## 2021-02-17 RX ADMIN — AMLODIPINE BESYLATE 2.5 MG: 2.5 TABLET ORAL at 20:56

## 2021-02-17 RX ADMIN — RIVAROXABAN 10 MG: 10 TABLET, FILM COATED ORAL at 17:16

## 2021-02-17 RX ADMIN — GUAIFENESIN 600 MG: 600 TABLET ORAL at 20:56

## 2021-02-17 RX ADMIN — SODIUM CHLORIDE 1 G: 1 TABLET ORAL at 17:16

## 2021-02-17 RX ADMIN — GEMFIBROZIL 600 MG: 600 TABLET ORAL at 07:56

## 2021-02-17 RX ADMIN — FUROSEMIDE 20 MG: 20 TABLET ORAL at 07:56

## 2021-02-17 RX ADMIN — NITROFURANTOIN (MONOHYDRATE/MACROCRYSTALS) 100 MG: 75; 25 CAPSULE ORAL at 20:56

## 2021-02-17 RX ADMIN — Medication 3 MG: at 20:56

## 2021-02-17 RX ADMIN — DOCUSATE SODIUM 100 MG: 100 CAPSULE, LIQUID FILLED ORAL at 07:56

## 2021-02-17 RX ADMIN — ACETAMINOPHEN 650 MG: 325 TABLET, FILM COATED ORAL at 14:33

## 2021-02-17 RX ADMIN — ASPIRIN 81 MG: 81 TABLET ORAL at 07:56

## 2021-02-17 RX ADMIN — DIPHENHYDRAMINE HYDROCHLORIDE 50 MG: 25 CAPSULE ORAL at 20:56

## 2021-02-17 RX ADMIN — Medication 400 MG: at 20:56

## 2021-02-17 RX ADMIN — METOPROLOL TARTRATE 12.5 MG: 25 TABLET, FILM COATED ORAL at 20:56

## 2021-02-17 RX ADMIN — Medication 400 MG: at 07:56

## 2021-02-17 RX ADMIN — FUROSEMIDE 20 MG: 20 TABLET ORAL at 17:16

## 2021-02-17 RX ADMIN — METOPROLOL TARTRATE 12.5 MG: 25 TABLET, FILM COATED ORAL at 07:56

## 2021-02-17 RX ADMIN — GEMFIBROZIL 600 MG: 600 TABLET ORAL at 17:16

## 2021-02-17 RX ADMIN — SODIUM CHLORIDE 1 G: 1 TABLET ORAL at 07:56

## 2021-02-17 RX ADMIN — NITROFURANTOIN (MONOHYDRATE/MACROCRYSTALS) 100 MG: 75; 25 CAPSULE ORAL at 07:56

## 2021-02-17 NOTE — PROGRESS NOTES
Subjective    Patient seen and examined on rounds.  Chart reviewed.  Events overnight noted.  History reviewed briefly with patient.     CC:  Deficits in mobility, transfers, self-care status post deconditioning, severe Covid 19 pneumonia, ventilator-dependent respiratory failure, dysphagia, multiple medical problems.     HPI:  Mr. Denis Green is a 70-year-old right-handed white male with chronic conditions significant for coronary artery disease, hypertension, hyperlipidemia who was admitted to [facility] on 1/5/21 due to Covid 19 pneumonia and worsening respiratory status.  He required intubation on 1/9/21 and had ventilator-dependent respiratory failure.  He was treated with Decadron and Remdesivir.  He had dysphagia requiring Dobbhoff tube feeds.  He was extubated on 1/16/21 to OptiFlow.  On 1/23/21 he was transitioned to high flow nasal cannula.  Hospital course was significant for acute renal insufficiency likely secondary to acute tubular necrosis and he was followed by nephrology.  He had subsequent improvement in renal function.  He also had new fever of 102.8°F on 1/20/21 and 1/21/21.  He was followed by infectious disease.  Blood cultures were negative. CT scan of the chest abdomen pelvis was done with results showing increasing pulmonary infiltrates/groundglass opacities and consolidations in his bilateral lower lobes.  He was treated empirically with Cefepime and Vancomycin to cover hospital-acquired pneumonia.  He was noted to be lethargic on 1/22/21.  Subsequently patient removed Dobbhoff tube on 1/25/21 and there is question of possible aspiration during that process.  He continued to have intermittent confusion on 1/26/21 was felt to be possible post ICU delirium.  He was followed by neurology.  MRI brain was unremarkable.  EEG showed mild slowing but no seizure activity was noted.  Antibiotics were discontinued after a 5 day course due to lethargy.  Repeat video swallow study was  "performed for dysphagia and patient was put on a soft diet with nectar thick liquids. On 2/3/21, hemoglobin was 11.2, WBC count 15.1, platelets were 275, BUN was 15, and creatinine was 0.7.  He has been needing assistance for mobility, transfers, self-care postoperatively. He is transferred to [place] [facility] on 2/4/21 for further rehabilitation care.       SUBJ: Video swallow was done today.  Mild oral and pharyngeal dysphagia noted.  Transient penetration of thin liquids is noted.  Speech therapy working with dysphagia.  Voice evaluation by ENT will be done tomorrow.  Discussed with patient.    ROS: Denies chest pain or shortness of breath. Other ROS negative. Past, family, social history is unchanged.    Physical Exam      Blood pressure 129/72, pulse 89, temperature 36.7 °C (98 °F), temperature source Oral, resp. rate 18, height 1.727 m (5' 8\"), weight 77.1 kg (170 lb), SpO2 95 %.  Body mass index is 25.85 kg/m².    General Appearance: Not in acute distress  Head/Ear/Nose/Throat: Normocephalic; Atraumatic.  On oxygen by nasal cannula.  Eye: EOMI; PERRL.   Neck: No JVD; No Bruits.   Respiratory: Decreased breath sounds at bases.   Cardiovascular: RRR; Normal S1, S2.   Gastrointestinal: Soft; NT; +BS.   Extremities: Bilateral lower extremity edema noted.   Musculoskeletal: Functional active range of motion in both upper and lower extremities.    Neurological: Awake alert oriented to person, had some difficulty naming the place and correct date.. Speech is fluent. Cranial nerve examination does not reveal any gross facial asymmetry. Strength testing about 4/5 strength in both upper extremities.  Bilateral hip flexion is 3+/5.  Bilateral quadriceps are 4/5 with the thighs supported.  Bilateral ankle dorsi and plantar flexion are 4/5.  He is grossly able to localize touch and position sense in great toes.  Deep tendon reflexes are hypoactive and symmetric bilaterally.  Plantars are flexor.  Coordination is " functional upper extremities.  Neurologically unchanged.  Behavior/Emotional: Appropriate; Cooperative.   Skin: No obvious rashes noted.        Current Facility-Administered Medications:   •  acetaminophen (TYLENOL) tablet 650 mg, 650 mg, oral, q4h PRN, Omari Spain MD, 650 mg at 02/17/21 1433  •  albuterol HFA (VENTOLIN HFA) 90 mcg/actuation inhaler 2 puff, 2 puff, inhalation, q4h PRN, Laure Bishop MD  •  alum-mag hydroxide-simeth (MAALOX) 200-200-20 mg/5 mL suspension 30 mL, 30 mL, oral, q6h PRN, Laure Bishop MD  •  amLODIPine (NORVASC) tablet 2.5 mg, 2.5 mg, oral, Nightly, Laure Bishop MD, 2.5 mg at 02/16/21 2110  •  aspirin enteric coated tablet 81 mg, 81 mg, oral, Daily, Omari Spain MD, 81 mg at 02/17/21 0756  •  bisacodyL (DULCOLAX) 10 mg suppository 10 mg, 10 mg, rectal, Daily PRN, Isael Lang MD, 10 mg at 02/09/21 1850  •  diphenhydrAMINE (BENADRYL) capsule 50 mg, 50 mg, oral, Nightly, Isael Lang MD, 50 mg at 02/16/21 2110  •  docusate sodium (COLACE) capsule 100 mg, 100 mg, oral, TID, Isael Lang MD, 100 mg at 02/17/21 0756  •  furosemide (LASIX) tablet 20 mg, 20 mg, oral, BID (am, 4p), Laure Bishop MD, 20 mg at 02/17/21 0756  •  gemfibroziL (LOPID) tablet 600 mg, 600 mg, oral, BID AC, Omari Spain MD, 600 mg at 02/17/21 0756  •  guaiFENesin (MUCINEX) 12 hr ER tablet 600 mg, 600 mg, oral, BID, Laure Bishop MD, 600 mg at 02/17/21 0756  •  magnesium hydroxide (M.O.M.) 400 mg/5 mL suspension 30 mL, 30 mL, oral, 2x daily PRN, Laure Bishop MD, 30 mL at 02/09/21 0904  •  magnesium oxide (MAG-OX) tablet 400 mg, 400 mg, oral, BID, Laure Bishop MD, 400 mg at 02/17/21 0756  •  melatonin ODT 3 mg, 3 mg, oral, Nightly, Laure Bishop MD, 3 mg at 02/16/21 2111  •  metoprolol tartrate (LOPRESSOR) split tablet 12.5 mg, 12.5 mg, oral, BID, Omari Spain MD, 12.5 mg at 02/17/21 0756  •  mouth moisturizer (TOOTHETTE) cream, , mucous membrane, TID, Laure Bishop MD, Given at  02/17/21 0758  •  nitrofurantoin (macrocrystal-monohydrate) (MACROBID) capsule 100 mg, 100 mg, oral, q12h EBER, Laure Bishop MD, 100 mg at 02/17/21 0756  •  polyethylene glycol (MIRALAX) 17 gram packet 17 g, 17 g, oral, Daily PRN, Omari Spain MD, 17 g at 02/04/21 2157  •  rivaroxaban (XARELTO) tablet 10 mg, 10 mg, oral, Daily with dinner, Laure Bishop MD, 10 mg at 02/16/21 1628  •  senna (SENOKOT) tablet 3 tablet, 3 tablet, oral, Daily before lunch, Isael Lang MD, 3 tablet at 02/13/21 1251  •  sodium chloride tablet 1 g, 1 g, oral, BID with meals, Laure Bishop MD, 1 g at 02/17/21 0756       Labs / Radiology    Lab Results   Component Value Date    WBC 11.56 (H) 02/15/2021    HGB 12.0 (L) 02/15/2021    HCT 38.3 (L) 02/15/2021    MCV 97.2 02/15/2021     (H) 02/15/2021     Lab Results   Component Value Date    GLUCOSE 82 02/15/2021    CALCIUM 8.9 02/15/2021     (L) 02/15/2021    K 3.9 02/15/2021    CO2 26 02/15/2021    CL 91 (L) 02/15/2021    BUN 12 02/15/2021    CREATININE 0.8 02/15/2021       Assessment and Plan    ASSESSMENT PLAN:  1. 70-year-old right-handed white male with chronic conditions significant for coronary artery disease, hypertension, hyperlipidemia who was admitted to [facility] on 1/5/21 due to Covid 19 pneumonia and worsening respiratory status.  He required intubation on 1/9/21 and had ventilator-dependent respiratory failure.  He was treated with Decadron and Remdesivir.  He had dysphagia requiring Dobbhoff tube feeds.  He was extubated on 1/16/21 to OptiFlow.  On 1/23/21 he was transitioned to high flow nasal cannula.  Hospital course was significant for acute renal insufficiency likely secondary to acute tubular necrosis and he was followed by nephrology.  He had subsequent improvement in renal function.  He also had new fever of 102.8°F on 1/20/21 and 1/21/21.  He was followed by infectious disease.  Blood cultures were negative. CT scan of the chest  abdomen pelvis was done with results showing increasing pulmonary infiltrates/groundglass opacities and consolidations in his bilateral lower lobes.  He was treated empirically with Cefepime and Vancomycin to cover hospital-acquired pneumonia.  He was noted to be lethargic on 1/22/21.  Subsequently patient removed Dobbhoff tube on 1/25/21 and there is question of possible aspiration during that process.  He continued to have intermittent confusion on 1/26/21 was felt to be possible post ICU delirium.  He was followed by neurology.  MRI brain was unremarkable.  EEG showed mild slowing but no seizure activity was noted.  Antibiotics were discontinued after a 5 day course due to lethargy.  Repeat video swallow study was performed for dysphagia and patient was put on a soft diet with nectar thick liquids. On 2/3/21, hemoglobin was 11.2, WBC count 15.1, platelets were 275, BUN was 15, and creatinine was 0.7.  He has been needing assistance for mobility, transfers, self-care postoperatively. He is transferred to [place] [facility] on 2/4/21 for further rehabilitation care.       2. DVT prophylaxis - on subcutaneous Heparin.  On SCDs.  Platelets 296 on 2/5/2021.  Platelets 389 on 2/11/2021.  Platelets 470 on 2/15/2021.     3.  Deconditioning - recent severe Covid 19 pneumonia, ventilator-dependent respiratory failure, dysphagia, multiple medical problems - He had ventilator-dependent respiratory failure.  He was treated with Decadron and Remdesivir.  Continue PT, OT, speech, psychology.  Follow falls precautions, cardiac precautions, aspiration precautions.     4. GI - On Colace, Senokot.  On PRN MiraLAX, MOM, Maalox.      5.  - voiding.  Denies dysuria.  Monitor post void residuals.     6.  Pulmonary - recent severe Covid 19 pneumonia, ventilator-dependent respiratory failure - on Ventolin HFA.  On Mucinex.     7. Pain - on PRN Tylenol.  On scheduled Tylenol for left wrist pain.  On topical Voltaren gel.  X-rays  of left wrist showed moderate degenerative arthritis.  K pad as needed for pain.     8. Hematology -hemoglobin 12.0, WBC 8.15 on 2/5/2021.  Hemoglobin 10.7, WBC 8.05 on 2/11/2021.  Hemoglobin 12.0, WBC 11.56 on 2/15/2021.     9. CVS - history of coronary artery disease and hypertension.  On Norvasc.  On Lopressor.  On Aspirin.  On Lasix.  On Magnesium oxide.     10.  Hyperlipidemia - on Lopid.     11.  Insomnia - on Melatonin.     12.  Dysphagia -on soft diet with nectar thick liquids.  Free water protocol with ice chips and sips of water per speech therapy.  Speech therapy and nutrition following.     13. Rehabilitation medicine - Continue comprehensive rehabilitation care. Continue PT, OT, speech, psychology.  We will follow falls precautions, cardiac precautions, monitor pulse oximetry in therapy and follow aspiration precautions.  Tolerating therapies per endurance.  Discussed with speech therapy.  Free water protocol ice chips was started.  Denies coughing with meals.He reported some pain in left wrist.  He is not sure if he accidentally banged the left wrist against the side rail of the bed.  Denies history of gout.  Left wrist does not appear warm or tender to touch.  K pad ordered.  We will also order scheduled Tylenol for 7 days.  Discussed results of wrist x-rays with patient which showed moderate degenerative change particularly about the radial aspect of the wrist.  He reports pain is less today.  He ambulated 25 feet with rolling walker with assistance of 2 people with physical therapy.Free water protocol with ice chips and sips of water per speech therapy.  He feels better today.  Left wrist pain is decreased significantly per patient.  Working on endurance with physical therapy.  He required minimal to moderate assistance for transfers.  Working on ADLs with occupational therapy.He feels better today.  Denies increased pain.  Discussed with him about doing incentive spirometry.  We will try to wean  off oxygen if possible.Trying to wean off oxygen as possible.  Tolerating therapies per endurance.  He reports decrease sleep at night.  Usually takes 2 Tylenol PM at night.  He is already on scheduled Tylenol.  Will order 50 mg of Benadryl at bedtime. He reports he slept well with Benadryl last night.  Tolerating therapies per endurance.  Denies left wrist pain today.Video swallow was done today.  Mild oral and pharyngeal dysphagia noted.  Transient penetration of thin liquids is noted.  Speech therapy working with dysphagia.  Voice evaluation by ENT will be done tomorrow.  Discussed with patient.     14. Reviewed labs today.  BUN 15, creatinine 0.6 on 2/5/2021.  BUN 16, creatinine 0.7 on 2/11/2021.  BUN 12, creatinine 0.8 on 2/15/2021.               Isael Lang MD  2/17/2021

## 2021-02-17 NOTE — PROGRESS NOTES
[facility] Progress Note          Patient was seen and examined at bedside.    Subjective:  2/16/2021:  Yesterday UA c/w UTI, had smelly and cloudy urine, highly suspect UTI, added macrobid 100 mg bid, requested UCX with the same urine sample sent yesterday. Clinically, he is stable, no clinical S/S of sepsis.   Stable, pleasant, no new complaints or O/N events.     2/17/2021:  Stable, no O/N events or new complaints, UCX still pending, keep Macrobid. His PT/OT is progressing well.     Objective   Vital signs in last 24 hours:  Temp:  [36.7 °C (98 °F)-36.9 °C (98.5 °F)] 36.7 °C (98 °F)  Heart Rate:  [77-97] 77  Resp:  [18-20] 18  BP: (102-140)/(61-79) 117/66    No intake or output data in the 24 hours ending 02/17/21 1040  Intake/Output this shift:  No intake/output data recorded.   Review of Systems:  All other systems reviewed and negative except as noted in the HPI.   Objective      Labs  reviewed his labs thoroughly   Lab Results   Component Value Date    WBC 11.56 (H) 02/15/2021    HGB 12.0 (L) 02/15/2021    HCT 38.3 (L) 02/15/2021    MCV 97.2 02/15/2021     (H) 02/15/2021     Lab Results   Component Value Date    GLUCOSE 82 02/15/2021    CALCIUM 8.9 02/15/2021     (L) 02/15/2021    K 3.9 02/15/2021    CO2 26 02/15/2021    CL 91 (L) 02/15/2021    BUN 12 02/15/2021    CREATININE 0.8 02/15/2021       Imaging  OSH imaging study reports reviewed:    CXR 2/5/2021:  IMPRESSION:  Bilateral airspace opacities most prominent in the left upper lobe and left  lower lobe likely representing COVID19 pneumonia.         Full Code    Physical Exam:  Head/Ear/Nose/Throat: normocephalic; atraumatic; moisture mouth mm, no oropharyngeal thrush noted.   Eyes: anicteric sclera, EOMI; PERRL.   Neck : supple, no JVD, no carotid bruits appeciated.   Respiratory: no evidence of labored breathing, lung sounds a little coarse, mild bibasilar diminished BS, no remarkable w/r/c.   Cardiovascular: RRR;  normal S1, S2; no m/r/g; no S3 or S4.   Gastrointestinal: soft; NT; BS normal; mildly distended; no CVAT b/l.   Genitourinary: no lim.   Extremities : no c/c/e .   Neurological: AO x 3, fluent speeches, following commands, CNS II-XII grossly intact; no focal neurologic deficits.   Behavior/Emotional: in NAD, appropriate; cooperative.   Skin: clean, dry and intact.     Plan of care was discussed with patient, RN, and PMR attending.     Assessment     CC: S/p severe covid-19 PNA complicated by VDRF, dysphagia, ERNIE, bacterial PNA, and  hospital delirium, physical deconditioning with ADL and ambulatory dysfunction.       70 y.o. male, I PTA, with PMH of HTN, HLD, and CAD, admitted to [facility] for severeCOVID-19 PNA. Worsening resp status intubated on 1/9- extubated 1/16 to OptiFlow. Transition to [facility] on 1/23.Eval by nephrology while in ICU due to ERNIE likely secondary to ATN. Creatinine has been improving with excellent urine output. Received Decadron and Remdesivir. Dobhoff tube was placed due to dysphagia. New fever of 102.8 on 1/20 and 1/21. ID was consulted. Bld cx's negative. CT scan of the chest abdomen pelvis was done with results showing increasing pulmonary infiltrates/groundglass opacities and consolidations in his bilateral lower lobes. Started empirically on cefepime and vancomycin to cover hospital-acquired pneumonia. Pt had increased lethargy on 1/22. Pt removed DHT 1/25; required higher O2 afterward - possible aspiration during the process. 1/26 patient continued to have intermittent confusion; ?post ICU delirium. Neurology is following, MRI brain was unremarkable. EEG showed mild slowing but no seizure activity. D/c'd abx after 5 days d/t lethargy. Repeat VFSS performed- now on soft nectar thick liquid diet .  Pt is AA&Ox3 with periods of confusion. Tolerates soft diet with NTL, voiding in urinal, O2 @ 2-3L via nc. Participating in therapies with marked improvement, functionally, he has residual  ADL and ambulatory dysfunction, rec's for acute rehab prior to home with SO. Transferred to [facility] on 2/4/2021.      1.S/p severe covid-19 PNA complicated by VDRF, dysphagia, ERNIE, bacterial PNA, and  hospital delirium, physical deconditioning with ADL and ambulatory dysfunction : inpt acute rehab, gait and balancing training, SLP therapy, nutrition support,  Fall/aspiration precaution, medical management, DVT prophylaxis, dermal defense, f/u with PCP after inpt rehab.     2. Pulm-S/p severe covid-19 PNA complicated by VDRF, s/p possible bacteria PNA,  hypoxemia with mild exertion, atelectasis: monitor SO2, prn NC O2, keep SO2>92%, incentive spirometry, bronchodilator inhaler, mucolytic agent, pulm toileting. Check CXR.      3. Psych-s/p hospital acute delirium, intermittent confusion: his metal status has much improved, monitor his mood and mentation, help his orientation, psychology CBT, avoid unnecessary sedatives, SW support.      4. GI-on soft nectar thick liquid diet, constipation:SLP evaluation to advance his diet as tolerated; provide constipation bowel regimen, keep adequate hydration, timed toilet visits.     5. DVT prophy: SCD, early ambulation, SQ heparin, check LE venous DUS to r/o DVT.       6. HTN, dyslipidemia: cont home HTN meds with holding parameter, orthostatic hypotension precaution, monitor BP . On gemfibrozil, f/u with his PCP.      Mixed significant dyslipidemia: LCL-C 185, HDL-C 30, , h/o HTN, HLD, and CAD, only gemfibrozil is far from adequate, labeled Lipitor allergy, still worth retrial with hydrophilic formula Crestor from low dose, which is absolutely necessary and also guideline recommended    7. Renal-s/p ERNIE recovered, electrolytes imbalance: monitor renal function and volume status, avoid nephrotoxic agents and low BP,  monitor and keep electrolytes balance.     8. - urinary retention: timed voiding trial, monitor PVR with prn cic, check UA/UCX.     Billing code:  [id number]  Diagnoses:  Patient Active Problem List   Diagnosis   • Acute metabolic encephalopathy   • ARDS (adult respiratory distress syndrome) (CMS/HCC)   • Coronary artery disease involving native coronary artery of native heart without angina pectoris   • Hypoxia   • Pneumonia due to COVID-19 virus   • Transaminitis   • Essential hypertension   • Dyslipidemia   • Atelectasis   • Physical deconditioning   • Acute cystitis        Laure Bishop MD  2/17/2021

## 2021-02-17 NOTE — PROGRESS NOTES
Patient: Denis Green  Location: [facility] [address]  MRN: [id number]  Today's date: 2/17/2021    Hospital Course  History of Present Illness  Denis LOVE is a 70 y.o. male admitted on 2/4/2021 with Pneumonia due to COVID-19 virus. Principal problem is No Principal Problem: There is no principal problem currently on the Problem List. Please update the Problem List and refresh..   Pt admitted to outside hospital on 1/5/2021 for COVID-19 PNA. Worsening resp status intubated on 1/9- extubated 1/16 to OptiFlow. Transition to [facility]NC on 1/23.Eval by nephrology while in ICU due to ERNIE likely secondary to ATN. Creatinine has been improving with excellent urine output. Received Decadron and Remdesivir. Dobhoff tube was placed due to dysphagia. New fever of 102.8 1/20 and 1/21. ID was consulted. Bld cx's negative. CT scan of the chest abdomen pelvis was done with results showing increasing pulmonary infiltrates/groundglass opacities and consolidations in his bilateral lower lobes. Started empirically on cefepime and vancomycin to cover hospital-acquired pneumonia. Pt had increased lethargy on 1/22. Pt removed DHT 1/25; required higher O2 afterward - possible aspiration during the process. 1/26 patient continued to have intermittent confusion; ?post ICU delirium. Neurology is following, MRI brain was unremarkable. EEG showed mild slowing but no seizure activity. D/c'd abx after 5 days d/t lethargy.       Past Medical History  Denis LOVE has a past medical history of Coronary artery disease, Hypertension, and Lipid disorder.    Therapy Pain/Vitals - 02/17/21 1124        Pain/Comfort/Sleep    Pain Charting Type  Pain Reassessment     Pain Rating (0-10): Rest  0        Vital Signs    Pulse  84     Heart Rate Source  Monitor     BP  (!) 144/81     BP Location  Left upper arm     BP Method  Automatic     Patient Position  Sitting           Prior Living Environment      Most Recent Value   Lives With  spouse,  child(mindy), adult [adult son lives in home occasionally]   Living Arrangements  house   Home Accessibility  stairs to enter home (Group), stairs within home (Group)   Living Environment Comment  Lives with wife and intermittently with one son.  [bed and bath on 2nd ,  no powder room on first]   Number of Stairs, Main Entrance  1   Stair Railings, Main Entrance  none   Location, Patient Bedroom  second floor, must negotiate stairs to access   Location, Bathroom  second floor, must negotiate stairs to access   Bathroom Access Comment  Pt. reports has tub shower and stall shower, uses tub shower primarily, has grab bar in shower, has stanard height toilet c wall on both sides   Number of Stairs, Within Home, Primary  12   Surface of Stairs, Within Home, Primary  hardwood   Stair Railings, Within Home, Primary  railings on both sides of stairs          Prior Level of Function      Most Recent Value   Dominant Hand  right   Ambulation  independent   Transferring  independent   Toileting  independent   Bathing  independent   Dressing  independent   Eating  independent   Communication  understands/communicates without difficulty   Swallowing  swallows foods/liquids without difficulty   Baseline Diet/Method of Nutritional Intake  regular solids, thin liquids   Past History of Dysphagia  No previous reports    Prior Level of Function Comment  Pt is a cattle/,  previously IND for all IADLs.    Assistive Device/Animal Currently Used at Home  none          Recreational Therapy Evaluation and Treatment - 02/17/21 0900        Session Details    Document Type  daily treatment/progress note     Mode of Treatment  individual therapy;recreational therapy        Time Calculation    Start Time  0900     Stop Time  1000     Time Calculation (min)  60 min        General Information    Patient Profile Reviewed?  yes     General Observations of Patient  direct handoff from OT        Coping/Community Reintegration    Observed  "Emotional State  calm     Verbalized Emotional State  acceptance        Coping Strategies    Counseling (Coping Strategies)  active listening utilized     Comment  during IE pt talked about wanting to find new things to do because he will have much more free time when he goes home. Session dedicated to leisure education workbook (created by this CTRS) for benefit of pt and family. Pt identified under emotional domain of heatlh, \"improving stress management\" as his main benefit to address while engaging in recreation and leisure pursuits. Pt's pie of life typical day met 4/5 domains of health, did not include spirituality. Pie of life \"ideal day\" included fishing, spending time c his wife, and still included 8-hour work day. Pt believes he has no intra- or interpersonal constraints to leisure. He identified his main leisure preference as spending time c family, though was not interested in examples of leisure activities to do as a family in his hometown, provided by therapist. Reporting to not participate in groups/activities in community d/t being too busy working, con't to not be very receptive to educating about community resources. At the end of the workbook, the pt did make 1 STG (\"going fishing with son\") and 1 LTG (\"actually enjoy the outdoors and sit down out there without being stressed\") for leisure pursuits. Pt will require reinforcement of leisure education for QOL upon dc      Therapeutic Recreational Activities (Coping Strategies)  other (see comments)     Quality of Life Promotion (Coping Strategies)  community involvement promoted        Rec Therapy Clinical Impression    Rehab Potential/Prognosis  good, to achieve stated therapy goals     Daily Outcome Statement  RT session dedicated to leisure education, pt will require reinforcement by CTRS t/o during of IP rehab stay for the promotion of QOL            Pain Assessment/Intervention  Pain Charting Type: Pain Assessment           Rec Care Plan Goals  "     Most Recent Value   Community Goal, Recreation   Recreation STG Activity: Community  Participate in simulated community integration to maximize on functional mobility and independence c min A and appropriate AD (at time of CI), in order to return to pre-morbid community involvement.   Recreation STG: Community  minimum assist (75% or less patient effort)   Recreation STG Date Established: Community  02/11/21   Recreation STG Duration: Community  10 days or less   Leisure Goal, Recreation   Recreation STG Activity: Leisure  Participate in chosen therapeutic activity incorporating strength and coordination in B LE's c min A and appropriate AD to promote independence and QOL at discharge.    Recreation STG: Leisure  minimum assist (75% or less patient effort)   Recreation STG Date Established: Leisure  02/11/21   Recreation STG Duration: Leisure  10 days or less   Additional Goal, Recreation   Recreation STG Activity: Additional Goal  Participate in pre-morbid leisure interests at least 2-3x/week to increase affect and coping mechanisms in hospital environment with global pandemic precautions in place.   Recreation STG: Additional  other (see comments) [2-3x/wk]   Recreation STG Date Established: Additional  02/11/21   Recreation STG Duration: Additional  10 days or less

## 2021-02-17 NOTE — PLAN OF CARE
Problem: Rehabilitation (IRF) Plan of Care  Goal: Plan of Care Review  Flowsheets (Taken 2/17/2021 5573)  Plan of Care Reviewed With: patient  IRF Plan of Care Review: progress ongoing, continue  Outcome Summary: RT session dedicated to leisure education, pt will require reinforcement by CTRS t/o during of IP rehab stay for the promotion of QOL

## 2021-02-17 NOTE — DISCHARGE INSTR - ACTIVITY
Physical Therapy:      Bed mobility: Bubba completes supine <> sit at a Modified Independent level with no assistive device.     Transfers: Bubba completes sit to stand and stand pivot transfers with use of Rolling Walker at a Modified Independent Level.     Ambulation: Bubba ambulates up to 150' on indoor level surfaces with use of a Rolling Walker at a Modified Independent Level.     Elevations: Bubba requires Close Supervision to complete a Full Flight of stairs with use of bilateral handrails.     Additional    Precautions: Falls risk. Continue to utilize your Rolling Walker to minimize risk of falls.     Durable Medical Equipment: Continue to utilize your Rolling Walker for all upright mobility until upgraded by your home PT.     Home Exercise Program: Complete your Home Exercise Program 1x/day upon discharge to home.     Entered by: David Roach, PT on: 2/23/2021    Occupational Therapy:  Isa Johnston (Betsy) OTR/L [facility]   [facility]    Grooming: Moderately independent standing at sink. Denis may benefit from sitting during grooming for safety and energy conservation.    Bathing: ***Recommend seated in shower for safety. Moderately Independent to complete task with access to grab bars.     Shower Transfers: OT recommending bench with back and grab bars. Deins will have vertical grab bar installed on wall adjacent to threshold. A 2nd bar would be beneficial on back wall.     Upper Body Dressing: Mod I seated for safety  Clothing Retireval: Mod I using rolling walker. Denis has a walker bag to transport small items safely.     Lower Body Dressing: Mod I seated.   Adaptive Equipment: sock aid, dressing stick, reacher    Toileting: Denis is Moderately independent with toilet safety rails and rolling walker for support.     Toilet Transfers: Moderately independent using rolling walker. Will use toilet safety frame at home.    Household Mobility/Household Activity: Denis  requires moderate independence to ambulate in the home with rolling walker. Denis' wife completed family training and reported that she feels comfortable providing the necessary level of care. Wife will assist with all house hold activities to include laundry, cleaning, cooking, shopping. Denis can assist with simple tasks but be mindful of energy conservation.    Driving:Patient has a handicap parking placard application, signed, completed by jo.    DME: Recommended durable medical equipment in the bathroom to include grab bars and a bench with back. Toilet safety frame    Medication Management: OT recommending weekly pill box with 3 to 4 compartments per day as applicable.    OT recommending Denis keep his cell phone with him at all times for emergencies.    SPEECH THERAPY:Swallowing:  Please continue your regular diet and thin liquids level, and take your medication whole with a sip of liquid. Continue to use your swallowing strategies: alternate food and liquid and decrease bite/drink size. A swallowing study (Video Fluorospcopic Swallow Study) was completed on 2/17/21 which showed Mild oral phase dysphagia characterized by mildly delayed A-P transfer and posterior loss of thin liquids. Mild pharyngeal phase dysphagia characterized by delayed swallow initiation of thin liquids, consistent transient penetration of thin liquids, and mild pharyngeal residue of thin liquids and soft solids. No aspiration present during this study.     Cognition:  All cognitive goals have been met. Bubba demonstrates great progress in short term memory, abstract reasoning, and attention skills. Initial supervision and assistance is recommended for the following tasks: managing your medications, managing your finances and scheduling appointments . Continue to use your strategies to help your thinking: STAR (Stop, Think, Act, Review), WRAP (write it down, rehearse, associate, picture it).  Entered by Susu Mandujano,  CCC-SLP on 2/23/2021

## 2021-02-17 NOTE — PROGRESS NOTES
Patient: Denis Green  Location: [facility] [address]  MRN: [id number]  Today's date: 2/17/2021    History of Present Illness  Denis LOVE is a 70 y.o. male admitted on 2/4/2021 with Pneumonia due to COVID-19 virus. Principal problem is No Principal Problem: There is no principal problem currently on the Problem List. Please update the Problem List and refresh..   Pt admitted to outside hospital on 1/5/2021 for COVID-19 PNA. Worsening resp status intubated on 1/9- extubated 1/16 to OptiFlow. Transition to [facility]NC on 1/23.Eval by nephrology while in ICU due to ERNIE likely secondary to ATN. Creatinine has been improving with excellent urine output. Received Decadron and Remdesivir. Dobhoff tube was placed due to dysphagia. New fever of 102.8 1/20 and 1/21. ID was consulted. Bld cx's negative. CT scan of the chest abdomen pelvis was done with results showing increasing pulmonary infiltrates/groundglass opacities and consolidations in his bilateral lower lobes. Started empirically on cefepime and vancomycin to cover hospital-acquired pneumonia. Pt had increased lethargy on 1/22. Pt removed DHT 1/25; required higher O2 afterward - possible aspiration during the process. 1/26 patient continued to have intermittent confusion; ?post ICU delirium. Neurology is following, MRI brain was unremarkable. EEG showed mild slowing but no seizure activity. D/c'd abx after 5 days d/t lethargy.       Past Medical History  Denis LOVE has a past medical history of Coronary artery disease, Hypertension, and Lipid disorder.    OT Vitals    Date/Time Pulse HR Source SpO2 Pt Activity O2 Therapy BP MAP BP Location BP Method Pt Position Who   02/17/21 0808 78 Monitor 93 % At rest None (Room air) 124/66 78 mmHg Left upper arm Automatic Lying AEB   02/17/21 0832 99 Monitor 87 % Walking None (Room air) -- -- -- -- -- AEB   02/17/21 0842 89 Monitor 95 % At rest None (Room air) -- -- -- -- -- AEB      OT Pain    Date/Time Pain Type  Pref Pain Scale Rating: Rest [facility]   02/17/21 0808 Pain Assessment number (Numeric Rating Pain Scale) 0 AEB   02/17/21 0832 Pain Reassessment number (Numeric Rating Pain Scale) 0 AEB   02/17/21 0842 Pain Reassessment -- -- AEB          Prior Living Environment      Most Recent Value   Lives With  spouse, child(mindy), adult [adult son lives in home occasionally]   Living Arrangements  house   Home Accessibility  stairs to enter home (Group), stairs within home (Group)   Living Environment Comment  Lives with wife and intermittently with one son.  [bed and bath on 2nd ,  no powder room on first]   Number of Stairs, Main Entrance  1   Stair Railings, Main Entrance  none   Location, Patient Bedroom  second floor, must negotiate stairs to access   Location, Bathroom  second floor, must negotiate stairs to access   Bathroom Access Comment  Pt. reports has tub shower and stall shower, uses tub shower primarily, has grab bar in shower, has stanard height toilet c wall on both sides   Number of Stairs, Within Home, Primary  12   Surface of Stairs, Within Home, Primary  hardwood   Stair Railings, Within Home, Primary  railings on both sides of stairs          Prior Level of Function      Most Recent Value   Dominant Hand  right   Ambulation  independent   Transferring  independent   Toileting  independent   Bathing  independent   Dressing  independent   Eating  independent   Communication  understands/communicates without difficulty   Swallowing  swallows foods/liquids without difficulty   Baseline Diet/Method of Nutritional Intake  regular solids, thin liquids   Past History of Dysphagia  No previous reports    Prior Level of Function Comment  Pt is a cattle/,  previously IND for all IADLs.    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, (+) , enjoys farming and being outside  "  Patient Goals  \"I want to get back up walking, get stronger, and be self sufficient\"          [facility] OT Evaluation and Treatment - 02/17/21 0808        Time Calculation    Start Time  0800     Stop Time  0900     Time Calculation (min)  60 min        Session Details    Document Type  daily treatment/progress note     Mode of Treatment  occupational therapy;individual therapy        General Information    General Observations of Patient  Pt received long sitting in bed        Transfers    Transfers  stand pivot transfer;toilet transfer        Stand Pivot Transfer    [place], Stand Pivot/Stand Step Transfer  touching/steadying assist;1 person assist;safety considerations     Verbal Cues  safety     Assistive Device  none     Comment  bed > w/c        Toilet Transfer    Transfer Technique  stand pivot     [place], Toilet Transfer  touching/steadying assist;1 person assist;safety considerations     Verbal Cues  safety     Assistive Device  commode, 3-in-1     Comment  commode over toielt        Safety Issues, Functional Mobility    Safety Issues Affecting Function (Mobility)  impulsivity;judgment     Impairments Affecting Function (Mobility)  balance;coordination;endurance/activity tolerance;pain;postural/trunk control;shortness of breath;strength     Comment, Safety Issues/Impairments (Mobility)  ambulated to closet for instruction on clothing retrieval. hand placement when reaching        Bathing    Self-Performance  chest;left arm;right arm;abdomen;front perineal area;buttocks     [place]  close supervision     Position  supported sitting;supported standing     Comment  sponge bath seated/standing @ sink, w/c for energy conservation        Upper Body Dressing    Tasks  pull over garment     Self-Performance  obtains clothes;threads left arm, shirt;threads right arm, shirt;pulls shirt over head/around back;pulls shirt down/adjusts     [place]  supervision     Position  supported sitting;supported " standing     Comment  Safety instruction during C/R w RW, decreased endurance for clothing retrieval, desaturated, requires cues for breath support        Lower Body Dressing    Tasks  don     Self-Performance  threads left leg, underpants;threads right leg, underpants;pulls underpants up or down;threads left leg, pants/shorts;threads right leg, pants/shorts;pulls pants/shorts up or down;dons/doffs left sock;dons/doffs right sock     [place] Assistance  dons/doffs left shoe;dons/doffs right shoe;shoelaces, left;shoelaces, right     [place]  close supervision;1 person assist;safety considerations     Position  supported sitting;unsupported sitting     Adaptive Equipment  none     [place], Footwear  minimum assist (75% or more patient effort);1 person assist     Comment  Safety instruction during C/R w RW, decreased endurance for clothing retrieval, desaturated, requires cues for breath support        Grooming    Self-Performance  washes, rinses and dries face;washes, rinses and dries hands;brushes/torres hair;oral care (brushing teeth, cleaning dentures)     [place]  supervision     Position  supported sitting     Comment  seated 2* 02 desaturation        Toileting    [place]  touching/steadying assist;1 person assist;safety considerations     Position  supported sitting;supported standing     Setup Assistance  adaptive equipment setup     Comment  improving dyn balance but limited by decreased 02 level        Daily Progress Summary (OT)    Symptoms Noted During/After Treatment  significant change in vital signs     Progress Toward Functional Goals (OT)  progressing toward functional goals as expected     Daily Outcome Statement (OT)  Pt presented with decreased 02 level when participating in safety instruction during clothing retrieval and item transport. OT instructed patient with demonstration of how 02 level is directly related to breath support and reminded pt to use spirometer frequently to  improve lung volumn. All standing tasks are limited by desaturation during exertion but levels returned to normal once seated.      Barriers to Overall Progress (OT)  decreased 02 levels, general debility, balance      Recommendations  continue POC                            IRF OT Goals      Most Recent Value   Transfer Goal 1   Activity/Assistive Device  toilet at 02/15/2021 1308   [place]  supervision required at 02/15/2021 1308   Time Frame  short-term goal (STG), 5 - 7 days at 02/15/2021 1308   Strategies/Barriers  DME at 02/15/2021 1308   Progress/Outcome  good progress toward goal, goal partially met, goal revised this date at 02/15/2021 1308   Transfer Goal 2   Activity/Assistive Device  toilet at 02/10/2021 1632   [place]  supervision required, set-up required at 02/10/2021 1632   Time Frame  long-term goal (LTG), 3 weeks at 02/10/2021 1632   Strategies/Barriers  DME at 02/10/2021 1632   Transfer Goal 3   Activity/Assistive Device  shower at 02/15/2021 1308   [place]  supervision required at 02/15/2021 1308   Time Frame  short-term goal (STG), 5 - 7 days at 02/15/2021 1308   Strategies/Barriers  DME at 02/15/2021 1308   Progress/Outcome  good progress toward goal, goal partially met, goal revised this date at 02/15/2021 1308   Transfer Goal 4   Activity/Assistive Device  shower at 02/10/2021 1632   [place]  supervision required, set-up required at 02/10/2021 1632   Time Frame  long-term goal (LTG), 3 weeks at 02/10/2021 1632   Strategies/Barriers  DME at 02/10/2021 1632   Bathing Goal 1   Activity/Assistive Device  bathing skills, all at 02/15/2021 1308   [place]  modified independence at 02/15/2021 1308   Time Frame  short-term goal (STG), 5 - 7 days at 02/15/2021 1308   Strategies/Barriers  DME at 02/15/2021 1308   Progress/Outcome  good progress toward goal, goal partially met, goal revised this date at 02/15/2021 1308   Bathing Goal 2   Activity/Assistive Device  bathing skills,  all at 02/05/2021 0726   [place]  supervision required at 02/05/2021 0726   Time Frame  long-term goal (LTG), 3 weeks at 02/05/2021 0726   Strategies/Barriers  DME TBD at 02/05/2021 0726   UB Dressing Goal 1   Activity/Assistive Device  upper body dressing at 02/15/2021 1308   [place]  modified independence at 02/15/2021 1308   Time Frame  short-term goal (STG), 5 - 7 days at 02/15/2021 1308   Strategies/Barriers  including set up at 02/15/2021 1308   Progress/Outcome  good progress toward goal, goal partially met, goal revised this date at 02/15/2021 1308   UB Dressing Goal 2   Activity/Assistive Device  upper body dressing at 02/05/2021 0726   [place]  modified independence at 02/05/2021 0726   Time Frame  long-term goal (LTG), 3 weeks at 02/05/2021 0726   LB Dressing Goal 1   Activity/Assistive Device  lower body dressing at 02/15/2021 1308   [place]  modified independence at 02/15/2021 1308   Time Frame  short-term goal (STG), 5 - 7 days at 02/15/2021 1308   Strategies/Barriers  AD including clothing retrieval at 02/15/2021 1308   Progress/Outcome  good progress toward goal, goal partially met, goal revised this date at 02/10/2021 1632   LB Dressing Goal 2   Activity/Assistive Device  lower body dressing at 02/05/2021 0726   [place]  supervision required at 02/05/2021 0726   Time Frame  long-term goal (LTG), 3 weeks at 02/05/2021 0726   Grooming Goal 1   Activity/Assistive Device  grooming skills, all at 02/15/2021 1308   [place]  modified independence at 02/15/2021 1308   Time Frame  short-term goal (STG), 5 - 7 days at 02/15/2021 1308   Strategies/Barriers  standing @ sink at 02/15/2021 1308   Progress/Outcome  good progress toward goal, goal partially met, goal revised this date at 02/15/2021 1308   Grooming Goal 2   Activity/Assistive Device  grooming skills, all at 02/05/2021 0726   [place]  modified independence at 02/05/2021 0726   Time Frame  long-term goal (LTG), 3  weeks at 02/05/2021 0726   Toileting Goal 1   Activity/Assistive Device  toileting skills, all at 02/15/2021 1308   [place]  supervision required at 02/15/2021 1308   Time Frame  short-term goal (STG), 5 - 7 days at 02/15/2021 1308   Strategies/Barriers  DME at 02/15/2021 1308   Progress/Outcome  good progress toward goal, goal partially met, goal revised this date at 02/15/2021 1308   Toileting Goal 2   Activity/Assistive Device  toileting skills, all at 02/10/2021 1632   [place]  supervision required at 02/10/2021 1632   Time Frame  long-term goal (LTG), 3 weeks at 02/10/2021 1632   Strategies/Barriers  DME at 02/10/2021 1632   Progress/Outcome  goal revised this date at 02/10/2021 1632

## 2021-02-17 NOTE — PROGRESS NOTES
Patient: Denis Green  Location: [facility] [address]  MRN: [id number]  Today's date: 2/17/2021    History of Present Illness  Denis LOVE is a 70 y.o. male admitted on 2/4/2021 with Pneumonia due to COVID-19 virus. Principal problem is No Principal Problem: There is no principal problem currently on the Problem List. Please update the Problem List and refresh..   Pt admitted to outside hospital on 1/5/2021 for COVID-19 PNA. Worsening resp status intubated on 1/9- extubated 1/16 to OptiFlow. Transition to [facility]NC on 1/23.Eval by nephrology while in ICU due to ERNIE likely secondary to ATN. Creatinine has been improving with excellent urine output. Received Decadron and Remdesivir. Dobhoff tube was placed due to dysphagia. New fever of 102.8 1/20 and 1/21. ID was consulted. Bld cx's negative. CT scan of the chest abdomen pelvis was done with results showing increasing pulmonary infiltrates/groundglass opacities and consolidations in his bilateral lower lobes. Started empirically on cefepime and vancomycin to cover hospital-acquired pneumonia. Pt had increased lethargy on 1/22. Pt removed DHT 1/25; required higher O2 afterward - possible aspiration during the process. 1/26 patient continued to have intermittent confusion; ?post ICU delirium. Neurology is following, MRI brain was unremarkable. EEG showed mild slowing but no seizure activity. D/c'd abx after 5 days d/t lethargy.       Past Medical History  Denis LOVE has a past medical history of Coronary artery disease, Hypertension, and Lipid disorder.    SLP Pain    Date/Time Pain Type Pref Pain Scale Location Rating: Rest Who   02/17/21 1330 Pain Assessment word (verbal rating pain scale) back 4 - moderate pain RS   02/17/21 1400 Pain Reassessment word (verbal rating pain scale) back 4 - moderate pain RS          Prior Living Environment      Most Recent Value   Lives With  spouse, child(mindy), adult [adult son lives in home occasionally]   Living  Arrangements  house   Home Accessibility  stairs to enter home (Group), stairs within home (Group)   Living Environment Comment  Lives with wife and intermittently with one son.  [bed and bath on 2nd ,  no powder room on first]   Number of Stairs, Main Entrance  1   Stair Railings, Main Entrance  none   Location, Patient Bedroom  second floor, must negotiate stairs to access   Location, Bathroom  second floor, must negotiate stairs to access   Bathroom Access Comment  Pt. reports has tub shower and stall shower, uses tub shower primarily, has grab bar in shower, has stanard height toilet c wall on both sides   Number of Stairs, Within Home, Primary  12   Surface of Stairs, Within Home, Primary  hardwood   Stair Railings, Within Home, Primary  railings on both sides of stairs          Prior Level of Function      Most Recent Value   Dominant Hand  right   Ambulation  independent   Transferring  independent   Toileting  independent   Bathing  independent   Dressing  independent   Eating  independent   Communication  understands/communicates without difficulty   Swallowing  swallows foods/liquids without difficulty   Baseline Diet/Method of Nutritional Intake  regular solids, thin liquids   Past History of Dysphagia  No previous reports    Prior Level of Function Comment  Pt is a cattle/,  previously IND for all IADLs.    Assistive Device/Animal Currently Used at Home  none          IRF SLP Evaluation and Treatment - 02/17/21 1331        Time Calculation    Start Time  1330     Stop Time  1400     Time Calculation (min)  30 min        Session Details    Document Type  daily treatment/progress note     Mode of Treatment  speech language pathology;individual therapy        General Information    General Observations of Patient  Pleasant and cooperative, pt seen at [facility] for VFSS        Swallowing Recommendations    Monitoring/Assistance Required  requires occasional supervision during eating and  drinking     Strategies to Enhance Eating/Swallowing  allow adequate time for eating;upright sitting position for eating     Diet Consistency Recommendations  soft solids;thin liquids     Recommended Feeding/Eating Techniques  alternate between small bites and sips of food/liquid;small sips/bites;maintain upright posture during/after eating for 30 mins     Mode of Delivery Recommendations  small bolus size;slow rate of intake     Medication Administration Recommendations  Per pt preference         Daily Progress Summary (SLP)    Daily Outcome Statement (SLP)  VFSS completed this date, which revealed, Mild oral phase dysphagia characterized by mildly delayed A-P transfer and posterior loss of thin liquids. 2.Mild pharyngeal phase dysphagia characterized by delayed swallow initiation of thin liquids, consistent transient penetration of thin liquids, and mild pharyngeal residue of thin liquids and soft solids. No aspiration present during this study. Recommend diet advancement to thin liquids. ST will follow-up with meal trial or regular solids/thin liquids next session.                         IRF SLP Goals      Most Recent Value   Verbal Expression Goal 1   Verbal Expression Goal 1  STG: pt will complete mod level verbal expression tasks x 90% c min cues. at 02/06/2021 1405   Time Frame  short-term goal (STG), 1 week at 02/06/2021 1405   Verbal Expression Goal 2   Verbal Expression Goal 2  LTG: pt will complete complex/abstract verbal expression tasks x 90% c independent use of WF strategies. at 02/06/2021 1405   Time Frame  long-term goal (LTG), 3 weeks at 02/06/2021 1405   Oral Nutrition/Hydration Goal 1   Activity  effective/safe/independent, use of swallowing techniques [regular/NTL, no overt s/s of aspiration ] at 02/05/2021 1202   Time Frame  short-term goal (STG), 1 week at 02/05/2021 1202   Oral Nutrition/Hydration Goal 2   Activity  effective/safe/independent, use of swallowing techniques [regular/thin, no  overt s/s of aspiration ] at 02/05/2021 1202   Time Frame  long-term goal (LTG), 3 weeks at 02/05/2021 1202   Executive Function Goal 1   Activity  complete, abstract thinking tasks, executive function tasks, organization/sequencing tasks, problem solving/reasoning tasks, other (see comments) [STG,  simple-mod level tasks.] at 02/06/2021 1405   [place]/Accuracy  with minimum, verbal cues/redirection, with additional processing time, with repetition of directions, with 90% accuracy at 02/06/2021 1405   Time Frame  1 week at 02/06/2021 1405   Executive Function Goal 2   Activity  complete, abstract thinking tasks, exhibit impulse control, organization/sequencing tasks, problem solving/reasoning tasks, other (see comments) [LTG] at 02/06/2021 1405   [place]/Accuracy  independently, with 90% accuracy, other (see comments) [mod-complex tasks.] at 02/06/2021 1405   Time Frame  3 weeks at 02/06/2021 1405   Memory Goal 1   Activity  short-term memory tasks, immediate recall tasks, working memory tasks, recall recent events, other (see comments) [STG,  simple-mod level tasks.] at 02/06/2021 1405   [place]/Accuracy  minimal cues for use of strategies, with 90% accuracy at 02/06/2021 1405   Time Frame  short-term goal (STG), 1 week at 02/06/2021 1405   Memory Goal 2   Activity  short-term memory tasks, immediate recall tasks, working memory tasks, recall recent events, other (see comments) [LTG,  mod complex information.] at 02/06/2021 1405   [place]/Accuracy  independent use of environmental cues/strategies, with 90% accuracy at 02/06/2021 1405   Time Frame  long-term goal (LTG), 3 weeks at 02/06/2021 1405

## 2021-02-17 NOTE — PLAN OF CARE
Problem: Rehabilitation (IRF) Plan of Care  Goal: Plan of Care Review  Flowsheets (Taken 2/17/2021 1231)  Plan of Care Reviewed With: patient  IRF Plan of Care Review: progress ongoing, continue  Outcome Summary: Pt presented with decreased 02 level when participating in safety instruction during clothing retrieval and item transport. OT instructed patient with demonstration of how 02 level is directly related to breath support and reminded pt to use spirometer frequently to improve lung volumn. All standing tasks are limited by desaturation during exertion but levels returned to normal once seated.

## 2021-02-17 NOTE — PLAN OF CARE
Problem: Rehabilitation (IRF) Plan of Care  Goal: Plan of Care Review  Outcome: Progressing  Flowsheets (Taken 2/17/2021 0123)  Plan of Care Reviewed With: patient  IRF Plan of Care Review: progress ongoing, continue  Outcome Summary: C/O urinary frequency,offered to assist to BR and pt refused,prefers to use urinal at hs,denies pain.Will monitor closely   Plan of Care Review  IRF Plan of Care Review: progress ongoing, continue  Plan of Care Reviewed With: patient  Outcome Summary: C/O urinary frequency,offered to assist to BR and pt refused,prefers to use urinal at hs,denies pain.Will monitor closely

## 2021-02-17 NOTE — PROGRESS NOTES
Patient: Denis Green  Location: [facility] [address]  MRN: [id number]  Today's date: 2/17/2021    History of Present Illness  Denis LOVE is a 70 y.o. male admitted on 2/4/2021 with Pneumonia due to COVID-19 virus. Principal problem is No Principal Problem: There is no principal problem currently on the Problem List. Please update the Problem List and refresh..   Pt admitted to outside hospital on 1/5/2021 for COVID-19 PNA. Worsening resp status intubated on 1/9- extubated 1/16 to OptiFlow. Transition to HFNC on 1/23.Eval by nephrology while in ICU due to ERNIE likely secondary to ATN. Creatinine has been improving with excellent urine output. Received Decadron and Remdesivir. Dobhoff tube was placed due to dysphagia. New fever of 102.8 1/20 and 1/21. ID was consulted. Bld cx's negative. CT scan of the chest abdomen pelvis was done with results showing increasing pulmonary infiltrates/groundglass opacities and consolidations in his bilateral lower lobes. Started empirically on cefepime and vancomycin to cover hospital-acquired pneumonia. Pt had increased lethargy on 1/22. Pt removed DHT 1/25; required higher O2 afterward - possible aspiration during the process. 1/26 patient continued to have intermittent confusion; ?post ICU delirium. Neurology is following, MRI brain was unremarkable. EEG showed mild slowing but no seizure activity. D/c'd abx after 5 days d/t lethargy.       Past Medical History  Denis LOVE has a past medical history of Coronary artery disease, Hypertension, and Lipid disorder.    PT Vitals    Date/Time Pulse HR Source BP BP Location BP Method Pt Position [facility]   02/17/21 1531 88 Monitor 100/68 Left upper arm Automatic Sitting CINDY   02/17/21 1557 89 Monitor 129/72 Left upper arm Automatic Sitting CINDY      PT Pain    Date/Time Pain Type Rating: Rest Who   02/17/21 1531 Pain Assessment 0 CINDY   02/17/21 1557 Pain Reassessment 0 CINDY          Prior Living Environment      Most Recent  Value   Lives With  spouse, child(mindy), adult [adult son lives in home occasionally]   Living Arrangements  house   Home Accessibility  stairs to enter home (Group), stairs within home (Group)   Living Environment Comment  Lives with wife and intermittently with one son.  [bed and bath on 2nd ,  no powder room on first]   Number of Stairs, Main Entrance  1   Stair Railings, Main Entrance  none   Location, Patient Bedroom  second floor, must negotiate stairs to access   Location, Bathroom  second floor, must negotiate stairs to access   Bathroom Access Comment  Pt. reports has tub shower and stall shower, uses tub shower primarily, has grab bar in shower, has stanard height toilet c wall on both sides   Number of Stairs, Within Home, Primary  12   Surface of Stairs, Within Home, Primary  hardwood   Stair Railings, Within Home, Primary  railings on both sides of stairs          Prior Level of Function      Most Recent Value   Dominant Hand  right   Ambulation  independent   Transferring  independent   Toileting  independent   Bathing  independent   Dressing  independent   Eating  independent   Communication  understands/communicates without difficulty   Swallowing  swallows foods/liquids without difficulty   Baseline Diet/Method of Nutritional Intake  regular solids, thin liquids   Past History of Dysphagia  No previous reports    Prior Level of Function Comment  Pt is a cattle/,  previously IND for all IADLs.    Assistive Device/Animal Currently Used at Home  none          IRF PT Evaluation and Treatment - 02/17/21 1548        Time Calculation    Start Time  1530     Stop Time  1600     Time Calculation (min)  30 min        Session Details    Document Type  daily treatment/progress note     Mode of Treatment  individual therapy;physical therapy        Transfers    Transfers  stand pivot transfer        Sit to Stand Transfer    [place], Sit to Stand Transfer  touching/steadying assist     Verbal Cues   safety;technique     Assistive Device  walker, front-wheeled     Comment  from WC to RW, touching assist for balance and safety        Stand to Sit Transfer    [place], Stand to Sit Transfer  touching/steadying assist     Verbal Cues  safety     Assistive Device  walker, front-wheeled     Comment  to WC, touching assist for safety        Stand Pivot Transfer    [place], Stand Pivot/Stand Step Transfer  minimum assist (75% or more patient effort)     Verbal Cues  safety;proper use of assistive device;technique     Assistive Device  walker, front-wheeled     Comment  via ambulatory approach to/from WC with MIn A for balance        Gait Training    [place], Gait  minimum assist (75% or more patient effort)     Assistive Device  walker, front-wheeled     Distance in Feet  120 feet     Gait Pattern Utilized  step-through     Deviations/Abnormal Patterns (Gait)  base of support, narrow;gait speed decreased;step length decreased     Comment  Verbal cues for B Hip extension and forward gaze when ambulating        Sloped Surface Gait Skills (Mobility)    [place]  minimum assist (75% or more patient effort)     Comment  30' ADA ramp with Min A and RW for stability. Cues for change in step length to ascend/descend        Stairs Training    [place], Stairs  minimum assist (75% or more patient effort)     Assistive Device  railing     Handrail Location  both sides     Number of Stairs  12     Stair Height  7 inches     Ascending Stairs Technique  step-to-step     Descending Stairs Technique  step-to-step     Comment  Min A for postural control and balance. Seated rest break at top        Daily Progress Summary (PT)    Daily Outcome Statement (PT)  Patient was able to continue to demonstrate improving muscular endurance of FF of steps despite HALINA and limited rest today. Cont to progress activity as tolerated.                             IRF PT Goals      Most Recent Value   Bed Mobility Goal 1    Activity/Assistive Device  rolling to left, rolling to right, sit to supine/supine to sit at 02/05/2021 1030   [place]  supervision required at 02/05/2021 1030   Time Frame  short-term goal (STG), 1 week at 02/11/2021 0824   Strategies/Barriers  Impaired strength, decreased endurance, safety considerations at 02/11/2021 0824   Progress/Outcome  goal ongoing at 02/11/2021 0824   Bed Mobility Goal 2   Activity/Assistive Device  rolling to left, rolling to right, sit to supine/supine to sit at 02/05/2021 1030   Roundup  modified independence at 02/05/2021 1030   Time Frame  long-term goal (LTG), 3 weeks at 02/11/2021 0824   Strategies/Barriers  Impaired strength, decreased endurance, safety considerations at 02/11/2021 0824   Progress/Outcome  goal ongoing at 02/11/2021 0824   Transfer Goal 1   Activity/Assistive Device  sit-to-stand/stand-to-sit, bed-to-chair/chair-to-bed at 02/11/2021 0824   [place]  minimum assist (75% or more patient effort) at 02/11/2021 0824   Time Frame  short-term goal (STG), 1 week at 02/11/2021 0824   Strategies/Barriers  poor strength, decreased strength at 02/11/2021 0824   Progress/Outcome  goal met, goal revised this date at 02/11/2021 0824   Transfer Goal 2   Activity/Assistive Device  sit-to-stand/stand-to-sit, bed-to-chair/chair-to-bed at 02/11/2021 0824   [place]  modified independence at 02/11/2021 0824   Time Frame  long-term goal (LTG), 3 weeks at 02/11/2021 0824   Strategies/Barriers  poor strength, decreased strength at 02/11/2021 0824   Progress/Outcome  goal ongoing at 02/11/2021 0824   Gait/Walking Locomotion Goal 1   Activity/Assistive Device  gait (walking locomotion) at 02/11/2021 0824   Distance  50 feet at 02/11/2021 0824   [place]  minimum assist (75% or more patient effort) at 02/11/2021 0824   Time Frame  short-term goal (STG), 1 week at 02/11/2021 0824   Strategies/Barriers  impaired endurance, decreased strength at 02/11/2021 0824    Progress/Outcome  goal met, goal revised this date at 02/11/2021 0824   Gait/Walking Locomotion Goal 2   Activity/Assistive Device  gait (walking locomotion) at 02/11/2021 0824   Distance  150 feet at 02/11/2021 0824   [place]  supervision required at 02/11/2021 0824   Time Frame  long-term goal (LTG), 3 weeks at 02/11/2021 0824   Strategies/Barriers  impaired endurance, decreased strength at 02/11/2021 0824   Progress/Outcome  goal ongoing at 02/11/2021 0824   Stairs Goal 1   Activity/Assistive Device  stairs, all skills at 02/11/2021 0824   Number of Stairs  4 at 02/11/2021 0824   [place]  minimum assist (75% or more patient effort) at 02/11/2021 0824   Time Frame  short-term goal (STG), 1 week at 02/11/2021 0824   Progress/Outcome  goal ongoing, goal revised this date at 02/11/2021 0824   Stairs Goal 2   Activity/Assistive Device  stairs, all skills at 02/11/2021 0824   Number of Stairs  12 at 02/11/2021 0824   [place]  supervision required at 02/11/2021 0824   Time Frame  long-term goal (LTG), 3 weeks at 02/11/2021 0824   Progress/Outcome  goal ongoing, goal revised this date at 02/11/2021 0824

## 2021-02-17 NOTE — PROGRESS NOTES
Patient: Denis Green  Location: [facility] [address]  MRN: [id number]  Today's date: 2/17/2021    History of Present Illness  Denis LOVE is a 70 y.o. male admitted on 2/4/2021 with Pneumonia due to COVID-19 virus. Principal problem is No Principal Problem: There is no principal problem currently on the Problem List. Please update the Problem List and refresh..   Pt admitted to outside hospital on 1/5/2021 for COVID-19 PNA. Worsening resp status intubated on 1/9- extubated 1/16 to OptiFlow. Transition to HFNC on 1/23.Eval by nephrology while in ICU due to ERNIE likely secondary to ATN. Creatinine has been improving with excellent urine output. Received Decadron and Remdesivir. Dobhoff tube was placed due to dysphagia. New fever of 102.8 1/20 and 1/21. ID was consulted. Bld cx's negative. CT scan of the chest abdomen pelvis was done with results showing increasing pulmonary infiltrates/groundglass opacities and consolidations in his bilateral lower lobes. Started empirically on cefepime and vancomycin to cover hospital-acquired pneumonia. Pt had increased lethargy on 1/22. Pt removed DHT 1/25; required higher O2 afterward - possible aspiration during the process. 1/26 patient continued to have intermittent confusion; ?post ICU delirium. Neurology is following, MRI brain was unremarkable. EEG showed mild slowing but no seizure activity. D/c'd abx after 5 days d/t lethargy.       Past Medical History  Denis LOVE has a past medical history of Coronary artery disease, Hypertension, and Lipid disorder.    PT Vitals    Date/Time Pulse HR Source SpO2 Pt Activity O2 Therapy BP BP Location BP Method Pt Position [facility]   02/17/21 1038 77 Monitor 97 % At rest None (Room air) 117/66 Left upper arm Automatic Sitting CINDY   02/17/21 1043 -- -- 95 % At rest None (Room air) -- -- -- -- CINDY   02/17/21 1124 84 Monitor -- -- -- 144/81 Left upper arm Automatic Sitting CINDY      PT Pain    Date/Time Pain Type Rating: Rest  Who   02/17/21 1038 Pain Assessment 0 CINDY   02/17/21 1124 Pain Reassessment 0 CINDY          Prior Living Environment      Most Recent Value   Lives With  spouse, child(mindy), adult [adult son lives in home occasionally]   Living Arrangements  house   Home Accessibility  stairs to enter home (Group), stairs within home (Group)   Living Environment Comment  Lives with wife and intermittently with one son.  [bed and bath on 2nd ,  no powder room on first]   Number of Stairs, Main Entrance  1   Stair Railings, Main Entrance  none   Location, Patient Bedroom  second floor, must negotiate stairs to access   Location, Bathroom  second floor, must negotiate stairs to access   Bathroom Access Comment  Pt. reports has tub shower and stall shower, uses tub shower primarily, has grab bar in shower, has stanard height toilet c wall on both sides   Number of Stairs, Within Home, Primary  12   Surface of Stairs, Within Home, Primary  hardwood   Stair Railings, Within Home, Primary  railings on both sides of stairs          Prior Level of Function      Most Recent Value   Dominant Hand  right   Ambulation  independent   Transferring  independent   Toileting  independent   Bathing  independent   Dressing  independent   Eating  independent   Communication  understands/communicates without difficulty   Swallowing  swallows foods/liquids without difficulty   Baseline Diet/Method of Nutritional Intake  regular solids, thin liquids   Past History of Dysphagia  No previous reports    Prior Level of Function Comment  Pt is a cattle/,  previously IND for all IADLs.    Assistive Device/Animal Currently Used at Home  none          IRF PT Evaluation and Treatment - 02/17/21 1038        Time Calculation    Start Time  1030     Stop Time  1130     Time Calculation (min)  60 min        Session Details    Document Type  daily treatment/progress note     Mode of Treatment  individual therapy;physical therapy        Transfers    Transfers   "stand pivot transfer        Sit to Stand Transfer    [place], Sit to Stand Transfer  touching/steadying assist     Verbal Cues  technique;safety     Assistive Device  walker, front-wheeled     Comment  from WC to RW, touching assist for balance and safety        Stand to Sit Transfer    [place], Stand to Sit Transfer  touching/steadying assist     Verbal Cues  technique;safety     Assistive Device  walker, front-wheeled     Comment  to WC, touching assist for balance and safety         Stand Pivot Transfer    [place], Stand Pivot/Stand Step Transfer  minimum assist (75% or more patient effort)     Verbal Cues  safety;proper use of assistive device     Assistive Device  walker, front-wheeled     Comment  via ambulatory approach to/from WC with Min A for balance and safety        Gait Training    [place], Gait  minimum assist (75% or more patient effort)     Assistive Device  walker, front-wheeled     Distance in Feet  110 feet     Gait Pattern Utilized  step-through     Deviations/Abnormal Patterns (Gait)  base of support, narrow;gait speed decreased;step length decreased     Comment  110' (2x) with RW and MIn to Touching Assist for balance and safety.         Curb Negotiation (Mobility)    [place]  minimum assist (75% or more patient effort)     Assistive Device  walker, front-wheeled     Curb Height  6 inches     Comment  6\" + 2\" curb step up/down with RW and Min A for balance and safety        Sloped Surface Gait Skills (Mobility)    [place]  minimum assist (75% or more patient effort)     Comment  5' indoor simulated ramp with RW and Min A x1.         Balance    Comment, Balance  Standing on blue foam with no UE support with Min A for balance. Performed front raises with bi-manual approach. Pt performed 2x10 mini-squats on blue foam with BUE support on RW with Min A.          Daily Progress Summary (PT)    Daily Outcome Statement (PT)  Mouth hygeine performed during PT session seated " at sink prior to pt drinking water during PT session. Patient demonstrates improving standing balance on compliant surfaces. Patient benefitted from inc seated rest breaks during session in preparation for HALINA later, but SPO2 remained > 95% on RA throughout.                             IRF PT Goals      Most Recent Value   Bed Mobility Goal 1   Activity/Assistive Device  rolling to left, rolling to right, sit to supine/supine to sit at 02/05/2021 1030   [place]  supervision required at 02/05/2021 1030   Time Frame  short-term goal (STG), 1 week at 02/11/2021 0824   Strategies/Barriers  Impaired strength, decreased endurance, safety considerations at 02/11/2021 0824   Progress/Outcome  goal ongoing at 02/11/2021 0824   Bed Mobility Goal 2   Activity/Assistive Device  rolling to left, rolling to right, sit to supine/supine to sit at 02/05/2021 1030   [place]  modified independence at 02/05/2021 1030   Time Frame  long-term goal (LTG), 3 weeks at 02/11/2021 0824   Strategies/Barriers  Impaired strength, decreased endurance, safety considerations at 02/11/2021 0824   Progress/Outcome  goal ongoing at 02/11/2021 0824   Transfer Goal 1   Activity/Assistive Device  sit-to-stand/stand-to-sit, bed-to-chair/chair-to-bed at 02/11/2021 0824   [place]  minimum assist (75% or more patient effort) at 02/11/2021 0824   Time Frame  short-term goal (STG), 1 week at 02/11/2021 0824   Strategies/Barriers  poor strength, decreased strength at 02/11/2021 0824   Progress/Outcome  goal met, goal revised this date at 02/11/2021 0824   Transfer Goal 2   Activity/Assistive Device  sit-to-stand/stand-to-sit, bed-to-chair/chair-to-bed at 02/11/2021 0824   [place]  modified independence at 02/11/2021 0824   Time Frame  long-term goal (LTG), 3 weeks at 02/11/2021 0824   Strategies/Barriers  poor strength, decreased strength at 02/11/2021 0824   Progress/Outcome  goal ongoing at 02/11/2021 0824   Gait/Walking Locomotion Goal 1    Activity/Assistive Device  gait (walking locomotion) at 02/11/2021 0824   Distance  50 feet at 02/11/2021 0824   [place]  minimum assist (75% or more patient effort) at 02/11/2021 0824   Time Frame  short-term goal (STG), 1 week at 02/11/2021 0824   Strategies/Barriers  impaired endurance, decreased strength at 02/11/2021 0824   Progress/Outcome  goal met, goal revised this date at 02/11/2021 0824   Gait/Walking Locomotion Goal 2   Activity/Assistive Device  gait (walking locomotion) at 02/11/2021 0824   Distance  150 feet at 02/11/2021 0824   [place]  supervision required at 02/11/2021 0824   Time Frame  long-term goal (LTG), 3 weeks at 02/11/2021 0824   Strategies/Barriers  impaired endurance, decreased strength at 02/11/2021 0824   Progress/Outcome  goal ongoing at 02/11/2021 0824   Stairs Goal 1   Activity/Assistive Device  stairs, all skills at 02/11/2021 0824   Number of Stairs  4 at 02/11/2021 0824   [place]  minimum assist (75% or more patient effort) at 02/11/2021 0824   Time Frame  short-term goal (STG), 1 week at 02/11/2021 0824   Progress/Outcome  goal ongoing, goal revised this date at 02/11/2021 0824   Stairs Goal 2   Activity/Assistive Device  stairs, all skills at 02/11/2021 0824   Number of Stairs  12 at 02/11/2021 0824   [place]  supervision required at 02/11/2021 0824   Time Frame  long-term goal (LTG), 3 weeks at 02/11/2021 0824   Progress/Outcome  goal ongoing, goal revised this date at 02/11/2021 0824

## 2021-02-17 NOTE — PLAN OF CARE
Problem: Rehabilitation (IRF) Plan of Care  Goal: Plan of Care Review  Flowsheets (Taken 2/17/2021 1122)  Plan of Care Reviewed With: patient  IRF Plan of Care Review: progress ongoing, continue  Outcome Summary: Patient completed curb/ramp elevations with RW and Min Ax1.

## 2021-02-17 NOTE — PLAN OF CARE
Problem: Rehabilitation (IRF) Plan of Care  Goal: Plan of Care Review  Flowsheets (Taken 2/17/2021 1624)  Plan of Care Reviewed With: patient  IRF Plan of Care Review: progress ongoing, continue  Outcome Summary: VFSS completed this date

## 2021-02-18 ENCOUNTER — APPOINTMENT (INPATIENT)
Dept: OCCUPATIONAL THERAPY | Facility: REHABILITATION | Age: 71
DRG: 948 | End: 2021-02-18
Payer: COMMERCIAL

## 2021-02-18 ENCOUNTER — APPOINTMENT (INPATIENT)
Dept: SPEECH THERAPY | Facility: REHABILITATION | Age: 71
DRG: 948 | End: 2021-02-18
Payer: COMMERCIAL

## 2021-02-18 ENCOUNTER — APPOINTMENT (INPATIENT)
Dept: PHYSICAL THERAPY | Facility: REHABILITATION | Age: 71
DRG: 948 | End: 2021-02-18
Payer: COMMERCIAL

## 2021-02-18 LAB
ANION GAP SERPL CALC-SCNC: 13 MEQ/L (ref 3–15)
BACTERIA UR CULT: ABNORMAL
BACTERIA UR CULT: ABNORMAL
BASOPHILS # BLD: 0.07 K/UL (ref 0.01–0.1)
BASOPHILS NFR BLD: 0.6 %
BUN SERPL-MCNC: 10 MG/DL (ref 8–20)
CALCIUM SERPL-MCNC: 8.5 MG/DL (ref 8.9–10.3)
CHLORIDE SERPL-SCNC: 94 MEQ/L (ref 98–109)
CO2 SERPL-SCNC: 26 MEQ/L (ref 22–32)
CREAT SERPL-MCNC: 0.6 MG/DL (ref 0.8–1.3)
DIFFERENTIAL METHOD BLD: ABNORMAL
EOSINOPHIL # BLD: 0.27 K/UL (ref 0.04–0.54)
EOSINOPHIL NFR BLD: 2.3 %
ERYTHROCYTE [DISTWIDTH] IN BLOOD BY AUTOMATED COUNT: 14.6 % (ref 11.6–14.4)
GFR SERPL CREATININE-BSD FRML MDRD: >60 ML/MIN/1.73M*2
GLUCOSE SERPL-MCNC: 96 MG/DL (ref 70–99)
HCT VFR BLDCO AUTO: 30.9 % (ref 40.1–51)
HGB BLD-MCNC: 10 G/DL (ref 13.7–17.5)
IMM GRANULOCYTES # BLD AUTO: 0.23 K/UL (ref 0–0.08)
IMM GRANULOCYTES NFR BLD AUTO: 2 %
LYMPHOCYTES # BLD: 1.95 K/UL (ref 1.2–3.5)
LYMPHOCYTES NFR BLD: 16.6 %
MCH RBC QN AUTO: 30.9 PG (ref 28–33.2)
MCHC RBC AUTO-ENTMCNC: 32.4 G/DL (ref 32.2–36.5)
MCV RBC AUTO: 95.4 FL (ref 83–98)
MONOCYTES # BLD: 1.55 K/UL (ref 0.3–1)
MONOCYTES NFR BLD: 13.2 %
NEUTROPHILS # BLD: 7.7 K/UL (ref 1.7–7)
NEUTS SEG NFR BLD: 65.3 %
NRBC BLD-RTO: 0 %
PDW BLD AUTO: 11 FL (ref 9.4–12.4)
PLATELET # BLD AUTO: 316 K/UL (ref 150–350)
POTASSIUM SERPL-SCNC: 3.8 MEQ/L (ref 3.6–5.1)
RBC # BLD AUTO: 3.24 M/UL (ref 4.5–5.8)
SODIUM SERPL-SCNC: 133 MEQ/L (ref 136–144)
WBC # BLD AUTO: 11.77 K/UL (ref 3.8–10.5)

## 2021-02-18 PROCEDURE — 63700000 HC SELF-ADMINISTRABLE DRUG: Performed by: INTERNAL MEDICINE

## 2021-02-18 PROCEDURE — 92526 ORAL FUNCTION THERAPY: CPT | Mod: GN

## 2021-02-18 PROCEDURE — 97116 GAIT TRAINING THERAPY: CPT | Mod: GP

## 2021-02-18 PROCEDURE — 97530 THERAPEUTIC ACTIVITIES: CPT | Mod: GO,59

## 2021-02-18 PROCEDURE — 97530 THERAPEUTIC ACTIVITIES: CPT | Mod: GP,59

## 2021-02-18 PROCEDURE — 80048 BASIC METABOLIC PNL TOTAL CA: CPT | Performed by: PHYSICAL MEDICINE & REHABILITATION

## 2021-02-18 PROCEDURE — 36415 COLL VENOUS BLD VENIPUNCTURE: CPT | Performed by: PHYSICAL MEDICINE & REHABILITATION

## 2021-02-18 PROCEDURE — 85025 COMPLETE CBC W/AUTO DIFF WBC: CPT | Performed by: PHYSICAL MEDICINE & REHABILITATION

## 2021-02-18 PROCEDURE — 97535 SELF CARE MNGMENT TRAINING: CPT | Mod: GO

## 2021-02-18 PROCEDURE — 63700000 HC SELF-ADMINISTRABLE DRUG: Performed by: PHYSICAL MEDICINE & REHABILITATION

## 2021-02-18 PROCEDURE — 12800000 HC ROOM AND CARE SEMIPRIVATE REHAB

## 2021-02-18 PROCEDURE — 97110 THERAPEUTIC EXERCISES: CPT | Mod: GP

## 2021-02-18 RX ORDER — CEFPODOXIME PROXETIL 200 MG/1
200 TABLET, FILM COATED ORAL 2 TIMES DAILY
Status: DISCONTINUED | OUTPATIENT
Start: 2021-02-18 | End: 2021-02-22

## 2021-02-18 RX ADMIN — GUAIFENESIN 600 MG: 600 TABLET ORAL at 21:09

## 2021-02-18 RX ADMIN — BISACODYL 10 MG: 10 SUPPOSITORY RECTAL at 21:41

## 2021-02-18 RX ADMIN — FUROSEMIDE 20 MG: 20 TABLET ORAL at 17:20

## 2021-02-18 RX ADMIN — SODIUM CHLORIDE 1 G: 1 TABLET ORAL at 17:20

## 2021-02-18 RX ADMIN — Medication 3 MG: at 21:09

## 2021-02-18 RX ADMIN — METOPROLOL TARTRATE 12.5 MG: 25 TABLET, FILM COATED ORAL at 08:05

## 2021-02-18 RX ADMIN — Medication 400 MG: at 21:09

## 2021-02-18 RX ADMIN — SODIUM CHLORIDE 1 G: 1 TABLET ORAL at 08:06

## 2021-02-18 RX ADMIN — ASPIRIN 81 MG: 81 TABLET ORAL at 08:05

## 2021-02-18 RX ADMIN — DIPHENHYDRAMINE HYDROCHLORIDE 50 MG: 25 CAPSULE ORAL at 21:09

## 2021-02-18 RX ADMIN — METOPROLOL TARTRATE 12.5 MG: 25 TABLET, FILM COATED ORAL at 21:09

## 2021-02-18 RX ADMIN — Medication 400 MG: at 08:05

## 2021-02-18 RX ADMIN — GUAIFENESIN 600 MG: 600 TABLET ORAL at 08:05

## 2021-02-18 RX ADMIN — CEFPODOXIME PROXETIL 200 MG: 200 TABLET, FILM COATED ORAL at 21:09

## 2021-02-18 RX ADMIN — GEMFIBROZIL 600 MG: 600 TABLET ORAL at 17:20

## 2021-02-18 RX ADMIN — NITROFURANTOIN (MONOHYDRATE/MACROCRYSTALS) 100 MG: 75; 25 CAPSULE ORAL at 08:06

## 2021-02-18 RX ADMIN — GEMFIBROZIL 600 MG: 600 TABLET ORAL at 08:05

## 2021-02-18 RX ADMIN — RIVAROXABAN 10 MG: 10 TABLET, FILM COATED ORAL at 17:20

## 2021-02-18 RX ADMIN — FUROSEMIDE 20 MG: 20 TABLET ORAL at 08:06

## 2021-02-18 RX ADMIN — CEFPODOXIME PROXETIL 200 MG: 200 TABLET, FILM COATED ORAL at 14:54

## 2021-02-18 NOTE — PROGRESS NOTES
Patient: Denis Green  Location: [facility] [address]  MRN: [id number]  Today's date: 2/18/2021    History of Present Illness  Denis LOVE is a 70 y.o. male admitted on 2/4/2021 with Pneumonia due to COVID-19 virus. Principal problem is No Principal Problem: There is no principal problem currently on the Problem List. Please update the Problem List and refresh..   Pt admitted to outside hospital on 1/5/2021 for COVID-19 PNA. Worsening resp status intubated on 1/9- extubated 1/16 to OptiFlow. Transition to HFNC on 1/23.Eval by nephrology while in ICU due to ERNIE likely secondary to ATN. Creatinine has been improving with excellent urine output. Received Decadron and Remdesivir. Dobhoff tube was placed due to dysphagia. New fever of 102.8 1/20 and 1/21. ID was consulted. Bld cx's negative. CT scan of the chest abdomen pelvis was done with results showing increasing pulmonary infiltrates/groundglass opacities and consolidations in his bilateral lower lobes. Started empirically on cefepime and vancomycin to cover hospital-acquired pneumonia. Pt had increased lethargy on 1/22. Pt removed DHT 1/25; required higher O2 afterward - possible aspiration during the process. 1/26 patient continued to have intermittent confusion; ?post ICU delirium. Neurology is following, MRI brain was unremarkable. EEG showed mild slowing but no seizure activity. D/c'd abx after 5 days d/t lethargy.       Past Medical History  Denis LOVE has a past medical history of Coronary artery disease, Hypertension, and Lipid disorder.    SLP Pain    Date/Time Pain Type Pref Pain Scale Side Location Rating: Rest Rating: Rest Who   02/18/21 1208 Pain Assessment number (Numeric Rating Pain Scale) Left foot arch  3 -- RS   02/18/21 1229 Pain Reassessment word (verbal rating pain scale) -- -- -- 0 - no pain RS          Prior Living Environment      Most Recent Value   Lives With  spouse, child(mindy), adult [adult son lives in home  occasionally]   Living Arrangements  house   Home Accessibility  stairs to enter home (Group), stairs within home (Group)   Living Environment Comment  Lives with wife and intermittently with one son.  [bed and bath on 2nd ,  no powder room on first]   Number of Stairs, Main Entrance  1   Stair Railings, Main Entrance  none   Location, Patient Bedroom  second floor, must negotiate stairs to access   Location, Bathroom  second floor, must negotiate stairs to access   Bathroom Access Comment  Pt. reports has tub shower and stall shower, uses tub shower primarily, has grab bar in shower, has stanard height toilet c wall on both sides   Number of Stairs, Within Home, Primary  12   Surface of Stairs, Within Home, Primary  hardwood   Stair Railings, Within Home, Primary  railings on both sides of stairs          Prior Level of Function      Most Recent Value   Dominant Hand  right   Ambulation  independent   Transferring  independent   Toileting  independent   Bathing  independent   Dressing  independent   Eating  independent   Communication  understands/communicates without difficulty   Swallowing  swallows foods/liquids without difficulty   Baseline Diet/Method of Nutritional Intake  regular solids, thin liquids   Past History of Dysphagia  No previous reports    Prior Level of Function Comment  Pt is a cattle/,  previously IND for all IADLs.    Assistive Device/Animal Currently Used at Home  none          IRF SLP Evaluation and Treatment - 02/18/21 1207        Time Calculation    Start Time  1200     Stop Time  1230     Time Calculation (min)  30 min        Session Details    Document Type  daily treatment/progress note     Mode of Treatment  speech language pathology;individual therapy        General Information    General Observations of Patient  Pleasant and cooperative, pt received in his room seated EOB with regular solids/thin liquids         Food and Liquid Trials (NIS)    Patient Positioning  other  (see comments)    Upright EOB    Oral Intake/Feeding Performance  independent/appropriate self-feeding skills     Food Consistencies Evaluated  regular solids     Regular Solids  intact;patient controlled amounts     Comment, Regular Solids  Good rotary mastication and oral clearance, no oral residuals      Liquid Consistencies Evaluated  thin liquids     Thin Liquids  single cup sips     Comment, Thin Liquids  No overt s/s of aspiration      Oral Preparatory Phase of Swallow  WFL     Oral Phase of Swallow  WFL     Pharyngeal Phase of Swallow  no clinical symptoms     Esophageal Phase of Swallow  no clinical symptoms     Comment  Recommend diet upgrade to regular solids/thin liquids         Swallowing Recommendations    Monitoring/Assistance Required  requires occasional supervision during eating and drinking     Strategies to Enhance Eating/Swallowing  upright sitting position for eating     Diet Consistency Recommendations  regular solids;thin liquids     Recommended Feeding/Eating Techniques  alternate between small bites and sips of food/liquid;maintain upright posture during/after eating for 30 mins;small sips/bites     Mode of Delivery Recommendations  small bolus size;slow rate of intake     Medication Administration Recommendations  Per pt preference         Daily Progress Summary (SLP)    Daily Outcome Statement (SLP)  Good participation, pt tolerated regular solids with thin liquids with no overt s/s of aspiration. IND use of safe swallowing strategies, slow rate, small bites, alternating solids and liquids. Recommend diet advancement. Education provided results of VFSS and diet upgrade. ST will continue to address breath support and voice production.                   Education provided this session. See the Patient Education summary report for full details.    IRF SLP Goals      Most Recent Value   Verbal Expression Goal 1   Verbal Expression Goal 1  STG: pt will complete mod level verbal expression  tasks x 90% c min cues. at 02/06/2021 1405   Time Frame  short-term goal (STG), 1 week at 02/06/2021 1405   Progress/Outcome  goal no longer appropriate at 02/18/2021 0834   Verbal Expression Goal 2   Verbal Expression Goal 2  LTG: pt will complete complex/abstract verbal expression tasks x 90% c independent use of WF strategies. at 02/06/2021 1405   Time Frame  long-term goal (LTG), 3 weeks at 02/06/2021 1405   Progress/Outcome  goal no longer appropriate at 02/18/2021 0834   Oral Nutrition/Hydration Goal 1   Activity  effective/safe/independent, use of swallowing techniques [regular/NTL, no overt s/s of aspiration ] at 02/05/2021 1202   Time Frame  short-term goal (STG), 3 - 5 days at 02/18/2021 0834   Progress/Outcome  goal partially met at 02/18/2021 0834   Oral Nutrition/Hydration Goal 2   Activity  effective/safe/independent, use of swallowing techniques [regular/thin, no overt s/s of aspiration ] at 02/05/2021 1202   Time Frame  long-term goal (LTG), 3 - 5 days at 02/18/2021 0834   Progress/Outcome  goal partially met at 02/18/2021 0834   Executive Function Goal 1   Activity  complete, abstract thinking tasks, executive function tasks, organization/sequencing tasks, problem solving/reasoning tasks, other (see comments) [STG,  simple-mod level tasks.] at 02/06/2021 1405   [place]/Accuracy  with minimum, verbal cues/redirection, with additional processing time, with repetition of directions, with 90% accuracy at 02/06/2021 1405   Time Frame  1 week at 02/06/2021 1405   Progress/Outcome  goal met at 02/18/2021 0834   Executive Function Goal 2   Activity  complete, abstract thinking tasks, exhibit impulse control, organization/sequencing tasks, problem solving/reasoning tasks, other (see comments) [LTG] at 02/06/2021 1405   [place]/Accuracy  independently, with 90% accuracy, other (see comments) [mod-complex tasks.] at 02/06/2021 1405   Time Frame  3 weeks at 02/06/2021 1405   Progress/Outcome  good  progress toward goal at 02/18/2021 0834   Memory Goal 1   Activity  short-term memory tasks, immediate recall tasks, working memory tasks, recall recent events, other (see comments) [STG,  simple-mod level tasks.] at 02/06/2021 1405   [place]/Accuracy  minimal cues for use of strategies, with 90% accuracy at 02/06/2021 1405   Time Frame  short-term goal (STG), 1 week at 02/06/2021 1405   Progress/Outcome  goal met at 02/18/2021 0834   Memory Goal 2   Activity  short-term memory tasks, immediate recall tasks, working memory tasks, recall recent events, other (see comments) [LTG,  mod complex information.] at 02/06/2021 1405   [place]/Accuracy  independent use of environmental cues/strategies, with 90% accuracy at 02/06/2021 1405   Time Frame  long-term goal (LTG), 1 week at 02/18/2021 0834   Progress/Outcome  good progress toward goal at 02/18/2021 0834

## 2021-02-18 NOTE — PLAN OF CARE
Problem: Rehabilitation (IRF) Plan of Care  Goal: Plan of Care Review  Flowsheets (Taken 2/18/2021 5821)  Plan of Care Reviewed With: patient  IRF Plan of Care Review: progress ongoing, continue  Outcome Summary: Patient is progressing in areas of functional mobility and activities of daily living skills but continues to be limited in endurance and application of safety strategies during dynamic standing tasks.

## 2021-02-18 NOTE — CONSULTS
ENT Consult    Diagnosis: Pneumonia due to COVID-19 virus [U07.1, J12.82]  Physical deconditioning [R53.81].    HPI     Patient is a 70 y.o. male who presents with hoarseness.  He was intubated after respiratory failure from COVID-19.  He was extubated on 116.  He had dysphagia and a feeding tube.  Video swallowing study was normal and he is now back to a normal diet.  He says his throat feels fine he is having no pain.  He just speaks in a gruff harsh voice.  He had no prior problems with his speaking.  He has no history of reflux.    Medical History:   Past Medical History:   Diagnosis Date   • Coronary artery disease    • Hypertension    • Lipid disorder        Surgical History: No past surgical history on file.    Social History:   Social History     Social History Narrative    He reports he lives with his wife.  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  He reports he has 2 children who live nearby.  He reports prior to admission he was independent in everything including driving.  He denies previous psychiatric history.  He denies the use of substances       Family History: No family history on file.    Allergies: Atorvastatin    Home Medications:  •  aspirin, Take 81 mg by mouth daily.  •  amLODIPine, Take 10 mg by mouth daily.  •  docusate sodium, Take 100 mg by mouth 2 (two) times a day.  •  famotidine, Take 20 mg by mouth 2 (two) times a day.  •  heparin (porcine), Inject 5,000 Units under the skin 3 (three) times a day.  •  metoprolol succinate XL, Take 12.5 mg by mouth every 12 (twelve) hours.    Current Medications:  •  acetaminophen, 650 mg, oral, q4h PRN  •  albuterol HFA, 2 puff, inhalation, q4h PRN  •  alum-mag hydroxide-simeth, 30 mL, oral, q6h PRN  •  amLODIPine, 2.5 mg, oral, Nightly  •  aspirin, 81 mg, oral, Daily  •  bisacodyL, 10 mg, rectal, Daily PRN  •  diphenhydrAMINE, 50 mg, oral, Nightly  •  docusate sodium, 100 mg, oral, TID  •  furosemide, 20 mg, oral, BID (am, 4p)  •   "gemfibroziL, 600 mg, oral, BID AC  •  guaiFENesin, 600 mg, oral, BID  •  magnesium hydroxide, 30 mL, oral, 2x daily PRN  •  magnesium oxide, 400 mg, oral, BID  •  melatonin, 3 mg, oral, Nightly  •  metoprolol tartrate, 12.5 mg, oral, BID  •  mouth moisturizer, , mucous membrane, TID  •  nitrofurantoin (macrocrystal-monohydrate), 100 mg, oral, q12h EBER  •  polyethylene glycol, 17 g, oral, Daily PRN  •  rivaroxaban, 10 mg, oral, Daily with dinner  •  senna, 3 tablet, oral, Daily before lunch  •  sodium chloride, 1 g, oral, BID with meals    Review of Systems   Constitutional: Negative for appetite change.   HENT: Negative for facial swelling.    Eyes: Negative for discharge.   Respiratory: Negative for stridor.    Cardiovascular: Negative for chest pain.   Gastrointestinal: Negative for diarrhea.   Genitourinary: Negative for difficulty urinating.   Skin: Negative for rash.   Neurological: Negative for speech difficulty.   Psychiatric/Behavioral: Negative for hallucinations.     Objective     Vital Signs for the last 24 hours:  Temp:  [36.8 °C (98.2 °F)] 36.8 °C (98.2 °F)  Heart Rate:  [77-95] 95  Resp:  [18] 18  BP: (100-144)/(64-81) 114/68    Physical Exam:  Visit Vitals  /68   Pulse 95   Temp 36.8 °C (98.2 °F) (Oral)   Resp 18   Ht 1.727 m (5' 8\")   Wt 77.1 kg (170 lb)   SpO2 96%   BMI 25.85 kg/m²       General appearance: alert, appears stated age and cooperative  Head: normocephalic, without obvious abnormality, atraumatic  Ears: normal TM's and external ear canals both ears  Nose: External nose with no gross deformity. Nares normal. No nasal septal hematoma or perforation. Mucosa normal. No drainage or sinus tenderness.  Throat: lips, mucosa, and tongue normal; teeth and gums normal  Neck: no adenopathy, no carotid bruit, no JVD, supple, symmetrical, trachea midline and thyroid not enlarged, symmetric, no tenderness/mass/nodules  Lungs: no wheezing or stridor  Extremities: extremities normal, warm and " well-perfused; no cyanosis, clubbing, or edema  Skin: Skin color, texture, turgor normal. No rashes or lesions  Lymph nodes: No Cervical Lymphadenopathy  Neurologic: Grossly normal    Ordering Physician: Dr. Lang  Proceduralist: Dr. Rey  Staff Present: Lelo Kern, SLP; Carol Saunders, SLP  Correct Patient: Yes  Correct Site: Yes  Correct Patient Position: Yes  Correct Procedure: Yes  What Procedure?: Laryngoscopy  Scope #: 6  Date of Onset: 01/04/21  Date of Order: 02/16/21  Date of Evaluation: 02/18/21    Flexible laryngoscopy was performed after topical anesthesia vasoconstriction was applied timeout was done and verbal consent was given.  This reveals normal vocal cord contour and motion.  There is mild laryngeal swelling and mild hyperfunctioning of the false for vocal cords.  There are no vocal cord lesions and the vocal cords do approximate well.      Assessment/Plan   Overall is laryngeal function is normal.  His vocal cords are moving well and approximating well.  There is mild edema but no vocal cord masses.  I feel that his voice will normalize in the coming weeks.  He is likely having some residual hoarseness from his intubation and respiratory failure.  Continue CD therapy.

## 2021-02-18 NOTE — PROGRESS NOTES
[facility] Progress Note          Patient was seen and examined at bedside.    Subjective:  UCX growing klebsiella, intermediate to Macrobid,  Sensitive to ceftriaxone, switch to Vantin 200 mg bid for 7 day from today, reviewed his am labs, including CBC and BMP,  Stable but still with leukocytosis. Clinically he is stable, no O/N events or new complaints.     Objective   Vital signs in last 24 hours:  Temp:  [36.8 °C (98.2 °F)] 36.8 °C (98.2 °F)  Heart Rate:  [] 91  Resp:  [18] 18  BP: (100-129)/(64-74) 110/64    No intake or output data in the 24 hours ending 02/18/21 1315  Intake/Output this shift:  No intake/output data recorded.   Review of Systems:  All other systems reviewed and negative except as noted in the HPI.   Objective      Labs  reviewed his labs thoroughly   Lab Results   Component Value Date    WBC 11.77 (H) 02/18/2021    HGB 10.0 (L) 02/18/2021    HCT 30.9 (L) 02/18/2021    MCV 95.4 02/18/2021     02/18/2021     Lab Results   Component Value Date    GLUCOSE 96 02/18/2021    CALCIUM 8.5 (L) 02/18/2021     (L) 02/18/2021    K 3.8 02/18/2021    CO2 26 02/18/2021    CL 94 (L) 02/18/2021    BUN 10 02/18/2021    CREATININE 0.6 (L) 02/18/2021       Imaging  OSH imaging study reports reviewed:    CXR 2/5/2021:  IMPRESSION:  Bilateral airspace opacities most prominent in the left upper lobe and left  lower lobe likely representing COVID19 pneumonia.         Full Code    Physical Exam:  Head/Ear/Nose/Throat: normocephalic; atraumatic; moisture mouth mm, no oropharyngeal thrush noted.   Eyes: anicteric sclera, EOMI; PERRL.   Neck : supple, no JVD, no carotid bruits appeciated.   Respiratory: no evidence of labored breathing, lung sounds a little coarse, mild bibasilar diminished BS, no remarkable w/r/c.   Cardiovascular: RRR; normal S1, S2; no m/r/g; no S3 or S4.   Gastrointestinal: soft; NT; BS normal; mildly distended; no CVAT b/l.   Genitourinary: no lim.    Extremities : no c/c/e .   Neurological: AO x 3, fluent speeches, following commands, CNS II-XII grossly intact; no focal neurologic deficits.   Behavior/Emotional: in NAD, appropriate; cooperative.   Skin: clean, dry and intact.     Plan of care was discussed with patient, RN, and PMR attending.     Assessment     CC: S/p severe covid-19 PNA complicated by VDRF, dysphagia, ERNIE, bacterial PNA, and  hospital delirium, physical deconditioning with ADL and ambulatory dysfunction.       70 y.o. male, I PTA, with PMH of HTN, HLD, and CAD, admitted to [facility] on 1/5/2021 for severeCOVID-19 PNA. Worsening resp status intubated on 1/9- extubated 1/16 to [facility]. Transition to [facility] on 1/23.Eval by nephrology while in ICU due to ERNIE likely secondary to ATN. Creatinine has been improving with excellent urine output. Received Decadron and Remdesivir. Dobhoff tube was placed due to dysphagia. New fever of 102.8 on 1/20 and 1/21. ID was consulted. Bld cx's negative. CT scan of the chest abdomen pelvis was done with results showing increasing pulmonary infiltrates/groundglass opacities and consolidations in his bilateral lower lobes. Started empirically on cefepime and vancomycin to cover hospital-acquired pneumonia. Pt had increased lethargy on 1/22. Pt removed DHT 1/25; required higher O2 afterward - possible aspiration during the process. 1/26 patient continued to have intermittent confusion; ?post ICU delirium. Neurology is following, MRI brain was unremarkable. EEG showed mild slowing but no seizure activity. D/c'd abx after 5 days d/t lethargy. Repeat VFSS performed- now on soft nectar thick liquid diet .  Pt is AA&Ox3 with periods of confusion. Tolerates soft diet with NTL, voiding in urinal, O2 @ 2-3L via nc. Participating in therapies with marked improvement, functionally, he has residual ADL and ambulatory dysfunction, rec's for acute rehab prior to home with SO. Transferred to [facility] on 2/4/2021.      1.S/p severe covid-19  PNA complicated by VDRF, dysphagia, ERNIE, bacterial PNA, and  hospital delirium, physical deconditioning with ADL and ambulatory dysfunction : inpt acute rehab, gait and balancing training, SLP therapy, nutrition support,  Fall/aspiration precaution, medical management, DVT prophylaxis, dermal defense, f/u with PCP after inpt rehab.     2. Pulm-S/p severe covid-19 PNA complicated by VDRF, s/p possible bacteria PNA,  hypoxemia with mild exertion, atelectasis: monitor SO2, prn NC O2, keep SO2>92%, incentive spirometry, bronchodilator inhaler, mucolytic agent, pulm toileting. Check CXR.      3. Psych-s/p hospital acute delirium, intermittent confusion: his metal status has much improved, monitor his mood and mentation, help his orientation, psychology CBT, avoid unnecessary sedatives, SW support.      4. GI-on soft nectar thick liquid diet, constipation:SLP evaluation to advance his diet as tolerated; provide constipation bowel regimen, keep adequate hydration, timed toilet visits.     5. DVT prophy: SCD, early ambulation, SQ heparin, check LE venous DUS to r/o DVT.       6. HTN, dyslipidemia: cont home HTN meds with holding parameter, orthostatic hypotension precaution, monitor BP . On gemfibrozil, f/u with his PCP.      Mixed significant dyslipidemia: LCL-C 185, HDL-C 30, , h/o HTN, HLD, and CAD, only gemfibrozil is far from adequate, labeled Lipitor allergy, still worth retrial with hydrophilic formula Crestor from low dose, which is absolutely necessary and also guideline recommended    7. Renal-s/p ERNIE recovered, electrolytes imbalance: monitor renal function and volume status, avoid nephrotoxic agents and low BP,  monitor and keep electrolytes balance.     8. - urinary retention: timed voiding trial, monitor PVR with prn cic, check UA/UCX.     Billing code: [id number]  Diagnoses:  Patient Active Problem List   Diagnosis   • Acute metabolic encephalopathy   • ARDS (adult respiratory distress syndrome) (CMS/HCC)    • Coronary artery disease involving native coronary artery of native heart without angina pectoris   • Hypoxia   • Pneumonia due to COVID-19 virus   • Transaminitis   • Essential hypertension   • Dyslipidemia   • Atelectasis   • Physical deconditioning   • Acute cystitis        Laure Bishop MD  2/18/2021

## 2021-02-18 NOTE — PLAN OF CARE
Problem: Rehabilitation ([facility]) Plan of Care  Goal: Plan of Care Review  Flowsheets (Taken 2/18/2021 1600)  Plan of Care Reviewed With: patient  IRF Plan of Care Review: progress ongoing, continue  Outcome Summary: Pt continues to tolerate PT on RA only.

## 2021-02-18 NOTE — PATIENT CARE CONFERENCE
Inpatient Rehabilitation Team Conference Note  Date: 2/18/2021  Time: 10:47 AM       Patient Name: Denis Green     Medical Record Number: [id number]   YOB: 1950  Sex: Male      Room/Bed: 320/320W  Payor Info: Payor: HUMANA / Plan: HUMANA MEDICARE / Product Type:  Managed Care /     Admitting Diagnosis: Pneumonia due to COVID-19 virus [U07.1, J12.82]  Physical deconditioning [R53.81]   Admit Date/Time: 2/4/2021  6:13 PM  Admission Comments: No comment available     Primary Diagnosis: No Principal Problem: There is no principal problem currently on the Problem List. Please update the Problem List and refresh.  Principal Problem: No Principal Problem: There is no principal problem currently on the Problem List. Please update the Problem List and refresh.    Patient Active Problem List    Diagnosis Date Noted   • Acute cystitis 02/16/2021   • Essential hypertension 02/05/2021   • Dyslipidemia 02/05/2021   • Atelectasis 02/05/2021   • Physical deconditioning 02/05/2021   • Acute metabolic encephalopathy 01/26/2021   • ARDS (adult respiratory distress syndrome) (CMS/[facility]) 01/16/2021   • Pneumonia due to COVID-19 virus 01/16/2021   • Hypoxia 01/05/2021   • Transaminitis 01/05/2021   • Coronary artery disease involving native coronary artery of native heart without angina pectoris 02/18/2013       Premorbid Status:  Dominant Hand: right  Ambulation: independent  Transferring: independent  Toileting: independent  Bathing: independent  Dressing: independent  Eating: independent  Communication: understands/communicates without difficulty  Swallowing: swallows foods/liquids without difficulty  Baseline Diet/Method of Nutritional Intake: regular solids;thin liquids  Past History of Dysphagia: No previous reports   Assistive Device/Animal Currently Used at Home: none  Prior Level of Function Comment: Pt is a cattle/,  previously IND for all IADLs.       Current Functional  Status:  Mobility  Gait  [place], Gait: minimum assist (75% or more patient effort)  Assistive Device: walker, front-wheeled  Comment: Verbal cues for B Hip extension and forward gaze when ambulating    Stairs  [place], Stairs: minimum assist (75% or more patient effort)  Assistive Device: railing  Handrail Location: both sides  Number of Stairs: 12  Stair Height: 7 inches  Comment: Min A for postural control and balance. Seated rest break at top    Wheelchair  Method of Locomotion: bimanual (upper extremity) propulsion  Functional Mobility Training: forward propulsion;steering;backward propulsion;stopping  [place], All Mobility: supervision  [place], Forward Propulsion: supervision  [place], Backward Propulsion: supervision  [place], Steering Activities: supervision  [place], Stopping Activities: supervision  Distance Propelled in Feet: 150 feet  Comment, Functional Mobility: Increased time to complete as pt is a novice user of WC  Parts Management: dependent (less than 25% patient effort)  Management Activities: parts (all) management    Transfers  [place], All Bed Mobility Activities: supervision  [place], Roll Left: minimum assist (75% or more patient effort);verbal cues (for trunk)  Verbal Cues (Roll Left): hand placement;safety;technique  [place], Roll Right: minimum assist (75% or more patient effort);verbal cues (for trunk)  Verbal Cues (Roll Right): hand placement;safety;technique  [place], Supine to Sit: minimum assist (75% or more patient effort)  [place], Sit to Supine: minimum assist (75% or more patient effort)  Assistive Device (Bed Mobility): bed rails  Comment (Bed Mobility): hosp bed HOB elevated  Maintains Weight-Bearing Status (Transfers): able to maintain weight-bearing status  Comment: improving balance  [place], Bed to Chair: touching/steadying assist  Verbal Cues: safety;technique  Assistive Device: walker,  front-wheeled  Comment: bed to w/c  [place], Chair to Bed: dependent (less than 25% patient effort);2 person assist;verbal cues  Verbal Cues: hand placement;preparatory posture;safety;technique  Assistive Device: wheelchair  Comment:  SPT; mod A x 1 for B hip/ knee ext , L/ R WS and steadying at pelvis; min A of 2nd person for safety; anterior approach of therapist.  (+ SOLIS)  [place], Sit to Stand Transfer: touching/steadying assist  Verbal Cues: safety;technique  Assistive Device: walker, front-wheeled  Comment: from WC to RW, touching assist for balance and safety  [place], Stand to Sit Transfer: touching/steadying assist  Verbal Cues: safety  Assistive Device: walker, front-wheeled  Comment: to , touching assist for safety  [place], Stand Pivot/Stand Step Transfer: minimum assist (75% or more patient effort)  Verbal Cues: safety;proper use of assistive device;technique  Assistive Device: walker, front-wheeled  Comment: via ambulatory approach to/from  with MIn A for balance    Transfer Technique: stand pivot  [place], Toilet Transfer: touching/steadying assist;1 person assist;safety considerations  Verbal Cues: safety  Assistive Device: commode, 3-in-1  Comment: commode over toielt  Transfer Technique: stand pivot  [place], Shower Transfer: close supervision;1 person assist;safety considerations  Verbal Cues: safety  Assistive Device: grab bars/tub rail  Comment: amb approach to commode in barrier free shower      Self Care  Self-Performance: threads left arm, shirt;threads right arm, shirt;pulls shirt over head/around back;pulls shirt down/adjusts  [place] Assistance: obtains clothes  Adaptive Equipment: none  Comment: assist to gather clothing due to desaturation  Tasks: don  Self-Performance: threads left leg, underpants;threads right leg, underpants;pulls underpants up or down;threads left leg, pants/shorts;threads right leg, pants/shorts;pulls pants/shorts up or  down;dons/doffs left sock;dons/doffs right sock;dons/doffs left shoe;dons/doffs right shoe;shoelaces, left;shoelaces, right  [place] Assistance: dons/doffs left shoe;dons/doffs right shoe;shoelaces, left;shoelaces, right  Adaptive Equipment: dressing stick;elastic shoelaces;long-handled shoe horn;sock aid;reacher  [place]: close supervision;1 person assist;safety considerations  Comment: AD for distal LEs  Self-Performance: chest;left arm;right arm;abdomen;front perineal area  Adaptive Equipment: grab bar/tub rail;hand-held shower spray hose  [place] Assistance: adaptive equipment setup  Comment: commode in stall shower  [place]: touching/steadying assist;1 person assist;safety considerations  Adaptive Equipment: commode, 3-in-1;grab bar/safety frame  Setup Assistance: adaptive equipment setup  Comment: improving dyn balance but limited by decreased 02 level  Self-Performance: washes, rinses and dries face;washes, rinses and dries hands;brushes/torres hair;oral care (brushing teeth, cleaning dentures)  Adaptive Equipment: none  Setup Assistance: open containers;obtain supplies  [place]: supervision  Comment: seated for energy conservation    Cognition  Affect/Mental Status (Cognitive): WFL  Orientation Status (Cognition): oriented x 4  Follows Commands (Cognition): follows three step commands;over 90% accuracy  Cognitive Function (Cognitive): attention deficit;executive function deficit;memory deficit  Attention Deficit (Cognitive): alternating attention  Comment, Attention: Great sustained/alternating attention to cognitive and swallowing exercises   Executive Function Deficit (Cognition): information processing;problem solving/reasoning (Fxl Math )  Comment, Executive Function: Functional math calculations related to time word problems, 100% acc I'ly   Memory Deficit (Cognitive): immediate recall  Comment, Short Term Memory: IND recall of 4-units of information immediately.   Safety Deficit (Cognitive):  insight into deficits/self awareness  Comment, Cognition: Pt motivated, cooperative, asking appropriate questions, able to follow required directions, good historian  City: 1-->multiple choice, phonemic cuing  Kind of Place: 3-->spontaneous/free recall  Name of [facility]: 3-->spontaneous/free recall  Month: 3-->spontaneous/free recall  Date: 1-->multiple choice, phonemic cuing  Year: 3-->spontaneous/free recall  Day of Week: 3-->spontaneous/free recall  Clock Time: 3-->spontaneous/free recall  Etiology/Event: 3-->spontaneous/free recall  Pathology Deficits: 3-->spontaneous/free recall  Total Score: 26    Communication  Speech Intelligibility (Motor Speech): WNL  Articulation (Motor Speech): WNL  Speech Fluency (Motor Speech): WNL  Vocal Loudness (Motor Speech): WNL  Breath Support (Motor Speech): intact  Rate/Prosody (Motor Speech): WNL  Resonance (Motor Speech): WNL  Comment, Motor Speech Assessment: WNL  Follows Commands (Auditory Comprehension): WNL;3-step commands;2-step commands;1-step command  Yes/No Questions (Auditory Comprehension): WNL;complex questions;simple/factual questions;biographical/personal questions  Complex Questions (Auditory Comprehension): intact  Simple/Factual Questions (Auditory Comprehension): intact  Biographical/Personal Questions (Auditory Comprehension): intact  Comment, Assessment (Auditory Comprehension): Mild impairment of A/C for short paragraph comprehension probably affected by impaired memory.      Frequency of Treatment (OT): 5-7 times per week, 60-90 minutes per day  Frequency of Treatment (PT): 5-7 times per week, 60-90 minutes per day  Therapy Frequency (SLP): 5-7 times per week, 30 minutes per day  Rec Therapy Frequency of Treatment: other (see comments)(2-3x/wk, 30'-60'/session)    Weekly Outcome Summaries:  Weekly Progress Summary (PT)  Progress Toward Functional Goals (PT): progressing toward functional goals as expected  Weekly Outcome Statement: Pt is a 71 y/o male  "initially admitted to an outside facility on 1/5. Dx'd with Covid 19 PNA. Intubated 1/9. Extubated 1/16. Hospital complications to include hospital aquired PNA, increased B pulmonary infiltrates requiring Abx , lethargy and confusion. Pt presenting with decreased activity tolerance/ SOLIS and O2 desat with minimal activity, decreased balance, decreased B LE strength. He currently requires Touching A for sit/stand and Min A for bed mobility. He performs SPT with RW and Min Ax1. He ambulates up to 150' with RW and Min A. He performs FF of steps with BHR and Min A with seated rest break. Family training with wife on Friday.   Recommendations (PT): Continue IP PT 5-7x/week, 60-90 min/day  Weekly Progress Summary (OT)  Progress Toward Functional Goals (OT): progressing toward functional goals as expected  Weekly Outcome Statement: Pt is making progress in all areas but continues to be limited by general weakness and limited 02 saturation, currently on 2-3L 02. Pt is not min A bathing, dressing, toileting and supervision for grooming. OT recommending sitting during all ADL for energy conservation. Toilet transfer w min A to commode over toilet and shower tx min A from w/c to commode in barrier free stall shower w grab bars. Pt limited in LB ADL so is trialing dsg st, sock aid, reacher, and LHSH. Also issued LH sponge for bathing.   Barriers to Overall Progress (OT): balance, general weakness, debilitated on 02  Recommendations (OT): continue POC  Weekly Progress Summary (SLP)  Weekly Outcome Statement: Steady fxl gains in all domains. Pt presents with greatly improved executive functioning/reasoning skills and STM. He completes mod level reasoning exercises with 100% acc I\"ly, to advance activities to a mod-complex level this week. VFSS completed 2/17/21, which revealed, Mild oral phase dysphagia characterized by mildly delayed A-P transfer and posterior loss of thin liquids. 2.Mild pharyngeal phase dysphagia characterized " "by delayed swallow initiation of thin liquids, consistent transient penetration of thin liquids, and mild pharyngeal residue of thin liquids and soft solids. No aspiration present during this study. Recommend diet advancement to thin liquids. ST will follow-up with meal trial or regular solids/thin liquids next session. Pt presents with suspected voice disorder in which he presents with poor breath support, reduced sustained phonation, hoarse/breathy vocal quality, and reduced vocal intensity. Pt to be seen by ENT this morning for formal voice evaluation.   Recommendations (SLP): Pt will benefit from continued skilled ST to address suspected voice disorder pending results of ENT, advance diet to regular solids, and to imrpove cognitive-communication to a level of IND.   Weekly Outcome Summary (Interdisciplinary)  Weekly Progress Summary: progressing toward goals as expected  Weekly Outcome Statement: He reports that he is making gains.   He reports that he climbed steps for first time yesterday, walking further, raising feet.  Discuss imporatnace of small t steps.  Reports that he is aware that he does \"lose his breather\"  Reports that he comes back quicklyHe reports he was distressed about  The water situation- this has frida rewsolved and he is on free water.   He reports that he will swallow study o Tuesday.  Reports that he does want to slow down after discharge.    He denies signficant emotional distress.  Reinforce coping and progress.    Problem Resolution:   Bladder Management: external device for management  Strategies/Techniques (Bowel Management): did not move bowels  Activities: integration into family life, integration into community life  Counseling (Coping Strategies): active listening utilized  Strategies to Enhance Eating/Swallowing: allow adequate time for eating, upright sitting position for eating   Identified Problems & Impairments: household mobility impairment (02/18/21 6300)  Comment, " "Identified Problems & Impairments: (not recorded)  Resolved Problems & Impairments: (not recorded)  Comment, Resolved Problems & Impairments: (not recorded) Team Weekly Outcome Statement: ST:  good progress, no aspiration.  observe w/ thin liquids today.  ENT: seeing today for voice .IS:  500.  cogs good.  will adv to mod complete. PT: 150\" min assist, RW flight of stairs, tx on RA no desat,  OT:  desat to 80s, recovers w/ deep breating.  LB drsg min assist. Psyc: working on energy conservation>  GOAL:  mod I, super for elevation.  GOALS:  super for BR, bathing, mod I dressing/toilet. (02/18/21 0852)        Goals/Support System:  Patient/Family Goals  Patient's Goals For Discharge: return to all previous roles/activities  Family/Support System  Health Advocacy: self-advocacy for health    IRF PT Goals      Most Recent Value   Bed Mobility Goal 1   Activity/Assistive Device  rolling to left, rolling to right, sit to supine/supine to sit at 02/05/2021 1030   [place]  supervision required at 02/05/2021 1030   Time Frame  short-term goal (STG), 1 week at 02/18/2021 0849   Strategies/Barriers  Impaired strength, decreased endurance, safety considerations at 02/18/2021 0849   Progress/Outcome  goal ongoing at 02/18/2021 0849   Bed Mobility Goal 2   Activity/Assistive Device  rolling to left, rolling to right, sit to supine/supine to sit at 02/05/2021 1030   [place]  modified independence at 02/05/2021 1030   Time Frame  long-term goal (LTG), 2 weeks at 02/18/2021 0849   Strategies/Barriers  Impaired strength, decreased endurance, safety considerations at 02/18/2021 0849   Progress/Outcome  goal ongoing at 02/18/2021 0849   Transfer Goal 1   Activity/Assistive Device  sit-to-stand/stand-to-sit at 02/18/2021 0849   [place]  supervision required at 02/18/2021 0849   Time Frame  short-term goal (STG), 1 week at 02/18/2021 0849   Strategies/Barriers  Impaired strength, decreased endurance, safety considerations " at 02/18/2021 0849   Progress/Outcome  goal met, goal revised this date at 02/18/2021 0849   Transfer Goal 2   Activity/Assistive Device  sit-to-stand/stand-to-sit, bed-to-chair/chair-to-bed at 02/11/2021 0824   [place]  modified independence at 02/11/2021 0824   Time Frame  long-term goal (LTG), 2 weeks at 02/18/2021 0849   Strategies/Barriers  Impaired strength, decreased endurance, safety considerations at 02/18/2021 0849   Progress/Outcome  goal ongoing at 02/18/2021 0849   Gait/Walking Locomotion Goal 1   Activity/Assistive Device  gait (walking locomotion) at 02/18/2021 0849   Distance  150 feet at 02/18/2021 0849   [place]  minimum assist (75% or more patient effort) at 02/18/2021 0849   Time Frame  short-term goal (STG), 1 week at 02/18/2021 0849   Strategies/Barriers  Impaired strength, decreased endurance, safety considerations at 02/18/2021 0849   Progress/Outcome  goal met, goal revised this date at 02/18/2021 0849   Gait/Walking Locomotion Goal 2   Activity/Assistive Device  gait (walking locomotion) at 02/18/2021 0849   Distance  150 feet at 02/18/2021 0849   [place]  modified independence at 02/18/2021 0849   Time Frame  long-term goal (LTG), 1 week at 02/18/2021 0849   Strategies/Barriers  Impaired strength, decreased endurance, safety considerations at 02/18/2021 0849   Progress/Outcome  goal ongoing, goal revised this date at 02/18/2021 0849   Stairs Goal 1   Activity/Assistive Device  stairs, all skills at 02/18/2021 0849   Number of Stairs  12 at 02/18/2021 0849   [place]  verbal cues required [touching assist] at 02/18/2021 0849   Time Frame  short-term goal (STG), 1 week at 02/18/2021 0849   Progress/Outcome  medical status inhibiting progress, goal revised this date at 02/18/2021 0849   Stairs Goal 2   Activity/Assistive Device  stairs, all skills at 02/11/2021 0824   Number of Stairs  12 at 02/11/2021 0824   [place]  supervision required at 02/11/2021 0824   Time  Frame  long-term goal (LTG), 2 weeks at 02/18/2021 0849   Progress/Outcome  goal ongoing at 02/18/2021 0849        IRF OT Goals      Most Recent Value   Transfer Goal 1   Activity/Assistive Device  toilet at 02/15/2021 1308   [place]  supervision required at 02/15/2021 1308   Time Frame  short-term goal (STG), 5 - 7 days at 02/15/2021 1308   Strategies/Barriers  DME at 02/15/2021 1308   Progress/Outcome  good progress toward goal, goal partially met, goal revised this date at 02/15/2021 1308   Transfer Goal 2   Activity/Assistive Device  toilet at 02/10/2021 1632   [place]  supervision required, set-up required at 02/10/2021 1632   Time Frame  long-term goal (LTG), 3 weeks at 02/10/2021 1632   Strategies/Barriers  DME at 02/10/2021 1632   Transfer Goal 3   Activity/Assistive Device  shower at 02/15/2021 1308   [place]  supervision required at 02/15/2021 1308   Time Frame  short-term goal (STG), 5 - 7 days at 02/15/2021 1308   Strategies/Barriers  DME at 02/15/2021 1308   Progress/Outcome  good progress toward goal, goal partially met, goal revised this date at 02/15/2021 1308   Transfer Goal 4   Activity/Assistive Device  shower at 02/10/2021 1632   [place]  supervision required, set-up required at 02/10/2021 1632   Time Frame  long-term goal (LTG), 3 weeks at 02/10/2021 1632   Strategies/Barriers  DME at 02/10/2021 1632   Bathing Goal 1   Activity/Assistive Device  bathing skills, all at 02/15/2021 1308   [place]  modified independence at 02/15/2021 1308   Time Frame  short-term goal (STG), 5 - 7 days at 02/15/2021 1308   Strategies/Barriers  DME at 02/15/2021 1308   Progress/Outcome  good progress toward goal, goal partially met, goal revised this date at 02/15/2021 1308   Bathing Goal 2   Activity/Assistive Device  bathing skills, all at 02/05/2021 0726   [place]  supervision required at 02/05/2021 0726   Time Frame  long-term goal (LTG), 3 weeks at 02/05/2021 6818    Strategies/Barriers  DME TBD at 02/05/2021 0726   UB Dressing Goal 1   Activity/Assistive Device  upper body dressing at 02/15/2021 1308   [place]  modified independence at 02/15/2021 1308   Time Frame  short-term goal (STG), 5 - 7 days at 02/15/2021 1308   Strategies/Barriers  including set up at 02/15/2021 1308   Progress/Outcome  good progress toward goal, goal partially met, goal revised this date at 02/15/2021 1308   UB Dressing Goal 2   Activity/Assistive Device  upper body dressing at 02/05/2021 0726   [place]  modified independence at 02/05/2021 0726   Time Frame  long-term goal (LTG), 3 weeks at 02/05/2021 0726   LB Dressing Goal 1   Activity/Assistive Device  lower body dressing at 02/15/2021 1308   [place]  modified independence at 02/15/2021 1308   Time Frame  short-term goal (STG), 5 - 7 days at 02/15/2021 1308   Strategies/Barriers  AD including clothing retrieval at 02/15/2021 1308   Progress/Outcome  good progress toward goal, goal partially met, goal revised this date at 02/10/2021 1632   LB Dressing Goal 2   Activity/Assistive Device  lower body dressing at 02/05/2021 0726   [place]  supervision required at 02/05/2021 0726   Time Frame  long-term goal (LTG), 3 weeks at 02/05/2021 0726   Grooming Goal 1   Activity/Assistive Device  grooming skills, all at 02/15/2021 1308   [place]  modified independence at 02/15/2021 1308   Time Frame  short-term goal (STG), 5 - 7 days at 02/15/2021 1308   Strategies/Barriers  standing @ sink at 02/15/2021 1308   Progress/Outcome  good progress toward goal, goal partially met, goal revised this date at 02/15/2021 1308   Grooming Goal 2   Activity/Assistive Device  grooming skills, all at 02/05/2021 0726   [place]  modified independence at 02/05/2021 0726   Time Frame  long-term goal (LTG), 3 weeks at 02/05/2021 0726   Toileting Goal 1   Activity/Assistive Device  toileting skills, all at 02/15/2021 1308   [place]  supervision  required at 02/15/2021 1308   Time Frame  short-term goal (STG), 5 - 7 days at 02/15/2021 1308   Strategies/Barriers  DME at 02/15/2021 1308   Progress/Outcome  good progress toward goal, goal partially met, goal revised this date at 02/15/2021 1308   Toileting Goal 2   Activity/Assistive Device  toileting skills, all at 02/10/2021 1632   [place]  supervision required at 02/10/2021 1632   Time Frame  long-term goal (LTG), 3 weeks at 02/10/2021 1632   Strategies/Barriers  DME at 02/10/2021 1632   Progress/Outcome  goal revised this date at 02/10/2021 1632        IRF SLP Goals      Most Recent Value   Verbal Expression Goal 1   Verbal Expression Goal 1  STG: pt will complete mod level verbal expression tasks x 90% c min cues. at 02/06/2021 1405   Time Frame  short-term goal (STG), 1 week at 02/06/2021 1405   Progress/Outcome  goal no longer appropriate at 02/18/2021 0834   Verbal Expression Goal 2   Verbal Expression Goal 2  LTG: pt will complete complex/abstract verbal expression tasks x 90% c independent use of WF strategies. at 02/06/2021 1405   Time Frame  long-term goal (LTG), 3 weeks at 02/06/2021 1405   Progress/Outcome  goal no longer appropriate at 02/18/2021 0834   Oral Nutrition/Hydration Goal 1   Activity  effective/safe/independent, use of swallowing techniques [regular/NTL, no overt s/s of aspiration ] at 02/05/2021 1202   Time Frame  short-term goal (STG), 3 - 5 days at 02/18/2021 0834   Progress/Outcome  goal partially met at 02/18/2021 0834   Oral Nutrition/Hydration Goal 2   Activity  effective/safe/independent, use of swallowing techniques [regular/thin, no overt s/s of aspiration ] at 02/05/2021 1202   Time Frame  long-term goal (LTG), 3 - 5 days at 02/18/2021 0834   Progress/Outcome  goal partially met at 02/18/2021 0834   Executive Function Goal 1   Activity  complete, abstract thinking tasks, executive function tasks, organization/sequencing tasks, problem solving/reasoning tasks, other  (see comments) [STG,  simple-mod level tasks.] at 02/06/2021 1405   [place]/Accuracy  with minimum, verbal cues/redirection, with additional processing time, with repetition of directions, with 90% accuracy at 02/06/2021 1405   Time Frame  1 week at 02/06/2021 1405   Progress/Outcome  goal met at 02/18/2021 0834   Executive Function Goal 2   Activity  complete, abstract thinking tasks, exhibit impulse control, organization/sequencing tasks, problem solving/reasoning tasks, other (see comments) [LTG] at 02/06/2021 1405   [place]/Accuracy  independently, with 90% accuracy, other (see comments) [mod-complex tasks.] at 02/06/2021 1405   Time Frame  3 weeks at 02/06/2021 1405   Progress/Outcome  good progress toward goal at 02/18/2021 0834   Memory Goal 1   Activity  short-term memory tasks, immediate recall tasks, working memory tasks, recall recent events, other (see comments) [STG,  simple-mod level tasks.] at 02/06/2021 1405   [place]/Accuracy  minimal cues for use of strategies, with 90% accuracy at 02/06/2021 1405   Time Frame  short-term goal (STG), 1 week at 02/06/2021 1405   Progress/Outcome  goal met at 02/18/2021 0834   Memory Goal 2   Activity  short-term memory tasks, immediate recall tasks, working memory tasks, recall recent events, other (see comments) [LTG,  mod complex information.] at 02/06/2021 1405   [place]/Accuracy  independent use of environmental cues/strategies, with 90% accuracy at 02/06/2021 1405   Time Frame  long-term goal (LTG), 1 week at 02/18/2021 0834   Progress/Outcome  good progress toward goal at 02/18/2021 0834          Risk for Complications  Constipation: Moderate  DVT: Moderate  Falls: Moderate      The patient's medical prognosis is good to achieve the stated goals below.    Expected Level of Function  Expected Functional Improvement: communication;mobility;motor dysfunction;safety;self-care;other (see comments) (swallow)  Self-Care: Setup or clean-up  assistance  Sphincter Control: Setup or clean-up assistance  Transfers: Setup or clean-up assistance  Locomotion: Supervision or touching assistance  Communication: Independent  Social Cognition: Independent       Discharge Planning:  Anticipated Discharge Disposition: home with home health services  Type of Home Care Services: home PT;home OT;nursing    Equipment/Device Needs at Discharge  Assistive Device/Animal Currently Used at Home: none    Anticipated Discharge Date: 2/24/2021    Needs Identified:  Community Reintegration  Activities: integration into family life, integration into community life  Barriers: type of disability  Comment, Community Reintegration: Pt given education regarding RT role and community reintegration (and simulated CI). Pt verbalizes understanding of COVID-19 precautions.      Team Members Present     Rehab Attending Present:  Isael Lang MD    Care Coordinator Present:  Gerda Sharif CM    Nurse Present:  Nory Guillaume RN    OT Present:  Isa Johnston OT    PT Present:  David Roach, BERNARD    SLP Present:  Susu Mandujano CCC-SLP    Psychologist Present:  Leonela Nunes PSY.D        Next Team Conference Date: 02/25/21

## 2021-02-18 NOTE — PLAN OF CARE
Problem: Cognitive Impairment  Goal: Cognitive Impairment Goal: Optimal Cognitive Function  2/18/2021 0848 by Susu Mandujano, CCC-SLP  Outcome: Progressing     Problem: Eating/Swallowing Impairment  Goal: Optimal Oral Motor and Swallow Ability  Outcome: Progressing

## 2021-02-18 NOTE — PLAN OF CARE
Problem: Rehabilitation (IRF) Plan of Care  Goal: Plan of Care Review  Flowsheets (Taken 2/18/2021 0287)  Plan of Care Reviewed With: patient  IRF Plan of Care Review: progress ongoing, continue  Outcome Summary: Good participation, pt tolerated regular solids with thin liquids with no overt s/s of aspiration. IND use of safe swallowing strategies, slow rate, small bites, alternating solids and liquids. Recommend diet advancement. Education provided results of VFSS and diet upgrade.

## 2021-02-18 NOTE — PROGRESS NOTES
Patient: Denis Green  Location: [facility] [address]  MRN: [id number]  Today's date: 2/18/2021    History of Present Illness  Denis LOVE is a 70 y.o. male admitted on 2/4/2021 with Pneumonia due to COVID-19 virus. Principal problem is No Principal Problem: There is no principal problem currently on the Problem List. Please update the Problem List and refresh..   Pt admitted to outside hospital on 1/5/2021 for COVID-19 PNA. Worsening resp status intubated on 1/9- extubated 1/16 to [facility]. Transition to [facility] on 1/23.Eval by nephrology while in ICU due to ERNIE likely secondary to ATN. Creatinine has been improving with excellent urine output. Received Decadron and Remdesivir. Dobhoff tube was placed due to dysphagia. New fever of 102.8 1/20 and 1/21. ID was consulted. Bld cx's negative. CT scan of the chest abdomen pelvis was done with results showing increasing pulmonary infiltrates/groundglass opacities and consolidations in his bilateral lower lobes. Started empirically on cefepime and vancomycin to cover hospital-acquired pneumonia. Pt had increased lethargy on 1/22. Pt removed DHT 1/25; required higher O2 afterward - possible aspiration during the process. 1/26 patient continued to have intermittent confusion; ?post ICU delirium. Neurology is following, MRI brain was unremarkable. EEG showed mild slowing but no seizure activity. D/c'd abx after 5 days d/t lethargy.       Past Medical History  Denis LOVE has a past medical history of Coronary artery disease, Hypertension, and Lipid disorder.    OT Vitals    Date/Time Pulse HR Source SpO2 Pt Activity O2 Therapy BP BP Location BP Method Pt Position [facility]   02/18/21 0805 95 -- 89 % At rest None (Room air) 114/68 Left upper arm Automatic Sitting AEB   02/18/21 0831 110 Monitor 91 % Other (Comment) None (Room air) during ADL activity -- -- -- Sitting AEB   02/18/21 0855 89 -- 94 % At rest None (Room air) -- -- -- -- AEB      OT Pain    Date/Time  Pain Type Pref Pain Scale Rating: Rest Onset/Duration [facility]   02/18/21 0805 Pain Assessment number (Numeric Rating Pain Scale) 0 -- AEB   02/18/21 0831 -- -- -- Pt desaturated during supine to sit. Approx 1 min seated EOB to recover to >90% on room air. Pt tolerated ADL with o2 above 90% with cues for breath support during exertion.  AEB   02/18/21 0855 -- -- -- Pt desaturated during supine to sit. Approx 1 min seated EOB to recover to >90% on room air. Pt tolerated ADL with o2 above 90% with cues for breath support during exertion.  AEB          Prior Living Environment      Most Recent Value   Lives With  spouse, child(mindy), adult [adult son lives in home occasionally]   Living Arrangements  house   Home Accessibility  stairs to enter home (Group), stairs within home (Group)   Living Environment Comment  Lives with wife and intermittently with one son.  [bed and bath on 2nd ,  no powder room on first]   Number of Stairs, Main Entrance  1   Stair Railings, Main Entrance  none   Location, Patient Bedroom  second floor, must negotiate stairs to access   Location, Bathroom  second floor, must negotiate stairs to access   Bathroom Access Comment  Pt. reports has tub shower and stall shower, uses tub shower primarily, has grab bar in shower, has stanard height toilet c wall on both sides   Number of Stairs, Within Home, Primary  12   Surface of Stairs, Within Home, Primary  hardwood   Stair Railings, Within Home, Primary  railings on both sides of stairs          Prior Level of Function      Most Recent Value   Dominant Hand  right   Ambulation  independent   Transferring  independent   Toileting  independent   Bathing  independent   Dressing  independent   Eating  independent   Communication  understands/communicates without difficulty   Swallowing  swallows foods/liquids without difficulty   Baseline Diet/Method of Nutritional Intake  regular solids, thin liquids   Past History of Dysphagia  No previous reports    Prior  "Level of Function Comment  Pt is a cattle/,  previously IND for all IADLs.    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 OT Evaluation and Treatment - 02/18/21 0806        Time Calculation    Start Time  0800     Stop Time  0900     Time Calculation (min)  60 min        Session Details    Document Type  daily treatment/progress note     Mode of Treatment  occupational therapy;individual therapy        General Information    General Observations of Patient  Pt received long sitting in bed        Transfers    Transfers  stand pivot transfer;shower transfer        Shower Transfer    Transfer Technique  stand pivot     [place], Shower Transfer  close supervision;1 person assist;safety considerations     Verbal Cues  safety     Assistive Device  grab bars/tub rail     Comment  amb approach to commode in barrier free shower        Bathing    Self-Performance  chest;left arm;right arm;abdomen;front perineal area     [place]  close supervision;1 person assist;safety considerations     Position  supported sitting     Setup Assistance  adaptive equipment setup     Adaptive Equipment  grab bar/tub rail;hand-held shower spray hose     Comment  commode in stall shower        Upper Body Dressing    Tasks  pull over garment     Self-Performance  threads left arm, shirt;threads right arm, shirt;pulls shirt over head/around back;pulls shirt down/adjusts     [place] Assistance  obtains clothes     [place]  close supervision;1 person assist;safety considerations     Position  unsupported sitting     Adaptive Equipment  none     Comment  assist to gather clothing due to desaturation        Lower Body Dressing    Self-Performance  " threads left leg, underpants;threads right leg, underpants;pulls underpants up or down;threads left leg, pants/shorts;threads right leg, pants/shorts;pulls pants/shorts up or down;dons/doffs left sock;dons/doffs right sock;dons/doffs left shoe;dons/doffs right shoe;shoelaces, left;shoelaces, right     [place]  close supervision;1 person assist;safety considerations     Position  supported sitting;supported standing     Adaptive Equipment  dressing stick;elastic shoelaces;long-handled shoe horn;sock aid;reacher     [place], Footwear  supervision;1 person assist;safety considerations;increased time to complete     Comment  AD for distal LEs        Grooming    Self-Performance  washes, rinses and dries face;washes, rinses and dries hands;brushes/torres hair;oral care (brushing teeth, cleaning dentures)     [place]  supervision     Position  supported sitting     Adaptive Equipment  none     [place], Oral Hygiene  supervision     Comment  seated for energy conservation        Daily Progress Summary (OT)    Symptoms Noted During/After Treatment  fatigue;shortness of breath;significant change in vital signs     Progress Toward Functional Goals (OT)  progressing toward functional goals as expected     Daily Outcome Statement (OT)  Pt continues to improve balance and functional performance during ADL but requires application of energy conservation techniques due to increased HR and decreased 02 when exertion self. Pt on room air but needs cues for deep breathing when perform exercise or standing tasks.      Barriers to Overall Progress (OT)  energy, weakness, balance, safety     Recommendations  continue POC                       Education provided this session. See the Patient Education summary report for full details.    IRF OT Goals      Most Recent Value   Transfer Goal 1   Activity/Assistive Device  toilet at 02/15/2021 1308   [place]  supervision required at 02/15/2021 1308   Time Frame   short-term goal (STG), 5 - 7 days at 02/15/2021 1308   Strategies/Barriers  DME at 02/15/2021 1308   Progress/Outcome  good progress toward goal, goal partially met, goal revised this date at 02/15/2021 1308   Transfer Goal 2   Activity/Assistive Device  toilet at 02/10/2021 1632   [place]  supervision required, set-up required at 02/10/2021 1632   Time Frame  long-term goal (LTG), 3 weeks at 02/10/2021 1632   Strategies/Barriers  DME at 02/10/2021 1632   Transfer Goal 3   Activity/Assistive Device  shower at 02/15/2021 1308   [place]  supervision required at 02/15/2021 1308   Time Frame  short-term goal (STG), 5 - 7 days at 02/15/2021 1308   Strategies/Barriers  DME at 02/15/2021 1308   Progress/Outcome  good progress toward goal, goal partially met, goal revised this date at 02/15/2021 1308   Transfer Goal 4   Activity/Assistive Device  shower at 02/10/2021 1632   [place]  supervision required, set-up required at 02/10/2021 1632   Time Frame  long-term goal (LTG), 3 weeks at 02/10/2021 1632   Strategies/Barriers  DME at 02/10/2021 1632   Bathing Goal 1   Activity/Assistive Device  bathing skills, all at 02/15/2021 1308   [place]  modified independence at 02/15/2021 1308   Time Frame  short-term goal (STG), 5 - 7 days at 02/15/2021 1308   Strategies/Barriers  DME at 02/15/2021 1308   Progress/Outcome  good progress toward goal, goal partially met, goal revised this date at 02/15/2021 1308   Bathing Goal 2   Activity/Assistive Device  bathing skills, all at 02/05/2021 0726   [place]  supervision required at 02/05/2021 0726   Time Frame  long-term goal (LTG), 3 weeks at 02/05/2021 0726   Strategies/Barriers  DME TBD at 02/05/2021 0726   UB Dressing Goal 1   Activity/Assistive Device  upper body dressing at 02/15/2021 1308   [place]  modified independence at 02/15/2021 1308   Time Frame  short-term goal (STG), 5 - 7 days at 02/15/2021 1308   Strategies/Barriers  including set up at  02/15/2021 1308   Progress/Outcome  good progress toward goal, goal partially met, goal revised this date at 02/15/2021 1308   UB Dressing Goal 2   Activity/Assistive Device  upper body dressing at 02/05/2021 0726   [place]  modified independence at 02/05/2021 0726   Time Frame  long-term goal (LTG), 3 weeks at 02/05/2021 0726   LB Dressing Goal 1   Activity/Assistive Device  lower body dressing at 02/15/2021 1308   [place]  modified independence at 02/15/2021 1308   Time Frame  short-term goal (STG), 5 - 7 days at 02/15/2021 1308   Strategies/Barriers  AD including clothing retrieval at 02/15/2021 1308   Progress/Outcome  good progress toward goal, goal partially met, goal revised this date at 02/10/2021 1632   LB Dressing Goal 2   Activity/Assistive Device  lower body dressing at 02/05/2021 0726   [place]  supervision required at 02/05/2021 0726   Time Frame  long-term goal (LTG), 3 weeks at 02/05/2021 0726   Grooming Goal 1   Activity/Assistive Device  grooming skills, all at 02/15/2021 1308   [place]  modified independence at 02/15/2021 1308   Time Frame  short-term goal (STG), 5 - 7 days at 02/15/2021 1308   Strategies/Barriers  standing @ sink at 02/15/2021 1308   Progress/Outcome  good progress toward goal, goal partially met, goal revised this date at 02/15/2021 1308   Grooming Goal 2   Activity/Assistive Device  grooming skills, all at 02/05/2021 0726   [place]  modified independence at 02/05/2021 0726   Time Frame  long-term goal (LTG), 3 weeks at 02/05/2021 0726   Toileting Goal 1   Activity/Assistive Device  toileting skills, all at 02/15/2021 1308   [place]  supervision required at 02/15/2021 1308   Time Frame  short-term goal (STG), 5 - 7 days at 02/15/2021 1308   Strategies/Barriers  DME at 02/15/2021 1308   Progress/Outcome  good progress toward goal, goal partially met, goal revised this date at 02/15/2021 1308   Toileting Goal 2   Activity/Assistive Device  toileting  skills, all at 02/10/2021 1632   [place]  supervision required at 02/10/2021 1632   Time Frame  long-term goal (LTG), 3 weeks at 02/10/2021 1632   Strategies/Barriers  DME at 02/10/2021 1632   Progress/Outcome  goal revised this date at 02/10/2021 1632

## 2021-02-18 NOTE — PLAN OF CARE
Problem: Rehabilitation (IRF) Plan of Care  Goal: Plan of Care Review  Outcome: Progressing  Flowsheets (Taken 2/18/2021 1143)  Plan of Care Reviewed With: patient  IRF Plan of Care Review: progress ongoing, continue  Outcome Summary:   Assumed patient care at 2300, denies pain   some trouble falling asleep and staying asleep   continent of bowel and bladder.   Plan of Care Review  IRF Plan of Care Review: progress ongoing, continue  Plan of Care Reviewed With: patient  Outcome Summary: Assumed patient care at 2300, denies pain; some trouble falling asleep and staying asleep; continent of bowel and bladder.

## 2021-02-18 NOTE — PLAN OF CARE
Problem: Rehabilitation (IRF) Plan of Care  Goal: Plan of Care Review  Outcome: Progressing  Flowsheets (Taken 2/18/2021 1208)  Plan of Care Reviewed With: spouse  IRF Plan of Care Review: progress ongoing, continue  Outcome Summary: called pts wife, Dana w/ post Team update.  she is [place] for FT tomorrow & window visit for saturday.  She is agreeable to projected d/c date of 2/24 to home w/ [facility] and will provide transportation.

## 2021-02-18 NOTE — PROGRESS NOTES
Patient: Denis Green  Location: [facility] [address]  MRN: [id number]  Today's date: 2/18/2021    History of Present Illness  Denis LOVE is a 70 y.o. male admitted on 2/4/2021 with Pneumonia due to COVID-19 virus. Principal problem is No Principal Problem: There is no principal problem currently on the Problem List. Please update the Problem List and refresh..   Pt admitted to outside hospital on 1/5/2021 for COVID-19 PNA. Worsening resp status intubated on 1/9- extubated 1/16 to OptiFlow. Transition to HFNC on 1/23.Eval by nephrology while in ICU due to ERNIE likely secondary to ATN. Creatinine has been improving with excellent urine output. Received Decadron and Remdesivir. Dobhoff tube was placed due to dysphagia. New fever of 102.8 1/20 and 1/21. ID was consulted. Bld cx's negative. CT scan of the chest abdomen pelvis was done with results showing increasing pulmonary infiltrates/groundglass opacities and consolidations in his bilateral lower lobes. Started empirically on cefepime and vancomycin to cover hospital-acquired pneumonia. Pt had increased lethargy on 1/22. Pt removed DHT 1/25; required higher O2 afterward - possible aspiration during the process. 1/26 patient continued to have intermittent confusion; ?post ICU delirium. Neurology is following, MRI brain was unremarkable. EEG showed mild slowing but no seizure activity. D/c'd abx after 5 days d/t lethargy.       Past Medical History  Denis LOVE has a past medical history of Coronary artery disease, Hypertension, and Lipid disorder.    OT Vitals    Date/Time Pulse HR Source SpO2 Pt Activity O2 Therapy BP BP Location BP Method Pt Position [facility]   02/18/21 1132 74 Monitor 93 % At rest None (Room air) 110/64 Left upper arm Automatic Sitting AEB   02/18/21 1150 -- -- 88 % Walking None (Room air) -- -- -- -- AEB   02/18/21 1155 91 Monitor 91 % At rest None (Room air) -- -- -- -- AEB      OT Pain    Date/Time Pain Type Pref Pain Scale Side  Orientation Location Rating: Rest Interventions [facility]   02/18/21 1132 Pain Assessment number (Numeric Rating Pain Scale) Left distal foot 3 position adjusted AEB   02/18/21 1155 Pain Reassessment number (Numeric Rating Pain Scale) -- -- -- -- -- AEB          Prior Living Environment      Most Recent Value   Lives With  spouse, child(mindy), adult [adult son lives in home occasionally]   Living Arrangements  house   Home Accessibility  stairs to enter home (Group), stairs within home (Group)   Living Environment Comment  Lives with wife and intermittently with one son.  [bed and bath on 2nd ,  no powder room on first]   Number of Stairs, Main Entrance  1   Stair Railings, Main Entrance  none   Location, Patient Bedroom  second floor, must negotiate stairs to access   Location, Bathroom  second floor, must negotiate stairs to access   Bathroom Access Comment  Pt. reports has tub shower and stall shower, uses tub shower primarily, has grab bar in shower, has stanard height toilet c wall on both sides   Number of Stairs, Within Home, Primary  12   Surface of Stairs, Within Home, Primary  hardwood   Stair Railings, Within Home, Primary  railings on both sides of stairs          Prior Level of Function      Most Recent Value   Dominant Hand  right   Ambulation  independent   Transferring  independent   Toileting  independent   Bathing  independent   Dressing  independent   Eating  independent   Communication  understands/communicates without difficulty   Swallowing  swallows foods/liquids without difficulty   Baseline Diet/Method of Nutritional Intake  regular solids, thin liquids   Past History of Dysphagia  No previous reports    Prior Level of Function Comment  Pt is a cattle/,  previously IND for all IADLs.    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, (+) , enjoys [employer]  "and being outside   Patient Goals  \"I want to get back up walking, get stronger, and be self sufficient\"          IRF OT Evaluation and Treatment - 02/18/21 1138        Time Calculation    Start Time  1130     Stop Time  1200     Time Calculation (min)  30 min        Session Details    Document Type  daily treatment/progress note     Mode of Treatment  occupational therapy;individual therapy        General Information    General Observations of Patient  Pt received long sitting in bed        Bed Mobility    Bed Mobility  bed mobility (all) activities     [place], All Bed Mobility Activities  distant supervision;1 person assist;safety considerations        Transfers    Transfers  stand pivot transfer        Stand Pivot Transfer    [place], Stand Pivot/Stand Step Transfer  close supervision;verbal cues;1 person assist;safety considerations     Verbal Cues  safety    breath support, postural control    Assistive Device  walker, front-wheeled     Comment  bed to/from w/c        Safety Issues, Functional Mobility    Safety Issues Affecting Function (Mobility)  impulsivity;insight into deficits/self awareness;positioning of assistive device;safety precautions follow-through/compliance     Impairments Affecting Function (Mobility)  balance;behavior;coordination;endurance/activity tolerance     Cognitive Impairments, Mobility Safety/Performance  impulsivity;insight into deficits/self awareness;safety precaution follow-through     Comment, Safety Issues/Impairments (Mobility)  Fair insight but tends to be mildly impulsive and need ongoing reinforcement to steady self, deep breathe and stand straight each time he comes to stand. Tendency is to start moving before he is stable then downplay instruction from therapist. Usually by end of session, pt agrees to apply strategies.         Daily Progress Summary (OT)    Symptoms Noted During/After Treatment  fatigue;shortness of breath;significant change in vital signs     " Progress Toward Functional Goals (OT)  progressing toward functional goals as expected     Daily Outcome Statement (OT)  Patient is progressing in areas of functional mobility and activities of daily living skills but continues to be limited in endurance and application of safety strategies during dynamic standing tasks.      Barriers to Overall Progress (OT)  weakness, balance, impulsivity     Recommendations  continue POC                            IRF OT Goals      Most Recent Value   Transfer Goal 1   Activity/Assistive Device  toilet at 02/15/2021 1308   [place]  supervision required at 02/15/2021 1308   Time Frame  short-term goal (STG), 5 - 7 days at 02/15/2021 1308   Strategies/Barriers  DME at 02/15/2021 1308   Progress/Outcome  good progress toward goal, goal partially met, goal revised this date at 02/15/2021 1308   Transfer Goal 2   Activity/Assistive Device  toilet at 02/10/2021 1632   [place]  supervision required, set-up required at 02/10/2021 1632   Time Frame  long-term goal (LTG), 3 weeks at 02/10/2021 1632   Strategies/Barriers  DME at 02/10/2021 1632   Transfer Goal 3   Activity/Assistive Device  shower at 02/15/2021 1308   [place]  supervision required at 02/15/2021 1308   Time Frame  short-term goal (STG), 5 - 7 days at 02/15/2021 1308   Strategies/Barriers  DME at 02/15/2021 1308   Progress/Outcome  good progress toward goal, goal partially met, goal revised this date at 02/15/2021 1308   Transfer Goal 4   Activity/Assistive Device  shower at 02/10/2021 1632   [place]  supervision required, set-up required at 02/10/2021 1632   Time Frame  long-term goal (LTG), 3 weeks at 02/10/2021 1632   Strategies/Barriers  DME at 02/10/2021 1632   Bathing Goal 1   Activity/Assistive Device  bathing skills, all at 02/15/2021 1308   [place]  modified independence at 02/15/2021 1308   Time Frame  short-term goal (STG), 5 - 7 days at 02/15/2021 1308   Strategies/Barriers  DME at  02/15/2021 1308   Progress/Outcome  good progress toward goal, goal partially met, goal revised this date at 02/15/2021 1308   Bathing Goal 2   Activity/Assistive Device  bathing skills, all at 02/05/2021 0726   [place]  supervision required at 02/05/2021 0726   Time Frame  long-term goal (LTG), 3 weeks at 02/05/2021 0726   Strategies/Barriers  DME TBD at 02/05/2021 0726   UB Dressing Goal 1   Activity/Assistive Device  upper body dressing at 02/15/2021 1308   [place]  modified independence at 02/15/2021 1308   Time Frame  short-term goal (STG), 5 - 7 days at 02/15/2021 1308   Strategies/Barriers  including set up at 02/15/2021 1308   Progress/Outcome  good progress toward goal, goal partially met, goal revised this date at 02/15/2021 1308   UB Dressing Goal 2   Activity/Assistive Device  upper body dressing at 02/05/2021 0726   [place]  modified independence at 02/05/2021 0726   Time Frame  long-term goal (LTG), 3 weeks at 02/05/2021 0726   LB Dressing Goal 1   Activity/Assistive Device  lower body dressing at 02/15/2021 1308   [place]  modified independence at 02/15/2021 1308   Time Frame  short-term goal (STG), 5 - 7 days at 02/15/2021 1308   Strategies/Barriers  AD including clothing retrieval at 02/15/2021 1308   Progress/Outcome  good progress toward goal, goal partially met, goal revised this date at 02/10/2021 1632   LB Dressing Goal 2   Activity/Assistive Device  lower body dressing at 02/05/2021 0726   [place]  supervision required at 02/05/2021 0726   Time Frame  long-term goal (LTG), 3 weeks at 02/05/2021 0726   Grooming Goal 1   Activity/Assistive Device  grooming skills, all at 02/15/2021 1308   [place]  modified independence at 02/15/2021 1308   Time Frame  short-term goal (STG), 5 - 7 days at 02/15/2021 1308   Strategies/Barriers  standing @ sink at 02/15/2021 1308   Progress/Outcome  good progress toward goal, goal partially met, goal revised this date at 02/15/2021  1308   Grooming Goal 2   Activity/Assistive Device  grooming skills, all at 02/05/2021 0726   [place]  modified independence at 02/05/2021 0726   Time Frame  long-term goal (LTG), 3 weeks at 02/05/2021 0726   Toileting Goal 1   Activity/Assistive Device  toileting skills, all at 02/15/2021 1308   [place]  supervision required at 02/15/2021 1308   Time Frame  short-term goal (STG), 5 - 7 days at 02/15/2021 1308   Strategies/Barriers  DME at 02/15/2021 1308   Progress/Outcome  good progress toward goal, goal partially met, goal revised this date at 02/15/2021 1308   Toileting Goal 2   Activity/Assistive Device  toileting skills, all at 02/10/2021 1632   [place]  supervision required at 02/10/2021 1632   Time Frame  long-term goal (LTG), 3 weeks at 02/10/2021 1632   Strategies/Barriers  DME at 02/10/2021 1632   Progress/Outcome  goal revised this date at 02/10/2021 1632

## 2021-02-19 ENCOUNTER — APPOINTMENT (INPATIENT)
Dept: PHYSICAL THERAPY | Facility: REHABILITATION | Age: 71
DRG: 948 | End: 2021-02-19
Payer: COMMERCIAL

## 2021-02-19 ENCOUNTER — APPOINTMENT (INPATIENT)
Dept: OCCUPATIONAL THERAPY | Facility: REHABILITATION | Age: 71
DRG: 948 | End: 2021-02-19
Payer: COMMERCIAL

## 2021-02-19 PROCEDURE — 97530 THERAPEUTIC ACTIVITIES: CPT | Mod: GO,59

## 2021-02-19 PROCEDURE — 92507 TX SP LANG VOICE COMM INDIV: CPT | Mod: GN

## 2021-02-19 PROCEDURE — 97116 GAIT TRAINING THERAPY: CPT | Mod: GP

## 2021-02-19 PROCEDURE — 97530 THERAPEUTIC ACTIVITIES: CPT | Mod: GP,59

## 2021-02-19 PROCEDURE — 92526 ORAL FUNCTION THERAPY: CPT | Mod: GN

## 2021-02-19 PROCEDURE — 97535 SELF CARE MNGMENT TRAINING: CPT | Mod: GO

## 2021-02-19 PROCEDURE — 63700000 HC SELF-ADMINISTRABLE DRUG: Performed by: INTERNAL MEDICINE

## 2021-02-19 PROCEDURE — 12800000 HC ROOM AND CARE SEMIPRIVATE REHAB

## 2021-02-19 PROCEDURE — 63700000 HC SELF-ADMINISTRABLE DRUG: Performed by: PHYSICAL MEDICINE & REHABILITATION

## 2021-02-19 RX ADMIN — GUAIFENESIN 600 MG: 600 TABLET ORAL at 08:23

## 2021-02-19 RX ADMIN — GEMFIBROZIL 600 MG: 600 TABLET ORAL at 08:22

## 2021-02-19 RX ADMIN — DOCUSATE SODIUM 100 MG: 100 CAPSULE, LIQUID FILLED ORAL at 08:24

## 2021-02-19 RX ADMIN — DIPHENHYDRAMINE HYDROCHLORIDE 50 MG: 25 CAPSULE ORAL at 21:33

## 2021-02-19 RX ADMIN — RIVAROXABAN 10 MG: 10 TABLET, FILM COATED ORAL at 16:36

## 2021-02-19 RX ADMIN — ASPIRIN 81 MG: 81 TABLET ORAL at 08:24

## 2021-02-19 RX ADMIN — CEFPODOXIME PROXETIL 200 MG: 200 TABLET, FILM COATED ORAL at 21:33

## 2021-02-19 RX ADMIN — SODIUM CHLORIDE 1 G: 1 TABLET ORAL at 16:36

## 2021-02-19 RX ADMIN — DOCUSATE SODIUM 100 MG: 100 CAPSULE, LIQUID FILLED ORAL at 16:36

## 2021-02-19 RX ADMIN — DOCUSATE SODIUM 100 MG: 100 CAPSULE, LIQUID FILLED ORAL at 21:33

## 2021-02-19 RX ADMIN — CEFPODOXIME PROXETIL 200 MG: 200 TABLET, FILM COATED ORAL at 08:24

## 2021-02-19 RX ADMIN — METOPROLOL TARTRATE 12.5 MG: 25 TABLET, FILM COATED ORAL at 08:23

## 2021-02-19 RX ADMIN — SENNOSIDES 3 TABLET: 8.6 TABLET, FILM COATED ORAL at 11:21

## 2021-02-19 RX ADMIN — Medication 3 MG: at 21:33

## 2021-02-19 RX ADMIN — FUROSEMIDE 20 MG: 20 TABLET ORAL at 08:23

## 2021-02-19 RX ADMIN — MAGNESIUM HYDROXIDE 30 ML: 400 SUSPENSION ORAL at 16:37

## 2021-02-19 RX ADMIN — BISACODYL 10 MG: 10 SUPPOSITORY RECTAL at 20:00

## 2021-02-19 RX ADMIN — SODIUM CHLORIDE 1 G: 1 TABLET ORAL at 08:22

## 2021-02-19 RX ADMIN — Medication 400 MG: at 21:34

## 2021-02-19 RX ADMIN — GEMFIBROZIL 600 MG: 600 TABLET ORAL at 16:35

## 2021-02-19 RX ADMIN — POLYETHYLENE GLYCOL 3350 17 G: 17 POWDER, FOR SOLUTION ORAL at 16:37

## 2021-02-19 RX ADMIN — GUAIFENESIN 600 MG: 600 TABLET ORAL at 21:34

## 2021-02-19 RX ADMIN — Medication 400 MG: at 08:23

## 2021-02-19 RX ADMIN — METOPROLOL TARTRATE 12.5 MG: 25 TABLET, FILM COATED ORAL at 21:33

## 2021-02-19 RX ADMIN — FUROSEMIDE 20 MG: 20 TABLET ORAL at 16:35

## 2021-02-19 NOTE — PROGRESS NOTES
Patient: Denis Green  Location: [facility] [address]  MRN: [id number]  Today's date: 2/19/2021    History of Present Illness  Denis LOVE is a 70 y.o. male admitted on 2/4/2021 with Pneumonia due to COVID-19 virus. Principal problem is No Principal Problem: There is no principal problem currently on the Problem List. Please update the Problem List and refresh..   Pt admitted to outside hospital on 1/5/2021 for COVID-19 PNA. Worsening resp status intubated on 1/9- extubated 1/16 to OptiFlow. Transition to HFNC on 1/23.Eval by nephrology while in ICU due to ERNIE likely secondary to ATN. Creatinine has been improving with excellent urine output. Received Decadron and Remdesivir. Dobhoff tube was placed due to dysphagia. New fever of 102.8 1/20 and 1/21. ID was consulted. Bld cx's negative. CT scan of the chest abdomen pelvis was done with results showing increasing pulmonary infiltrates/groundglass opacities and consolidations in his bilateral lower lobes. Started empirically on cefepime and vancomycin to cover hospital-acquired pneumonia. Pt had increased lethargy on 1/22. Pt removed DHT 1/25; required higher O2 afterward - possible aspiration during the process. 1/26 patient continued to have intermittent confusion; ?post ICU delirium. Neurology is following, MRI brain was unremarkable. EEG showed mild slowing but no seizure activity. D/c'd abx after 5 days d/t lethargy.       Past Medical History  Denis LOVE has a past medical history of Coronary artery disease, Hypertension, and Lipid disorder.        Prior Living Environment      Most Recent Value   Lives With  spouse, child(mindy), adult [adult son lives in home occasionally]   Living Arrangements  house   Home Accessibility  stairs to enter home (Group), stairs within home (Group)   Living Environment Comment  Lives with wife and intermittently with one son.  [bed and bath on 2nd ,  no powder room on first]   Number of Stairs, Main Entrance   "1   Stair Railings, Main Entrance  none   Location, Patient Bedroom  second floor, must negotiate stairs to access   Location, Bathroom  second floor, must negotiate stairs to access   Bathroom Access Comment  Pt. reports has tub shower and stall shower, uses tub shower primarily, has grab bar in shower, has stanard height toilet c wall on both sides   Number of Stairs, Within Home, Primary  12   Surface of Stairs, Within Home, Primary  hardwood   Stair Railings, Within Home, Primary  railings on both sides of stairs          Prior Level of Function      Most Recent Value   Dominant Hand  right   Ambulation  independent   Transferring  independent   Toileting  independent   Bathing  independent   Dressing  independent   Eating  independent   Communication  understands/communicates without difficulty   Swallowing  swallows foods/liquids without difficulty   Baseline Diet/Method of Nutritional Intake  regular solids, thin liquids   Past History of Dysphagia  No previous reports    Prior Level of Function Comment  Pt is a cattle/,  previously IND for all IADLs.    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, (+) , enjoys farming and being outside   Patient Goals  \"I want to get back up walking, get stronger, and be self sufficient\"         02/19/21 1537   Pain/Comfort/Sleep   Pain Charting Type Pain Assessment   Preferred Pain Scale number (Numeric Rating Pain Scale)   Pain Rating (0-10): Rest 0   Vital Signs   Heart Rate 87   /79   BP Location Left upper arm   BP Method Automatic   Patient Position Sitting        02/19/21 1555   Pain/Comfort/Sleep   Pain Charting Type Pain Reassessment   Preferred Pain Scale number (Numeric Rating Pain Scale)   Pain Rating (0-10): Rest 0         IRF OT Evaluation and Treatment - 02/19/21 1538        Time Calculation    Start Time  " 1530     Stop Time  1600     Time Calculation (min)  30 min        Session Details    Document Type  daily treatment/progress note     Mode of Treatment  occupational therapy;individual therapy        General Information    Patient/Family/Caregiver Comments/Observations  Wife Dana, training in simulated living environment with focus on bathroom safety/transfers     General Observations of Patient  Family observation/instruction with wife dana        Safety Awareness/Health Promotion    Fall Prevention  demonstrates fall risk prevention techniques        Caregiver Training    Caregiver(s) to be Trained  spouse/significant other     Caregiver Training Plan  transfer training     Comment, Caregiver Training Plan  Pt's wife present for transfer training and DME needs assessment to maximize safety in bathroom. Wife observed pt sit/rise from std toilet with safety rails and step in/out of shower transitioning from walker to vertical grab bar on wall above threshold. Wife reports that she feels confident in level of care that needs to be provided. Issued Home Safety Handout with suggestions for environmental modification.        Transfers    Transfers  toilet transfer;shower transfer     Maintains Weight-Bearing Status (Transfers)  able to maintain weight-bearing status        Toilet Transfer    Transfer Technique  stand pivot     [place], Toilet Transfer  close supervision     Verbal Cues  safety     Assistive Device  grab bars/safety frame     Comment  Pt practice sit to/from stand using toilet safety frame (already installed at pt's home) in conjunction with standard toilet. Pt able to sit/rise with cl sup.         Shower Transfer    Transfer Technique  step over, left entry;step over, right entry     [place], Shower Transfer  close supervision;1 person assist;safety considerations     Verbal Cues  safety;technique     Assistive Device  grab bars/tub rail;shower chair     Comment  Wife/pt to have grab  bars installed, OT recommended on wall above threshold. Wife/pt agree. Wife observed pt step in/out of shower and sit on bench with back (owns). Wife reports that she feels comfortable with level of support needed.         Daily Progress Summary (OT)    Symptoms Noted During/After Treatment  none     Progress Toward Functional Goals (OT)  progressing toward functional goals as expected     Daily Outcome Statement (OT)  Wife was present for family training in simulated living environment. Wife and pt feel confident in level of assistance needed and agree with DME recommendations for bathroom transfers. Issued Home Safety Handout for reference and simple suggestions for home modifications to maximize safety and decrease falls.      Barriers to Overall Progress (OT)  balance, general strength     Recommendations  continue POC                       Education provided this session. See the Patient Education summary report for full details.    IRF OT Goals      Most Recent Value   Transfer Goal 1   Activity/Assistive Device  toilet at 02/15/2021 1308   [place]  supervision required at 02/15/2021 1308   Time Frame  short-term goal (STG), 5 - 7 days at 02/15/2021 1308   Strategies/Barriers  DME at 02/15/2021 1308   Progress/Outcome  good progress toward goal, goal partially met, goal revised this date at 02/15/2021 1308   Transfer Goal 2   Activity/Assistive Device  toilet at 02/10/2021 1632   [place]  supervision required, set-up required at 02/10/2021 1632   Time Frame  long-term goal (LTG), 3 weeks at 02/10/2021 1632   Strategies/Barriers  DME at 02/10/2021 1632   Transfer Goal 3   Activity/Assistive Device  shower at 02/15/2021 1308   [place]  supervision required at 02/15/2021 1308   Time Frame  short-term goal (STG), 5 - 7 days at 02/15/2021 1308   Strategies/Barriers  DME at 02/15/2021 1308   Progress/Outcome  good progress toward goal, goal partially met, goal revised this date at 02/15/2021 1308    Transfer Goal 4   Activity/Assistive Device  shower at 02/10/2021 1632   [place]  supervision required, set-up required at 02/10/2021 1632   Time Frame  long-term goal (LTG), 3 weeks at 02/10/2021 1632   Strategies/Barriers  DME at 02/10/2021 1632   Bathing Goal 1   Activity/Assistive Device  bathing skills, all at 02/15/2021 1308   [place]  modified independence at 02/15/2021 1308   Time Frame  short-term goal (STG), 5 - 7 days at 02/15/2021 1308   Strategies/Barriers  DME at 02/15/2021 1308   Progress/Outcome  good progress toward goal, goal partially met, goal revised this date at 02/15/2021 1308   Bathing Goal 2   Activity/Assistive Device  bathing skills, all at 02/05/2021 0726   [place]  supervision required at 02/05/2021 0726   Time Frame  long-term goal (LTG), 3 weeks at 02/05/2021 0726   Strategies/Barriers  DME TBD at 02/05/2021 0726   UB Dressing Goal 1   Activity/Assistive Device  upper body dressing at 02/15/2021 1308   [place]  modified independence at 02/15/2021 1308   Time Frame  short-term goal (STG), 5 - 7 days at 02/15/2021 1308   Strategies/Barriers  including set up at 02/15/2021 1308   Progress/Outcome  good progress toward goal, goal partially met, goal revised this date at 02/15/2021 1308   UB Dressing Goal 2   Activity/Assistive Device  upper body dressing at 02/05/2021 0726   [place]  modified independence at 02/05/2021 0726   Time Frame  long-term goal (LTG), 3 weeks at 02/05/2021 0726   LB Dressing Goal 1   Activity/Assistive Device  lower body dressing at 02/15/2021 1308   [place]  modified independence at 02/15/2021 1308   Time Frame  short-term goal (STG), 5 - 7 days at 02/15/2021 1308   Strategies/Barriers  AD including clothing retrieval at 02/15/2021 1308   Progress/Outcome  good progress toward goal, goal partially met, goal revised this date at 02/10/2021 1632   LB Dressing Goal 2   Activity/Assistive Device  lower body dressing at 02/05/2021 0726    [place]  supervision required at 02/05/2021 0726   Time Frame  long-term goal (LTG), 3 weeks at 02/05/2021 0726   Grooming Goal 1   Activity/Assistive Device  grooming skills, all at 02/15/2021 1308   [place]  modified independence at 02/15/2021 1308   Time Frame  short-term goal (STG), 5 - 7 days at 02/15/2021 1308   Strategies/Barriers  standing @ sink at 02/15/2021 1308   Progress/Outcome  good progress toward goal, goal partially met, goal revised this date at 02/15/2021 1308   Grooming Goal 2   Activity/Assistive Device  grooming skills, all at 02/05/2021 0726   [place]  modified independence at 02/05/2021 0726   Time Frame  long-term goal (LTG), 3 weeks at 02/05/2021 0726   Toileting Goal 1   Activity/Assistive Device  toileting skills, all at 02/15/2021 1308   [place]  supervision required at 02/15/2021 1308   Time Frame  short-term goal (STG), 5 - 7 days at 02/15/2021 1308   Strategies/Barriers  DME at 02/15/2021 1308   Progress/Outcome  good progress toward goal, goal partially met, goal revised this date at 02/15/2021 1308   Toileting Goal 2   Activity/Assistive Device  toileting skills, all at 02/10/2021 1632   [place]  supervision required at 02/10/2021 1632   Time Frame  long-term goal (LTG), 3 weeks at 02/10/2021 1632   Strategies/Barriers  DME at 02/10/2021 1632   Progress/Outcome  goal revised this date at 02/10/2021 1632

## 2021-02-19 NOTE — PLAN OF CARE
Problem: Rehabilitation (IRF) Plan of Care  Goal: Plan of Care Review  Flowsheets (Taken 2/19/2021 1102)  Plan of Care Reviewed With: patient  IRF Plan of Care Review: progress ongoing, continue  Outcome Summary: Patient ambulated increased distance during PT session.

## 2021-02-19 NOTE — PROGRESS NOTES
Patient: Denis Green  Location: [facility] [address]  MRN: [id number]  Today's date: 2/19/2021    History of Present Illness  Denis LOVE is a 70 y.o. male admitted on 2/4/2021 with Pneumonia due to COVID-19 virus. Principal problem is No Principal Problem: There is no principal problem currently on the Problem List. Please update the Problem List and refresh..   Pt admitted to outside hospital on 1/5/2021 for COVID-19 PNA. Worsening resp status intubated on 1/9- extubated 1/16 to OptiFlow. Transition to [facility] on 1/23.Eval by nephrology while in ICU due to ERNIE likely secondary to ATN. Creatinine has been improving with excellent urine output. Received Decadron and Remdesivir. Dobhoff tube was placed due to dysphagia. New fever of 102.8 1/20 and 1/21. ID was consulted. Bld cx's negative. CT scan of the chest abdomen pelvis was done with results showing increasing pulmonary infiltrates/groundglass opacities and consolidations in his bilateral lower lobes. Started empirically on cefepime and vancomycin to cover hospital-acquired pneumonia. Pt had increased lethargy on 1/22. Pt removed DHT 1/25; required higher O2 afterward - possible aspiration during the process. 1/26 patient continued to have intermittent confusion; ?post ICU delirium. Neurology is following, MRI brain was unremarkable. EEG showed mild slowing but no seizure activity. D/c'd abx after 5 days d/t lethargy.       Past Medical History  Denis LOVE has a past medical history of Coronary artery disease, Hypertension, and Lipid disorder.        Prior Living Environment      Most Recent Value   Lives With  spouse, child(mindy), adult [adult son lives in home occasionally]   Living Arrangements  house   Home Accessibility  stairs to enter home (Group), stairs within home (Group)   Living Environment Comment  Lives with wife and intermittently with one son.  [bed and bath on 2nd ,  no powder room on first]   Number of Stairs, Main Entrance   1   Stair Railings, Main Entrance  none   Location, Patient Bedroom  second floor, must negotiate stairs to access   Location, Bathroom  second floor, must negotiate stairs to access   Bathroom Access Comment  Pt. reports has tub shower and stall shower, uses tub shower primarily, has grab bar in shower, has stanard height toilet c wall on both sides   Number of Stairs, Within Home, Primary  12   Surface of Stairs, Within Home, Primary  hardwood   Stair Railings, Within Home, Primary  railings on both sides of stairs          Prior Level of Function      Most Recent Value   Dominant Hand  right   Ambulation  independent   Transferring  independent   Toileting  independent   Bathing  independent   Dressing  independent   Eating  independent   Communication  understands/communicates without difficulty   Swallowing  swallows foods/liquids without difficulty   Baseline Diet/Method of Nutritional Intake  regular solids, thin liquids   Past History of Dysphagia  No previous reports    Prior Level of Function Comment  Pt is a cattle/,  previously IND for all IADLs.    Assistive Device/Animal Currently Used at Home  none          IRF PT Evaluation and Treatment - 02/19/21 1503        Time Calculation    Start Time  1500     Stop Time  1530     Time Calculation (min)  30 min        Session Details    Document Type  daily treatment/progress note     Mode of Treatment  individual therapy;physical therapy        General Information    Patient/Family/Caregiver Comments/Observations  Pt's wife Dana present for family training        Caregiver Training    Caregiver(s) to be Trained  spouse/significant other     Caregiver Training Plan  bed mobility training;ambulation using assistive device;transfer training     Comment, Caregiver Training Plan  Pt's wife present for education and training. See below for specific details on training. Pt's wife had RW present from home. Session focused on elevation training. Pt, PT and  "wife agreed to complete stairs 1-2x/day only initially upon d/c to home.          Bed Mobility    [place], Roll Left  supervision     [place], Roll Right  supervision     [place], Supine to Sit  supervision     [place], Sit to Supine  supervision     Comment (Bed Mobility)  Performed on standard bed with pt's wife providing Supervision        Transfers    Transfers  stand pivot transfer        Sit to Stand Transfer    [place], Sit to Stand Transfer  close supervision     Verbal Cues  safety     Assistive Device  walker, front-wheeled     Comment  from WC and bed to RW, pt's wife educated on transfer status        Stand to Sit Transfer    [place], Stand to Sit Transfer  close supervision     Verbal Cues  safety     Assistive Device  walker, front-wheeled     Comment  to WC and bed with Cl-S for safety        Stand Pivot Transfer    [place], Stand Pivot/Stand Step Transfer  close supervision     Verbal Cues  safety     Assistive Device  walker, front-wheeled     Comment  via ambulatory approach to/from WC and bed with pt's wife providing Cl-S        Gait Training    [place], Gait  close supervision     Assistive Device  walker, front-wheeled     Distance in Feet  40 feet     Gait Pattern Utilized  step-through     Deviations/Abnormal Patterns (Gait)  base of support, narrow;gait speed decreased;step length decreased     Comment  Ambulation trials completed in ILU with pt's wife providing Cl-S         Curb Negotiation (Mobility)    [place]  minimum assist (75% or more patient effort)     Assistive Device  walker, front-wheeled     Curb Height  6 inches     Comment  6\" + 2\" curb step performed 2x. First PT provided assist, followed by pt's wife. Education to pt's wife on guarding techniques and location        Sloped Surface Gait Skills (Mobility)    [place]  minimum assist (75% or more patient effort)     Comment  5' indoor simulated ramp 2x with PT and pt's wife each " "providing assistance. Verbal cues to pt's wife on appropriate guarding for Min A        Stairs Training    [place], Stairs  minimum assist (75% or more patient effort)     Assistive Device  railing     Handrail Location  both sides     Number of Stairs  12     Stair Height  6 inches     Ascending Stairs Technique  step-to-step     Descending Stairs Technique  step-to-step     Comment  Performed 4(6\") 3x consecutively. First PT provided assistance and verbal description for guarding technique. Pt's wife demonstrated ability to perform on final 2 trials. Education provided on having chair at top and bottom of steps and bringing RW up/down or having 2 walkers present in home.          Daily Progress Summary (PT)    Daily Outcome Statement (PT)  Patient's wife was present for education and training. PT educated wife on current mobility status. PT verbalized to family anticipation for patient to reach Mod I level for indoor mobility on level surfaces with Supervision to be provided on stairs. Pt's wife had pt's personal RW present and PT fitted this to ensure the correct height. Pt's wife demonstrated ability to provide appropriate assistance for him at this currently level.                        Education provided this session. See the Patient Education summary report for full details.    IRF PT Goals      Most Recent Value   Bed Mobility Goal 1   Activity/Assistive Device  rolling to left, rolling to right, sit to supine/supine to sit at 02/05/2021 1030   [place]  supervision required at 02/05/2021 1030   Time Frame  short-term goal (STG), 1 week at 02/18/2021 0849   Strategies/Barriers  Impaired strength, decreased endurance, safety considerations at 02/18/2021 0849   Progress/Outcome  goal ongoing at 02/18/2021 0849   Bed Mobility Goal 2   Activity/Assistive Device  rolling to left, rolling to right, sit to supine/supine to sit at 02/05/2021 1030   [place]  modified independence at 02/05/2021 1030 "   Time Frame  long-term goal (LTG), 2 weeks at 02/18/2021 0849   Strategies/Barriers  Impaired strength, decreased endurance, safety considerations at 02/18/2021 0849   Progress/Outcome  goal ongoing at 02/18/2021 0849   Transfer Goal 1   Activity/Assistive Device  sit-to-stand/stand-to-sit at 02/18/2021 0849   [place]  supervision required at 02/18/2021 0849   Time Frame  short-term goal (STG), 1 week at 02/18/2021 0849   Strategies/Barriers  Impaired strength, decreased endurance, safety considerations at 02/18/2021 0849   Progress/Outcome  goal met, goal revised this date at 02/18/2021 0849   Transfer Goal 2   Activity/Assistive Device  sit-to-stand/stand-to-sit, bed-to-chair/chair-to-bed at 02/11/2021 0824   [place]  modified independence at 02/11/2021 0824   Time Frame  long-term goal (LTG), 2 weeks at 02/18/2021 0849   Strategies/Barriers  Impaired strength, decreased endurance, safety considerations at 02/18/2021 0849   Progress/Outcome  goal ongoing at 02/18/2021 0849   Gait/Walking Locomotion Goal 1   Activity/Assistive Device  gait (walking locomotion) at 02/18/2021 0849   Distance  150 feet at 02/18/2021 0849   [place]  minimum assist (75% or more patient effort) at 02/18/2021 0849   Time Frame  short-term goal (STG), 1 week at 02/18/2021 0849   Strategies/Barriers  Impaired strength, decreased endurance, safety considerations at 02/18/2021 0849   Progress/Outcome  goal met, goal revised this date at 02/18/2021 0849   Gait/Walking Locomotion Goal 2   Activity/Assistive Device  gait (walking locomotion) at 02/18/2021 0849   Distance  150 feet at 02/18/2021 0849   [place]  modified independence at 02/18/2021 0849   Time Frame  long-term goal (LTG), 1 week at 02/18/2021 0849   Strategies/Barriers  Impaired strength, decreased endurance, safety considerations at 02/18/2021 0849   Progress/Outcome  goal ongoing, goal revised this date at 02/18/2021 0849   Stairs Goal 1   Activity/Assistive  Device  stairs, all skills at 02/18/2021 0849   Number of Stairs  12 at 02/18/2021 0849   [place]  verbal cues required [touching assist] at 02/18/2021 0849   Time Frame  short-term goal (STG), 1 week at 02/18/2021 0849   Progress/Outcome  medical status inhibiting progress, goal revised this date at 02/18/2021 0849   Stairs Goal 2   Activity/Assistive Device  stairs, all skills at 02/11/2021 0824   Number of Stairs  12 at 02/11/2021 0824   [place]  supervision required at 02/11/2021 0824   Time Frame  long-term goal (LTG), 2 weeks at 02/18/2021 0849   Progress/Outcome  goal ongoing at 02/18/2021 0849

## 2021-02-19 NOTE — PROGRESS NOTES
[facility] Progress Note          Patient was seen and examined at bedside.    Subjective:  2/18/2021:  UCX growing klebsiella, intermediate to Macrobid,  Sensitive to ceftriaxone, switch to Vantin 200 mg bid for 7 day from today, reviewed his am labs, including CBC and BMP,  Stable but still with leukocytosis. Clinically he is stable, no O/N events or new complaints.    2/19/2021:  Stable, no O/N events, no new complaints.    Objective   Vital signs in last 24 hours:  Temp:  [36.4 °C (97.5 °F)-36.4 °C (97.6 °F)] 36.4 °C (97.5 °F)  Heart Rate:  [70-91] 80  Resp:  [18] 18  BP: (104-134)/(57-81) 107/57    No intake or output data in the 24 hours ending 02/19/21 1057  Intake/Output this shift:  No intake/output data recorded.   Review of Systems:  All other systems reviewed and negative except as noted in the HPI.   Objective      Labs  reviewed his labs thoroughly   Lab Results   Component Value Date    WBC 11.77 (H) 02/18/2021    HGB 10.0 (L) 02/18/2021    HCT 30.9 (L) 02/18/2021    MCV 95.4 02/18/2021     02/18/2021     Lab Results   Component Value Date    GLUCOSE 96 02/18/2021    CALCIUM 8.5 (L) 02/18/2021     (L) 02/18/2021    K 3.8 02/18/2021    CO2 26 02/18/2021    CL 94 (L) 02/18/2021    BUN 10 02/18/2021    CREATININE 0.6 (L) 02/18/2021       Imaging  OSH imaging study reports reviewed:    CXR 2/5/2021:  IMPRESSION:  Bilateral airspace opacities most prominent in the left upper lobe and left  lower lobe likely representing COVID19 pneumonia.         Full Code    Physical Exam:  Head/Ear/Nose/Throat: normocephalic; atraumatic; moisture mouth mm, no oropharyngeal thrush noted.   Eyes: anicteric sclera, EOMI; PERRL.   Neck : supple, no JVD, no carotid bruits appeciated.   Respiratory: no evidence of labored breathing, lung sounds a little coarse, mild bibasilar diminished BS, no remarkable w/r/c.   Cardiovascular: RRR; normal S1, S2; no m/r/g; no S3 or S4.    Gastrointestinal: soft; NT; BS normal; mildly distended; no CVAT b/l.   Genitourinary: no ilm.   Extremities : no c/c/e .   Neurological: AO x 3, fluent speeches, following commands, CNS II-XII grossly intact; no focal neurologic deficits.   Behavior/Emotional: in NAD, appropriate; cooperative.   Skin: clean, dry and intact.     Plan of care was discussed with patient, RN, and PMR attending.     Assessment     CC: S/p severe covid-19 PNA complicated by VDRF, dysphagia, ERNIE, bacterial PNA, and  hospital delirium, physical deconditioning with ADL and ambulatory dysfunction.       70 y.o. male, I PTA, with PMH of HTN, HLD, and CAD, admitted to [facility] on 1/5/2021 for severeCOVID-19 PNA. Worsening resp status intubated on 1/9- extubated 1/16 to OptiFlow. Transition to [facility] on 1/23.Eval by nephrology while in ICU due to ERNIE likely secondary to ATN. Creatinine has been improving with excellent urine output. Received Decadron and Remdesivir. Dobhoff tube was placed due to dysphagia. New fever of 102.8 on 1/20 and 1/21. ID was consulted. Bld cx's negative. CT scan of the chest abdomen pelvis was done with results showing increasing pulmonary infiltrates/groundglass opacities and consolidations in his bilateral lower lobes. Started empirically on cefepime and vancomycin to cover hospital-acquired pneumonia. Pt had increased lethargy on 1/22. Pt removed DHT 1/25; required higher O2 afterward - possible aspiration during the process. 1/26 patient continued to have intermittent confusion; ?post ICU delirium. Neurology is following, MRI brain was unremarkable. EEG showed mild slowing but no seizure activity. D/c'd abx after 5 days d/t lethargy. Repeat VFSS performed- now on soft nectar thick liquid diet .  Pt is AA&Ox3 with periods of confusion. Tolerates soft diet with NTL, voiding in urinal, O2 @ 2-3L via nc. Participating in therapies with marked improvement, functionally, he has residual ADL and ambulatory dysfunction, rec's  for acute rehab prior to home with SO. Transferred to [facility] on 2/4/2021.      1.S/p severe covid-19 PNA complicated by VDRF, dysphagia, ERNIE, bacterial PNA, and  hospital delirium, physical deconditioning with ADL and ambulatory dysfunction : inpt acute rehab, gait and balancing training, SLP therapy, nutrition support,  Fall/aspiration precaution, medical management, DVT prophylaxis, dermal defense, f/u with PCP after inpt rehab.     2. Pulm-S/p severe covid-19 PNA complicated by VDRF, s/p possible bacteria PNA,  hypoxemia with mild exertion, atelectasis: monitor SO2, prn NC O2, keep SO2>92%, incentive spirometry, bronchodilator inhaler, mucolytic agent, pulm toileting. Check CXR.      3. Psych-s/p hospital acute delirium, intermittent confusion: his metal status has much improved, monitor his mood and mentation, help his orientation, psychology CBT, avoid unnecessary sedatives, SW support.      4. GI-on soft nectar thick liquid diet, constipation:SLP evaluation to advance his diet as tolerated; provide constipation bowel regimen, keep adequate hydration, timed toilet visits.     5. DVT prophy: SCD, early ambulation, SQ heparin, check LE venous DUS to r/o DVT.       6. HTN, dyslipidemia: cont home HTN meds with holding parameter, orthostatic hypotension precaution, monitor BP . On gemfibrozil, f/u with his PCP.      Mixed significant dyslipidemia: LCL-C 185, HDL-C 30, , h/o HTN, HLD, and CAD, only gemfibrozil is far from adequate, labeled Lipitor allergy, still worth retrial with hydrophilic formula Crestor from low dose, which is absolutely necessary and also guideline recommended    7. Renal-s/p ERNIE recovered, electrolytes imbalance: monitor renal function and volume status, avoid nephrotoxic agents and low BP,  monitor and keep electrolytes balance.     8. - urinary retention: timed voiding trial, monitor PVR with prn cic, check UA/UCX.     Billing code: [id number]  Diagnoses:  Patient Active Problem List    Diagnosis   • Acute metabolic encephalopathy   • ARDS (adult respiratory distress syndrome) (CMS/HCC)   • Coronary artery disease involving native coronary artery of native heart without angina pectoris   • Hypoxia   • Pneumonia due to COVID-19 virus   • Transaminitis   • Essential hypertension   • Dyslipidemia   • Atelectasis   • Physical deconditioning   • Acute cystitis        Laure Bishop MD  2/19/2021

## 2021-02-19 NOTE — PLAN OF CARE
Problem: Rehabilitation (IRF) Plan of Care  Goal: Plan of Care Review  Flowsheets (Taken 2/19/2021 9830)  Plan of Care Reviewed With: patient  IRF Plan of Care Review: progress ongoing, continue  Outcome Summary: Tolerated pain, maintained 02>90% during ambulation, improving amb distance on level surface, continues to require cues for safety strategies during standing tasks

## 2021-02-19 NOTE — PROGRESS NOTES
Subjective    Patient seen and examined on rounds.  Chart reviewed.  Events overnight noted.  History reviewed briefly with patient.     CC:  Deficits in mobility, transfers, self-care status post deconditioning, severe Covid 19 pneumonia, ventilator-dependent respiratory failure, dysphagia, multiple medical problems.     HPI:  Mr. Denis Green is a 70-year-old right-handed white male with chronic conditions significant for coronary artery disease, hypertension, hyperlipidemia who was admitted to [facility] on 1/5/21 due to Covid 19 pneumonia and worsening respiratory status.  He required intubation on 1/9/21 and had ventilator-dependent respiratory failure.  He was treated with Decadron and Remdesivir.  He had dysphagia requiring Dobbhoff tube feeds.  He was extubated on 1/16/21 to OptiFlow.  On 1/23/21 he was transitioned to high flow nasal cannula.  Hospital course was significant for acute renal insufficiency likely secondary to acute tubular necrosis and he was followed by nephrology.  He had subsequent improvement in renal function.  He also had new fever of 102.8°F on 1/20/21 and 1/21/21.  He was followed by infectious disease.  Blood cultures were negative. CT scan of the chest abdomen pelvis was done with results showing increasing pulmonary infiltrates/groundglass opacities and consolidations in his bilateral lower lobes.  He was treated empirically with Cefepime and Vancomycin to cover hospital-acquired pneumonia.  He was noted to be lethargic on 1/22/21.  Subsequently patient removed Dobbhoff tube on 1/25/21 and there is question of possible aspiration during that process.  He continued to have intermittent confusion on 1/26/21 was felt to be possible post ICU delirium.  He was followed by neurology.  MRI brain was unremarkable.  EEG showed mild slowing but no seizure activity was noted.  Antibiotics were discontinued after a 5 day course due to lethargy.  Repeat video swallow study was  "performed for dysphagia and patient was put on a soft diet with nectar thick liquids. On 2/3/21, hemoglobin was 11.2, WBC count 15.1, platelets were 275, BUN was 15, and creatinine was 0.7.  He has been needing assistance for mobility, transfers, self-care postoperatively. He is transferred to [facility] on 2/4/21 for further rehabilitation care.       SUBJ: Tolerating upgraded diet after video swallow.   Noted ENT input regarding his voice.  Vocal cords moving normally per ENT.  Discussed with patient.    ROS: Denies chest pain or shortness of breath. Other ROS negative. Past, family, social history is unchanged.    Physical Exam      Blood pressure (P) 133/68, pulse (P) 73, temperature 36.8 °C (98.2 °F), temperature source Oral, resp. rate (P) 18, height 1.727 m (5' 8\"), weight 77.1 kg (170 lb), SpO2 (P) 97 %.  Body mass index is 25.85 kg/m².    General Appearance: Not in acute distress  Head/Ear/Nose/Throat: Normocephalic; Atraumatic.  On oxygen by nasal cannula.  Eye: EOMI; PERRL.   Neck: No JVD; No Bruits.   Respiratory: Decreased breath sounds at bases.   Cardiovascular: RRR; Normal S1, S2.   Gastrointestinal: Soft; NT; +BS.   Extremities: Bilateral lower extremity edema noted.   Musculoskeletal: Functional active range of motion in both upper and lower extremities.    Neurological: Awake alert oriented to person, had some difficulty naming the place and correct date.. Speech is fluent. Cranial nerve examination does not reveal any gross facial asymmetry. Strength testing about 4/5 strength in both upper extremities.  Bilateral hip flexion is 3+/5.  Bilateral quadriceps are 4/5 with the thighs supported.  Bilateral ankle dorsi and plantar flexion are 4/5.  He is grossly able to localize touch and position sense in great toes.  Deep tendon reflexes are hypoactive and symmetric bilaterally.  Plantars are flexor.  Coordination is functional upper extremities.  Neurologically " stable.  Behavior/Emotional: Appropriate; Cooperative.   Skin: No obvious rashes noted.        Current Facility-Administered Medications:   •  acetaminophen (TYLENOL) tablet 650 mg, 650 mg, oral, q4h PRN, Omari Spain MD, 650 mg at 02/17/21 1433  •  albuterol HFA (VENTOLIN HFA) 90 mcg/actuation inhaler 2 puff, 2 puff, inhalation, q4h PRN, Laure Bishop MD  •  alum-mag hydroxide-simeth (MAALOX) 200-200-20 mg/5 mL suspension 30 mL, 30 mL, oral, q6h PRN, Laure Bishop MD  •  amLODIPine (NORVASC) tablet 2.5 mg, 2.5 mg, oral, Nightly, Laure Bishop MD, 2.5 mg at 02/17/21 2056  •  aspirin enteric coated tablet 81 mg, 81 mg, oral, Daily, Omari Spain MD, 81 mg at 02/18/21 0805  •  bisacodyL (DULCOLAX) 10 mg suppository 10 mg, 10 mg, rectal, Daily PRN, Isael Lang MD, 10 mg at 02/09/21 1850  •  cefpodoxime (VANTIN) tablet 200 mg, 200 mg, oral, BID, Laure Bishop MD, 200 mg at 02/18/21 2109  •  diphenhydrAMINE (BENADRYL) capsule 50 mg, 50 mg, oral, Nightly, Isael Lang MD, 50 mg at 02/18/21 2109  •  docusate sodium (COLACE) capsule 100 mg, 100 mg, oral, TID, Isael Lang MD, 100 mg at 02/17/21 0756  •  furosemide (LASIX) tablet 20 mg, 20 mg, oral, BID (am, 4p), Laure Bishop MD, 20 mg at 02/18/21 1720  •  gemfibroziL (LOPID) tablet 600 mg, 600 mg, oral, BID AC, Omari Spain MD, 600 mg at 02/18/21 1720  •  guaiFENesin (MUCINEX) 12 hr ER tablet 600 mg, 600 mg, oral, BID, Laure Bishop MD, 600 mg at 02/18/21 2109  •  magnesium hydroxide (M.O.M.) 400 mg/5 mL suspension 30 mL, 30 mL, oral, 2x daily PRN, Laure Bishop MD, 30 mL at 02/09/21 0904  •  magnesium oxide (MAG-OX) tablet 400 mg, 400 mg, oral, BID, Laure Bishop MD, 400 mg at 02/18/21 2109  •  melatonin ODT 3 mg, 3 mg, oral, Nightly, Laure Bishop MD, 3 mg at 02/18/21 2109  •  metoprolol tartrate (LOPRESSOR) split tablet 12.5 mg, 12.5 mg, oral, BID, Omari Spain MD, 12.5 mg at 02/18/21 2109  •  mouth moisturizer (TOOTHETTE)  cream, , mucous membrane, TID, Laure Bishop MD, Given at 02/18/21 2109  •  polyethylene glycol (MIRALAX) 17 gram packet 17 g, 17 g, oral, Daily PRN, Omari Spain MD, 17 g at 02/04/21 2157  •  rivaroxaban (XARELTO) tablet 10 mg, 10 mg, oral, Daily with dinner, Laure Bishop MD, 10 mg at 02/18/21 1720  •  senna (SENOKOT) tablet 3 tablet, 3 tablet, oral, Daily before lunch, Isael Lang MD, 3 tablet at 02/13/21 1251  •  sodium chloride tablet 1 g, 1 g, oral, BID with meals, Laure Bishop MD, 1 g at 02/18/21 1720       Labs / Radiology    Lab Results   Component Value Date    WBC 11.77 (H) 02/18/2021    HGB 10.0 (L) 02/18/2021    HCT 30.9 (L) 02/18/2021    MCV 95.4 02/18/2021     02/18/2021     Lab Results   Component Value Date    GLUCOSE 96 02/18/2021    CALCIUM 8.5 (L) 02/18/2021     (L) 02/18/2021    K 3.8 02/18/2021    CO2 26 02/18/2021    CL 94 (L) 02/18/2021    BUN 10 02/18/2021    CREATININE 0.6 (L) 02/18/2021       Assessment and Plan    ASSESSMENT PLAN:  1. 70-year-old right-handed white male with chronic conditions significant for coronary artery disease, hypertension, hyperlipidemia who was admitted to [facility] on 1/5/21 due to Covid 19 pneumonia and worsening respiratory status.  He required intubation on 1/9/21 and had ventilator-dependent respiratory failure.  He was treated with Decadron and Remdesivir.  He had dysphagia requiring Dobbhoff tube feeds.  He was extubated on 1/16/21 to OptiFlow.  On 1/23/21 he was transitioned to high flow nasal cannula.  Hospital course was significant for acute renal insufficiency likely secondary to acute tubular necrosis and he was followed by nephrology.  He had subsequent improvement in renal function.  He also had new fever of 102.8°F on 1/20/21 and 1/21/21.  He was followed by infectious disease.  Blood cultures were negative. CT scan of the chest abdomen pelvis was done with results showing increasing pulmonary  infiltrates/groundglass opacities and consolidations in his bilateral lower lobes.  He was treated empirically with Cefepime and Vancomycin to cover hospital-acquired pneumonia.  He was noted to be lethargic on 1/22/21.  Subsequently patient removed Dobbhoff tube on 1/25/21 and there is question of possible aspiration during that process.  He continued to have intermittent confusion on 1/26/21 was felt to be possible post ICU delirium.  He was followed by neurology.  MRI brain was unremarkable.  EEG showed mild slowing but no seizure activity was noted.  Antibiotics were discontinued after a 5 day course due to lethargy.  Repeat video swallow study was performed for dysphagia and patient was put on a soft diet with nectar thick liquids. On 2/3/21, hemoglobin was 11.2, WBC count 15.1, platelets were 275, BUN was 15, and creatinine was 0.7.  He has been needing assistance for mobility, transfers, self-care postoperatively. He is transferred to [facility] on 2/4/21 for further rehabilitation care.       2. DVT prophylaxis - on subcutaneous Heparin.  On SCDs.  Platelets 296 on 2/5/2021.  Platelets 389 on 2/11/2021.  Platelets 470 on 2/15/2021.     3.  Deconditioning - recent severe Covid 19 pneumonia, ventilator-dependent respiratory failure, dysphagia, multiple medical problems - He had ventilator-dependent respiratory failure.  He was treated with Decadron and Remdesivir.  Continue PT, OT, speech, psychology.  Follow falls precautions, cardiac precautions, aspiration precautions.     4. GI - On Colace, Senokot.  On PRN MiraLAX, MOM, Maalox.      5.  - voiding.  Denies dysuria.  Monitor post void residuals.     6.  Pulmonary - recent severe Covid 19 pneumonia, ventilator-dependent respiratory failure - on Ventolin HFA.  On Mucinex.     7. Pain - on PRN Tylenol.  On scheduled Tylenol for left wrist pain.  On topical Voltaren gel.  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  K pad as  needed for pain.     8. Hematology -hemoglobin 12.0, WBC 8.15 on 2/5/2021.  Hemoglobin 10.7, WBC 8.05 on 2/11/2021.  Hemoglobin 12.0, WBC 11.56 on 2/15/2021.     9. CVS - history of coronary artery disease and hypertension.  On Norvasc.  On Lopressor.  On Aspirin.  On Lasix.  On Magnesium oxide.     10.  Hyperlipidemia - on Lopid.     11.  Insomnia - on Melatonin.     12.  Dysphagia -on soft diet with nectar thick liquids.  Free water protocol with ice chips and sips of water per speech therapy.  Speech therapy and nutrition following.     13. Rehabilitation medicine - Continue comprehensive rehabilitation care. Continue PT, OT, speech, psychology.  We will follow falls precautions, cardiac precautions, monitor pulse oximetry in therapy and follow aspiration precautions.  Tolerating therapies per endurance.  Discussed with speech therapy.  Free water protocol ice chips was started.  Denies coughing with meals.He reported some pain in left wrist.  He is not sure if he accidentally banged the left wrist against the side rail of the bed.  Denies history of gout.  Left wrist does not appear warm or tender to touch.  K pad ordered.  We will also order scheduled Tylenol for 7 days.  Discussed results of wrist x-rays with patient which showed moderate degenerative change particularly about the radial aspect of the wrist.  He reports pain is less today.  He ambulated 25 feet with rolling walker with assistance of 2 people with physical therapy.Free water protocol with ice chips and sips of water per speech therapy.  He feels better today.  Left wrist pain is decreased significantly per patient.  Working on endurance with physical therapy.  He required minimal to moderate assistance for transfers.  Working on ADLs with occupational therapy.He feels better today.  Denies increased pain.  Discussed with him about doing incentive spirometry.  We will try to wean off oxygen if possible.Trying to wean off oxygen as possible.   Tolerating therapies per endurance.  He reports decrease sleep at night.  Usually takes 2 Tylenol PM at night.  He is already on scheduled Tylenol.  Will order 50 mg of Benadryl at bedtime. He reports he slept well with Benadryl last night.  Tolerating therapies per endurance.  Denies left wrist pain today.Video swallow was done today.  Mild oral and pharyngeal dysphagia noted.  Transient penetration of thin liquids is noted.  Speech therapy working with dysphagia.  Tolerating upgraded diet after video swallow.  Noted ENT input regarding his voice.  Vocal cords moving normally per ENT.  Discussed with patient.     14. Reviewed labs today.  BUN 15, creatinine 0.6 on 2/5/2021.  BUN 16, creatinine 0.7 on 2/11/2021.  BUN 12, creatinine 0.8 on 2/15/2021.               Isael Lang MD  2/18/2021

## 2021-02-19 NOTE — PROGRESS NOTES
Patient: Denis Green  Location: [facility] [address]  MRN: [id number]  Today's date: 2/19/2021    History of Present Illness  Denis LOVE is a 70 y.o. male admitted on 2/4/2021 with Pneumonia due to COVID-19 virus. Principal problem is No Principal Problem: There is no principal problem currently on the Problem List. Please update the Problem List and refresh..   Pt admitted to outside hospital on 1/5/2021 for COVID-19 PNA. Worsening resp status intubated on 1/9- extubated 1/16 to OptiFlow. Transition to [facility]NC on 1/23.Eval by nephrology while in ICU due to ERNIE likely secondary to ATN. Creatinine has been improving with excellent urine output. Received Decadron and Remdesivir. Dobhoff tube was placed due to dysphagia. New fever of 102.8 1/20 and 1/21. ID was consulted. Bld cx's negative. CT scan of the chest abdomen pelvis was done with results showing increasing pulmonary infiltrates/groundglass opacities and consolidations in his bilateral lower lobes. Started empirically on cefepime and vancomycin to cover hospital-acquired pneumonia. Pt had increased lethargy on 1/22. Pt removed DHT 1/25; required higher O2 afterward - possible aspiration during the process. 1/26 patient continued to have intermittent confusion; ?post ICU delirium. Neurology is following, MRI brain was unremarkable. EEG showed mild slowing but no seizure activity. D/c'd abx after 5 days d/t lethargy.       Past Medical History  Denis LOVE has a past medical history of Coronary artery disease, Hypertension, and Lipid disorder.    OT Vitals    Date/Time Pulse SpO2 Pt Activity O2 Therapy BP BP Location BP Method Pt Position [facility]   02/19/21 1308 65 95 % At rest None (Room air) 118/68 Left upper arm Automatic Sitting AEB      OT Pain    Date/Time Pain Type Pref Pain Scale Rating: Rest Who   02/19/21 1308 Pain Assessment number (Numeric Rating Pain Scale) 0 AEB   02/19/21 1328 Pain Reassessment number (Numeric Rating Pain Scale) 0  "AEB          Prior Living Environment      Most Recent Value   Lives With  spouse, child(mindy), adult [adult son lives in home occasionally]   Living Arrangements  house   Home Accessibility  stairs to enter home (Group), stairs within home (Group)   Living Environment Comment  Lives with wife and intermittently with one son.  [bed and bath on 2nd ,  no powder room on first]   Number of Stairs, Main Entrance  1   Stair Railings, Main Entrance  none   Location, Patient Bedroom  second floor, must negotiate stairs to access   Location, Bathroom  second floor, must negotiate stairs to access   Bathroom Access Comment  Pt. reports has tub shower and stall shower, uses tub shower primarily, has grab bar in shower, has stanard height toilet c wall on both sides   Number of Stairs, Within Home, Primary  12   Surface of Stairs, Within Home, Primary  hardwood   Stair Railings, Within Home, Primary  railings on both sides of stairs          Prior Level of Function      Most Recent Value   Dominant Hand  right   Ambulation  independent   Transferring  independent   Toileting  independent   Bathing  independent   Dressing  independent   Eating  independent   Communication  understands/communicates without difficulty   Swallowing  swallows foods/liquids without difficulty   Baseline Diet/Method of Nutritional Intake  regular solids, thin liquids   Past History of Dysphagia  No previous reports    Prior Level of Function Comment  Pt is a cattle/,  previously IND for all IADLs.    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, (+) , enjoys farming and being outside   Patient Goals  \"I want to get back up walking, get stronger, and be self sufficient\"          IRF OT Evaluation and Treatment - 02/19/21 1308        Time Calculation    Start Time  1300     Stop Time  1330     Time " Calculation (min)  30 min        Session Details    Document Type  daily treatment/progress note     Mode of Treatment  occupational therapy;individual therapy        General Information    General Observations of Patient  Pt received long sitting in bed        Bed Mobility    Bed Mobility  bed mobility (all) activities     [place], All Bed Mobility Activities  distant supervision     Comment (Bed Mobility)  hosp bed        Transfers    Transfers  stand pivot transfer;toilet transfer        Stand Pivot Transfer    [place], Stand Pivot/Stand Step Transfer  close supervision;1 person assist;safety considerations     Verbal Cues  safety     Assistive Device  walker, front-wheeled     Comment  bed to w/c, cues for sequencing safest technique        Toilet Transfer    Transfer Technique  stand-sit    standing to urinate    [place], Toilet Transfer  close supervision;1 person assist;safety considerations     Verbal Cues  safety     Comment  standing to urinate w UE support on rails        Safety Issues, Functional Mobility    Safety Issues Affecting Function (Mobility)  safety precautions follow-through/compliance     Impairments Affecting Function (Mobility)  balance;endurance/activity tolerance;pain;postural/trunk control;shortness of breath     Cognitive Impairments, Mobility Safety/Performance  impulsivity;insight into deficits/self awareness     Comment, Safety Issues/Impairments (Mobility)  Fair insight but continues to need reminders for safest sequencing when coming to stand, establishing balance        Balance    Sit to Stand Dynamic Balance  mild impairment;supported;unsupported;standing     Static Standing Balance  mild impairment;supported;unsupported;standing     Comment, Balance  Improving postural control, tolerating ambulation for household distances without 02 de-saturation using walker, less forward flexed with reminders        Daily Progress Summary (OT)    Symptoms Noted During/After  Treatment  none     Progress Toward Functional Goals (OT)  progressing toward functional goals as expected     Daily Outcome Statement (OT)  Tolerated pain, maintained 02>90% during ambulation, improving amb distance on level surface, continues to require cues for safety strategies during standing tasks     Barriers to Overall Progress (OT)  balance, safety awarenss      Recommendations  continue POC, prep for family training with wife this pm                            IRF OT Goals      Most Recent Value   Transfer Goal 1   Activity/Assistive Device  toilet at 02/15/2021 1308   [place]  supervision required at 02/15/2021 1308   Time Frame  short-term goal (STG), 5 - 7 days at 02/15/2021 1308   Strategies/Barriers  DME at 02/15/2021 1308   Progress/Outcome  good progress toward goal, goal partially met, goal revised this date at 02/15/2021 1308   Transfer Goal 2   Activity/Assistive Device  toilet at 02/10/2021 1632   [place]  supervision required, set-up required at 02/10/2021 1632   Time Frame  long-term goal (LTG), 3 weeks at 02/10/2021 1632   Strategies/Barriers  DME at 02/10/2021 1632   Transfer Goal 3   Activity/Assistive Device  shower at 02/15/2021 1308   [place]  supervision required at 02/15/2021 1308   Time Frame  short-term goal (STG), 5 - 7 days at 02/15/2021 1308   Strategies/Barriers  DME at 02/15/2021 1308   Progress/Outcome  good progress toward goal, goal partially met, goal revised this date at 02/15/2021 1308   Transfer Goal 4   Activity/Assistive Device  shower at 02/10/2021 1632   [place]  supervision required, set-up required at 02/10/2021 1632   Time Frame  long-term goal (LTG), 3 weeks at 02/10/2021 1632   Strategies/Barriers  DME at 02/10/2021 1632   Bathing Goal 1   Activity/Assistive Device  bathing skills, all at 02/15/2021 1308   [place]  modified independence at 02/15/2021 1308   Time Frame  short-term goal (STG), 5 - 7 days at 02/15/2021 1308    Strategies/Barriers  DME at 02/15/2021 1308   Progress/Outcome  good progress toward goal, goal partially met, goal revised this date at 02/15/2021 1308   Bathing Goal 2   Activity/Assistive Device  bathing skills, all at 02/05/2021 0726   [place]  supervision required at 02/05/2021 0726   Time Frame  long-term goal (LTG), 3 weeks at 02/05/2021 0726   Strategies/Barriers  DME TBD at 02/05/2021 0726   UB Dressing Goal 1   Activity/Assistive Device  upper body dressing at 02/15/2021 1308   [place]  modified independence at 02/15/2021 1308   Time Frame  short-term goal (STG), 5 - 7 days at 02/15/2021 1308   Strategies/Barriers  including set up at 02/15/2021 1308   Progress/Outcome  good progress toward goal, goal partially met, goal revised this date at 02/15/2021 1308   UB Dressing Goal 2   Activity/Assistive Device  upper body dressing at 02/05/2021 0726   [place]  modified independence at 02/05/2021 0726   Time Frame  long-term goal (LTG), 3 weeks at 02/05/2021 0726   LB Dressing Goal 1   Activity/Assistive Device  lower body dressing at 02/15/2021 1308   [place]  modified independence at 02/15/2021 1308   Time Frame  short-term goal (STG), 5 - 7 days at 02/15/2021 1308   Strategies/Barriers  AD including clothing retrieval at 02/15/2021 1308   Progress/Outcome  good progress toward goal, goal partially met, goal revised this date at 02/10/2021 1632   LB Dressing Goal 2   Activity/Assistive Device  lower body dressing at 02/05/2021 0726   [place]  supervision required at 02/05/2021 0726   Time Frame  long-term goal (LTG), 3 weeks at 02/05/2021 0726   Grooming Goal 1   Activity/Assistive Device  grooming skills, all at 02/15/2021 1308   [place]  modified independence at 02/15/2021 1308   Time Frame  short-term goal (STG), 5 - 7 days at 02/15/2021 1308   Strategies/Barriers  standing @ sink at 02/15/2021 1308   Progress/Outcome  good progress toward goal, goal partially met, goal  revised this date at 02/15/2021 1308   Grooming Goal 2   Activity/Assistive Device  grooming skills, all at 02/05/2021 0726   [place]  modified independence at 02/05/2021 0726   Time Frame  long-term goal (LTG), 3 weeks at 02/05/2021 0726   Toileting Goal 1   Activity/Assistive Device  toileting skills, all at 02/15/2021 1308   [place]  supervision required at 02/15/2021 1308   Time Frame  short-term goal (STG), 5 - 7 days at 02/15/2021 1308   Strategies/Barriers  DME at 02/15/2021 1308   Progress/Outcome  good progress toward goal, goal partially met, goal revised this date at 02/15/2021 1308   Toileting Goal 2   Activity/Assistive Device  toileting skills, all at 02/10/2021 1632   [place]  supervision required at 02/10/2021 1632   Time Frame  long-term goal (LTG), 3 weeks at 02/10/2021 1632   Strategies/Barriers  DME at 02/10/2021 1632   Progress/Outcome  goal revised this date at 02/10/2021 1632

## 2021-02-19 NOTE — PLAN OF CARE
Problem: Skin Injury Risk Increased  Goal: Skin Health and Integrity  Intervention: Promote and Optimize Oral Intake  Flowsheets (Taken 2/12/2021 1451 by Marilin Salas RD)  Oral Nutrition Promotion:   calorie dense foods provided   nutritional therapy counseling provided     Nutrition Interventions/ Recommendations:   1. Continue Boost pudding with dinner.  2. Maintain current diet per SLP.  3. Encourage protein and fiber intake with meals.  4. Monitor PO, weight, labs, BM fx, and skin.  5. Check weight weekly.

## 2021-02-19 NOTE — NURSING NOTE
Family training session this pm with wife (Dana) and patient. Medications discussed including purpose and considerations. Also discussed scheduling followup appt with cardiologist and PCP within 1 week after getting home. Bowel status discussed and constipation improving with increased fluid intake (now on this liquids) and increased mobility level-still requiring aggressive intervention for reguloar BM (Pt reports he usually had BM daily). received additional miralx and MOM today, will continue to monitor

## 2021-02-19 NOTE — PROGRESS NOTES
Patient: Denis Green  Location: [facility] [address]  MRN: [id number]  Today's date: 2/19/2021    History of Present Illness  Denis LOVE is a 70 y.o. male admitted on 2/4/2021 with Pneumonia due to COVID-19 virus. Principal problem is No Principal Problem: There is no principal problem currently on the Problem List. Please update the Problem List and refresh..   Pt admitted to outside hospital on 1/5/2021 for COVID-19 PNA. Worsening resp status intubated on 1/9- extubated 1/16 to OptiFlow. Transition to [facility] on 1/23.Eval by nephrology while in ICU due to ERNIE likely secondary to ATN. Creatinine has been improving with excellent urine output. Received Decadron and Remdesivir. Dobhoff tube was placed due to dysphagia. New fever of 102.8 1/20 and 1/21. ID was consulted. Bld cx's negative. CT scan of the chest abdomen pelvis was done with results showing increasing pulmonary infiltrates/groundglass opacities and consolidations in his bilateral lower lobes. Started empirically on cefepime and vancomycin to cover hospital-acquired pneumonia. Pt had increased lethargy on 1/22. Pt removed DHT 1/25; required higher O2 afterward - possible aspiration during the process. 1/26 patient continued to have intermittent confusion; ?post ICU delirium. Neurology is following, MRI brain was unremarkable. EEG showed mild slowing but no seizure activity. D/c'd abx after 5 days d/t lethargy.       Past Medical History  Denis LOVE has a past medical history of Coronary artery disease, Hypertension, and Lipid disorder.    SLP Pain    Date/Time Pain Type Pref Pain Scale Rating: Rest Who   02/19/21 1433 Pain Assessment word (verbal rating pain scale) 0 - no pain RS   02/19/21 1459 Pain Reassessment word (verbal rating pain scale) 0 - no pain RS          Prior Living Environment      Most Recent Value   Lives With  spouse, child(mindy), adult [adult son lives in home occasionally]   Living Arrangements  house   Home  Accessibility  stairs to enter home (Group), stairs within home (Group)   Living Environment Comment  Lives with wife and intermittently with one son.  [bed and bath on 2nd ,  no powder room on first]   Number of Stairs, Main Entrance  1   Stair Railings, Main Entrance  none   Location, Patient Bedroom  second floor, must negotiate stairs to access   Location, Bathroom  second floor, must negotiate stairs to access   Bathroom Access Comment  Pt. reports has tub shower and stall shower, uses tub shower primarily, has grab bar in shower, has stanard height toilet c wall on both sides   Number of Stairs, Within Home, Primary  12   Surface of Stairs, Within Home, Primary  hardwood   Stair Railings, Within Home, Primary  railings on both sides of stairs          Prior Level of Function      Most Recent Value   Dominant Hand  right   Ambulation  independent   Transferring  independent   Toileting  independent   Bathing  independent   Dressing  independent   Eating  independent   Communication  understands/communicates without difficulty   Swallowing  swallows foods/liquids without difficulty   Baseline Diet/Method of Nutritional Intake  regular solids, thin liquids   Past History of Dysphagia  No previous reports    Prior Level of Function Comment  Pt is a cattle/,  previously IND for all IADLs.    Assistive Device/Animal Currently Used at Home  none          IRF SLP Evaluation and Treatment - 02/19/21 1433        Time Calculation    Start Time  1430     Stop Time  1500     Time Calculation (min)  30 min        Session Details    Document Type  daily treatment/progress note     Mode of Treatment  speech language pathology;individual therapy        General Information    Patient/Family/Caregiver Comments/Observations  Pt accompanied by his wifeDana for family training.      General Observations of Patient  Pleasant and cooperative, pt received in ILU for family trainging        Caregiver Training     Caregiver(s) to be Trained  spouse/significant other     Caregiver Training Plan  dysphagia diet consistency;home exercise program     Comment, Caregiver Training Plan  Pt's wife, Dana, present for family education. Extensive education provided re. goals of ST, prigress toward goals, swallow function, VFSS results and reviewed video imaging, diet recommendations and safe swallowing strategies, and reviewed cognitive goals almost met. No further ST warranted following d/c. Pt's wife verbalized good understanding to education and asked appropriate questions.         Daily Progress Summary (SLP)    Daily Outcome Statement (SLP)  Pt's wife/patient participated in education and verbalized good understanding to education and asked appropriate questions. Pt demonstrating great progress toward goals, ST will continue to address higher level cognition in preparation to return to IADLs.                   Education provided this session. See the Patient Education summary report for full details.    IRF SLP Goals      Most Recent Value   Verbal Expression Goal 1   Verbal Expression Goal 1  STG: pt will complete mod level verbal expression tasks x 90% c min cues. at 02/06/2021 1405   Time Frame  short-term goal (STG), 1 week at 02/06/2021 1405   Progress/Outcome  goal no longer appropriate at 02/18/2021 0834   Verbal Expression Goal 2   Verbal Expression Goal 2  LTG: pt will complete complex/abstract verbal expression tasks x 90% c independent use of WF strategies. at 02/06/2021 1405   Time Frame  long-term goal (LTG), 3 weeks at 02/06/2021 1405   Progress/Outcome  goal no longer appropriate at 02/18/2021 0834   Oral Nutrition/Hydration Goal 1   Activity  effective/safe/independent, use of swallowing techniques [regular/NTL, no overt s/s of aspiration ] at 02/05/2021 1202   Time Frame  short-term goal (STG), 3 - 5 days at 02/18/2021 0834   Progress/Outcome  goal partially met at 02/18/2021 0834   Oral Nutrition/Hydration Goal  2   Activity  effective/safe/independent, use of swallowing techniques [regular/thin, no overt s/s of aspiration ] at 02/05/2021 1202   Time Frame  long-term goal (LTG), 3 - 5 days at 02/18/2021 0834   Progress/Outcome  goal partially met at 02/18/2021 0834   Executive Function Goal 1   Activity  complete, abstract thinking tasks, executive function tasks, organization/sequencing tasks, problem solving/reasoning tasks, other (see comments) [STG,  simple-mod level tasks.] at 02/06/2021 1405   [place]/Accuracy  with minimum, verbal cues/redirection, with additional processing time, with repetition of directions, with 90% accuracy at 02/06/2021 1405   Time Frame  1 week at 02/06/2021 1405   Progress/Outcome  goal met at 02/18/2021 0834   Executive Function Goal 2   Activity  complete, abstract thinking tasks, exhibit impulse control, organization/sequencing tasks, problem solving/reasoning tasks, other (see comments) [LTG] at 02/06/2021 1405   [place]/Accuracy  independently, with 90% accuracy, other (see comments) [mod-complex tasks.] at 02/06/2021 1405   Time Frame  3 weeks at 02/06/2021 1405   Progress/Outcome  good progress toward goal at 02/18/2021 0834   Memory Goal 1   Activity  short-term memory tasks, immediate recall tasks, working memory tasks, recall recent events, other (see comments) [STG,  simple-mod level tasks.] at 02/06/2021 1405   [place]/Accuracy  minimal cues for use of strategies, with 90% accuracy at 02/06/2021 1405   Time Frame  short-term goal (STG), 1 week at 02/06/2021 1405   Progress/Outcome  goal met at 02/18/2021 0834   Memory Goal 2   Activity  short-term memory tasks, immediate recall tasks, working memory tasks, recall recent events, other (see comments) [LTG,  mod complex information.] at 02/06/2021 1405   [place]/Accuracy  independent use of environmental cues/strategies, with 90% accuracy at 02/06/2021 1405   Time Frame  long-term goal (LTG), 1 week at 02/18/2021  0834   Progress/Outcome  good progress toward goal at 02/18/2021 0834

## 2021-02-19 NOTE — PROGRESS NOTES
Patient: Denis Green  Location: [facility] [address]  MRN: [id number]  Today's date: 2/19/2021    History of Present Illness  Denis LOVE is a 70 y.o. male admitted on 2/4/2021 with Pneumonia due to COVID-19 virus. Principal problem is No Principal Problem: There is no principal problem currently on the Problem List. Please update the Problem List and refresh..   Pt admitted to outside hospital on 1/5/2021 for COVID-19 PNA. Worsening resp status intubated on 1/9- extubated 1/16 to OptiFlow. Transition to [facility]NC on 1/23.Eval by nephrology while in ICU due to ERNIE likely secondary to ATN. Creatinine has been improving with excellent urine output. Received Decadron and Remdesivir. Dobhoff tube was placed due to dysphagia. New fever of 102.8 1/20 and 1/21. ID was consulted. Bld cx's negative. CT scan of the chest abdomen pelvis was done with results showing increasing pulmonary infiltrates/groundglass opacities and consolidations in his bilateral lower lobes. Started empirically on cefepime and vancomycin to cover hospital-acquired pneumonia. Pt had increased lethargy on 1/22. Pt removed DHT 1/25; required higher O2 afterward - possible aspiration during the process. 1/26 patient continued to have intermittent confusion; ?post ICU delirium. Neurology is following, MRI brain was unremarkable. EEG showed mild slowing but no seizure activity. D/c'd abx after 5 days d/t lethargy.       Past Medical History  Denis LOVE has a past medical history of Coronary artery disease, Hypertension, and Lipid disorder.    PT Vitals    Date/Time Pulse HR Source SpO2 Pt Activity O2 Therapy BP BP Location BP Method Pt Position Observations [facility]   02/19/21 1008 80 Monitor 94 % At rest None (Room air) 107/57 Left upper arm Automatic Sitting -- [facility]   02/19/21 1015 -- -- 91 % At rest None (Room air) -- -- -- -- post ambulation (300')  [facility]   02/19/21 1058 79 Monitor 94 % At rest None (Room air) 124/67 Left upper arm Automatic  Sitting -- [facility]      PT Pain    Date/Time Pain Type Rating: Rest Who   02/19/21 1008 Pain Assessment 0 [facility]   02/19/21 1058 Pain Reassessment 0 [facility]          Prior Living Environment      Most Recent Value   Lives With  spouse, child(mindy), adult [adult son lives in home occasionally]   Living Arrangements  house   Home Accessibility  stairs to enter home (Group), stairs within home (Group)   Living Environment Comment  Lives with wife and intermittently with one son.  [bed and bath on 2nd ,  no powder room on first]   Number of Stairs, Main Entrance  1   Stair Railings, Main Entrance  none   Location, Patient Bedroom  second floor, must negotiate stairs to access   Location, Bathroom  second floor, must negotiate stairs to access   Bathroom Access Comment  Pt. reports has tub shower and stall shower, uses tub shower primarily, has grab bar in shower, has stanard height toilet c wall on both sides   Number of Stairs, Within Home, Primary  12   Surface of Stairs, Within Home, Primary  hardwood   Stair Railings, Within Home, Primary  railings on both sides of stairs          Prior Level of Function      Most Recent Value   Dominant Hand  right   Ambulation  independent   Transferring  independent   Toileting  independent   Bathing  independent   Dressing  independent   Eating  independent   Communication  understands/communicates without difficulty   Swallowing  swallows foods/liquids without difficulty   Baseline Diet/Method of Nutritional Intake  regular solids, thin liquids   Past History of Dysphagia  No previous reports    Prior Level of Function Comment  Pt is a cattle/,  previously IND for all IADLs.    Assistive Device/Animal Currently Used at Home  none          IRF PT Evaluation and Treatment - 02/19/21 1011        Time Calculation    Start Time  1000     Stop Time  1100     Time Calculation (min)  60 min        Session Details    Document Type  daily treatment/progress note     Mode of Treatment   individual therapy;physical therapy        Transfers    Transfers  stand pivot transfer        Sit to Stand Transfer    [place], Sit to Stand Transfer  close supervision     Verbal Cues  safety     Assistive Device  walker, front-wheeled     Comment  from WC to RW with CL-S for safety         Stand to Sit Transfer    [place], Stand to Sit Transfer  close supervision     Verbal Cues  safety     Assistive Device  walker, front-wheeled     Comment  to/from WC         Stand Pivot Transfer    [place], Stand Pivot/Stand Step Transfer  touching/steadying assist     Verbal Cues  safety     Assistive Device  walker, front-wheeled     Comment  via ambulatory approach to/from WC with touching assist for safety/balance         Gait Training    [place], Gait  close supervision     Assistive Device  walker, front-wheeled     Distance in Feet  300 feet     Gait Pattern Utilized  step-through     Deviations/Abnormal Patterns (Gait)  base of support, narrow;gait speed decreased;step length decreased     Comment  CL-S for safety and balance. 300' (1x), 200' (1x) and 150' (1x) with rest breaks in sitting between all trials.         Rough/Uneven Surface Gait Skills (Mobility)    [place]  close supervision     Assistive Device  walker, front-wheeled     Distance in Feet  50 feet     Comment  Indoor rough carpet         10 Meter Walk Test (Self-Selected Velocity)    Trial One: Ten Meter Walk Test (sec)  11.01 seconds     Trial Two: Ten Meter Walk Test (sec)  9.55 seconds     Trial Three: Ten Meter Walk Test (sec)  8.69 seconds     Assistive Device  walker, front-wheeled     Trial One: Gait Speed (m/s)  0.54 m/s     Trial Two: Gait Speed (m/s)  0.63 m/s     Trial Three: Gait Speed (m/s)  0.69 m/s     Average Gait Speed (m/s): Three Trials  0.62 m/s        Motor Skills    Motor Control/Coordination Interventions  stepping/walking        Advanced Stepping/Walking Interventions    Stepping/Walking Interventions  side  stepping;backward walking     Backward Walking (Stepping/Walking Interventions)  Forward stepping x8 reps each LE to backwards stepping x8 reps each LE      Side Stepping (Stepping/Walking Interventions)  Side-stepping R/L 2x6 reps each direction with no UE support with Jaqueline for postural control and weight shifting at pelvis        Daily Progress Summary (PT)    Daily Outcome Statement (PT)  Patient demonstrated a significant improvement in gait speed (currently 0.62 m/s) v. when previously tested (0.32 m/s). Patient was able to progres ambulation distance to 300' today. He required Cl-S only with all level mobility with RW (transfers/ambulation).                             IRF PT Goals      Most Recent Value   Bed Mobility Goal 1   Activity/Assistive Device  rolling to left, rolling to right, sit to supine/supine to sit at 02/05/2021 1030   [place]  supervision required at 02/05/2021 1030   Time Frame  short-term goal (STG), 1 week at 02/18/2021 0849   Strategies/Barriers  Impaired strength, decreased endurance, safety considerations at 02/18/2021 0849   Progress/Outcome  goal ongoing at 02/18/2021 0849   Bed Mobility Goal 2   Activity/Assistive Device  rolling to left, rolling to right, sit to supine/supine to sit at 02/05/2021 1030   [place]  modified independence at 02/05/2021 1030   Time Frame  long-term goal (LTG), 2 weeks at 02/18/2021 0849   Strategies/Barriers  Impaired strength, decreased endurance, safety considerations at 02/18/2021 0849   Progress/Outcome  goal ongoing at 02/18/2021 0849   Transfer Goal 1   Activity/Assistive Device  sit-to-stand/stand-to-sit at 02/18/2021 0849   [place]  supervision required at 02/18/2021 0849   Time Frame  short-term goal (STG), 1 week at 02/18/2021 0849   Strategies/Barriers  Impaired strength, decreased endurance, safety considerations at 02/18/2021 0849   Progress/Outcome  goal met, goal revised this date at 02/18/2021 0849   Transfer Goal 2    Activity/Assistive Device  sit-to-stand/stand-to-sit, bed-to-chair/chair-to-bed at 02/11/2021 0824   [place]  modified independence at 02/11/2021 0824   Time Frame  long-term goal (LTG), 2 weeks at 02/18/2021 0849   Strategies/Barriers  Impaired strength, decreased endurance, safety considerations at 02/18/2021 0849   Progress/Outcome  goal ongoing at 02/18/2021 0849   Gait/Walking Locomotion Goal 1   Activity/Assistive Device  gait (walking locomotion) at 02/18/2021 0849   Distance  150 feet at 02/18/2021 0849   [place]  minimum assist (75% or more patient effort) at 02/18/2021 0849   Time Frame  short-term goal (STG), 1 week at 02/18/2021 0849   Strategies/Barriers  Impaired strength, decreased endurance, safety considerations at 02/18/2021 0849   Progress/Outcome  goal met, goal revised this date at 02/18/2021 0849   Gait/Walking Locomotion Goal 2   Activity/Assistive Device  gait (walking locomotion) at 02/18/2021 0849   Distance  150 feet at 02/18/2021 0849   [place]  modified independence at 02/18/2021 0849   Time Frame  long-term goal (LTG), 1 week at 02/18/2021 0849   Strategies/Barriers  Impaired strength, decreased endurance, safety considerations at 02/18/2021 0849   Progress/Outcome  goal ongoing, goal revised this date at 02/18/2021 0849   Stairs Goal 1   Activity/Assistive Device  stairs, all skills at 02/18/2021 0849   Number of Stairs  12 at 02/18/2021 0849   [place]  verbal cues required [touching assist] at 02/18/2021 0849   Time Frame  short-term goal (STG), 1 week at 02/18/2021 0849   Progress/Outcome  medical status inhibiting progress, goal revised this date at 02/18/2021 0849   Stairs Goal 2   Activity/Assistive Device  stairs, all skills at 02/11/2021 0824   Number of Stairs  12 at 02/11/2021 0824   [place]  supervision required at 02/11/2021 0824   Time Frame  long-term goal (LTG), 2 weeks at 02/18/2021 0849   Progress/Outcome  goal ongoing at 02/18/2021 0832

## 2021-02-19 NOTE — PLAN OF CARE
Problem: Rehabilitation (IRF) Plan of Care  Goal: Plan of Care Review  2/19/2021 1701 by Isa Johnston, OT  Flowsheets (Taken 2/19/2021 1701)  Plan of Care Reviewed With:   patient   spouse  IRF Plan of Care Review: progress ongoing, continue  Outcome Summary: Wife was present for family training in simulated living environment. Wife and pt feel confident in level of assistance needed and agree with DME recommendations for bathroom transfers. Issued Home Safety Handout for reference and simple suggestions for home modifications to maximize safety and decrease falls.

## 2021-02-19 NOTE — PLAN OF CARE
Problem: Rehabilitation (IRF) Plan of Care  Goal: Plan of Care Review  Outcome: Progressing  Flowsheets (Taken 2/19/2021 0411)  Plan of Care Reviewed With: patient  IRF Plan of Care Review: progress ongoing, continue  Outcome Summary:   Assumed patient at 2300, denies pain   appeared to sleep on and off overnight   fall prevent/ patient safety maintained, call bell within reach   Plan of Care Review  IRF Plan of Care Review: progress ongoing, continue  Plan of Care Reviewed With: patient  Outcome Summary: Assumed patient at 2300, denies pain; appeared to sleep on and off overnight;fall prevent/ patient safety maintained, call bell within reach

## 2021-02-19 NOTE — PLAN OF CARE
Problem: Rehabilitation (IRF) Plan of Care  Goal: Plan of Care Review  Flowsheets (Taken 2/19/2021 5670)  Plan of Care Reviewed With: patient  IRF Plan of Care Review: progress ongoing, continue  Outcome Summary: Pt's wife/patient participated in education and verbalized good understanding to education and asked appropriate questions. Pt demonstrating great progress toward goals.

## 2021-02-19 NOTE — PROGRESS NOTES
Nutrition Note      320/320W    Clinical Course: Patient is a 70 y.o. male who was admitted on 2/4/2021 with a diagnosis of Pneumonia due to COVID-19 virus [U07.1, J12.82]  Physical deconditioning [R53.81].     Nutrition Interventions/ Recommendations:   1. Continue Boost pudding with dinner.  2. Maintain current diet per SLP.  3. Encourage protein and fiber intake with meals.  4. Monitor PO, weight, labs, BM fx, and skin.  5. Check weight weekly.    Nutrition Risk Level 2      Past Medical History:   Diagnosis Date   • Coronary artery disease    • Hypertension    • Lipid disorder      No past surgical history on file.         Dietary Orders   (From admission, onward)             Start     Ordered    02/18/21 1213  Adult Diet Regular; Thin Liquids  Diet effective now     Comments: Aspiration precautions, distant SPV   Question Answer Comment   Diet Texture Regular    Fluid Consistency: Thin Liquids        02/18/21 1301    02/09/21 1735  Diet message  Once      02/09/21 1735    02/09/21 1735  Dietary nutrition supplements  Once     Comments: Please meet with patient-he has decreased appetite and constipation. Can benefit from education and food selection choices. Thank you    02/09/21 1733                Reason for Assessment  Reason For Assessment: per organizational policy  Diagnosis: infection/sepsis  Identified At Risk by Screening Criteria: difficulty chewing/swallowing, reduced oral intake over the last month, unintentional loss of 10 lbs or more in the past 2 mos    [facility] Nutrition Screen Tool  Has patient lost weight without trying?: 0-->No  If yes,how much weight has been lost?: 0-->Patient has not lost weight  Has patient been eating poorly due to decreased appetite?: 0-->No  MST Nutrition Screen Score: 0    Nutrition/Diet History  Typical Food/Fluid Intake: Regular diet at home  Diet Prior to Admission: Soft/Nectars  Appetite Prior to Admission: Fair-25-50%  Intake (%): 25%  Food Preferences: Loves meat,  "dislikes nectar liquids  Cultural/Mormon Preferences: None  Meal/Snack Patterns: Often only eats dinner  Supplemental Drinks/Foods/Additives: disliked Magic Cup, Boost pudding with dinner  Typical Activity Patterns: vigorous intensity(Farmer)  Functional Status: able to prepare meals, able to purchase food, ambulatory  Food Allergies: (NKFA)  Factors Affecting Nutritional Intake: (none now on Reg/Thin; appetite returning)  Tube feeding/TPN Prior to Admission?: Tube feeding-yes    Physical Findings  Overall Physical Appearance: loss of muscle mass, overweight(thighs)  Gastrointestinal: (C/O recent constipation)  Last Bowel Movement: 02/19/21  Oral/Mouth Cavity: (WDL)  Skin: (WDL)    [id number] Physical Appearance  Overall Physical Appearance: loss of muscle mass, overweight(thighs)  Gastrointestinal: (C/O recent constipation)  Last Bowel Movement: 02/19/21  Oral/Mouth Cavity: (WDL)  Skin: (WDL)    Nutrition Order  Nutrition Order Review: meets nutritional requirements  Nutrition Order Comments: Reg/Thin    Anthropometrics  Height: 172.7 cm (5' 8\")    Wt Readings from Last 3 Encounters:   02/10/21 77.1 kg (170 lb)   02/01/21 84 kg (185 lb 3 oz)       Weights (last 7 days)     None          Current Weight  Weight Method: Bed scale  Weight: 77.1 kg (170 lb)    Ideal Body Weight (IBW)  Ideal Body Weight (IBW) (kg): 70.89    Usual Body Weight (UBW)  Usual Body Weight: 93 kg (205 lb)  % of Usual Body Weight Assessment: 85-95% - mild deficit  Weight Loss: unintentional  Time Frame: 1 Month(~20# (10%))    Body Mass Index (BMI)  BMI (Calculated): 25.9  BMI Assessment: BMI 25-29.9: overweight        Labs/Procedures/Meds  Lab Results Reviewed: reviewed, pertinent  Lab Results Comments: Na 133L, Cr 0.6L, H/H 10/30.9L    CMP Results       02/18/21 02/15/21 02/11/21                    0535 0515 0637          133 132         K 3.8 3.9 4.2         Cl 94 91 92         CO2 26 26 27         Glucose 96 82 94         BUN 10 12 16    "      Creatinine 0.6 0.8 0.7         Calcium 8.5 8.9 8.8         Anion Gap 13 16 13         EGFR >60.0 >60.0 >60.0                     Lab Results   Component Value Date    ALT 59 02/05/2021    AST 20 02/05/2021    ALKPHOS 124 02/05/2021    BILITOT 0.7 02/05/2021     No results found for: [id number]  Lab Results   Component Value Date    CALCIUM 8.5 (L) 02/18/2021     Lab Results   Component Value Date    WBC 11.77 (H) 02/18/2021    HGB 10.0 (L) 02/18/2021    HCT 30.9 (L) 02/18/2021    MCV 95.4 02/18/2021     02/18/2021     No results found for: IRON, TIBC, FERRITIN  Lab Results   Component Value Date    CHOL 269 (H) 02/05/2021     Lab Results   Component Value Date    HDL 30 (L) 02/05/2021     Lab Results   Component Value Date    LDLCALC 185 (H) 02/05/2021     Lab Results   Component Value Date    TRIG 269 (H) 02/05/2021     No results found for: CHOLHDL  Glucose Results     No lab values to display.            • amLODIPine  2.5 mg oral Nightly   • aspirin  81 mg oral Daily   • cefpodoxime  200 mg oral BID   • diphenhydrAMINE  50 mg oral Nightly   • docusate sodium  100 mg oral TID   • furosemide  20 mg oral BID (am, 4p)   • gemfibroziL  600 mg oral BID AC   • guaiFENesin  600 mg oral BID   • magnesium oxide  400 mg oral BID   • melatonin  3 mg oral Nightly   • metoprolol tartrate  12.5 mg oral BID   • mouth moisturizer   mucous membrane TID   • rivaroxaban  10 mg oral Daily with dinner   • senna  3 tablet oral Daily before lunch   • sodium chloride  1 g oral BID with meals       Medications  Pertinent Medications Reviewed: reviewed, pertinent  Pertinent Medications Comments: NaCl tabs, Mg-oxide, lasix, colace    Estimated/Assessed Needs  Additional Documentation: Calorie Requirements (Group), Fluid Requirements (Group), Protein Requirements (Group)    Calorie Requirements  Estimated kCal Needs: Actual Body Weight  Estimated Calorie Need Method: kcal/kg  Calorie/kg Recommended: 22-25  Calorie  Recommendations: 1346-9236      [place]. Jeor Equation  RMR ([place]-St. Jeor Equation): 1510.61       Protein Requirements  Recommended Dosing Weight (Estimated Protein Needs): Actual Body Weight  Est Protein Requirement Amount (gms/kg): (1.2-1.3g/kg)  Protein Recommendations: (101-109)    Fluid Requirements  Fluid Recommendation (mL): 20-25ml  Recommended Fluid Needs Dosing Weight: Actual Body Weight  Fluid Requirements (mL/day): (2788-4015)  Estimated Fluid Requirement Method: RDA Method        PES  Statement: PES Statement  Nutrition Diagnosis: Inadequate Protein-Energy Intake(improving)  Related To:: Decreased ability to consume sufficient energy  As Evidenced By:: Variable po intake  Nutritional Needs Met?: Yes                                   Clinical Comments:     Appetite improving. Pt states large reason for poor PO was soft/nectar diet. Better now with diet upgrade. Does still struggle with 3 meals per day; typically only eats 1 meal per day (dinner). States wife is an excellent cook and will have no problem continuing to gain weight/muscle after D/C.  Pt normally a big water drinker (32-64oz drank just with dinner). Very happy to be able to drink water again.  Pt is a farmer. Upset with muscle loss in legs but notes they are already much improved.  Per nursing Pt struggling with constipation. Reviewed menu selections. Nursing states would like to see more roughage ordered to aid regular BMs.   B = prunes, raisin bran, omelet, fresh orange x2, milk  L = garden salad, brownie, milk  D = hoagie with parra, mashed sweet potato, milk, chicken noodle soup        Goals:  1. PO >75% est. needs  2. Weight maintenance / healthy loss of 1-2lb/week  3. Skin Integrity   4. Labs WNL  5. BM regularity      Monitor and Evaluation:   1. PO intake and weight  2. Labs and skin  3. BM fx      Discussed with: patient, nursing      Date: 02/19/21  Signature: LAURIE Molina

## 2021-02-20 ENCOUNTER — APPOINTMENT (INPATIENT)
Dept: PHYSICAL THERAPY | Facility: REHABILITATION | Age: 71
DRG: 948 | End: 2021-02-20
Payer: COMMERCIAL

## 2021-02-20 PROCEDURE — 63700000 HC SELF-ADMINISTRABLE DRUG: Performed by: PHYSICAL MEDICINE & REHABILITATION

## 2021-02-20 PROCEDURE — 63700000 HC SELF-ADMINISTRABLE DRUG: Performed by: INTERNAL MEDICINE

## 2021-02-20 PROCEDURE — 97530 THERAPEUTIC ACTIVITIES: CPT | Mod: GP

## 2021-02-20 PROCEDURE — 97116 GAIT TRAINING THERAPY: CPT | Mod: GP

## 2021-02-20 PROCEDURE — 63700000 HC SELF-ADMINISTRABLE DRUG: Performed by: PODIATRIST

## 2021-02-20 PROCEDURE — 12800000 HC ROOM AND CARE SEMIPRIVATE REHAB

## 2021-02-20 RX ORDER — LIDOCAINE 560 MG/1
1 PATCH PERCUTANEOUS; TOPICAL; TRANSDERMAL DAILY
Status: DISCONTINUED | OUTPATIENT
Start: 2021-02-20 | End: 2021-02-24 | Stop reason: HOSPADM

## 2021-02-20 RX ADMIN — LIDOCAINE 1 PATCH: 246 PATCH TOPICAL at 10:44

## 2021-02-20 RX ADMIN — CEFPODOXIME PROXETIL 200 MG: 200 TABLET, FILM COATED ORAL at 09:06

## 2021-02-20 RX ADMIN — SODIUM CHLORIDE 1 G: 1 TABLET ORAL at 17:20

## 2021-02-20 RX ADMIN — GUAIFENESIN 600 MG: 600 TABLET ORAL at 09:04

## 2021-02-20 RX ADMIN — RIVAROXABAN 10 MG: 10 TABLET, FILM COATED ORAL at 17:20

## 2021-02-20 RX ADMIN — METOPROLOL TARTRATE 12.5 MG: 25 TABLET, FILM COATED ORAL at 21:24

## 2021-02-20 RX ADMIN — DIPHENHYDRAMINE HYDROCHLORIDE 50 MG: 25 CAPSULE ORAL at 21:24

## 2021-02-20 RX ADMIN — GEMFIBROZIL 600 MG: 600 TABLET ORAL at 09:02

## 2021-02-20 RX ADMIN — POLYETHYLENE GLYCOL 3350 17 G: 17 POWDER, FOR SOLUTION ORAL at 09:07

## 2021-02-20 RX ADMIN — METOPROLOL TARTRATE 12.5 MG: 25 TABLET, FILM COATED ORAL at 09:04

## 2021-02-20 RX ADMIN — SENNOSIDES 3 TABLET: 8.6 TABLET, FILM COATED ORAL at 11:32

## 2021-02-20 RX ADMIN — ASPIRIN 81 MG: 81 TABLET ORAL at 09:06

## 2021-02-20 RX ADMIN — FUROSEMIDE 20 MG: 20 TABLET ORAL at 17:19

## 2021-02-20 RX ADMIN — DOCUSATE SODIUM 100 MG: 100 CAPSULE, LIQUID FILLED ORAL at 21:27

## 2021-02-20 RX ADMIN — DOCUSATE SODIUM 100 MG: 100 CAPSULE, LIQUID FILLED ORAL at 09:05

## 2021-02-20 RX ADMIN — CEFPODOXIME PROXETIL 200 MG: 200 TABLET, FILM COATED ORAL at 21:27

## 2021-02-20 RX ADMIN — GUAIFENESIN 600 MG: 600 TABLET ORAL at 21:26

## 2021-02-20 RX ADMIN — DOCUSATE SODIUM 100 MG: 100 CAPSULE, LIQUID FILLED ORAL at 17:20

## 2021-02-20 RX ADMIN — Medication 400 MG: at 21:27

## 2021-02-20 RX ADMIN — Medication 3 MG: at 21:27

## 2021-02-20 RX ADMIN — Medication 400 MG: at 09:04

## 2021-02-20 RX ADMIN — MAGNESIUM HYDROXIDE 30 ML: 400 SUSPENSION ORAL at 09:07

## 2021-02-20 RX ADMIN — FUROSEMIDE 20 MG: 20 TABLET ORAL at 09:05

## 2021-02-20 RX ADMIN — GEMFIBROZIL 600 MG: 600 TABLET ORAL at 17:19

## 2021-02-20 RX ADMIN — SODIUM CHLORIDE 1 G: 1 TABLET ORAL at 09:03

## 2021-02-20 NOTE — PLAN OF CARE
Problem: Rehabilitation (IRF) Plan of Care  Goal: Plan of Care Review  Outcome: Progressing  Flowsheets (Taken 2/20/2021 0424)  Plan of Care Reviewed With: patient  IRF Plan of Care Review: progress ongoing, continue  Outcome Summary: A&Ox3. Continent of b/b. Had large BM after suppository. No c/o pain. Slept on/off.   Plan of Care Review  IRF Plan of Care Review: progress ongoing, continue  Plan of Care Reviewed With: patient  Outcome Summary: A&Ox3. Continent of b/b. Had large BM after suppository. No c/o pain. Slept on/off.

## 2021-02-20 NOTE — PROGRESS NOTES
Subjective    Patient seen and examined on rounds.  Chart reviewed.  Events overnight noted.  History reviewed briefly with patient.     CC:  Deficits in mobility, transfers, self-care status post deconditioning, severe Covid 19 pneumonia, ventilator-dependent respiratory failure, dysphagia, multiple medical problems.     HPI:  Mr. Denis Green is a 70-year-old right-handed white male with chronic conditions significant for coronary artery disease, hypertension, hyperlipidemia who was admitted to [facility] on 1/5/21 due to Covid 19 pneumonia and worsening respiratory status.  He required intubation on 1/9/21 and had ventilator-dependent respiratory failure.  He was treated with Decadron and Remdesivir.  He had dysphagia requiring Dobbhoff tube feeds.  He was extubated on 1/16/21 to OptiFlow.  On 1/23/21 he was transitioned to high flow nasal cannula.  Hospital course was significant for acute renal insufficiency likely secondary to acute tubular necrosis and he was followed by nephrology.  He had subsequent improvement in renal function.  He also had new fever of 102.8°F on 1/20/21 and 1/21/21.  He was followed by infectious disease.  Blood cultures were negative. CT scan of the chest abdomen pelvis was done with results showing increasing pulmonary infiltrates/groundglass opacities and consolidations in his bilateral lower lobes.  He was treated empirically with Cefepime and Vancomycin to cover hospital-acquired pneumonia.  He was noted to be lethargic on 1/22/21.  Subsequently patient removed Dobbhoff tube on 1/25/21 and there is question of possible aspiration during that process.  He continued to have intermittent confusion on 1/26/21 was felt to be possible post ICU delirium.  He was followed by neurology.  MRI brain was unremarkable.  EEG showed mild slowing but no seizure activity was noted.  Antibiotics were discontinued after a 5 day course due to lethargy.  Repeat video swallow study was  "performed for dysphagia and patient was put on a soft diet with nectar thick liquids. On 2/3/21, hemoglobin was 11.2, WBC count 15.1, platelets were 275, BUN was 15, and creatinine was 0.7.  He has been needing assistance for mobility, transfers, self-care postoperatively. He is transferred to [facility] on 2/4/21 for further rehabilitation care.       SUBJ: Tolerating regular thins diet.  Discussed ENT input with him.  He requested podiatric consult which was ordered.    ROS: Denies chest pain or shortness of breath. Other ROS negative. Past, family, social history is unchanged.    Physical Exam      Blood pressure 121/73, pulse 89, temperature 36.6 °C (97.9 °F), temperature source Oral, resp. rate 18, height 1.727 m (5' 8\"), weight 77.1 kg (170 lb), SpO2 94 %.  Body mass index is 25.85 kg/m².    General Appearance: Not in acute distress  Head/Ear/Nose/Throat: Normocephalic; Atraumatic.  On oxygen by nasal cannula.  Eye: EOMI; PERRL.   Neck: No JVD; No Bruits.   Respiratory: Decreased breath sounds at bases.   Cardiovascular: RRR; Normal S1, S2.   Gastrointestinal: Soft; NT; +BS.   Extremities: Bilateral lower extremity edema noted.   Musculoskeletal: Functional active range of motion in both upper and lower extremities.    Neurological: Awake alert oriented to person, had some difficulty naming the place and correct date.. Speech is fluent. Cranial nerve examination does not reveal any gross facial asymmetry. Strength testing about 4/5 strength in both upper extremities.  Bilateral hip flexion is 3+/5.  Bilateral quadriceps are 4/5 with the thighs supported.  Bilateral ankle dorsi and plantar flexion are 4/5.  He is grossly able to localize touch and position sense in great toes.  Deep tendon reflexes are hypoactive and symmetric bilaterally.  Plantars are flexor.  Coordination is functional upper extremities.  Neurologically unchanged.  Behavior/Emotional: Appropriate; Cooperative.   Skin: No " obvious rashes noted.        Current Facility-Administered Medications:   •  acetaminophen (TYLENOL) tablet 650 mg, 650 mg, oral, q4h PRN, Omari Spain MD, 650 mg at 02/17/21 1433  •  albuterol HFA (VENTOLIN HFA) 90 mcg/actuation inhaler 2 puff, 2 puff, inhalation, q4h PRN, Laure Bishop MD  •  alum-mag hydroxide-simeth (MAALOX) 200-200-20 mg/5 mL suspension 30 mL, 30 mL, oral, q6h PRN, Laure Bishop MD  •  amLODIPine (NORVASC) tablet 2.5 mg, 2.5 mg, oral, Nightly, Laure Bishop MD, 2.5 mg at 02/17/21 2056  •  aspirin enteric coated tablet 81 mg, 81 mg, oral, Daily, Omari Spain MD, 81 mg at 02/19/21 0824  •  bisacodyL (DULCOLAX) 10 mg suppository 10 mg, 10 mg, rectal, Daily PRN, Isael Lang MD, 10 mg at 02/18/21 2141  •  cefpodoxime (VANTIN) tablet 200 mg, 200 mg, oral, BID, Laure Bishop MD, 200 mg at 02/19/21 0824  •  diphenhydrAMINE (BENADRYL) capsule 50 mg, 50 mg, oral, Nightly, Isael Lang MD, 50 mg at 02/18/21 2109  •  docusate sodium (COLACE) capsule 100 mg, 100 mg, oral, TID, Isael Lang MD, 100 mg at 02/19/21 1636  •  furosemide (LASIX) tablet 20 mg, 20 mg, oral, BID (am, 4p), Laure Bishop MD, 20 mg at 02/19/21 1635  •  gemfibroziL (LOPID) tablet 600 mg, 600 mg, oral, BID AC, Omari Spain MD, 600 mg at 02/19/21 1635  •  guaiFENesin (MUCINEX) 12 hr ER tablet 600 mg, 600 mg, oral, BID, Laure Bishop MD, 600 mg at 02/19/21 0823  •  magnesium hydroxide (M.O.M.) 400 mg/5 mL suspension 30 mL, 30 mL, oral, 2x daily PRN, Laure Bishop MD, 30 mL at 02/19/21 1637  •  magnesium oxide (MAG-OX) tablet 400 mg, 400 mg, oral, BID, Laure Bishop MD, 400 mg at 02/19/21 0823  •  melatonin ODT 3 mg, 3 mg, oral, Nightly, Laure Bishop MD, 3 mg at 02/18/21 2109  •  metoprolol tartrate (LOPRESSOR) split tablet 12.5 mg, 12.5 mg, oral, BID, Omari Spain MD, 12.5 mg at 02/19/21 0823  •  mouth moisturizer (TOOTHETTE) cream, , mucous membrane, TID, Laure Bishop MD, Given at 02/19/21  1635  •  polyethylene glycol (MIRALAX) 17 gram packet 17 g, 17 g, oral, Daily PRN, Omari Spain MD, 17 g at 02/19/21 1637  •  rivaroxaban (XARELTO) tablet 10 mg, 10 mg, oral, Daily with dinner, Laure Bishop MD, 10 mg at 02/19/21 1636  •  senna (SENOKOT) tablet 3 tablet, 3 tablet, oral, Daily before lunch, Isael Lang MD, 3 tablet at 02/19/21 1121  •  sodium chloride tablet 1 g, 1 g, oral, BID with meals, Laure Bishop MD, 1 g at 02/19/21 1636       Labs / Radiology    Lab Results   Component Value Date    WBC 11.77 (H) 02/18/2021    HGB 10.0 (L) 02/18/2021    HCT 30.9 (L) 02/18/2021    MCV 95.4 02/18/2021     02/18/2021     Lab Results   Component Value Date    GLUCOSE 96 02/18/2021    CALCIUM 8.5 (L) 02/18/2021     (L) 02/18/2021    K 3.8 02/18/2021    CO2 26 02/18/2021    CL 94 (L) 02/18/2021    BUN 10 02/18/2021    CREATININE 0.6 (L) 02/18/2021       Assessment and Plan    ASSESSMENT PLAN:  1. 70-year-old right-handed white male with chronic conditions significant for coronary artery disease, hypertension, hyperlipidemia who was admitted to [facility] on 1/5/21 due to Covid 19 pneumonia and worsening respiratory status.  He required intubation on 1/9/21 and had ventilator-dependent respiratory failure.  He was treated with Decadron and Remdesivir.  He had dysphagia requiring Dobbhoff tube feeds.  He was extubated on 1/16/21 to OptiFlow.  On 1/23/21 he was transitioned to high flow nasal cannula.  Hospital course was significant for acute renal insufficiency likely secondary to acute tubular necrosis and he was followed by nephrology.  He had subsequent improvement in renal function.  He also had new fever of 102.8°F on 1/20/21 and 1/21/21.  He was followed by infectious disease.  Blood cultures were negative. CT scan of the chest abdomen pelvis was done with results showing increasing pulmonary infiltrates/groundglass opacities and consolidations in his bilateral lower  lobes.  He was treated empirically with Cefepime and Vancomycin to cover hospital-acquired pneumonia.  He was noted to be lethargic on 1/22/21.  Subsequently patient removed Dobbhoff tube on 1/25/21 and there is question of possible aspiration during that process.  He continued to have intermittent confusion on 1/26/21 was felt to be possible post ICU delirium.  He was followed by neurology.  MRI brain was unremarkable.  EEG showed mild slowing but no seizure activity was noted.  Antibiotics were discontinued after a 5 day course due to lethargy.  Repeat video swallow study was performed for dysphagia and patient was put on a soft diet with nectar thick liquids. On 2/3/21, hemoglobin was 11.2, WBC count 15.1, platelets were 275, BUN was 15, and creatinine was 0.7.  He has been needing assistance for mobility, transfers, self-care postoperatively. He is transferred to [place] rehabilitation on 2/4/21 for further rehabilitation care.       2. DVT prophylaxis - on subcutaneous Heparin.  On SCDs.  Platelets 296 on 2/5/2021.  Platelets 389 on 2/11/2021.  Platelets 470 on 2/15/2021.     3.  Deconditioning - recent severe Covid 19 pneumonia, ventilator-dependent respiratory failure, dysphagia, multiple medical problems - He had ventilator-dependent respiratory failure.  He was treated with Decadron and Remdesivir.  Continue PT, OT, speech, psychology.  Follow falls precautions, cardiac precautions, aspiration precautions.     4. GI - On Colace, Senokot.  On PRN MiraLAX, MOM, Maalox.      5.  - voiding.  Denies dysuria.  Monitor post void residuals.     6.  Pulmonary - recent severe Covid 19 pneumonia, ventilator-dependent respiratory failure - on Ventolin HFA.  On Mucinex.     7. Pain - on PRN Tylenol.  On scheduled Tylenol for left wrist pain.  On topical Voltaren gel.  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  K pad as needed for pain.     8. Hematology -hemoglobin 12.0, WBC 8.15 on 2/5/2021.   Hemoglobin 10.7, WBC 8.05 on 2/11/2021.  Hemoglobin 12.0, WBC 11.56 on 2/15/2021.     9. CVS - history of coronary artery disease and hypertension.  On Norvasc.  On Lopressor.  On Aspirin.  On Lasix.  On Magnesium oxide.     10.  Hyperlipidemia - on Lopid.     11.  Insomnia - on Melatonin.     12.  Dysphagia -on soft diet with nectar thick liquids.  Free water protocol with ice chips and sips of water per speech therapy.  Speech therapy and nutrition following.     13. Rehabilitation medicine - Continue comprehensive rehabilitation care. Continue PT, OT, speech, psychology.  We will follow falls precautions, cardiac precautions, monitor pulse oximetry in therapy and follow aspiration precautions.  Tolerating therapies per endurance.  Discussed with speech therapy.  Free water protocol ice chips was started.  Denies coughing with meals.He reported some pain in left wrist.  He is not sure if he accidentally banged the left wrist against the side rail of the bed.  Denies history of gout.  Left wrist does not appear warm or tender to touch.  K pad ordered.  We will also order scheduled Tylenol for 7 days.  Discussed results of wrist x-rays with patient which showed moderate degenerative change particularly about the radial aspect of the wrist.  He reports pain is less today.  He ambulated 25 feet with rolling walker with assistance of 2 people with physical therapy.Free water protocol with ice chips and sips of water per speech therapy.  He feels better today.  Left wrist pain is decreased significantly per patient.  Working on endurance with physical therapy.  He required minimal to moderate assistance for transfers.  Working on ADLs with occupational therapy.He feels better today.  Denies increased pain.  Discussed with him about doing incentive spirometry.  We will try to wean off oxygen if possible.Trying to wean off oxygen as possible.  Tolerating therapies per endurance.  He reports decrease sleep at night.   Usually takes 2 Tylenol PM at night.  He is already on scheduled Tylenol.  Will order 50 mg of Benadryl at bedtime. He reports he slept well with Benadryl last night.  Tolerating therapies per endurance.  Denies left wrist pain today.Video swallow was done today.  Mild oral and pharyngeal dysphagia noted.  Transient penetration of thin liquids is noted.  Speech therapy working with dysphagia.  Tolerating upgraded diet after video swallow.  Noted ENT input regarding his voice.  Vocal cords moving normally per ENT.  Discussed with patient.Tolerating regular thins diet.  Discussed ENT input with him.  He requested podiatric consult which was ordered.     14. Reviewed labs today.  BUN 15, creatinine 0.6 on 2/5/2021.  BUN 16, creatinine 0.7 on 2/11/2021.  BUN 12, creatinine 0.8 on 2/15/2021.               Isael Lang MD  2/19/2021

## 2021-02-20 NOTE — PROGRESS NOTES
Patient: Denis Green  Location: [facility] [address]  MRN: [id number]  Today's date: 2/20/2021    History of Present Illness  Denis LOVE is a 70 y.o. male admitted on 2/4/2021 with Pneumonia due to COVID-19 virus. Principal problem is No Principal Problem: There is no principal problem currently on the Problem List. Please update the Problem List and refresh..   Pt admitted to outside hospital on 1/5/2021 for COVID-19 PNA. Worsening resp status intubated on 1/9- extubated 1/16 to OptiFlow. Transition to HFNC on 1/23.Eval by nephrology while in ICU due to ERNIE likely secondary to ATN. Creatinine has been improving with excellent urine output. Received Decadron and Remdesivir. Dobhoff tube was placed due to dysphagia. New fever of 102.8 1/20 and 1/21. ID was consulted. Bld cx's negative. CT scan of the chest abdomen pelvis was done with results showing increasing pulmonary infiltrates/groundglass opacities and consolidations in his bilateral lower lobes. Started empirically on cefepime and vancomycin to cover hospital-acquired pneumonia. Pt had increased lethargy on 1/22. Pt removed DHT 1/25; required higher O2 afterward - possible aspiration during the process. 1/26 patient continued to have intermittent confusion; ?post ICU delirium. Neurology is following, MRI brain was unremarkable. EEG showed mild slowing but no seizure activity. D/c'd abx after 5 days d/t lethargy.       Past Medical History  Denis LOVE has a past medical history of Coronary artery disease, Hypertension, and Lipid disorder.    PT Vitals    Date/Time Pulse SpO2 Pt Activity O2 Therapy BP BP Location BP Method Pt Position [facility]   02/20/21 0900 70 94 % At rest None (Room air) 114/60 Left upper arm Automatic Sitting MKW      PT Pain    Date/Time Pain Type Pref Pain Scale Rating: Rest Rating: Activity [facility]   02/20/21 0900 Pain Assessment number (Numeric Rating Pain Scale) 0 - no pain 0 - no pain MKW   02/20/21 0925 Pain  Reassessment number (Numeric Rating Pain Scale) 0 - no pain -- MKW          Prior Living Environment      Most Recent Value   Lives With  spouse, child(mindy), adult [adult son lives in home occasionally]   Living Arrangements  house   Home Accessibility  stairs to enter home (Group), stairs within home (Group)   Living Environment Comment  Lives with wife and intermittently with one son.  [bed and bath on 2nd ,  no powder room on first]   Number of Stairs, Main Entrance  1   Stair Railings, Main Entrance  none   Location, Patient Bedroom  second floor, must negotiate stairs to access   Location, Bathroom  second floor, must negotiate stairs to access   Bathroom Access Comment  Pt. reports has tub shower and stall shower, uses tub shower primarily, has grab bar in shower, has stanard height toilet c wall on both sides   Number of Stairs, Within Home, Primary  12   Surface of Stairs, Within Home, Primary  hardwood   Stair Railings, Within Home, Primary  railings on both sides of stairs          Prior Level of Function      Most Recent Value   Dominant Hand  right   Ambulation  independent   Transferring  independent   Toileting  independent   Bathing  independent   Dressing  independent   Eating  independent   Communication  understands/communicates without difficulty   Swallowing  swallows foods/liquids without difficulty   Baseline Diet/Method of Nutritional Intake  regular solids, thin liquids   Past History of Dysphagia  No previous reports    Prior Level of Function Comment  Pt is a cattle/,  previously IND for all IADLs.    Assistive Device/Animal Currently Used at Home  none          IRF PT Evaluation and Treatment - 02/20/21 0900        Time Calculation    Start Time  0900     Stop Time  0930     Time Calculation (min)  30 min        Session Details    Document Type  daily treatment/progress note     Mode of Treatment  individual therapy;physical therapy        General Information     Patient/Family/Caregiver Comments/Observations  received patient sitting in wc; agreeable to PT        Sit to Stand Transfer    [place], Sit to Stand Transfer  close supervision;verbal cues     Verbal Cues  safety     Assistive Device  walker, front-wheeled     Comment  from wc to RW        Stand to Sit Transfer    [place], Stand to Sit Transfer  close supervision;verbal cues     Verbal Cues  safety     Assistive Device  walker, front-wheeled     Comment  to wc from RW        Stand Pivot Transfer    [place], Stand Pivot/Stand Step Transfer  close supervision;verbal cues     Verbal Cues  safety     Assistive Device  walker, front-wheeled     Comment  ambulatory approach to wc with RW        Gait Training    [place], Gait  close supervision;verbal cues     Assistive Device  walker, front-wheeled     Distance in Feet  150 feet    first trial: 100'    Gait Pattern Utilized  step-through     Deviations/Abnormal Patterns (Gait)  base of support, narrow;gait speed decreased;step length decreased     Bilateral Gait Deviations  heel strike decreased        Stairs Training    [place], Stairs  minimum assist (75% or more patient effort);verbal cues     Assistive Device  railing     Handrail Location  both sides     Number of Stairs  12     Stair Height  6 inches     Ascending Stairs Technique  step-to-step    leading with LLE up    Descending Stairs Technique  step-to-step    leading with RLE down    Comment  v. cues for full foot on step (4 x 3)        Lower Extremity (Therapeutic Exercise)    Exercise Position/Type (LE Therapeutic Exercise)  seated;standing     General Exercise (LE Therapeutic Exercise)  bilateral;ankle pumps;LAQ (long arc quad);marching while seated;marching while standing     Reps and Sets (LE Therapeutic Exercise)  10 -20     Comment (LE Therapeutic Exercise)  Seated: AP's x 20; LAQ 10 x 2, marches x 20;  Standing with RW for support: Marches x 10, alt. lateral toe taps x 10         Daily Progress Summary (PT)    Symptoms Noted During/After Treatment  none     Progress Toward Functional Goals (PT)  progressing toward functional goals as expected     Daily Outcome Statement (PT)  Cooperative and motivated to improve functional mobility skills     Recommendations  Progress as per POC.                            IRF PT Goals      Most Recent Value   Bed Mobility Goal 1   Activity/Assistive Device  rolling to left, rolling to right, sit to supine/supine to sit at 02/05/2021 1030   [place]  supervision required at 02/05/2021 1030   Time Frame  short-term goal (STG), 1 week at 02/18/2021 0849   Strategies/Barriers  Impaired strength, decreased endurance, safety considerations at 02/18/2021 0849   Progress/Outcome  goal ongoing at 02/18/2021 0849   Bed Mobility Goal 2   Activity/Assistive Device  rolling to left, rolling to right, sit to supine/supine to sit at 02/05/2021 1030   [place]  modified independence at 02/05/2021 1030   Time Frame  long-term goal (LTG), 2 weeks at 02/18/2021 0849   Strategies/Barriers  Impaired strength, decreased endurance, safety considerations at 02/18/2021 0849   Progress/Outcome  goal ongoing at 02/18/2021 0849   Transfer Goal 1   Activity/Assistive Device  sit-to-stand/stand-to-sit at 02/18/2021 0849   [place]  supervision required at 02/18/2021 0849   Time Frame  short-term goal (STG), 1 week at 02/18/2021 0849   Strategies/Barriers  Impaired strength, decreased endurance, safety considerations at 02/18/2021 0849   Progress/Outcome  goal met, goal revised this date at 02/18/2021 0849   Transfer Goal 2   Activity/Assistive Device  sit-to-stand/stand-to-sit, bed-to-chair/chair-to-bed at 02/11/2021 0824   [place]  modified independence at 02/11/2021 0824   Time Frame  long-term goal (LTG), 2 weeks at 02/18/2021 0849   Strategies/Barriers  Impaired strength, decreased endurance, safety considerations at 02/18/2021 0849   Progress/Outcome  goal  ongoing at 02/18/2021 0849   Gait/Walking Locomotion Goal 1   Activity/Assistive Device  gait (walking locomotion) at 02/18/2021 0849   Distance  150 feet at 02/18/2021 0849   [place]  minimum assist (75% or more patient effort) at 02/18/2021 0849   Time Frame  short-term goal (STG), 1 week at 02/18/2021 0849   Strategies/Barriers  Impaired strength, decreased endurance, safety considerations at 02/18/2021 0849   Progress/Outcome  goal met, goal revised this date at 02/18/2021 0849   Gait/Walking Locomotion Goal 2   Activity/Assistive Device  gait (walking locomotion) at 02/18/2021 0849   Distance  150 feet at 02/18/2021 0849   [place]  modified independence at 02/18/2021 0849   Time Frame  long-term goal (LTG), 1 week at 02/18/2021 0849   Strategies/Barriers  Impaired strength, decreased endurance, safety considerations at 02/18/2021 0849   Progress/Outcome  goal ongoing, goal revised this date at 02/18/2021 0849   Stairs Goal 1   Activity/Assistive Device  stairs, all skills at 02/18/2021 0849   Number of Stairs  12 at 02/18/2021 0849   [place]  verbal cues required [touching assist] at 02/18/2021 0849   Time Frame  short-term goal (STG), 1 week at 02/18/2021 0849   Progress/Outcome  medical status inhibiting progress, goal revised this date at 02/18/2021 0849   Stairs Goal 2   Activity/Assistive Device  stairs, all skills at 02/11/2021 0824   Number of Stairs  12 at 02/11/2021 0824   [place]  supervision required at 02/11/2021 0824   Time Frame  long-term goal (LTG), 2 weeks at 02/18/2021 0849   Progress/Outcome  goal ongoing at 02/18/2021 0849

## 2021-02-20 NOTE — PLAN OF CARE
Problem: Rehabilitation (IRF) Plan of Care  Goal: Plan of Care Review  Flowsheets (Taken 2/20/2021 1222)  Plan of Care Reviewed With: patient  IRF Plan of Care Review: progress ongoing, continue  Outcome Summary: Cooperative and motivated to improve functional mobility skills

## 2021-02-21 ENCOUNTER — APPOINTMENT (INPATIENT)
Dept: OTHER | Facility: REHABILITATION | Age: 71
DRG: 948 | End: 2021-02-21
Payer: COMMERCIAL

## 2021-02-21 ENCOUNTER — APPOINTMENT (INPATIENT)
Dept: OCCUPATIONAL THERAPY | Facility: REHABILITATION | Age: 71
DRG: 948 | End: 2021-02-21
Payer: COMMERCIAL

## 2021-02-21 PROCEDURE — 12800000 HC ROOM AND CARE SEMIPRIVATE REHAB

## 2021-02-21 PROCEDURE — 97530 THERAPEUTIC ACTIVITIES: CPT | Mod: GO

## 2021-02-21 PROCEDURE — 63700000 HC SELF-ADMINISTRABLE DRUG: Performed by: PHYSICAL MEDICINE & REHABILITATION

## 2021-02-21 PROCEDURE — 63700000 HC SELF-ADMINISTRABLE DRUG: Performed by: INTERNAL MEDICINE

## 2021-02-21 PROCEDURE — 63700000 HC SELF-ADMINISTRABLE DRUG: Performed by: PODIATRIST

## 2021-02-21 PROCEDURE — 97530 THERAPEUTIC ACTIVITIES: CPT

## 2021-02-21 RX ADMIN — FUROSEMIDE 20 MG: 20 TABLET ORAL at 09:09

## 2021-02-21 RX ADMIN — CEFPODOXIME PROXETIL 200 MG: 200 TABLET, FILM COATED ORAL at 09:10

## 2021-02-21 RX ADMIN — Medication 3 MG: at 20:39

## 2021-02-21 RX ADMIN — GUAIFENESIN 600 MG: 600 TABLET ORAL at 09:09

## 2021-02-21 RX ADMIN — GEMFIBROZIL 600 MG: 600 TABLET ORAL at 07:44

## 2021-02-21 RX ADMIN — AMLODIPINE BESYLATE 2.5 MG: 2.5 TABLET ORAL at 20:39

## 2021-02-21 RX ADMIN — LIDOCAINE 1 PATCH: 246 PATCH TOPICAL at 09:09

## 2021-02-21 RX ADMIN — DOCUSATE SODIUM 100 MG: 100 CAPSULE, LIQUID FILLED ORAL at 09:09

## 2021-02-21 RX ADMIN — ASPIRIN 81 MG: 81 TABLET ORAL at 09:10

## 2021-02-21 RX ADMIN — SODIUM CHLORIDE 1 G: 1 TABLET ORAL at 09:08

## 2021-02-21 RX ADMIN — DOCUSATE SODIUM 100 MG: 100 CAPSULE, LIQUID FILLED ORAL at 20:39

## 2021-02-21 RX ADMIN — SENNOSIDES 3 TABLET: 8.6 TABLET, FILM COATED ORAL at 12:15

## 2021-02-21 RX ADMIN — DIPHENHYDRAMINE HYDROCHLORIDE 50 MG: 25 CAPSULE ORAL at 20:38

## 2021-02-21 RX ADMIN — CEFPODOXIME PROXETIL 200 MG: 200 TABLET, FILM COATED ORAL at 20:39

## 2021-02-21 RX ADMIN — METOPROLOL TARTRATE 12.5 MG: 25 TABLET, FILM COATED ORAL at 09:08

## 2021-02-21 RX ADMIN — Medication 400 MG: at 20:38

## 2021-02-21 RX ADMIN — Medication 400 MG: at 09:09

## 2021-02-21 RX ADMIN — GEMFIBROZIL 600 MG: 600 TABLET ORAL at 16:51

## 2021-02-21 RX ADMIN — DOCUSATE SODIUM 100 MG: 100 CAPSULE, LIQUID FILLED ORAL at 16:51

## 2021-02-21 RX ADMIN — SODIUM CHLORIDE 1 G: 1 TABLET ORAL at 16:52

## 2021-02-21 RX ADMIN — FUROSEMIDE 20 MG: 20 TABLET ORAL at 16:51

## 2021-02-21 RX ADMIN — GUAIFENESIN 600 MG: 600 TABLET ORAL at 20:38

## 2021-02-21 RX ADMIN — METOPROLOL TARTRATE 12.5 MG: 25 TABLET, FILM COATED ORAL at 20:39

## 2021-02-21 RX ADMIN — RIVAROXABAN 10 MG: 10 TABLET, FILM COATED ORAL at 16:52

## 2021-02-21 NOTE — NURSING NOTE
Pt c/o urinary frequency today. He denies burning or difficulty initiating urine stream. Has been continent of bladder. PVR checked this afternoon and 254cc.  Denies diaphoresis or chills. Afebrile. He has been drinking thin liquids ad fausto. Encouraged cranberry juice and bladder re-education discussed after deconditioning. Due for CBC in am and Vantin course continues.

## 2021-02-21 NOTE — PLAN OF CARE
Problem: Rehabilitation (IRF) Plan of Care  Goal: Plan of Care Review  Flowsheets (Taken 2/21/2021 1230)  Plan of Care Reviewed With: patient  IRF Plan of Care Review: progress ongoing, continue  Outcome Summary: pt did well amb on rough surface in greenhouse while discussing importance of leisure for health and wellness

## 2021-02-21 NOTE — PLAN OF CARE
Problem: Rehabilitation (IRF) Plan of Care  Goal: Plan of Care Review  Flowsheets  Taken 2/21/2021 1701 by Irene Sharp OT  Plan of Care Reviewed With: patient  Outcome Summary: Pt focused on functional ambulation with device and endurance building.  Tolerated well.  Pulse oximetry stable.  Taken 2/21/2021 1235 by Alexa Wylie CTRS  IRF Plan of Care Review: progress ongoing, continue

## 2021-02-21 NOTE — PROGRESS NOTES
Subjective    Patient seen and examined on rounds.  Chart reviewed.  Events overnight noted.  History reviewed briefly with patient.     CC:  Deficits in mobility, transfers, self-care status post deconditioning, severe Covid 19 pneumonia, ventilator-dependent respiratory failure, dysphagia, multiple medical problems.     HPI:  Mr. Denis Green is a 70-year-old right-handed white male with chronic conditions significant for coronary artery disease, hypertension, hyperlipidemia who was admitted to [facility] on 1/5/21 due to Covid 19 pneumonia and worsening respiratory status.  He required intubation on 1/9/21 and had ventilator-dependent respiratory failure.  He was treated with Decadron and Remdesivir.  He had dysphagia requiring Dobbhoff tube feeds.  He was extubated on 1/16/21 to OptiFlow.  On 1/23/21 he was transitioned to high flow nasal cannula.  Hospital course was significant for acute renal insufficiency likely secondary to acute tubular necrosis and he was followed by nephrology.  He had subsequent improvement in renal function.  He also had new fever of 102.8°F on 1/20/21 and 1/21/21.  He was followed by infectious disease.  Blood cultures were negative. CT scan of the chest abdomen pelvis was done with results showing increasing pulmonary infiltrates/groundglass opacities and consolidations in his bilateral lower lobes.  He was treated empirically with Cefepime and Vancomycin to cover hospital-acquired pneumonia.  He was noted to be lethargic on 1/22/21.  Subsequently patient removed Dobbhoff tube on 1/25/21 and there is question of possible aspiration during that process.  He continued to have intermittent confusion on 1/26/21 was felt to be possible post ICU delirium.  He was followed by neurology.  MRI brain was unremarkable.  EEG showed mild slowing but no seizure activity was noted.  Antibiotics were discontinued after a 5 day course due to lethargy.  Repeat video swallow study was  "performed for dysphagia and patient was put on a soft diet with nectar thick liquids. On 2/3/21, hemoglobin was 11.2, WBC count 15.1, platelets were 275, BUN was 15, and creatinine was 0.7.  He has been needing assistance for mobility, transfers, self-care postoperatively. He is transferred to [facility] on 2/4/21 for further rehabilitation care.       SUBJ: He was seen by podiatry earlier today.  Left foot was injected.  He reports left foot feels much better now.    ROS: Denies chest pain or shortness of breath. Other ROS negative. Past, family, social history is unchanged.    Physical Exam      Blood pressure 114/60, pulse 70, temperature 36.6 °C (97.8 °F), temperature source Oral, resp. rate 16, height 1.727 m (5' 8\"), weight 77.1 kg (170 lb), SpO2 94 %.  Body mass index is 25.85 kg/m².    General Appearance: Not in acute distress  Head/Ear/Nose/Throat: Normocephalic; Atraumatic.  On oxygen by nasal cannula.  Eye: EOMI; PERRL.   Neck: No JVD; No Bruits.   Respiratory: Decreased breath sounds at bases.   Cardiovascular: RRR; Normal S1, S2.   Gastrointestinal: Soft; NT; +BS.   Extremities: Bilateral lower extremity edema noted.   Musculoskeletal: Functional active range of motion in both upper and lower extremities.    Neurological: Awake alert oriented to person, had some difficulty naming the place and correct date.. Speech is fluent. Cranial nerve examination does not reveal any gross facial asymmetry. Strength testing about 4/5 strength in both upper extremities.  Bilateral hip flexion is 3+/5.  Bilateral quadriceps are 4/5 with the thighs supported.  Bilateral ankle dorsi and plantar flexion are 4/5.  He is grossly able to localize touch and position sense in great toes.  Deep tendon reflexes are hypoactive and symmetric bilaterally.  Plantars are flexor.  Coordination is functional upper extremities.  Neurologically stable.  Behavior/Emotional: Appropriate; Cooperative.   Skin: No obvious " rashes noted.        Current Facility-Administered Medications:   •  acetaminophen (TYLENOL) tablet 650 mg, 650 mg, oral, q4h PRN, Omari Spain MD, 650 mg at 02/17/21 1433  •  albuterol HFA (VENTOLIN HFA) 90 mcg/actuation inhaler 2 puff, 2 puff, inhalation, q4h PRN, Laure Bishop MD  •  alum-mag hydroxide-simeth (MAALOX) 200-200-20 mg/5 mL suspension 30 mL, 30 mL, oral, q6h PRN, Laure Bishop MD  •  amLODIPine (NORVASC) tablet 2.5 mg, 2.5 mg, oral, Nightly, Laure Bishop MD, 2.5 mg at 02/17/21 2056  •  aspirin enteric coated tablet 81 mg, 81 mg, oral, Daily, Omari Spain MD, 81 mg at 02/20/21 0906  •  bisacodyL (DULCOLAX) 10 mg suppository 10 mg, 10 mg, rectal, Daily PRN, Isael Lang MD, 10 mg at 02/19/21 2000  •  cefpodoxime (VANTIN) tablet 200 mg, 200 mg, oral, BID, Laure Bishop MD, 200 mg at 02/20/21 0906  •  diphenhydrAMINE (BENADRYL) capsule 50 mg, 50 mg, oral, Nightly, Isael Lang MD, 50 mg at 02/19/21 2133  •  docusate sodium (COLACE) capsule 100 mg, 100 mg, oral, TID, Isael Lang MD, 100 mg at 02/20/21 1720  •  furosemide (LASIX) tablet 20 mg, 20 mg, oral, BID (am, 4p), Laure Bishop MD, 20 mg at 02/20/21 1719  •  gemfibroziL (LOPID) tablet 600 mg, 600 mg, oral, BID AC, Omari Spain MD, 600 mg at 02/20/21 1719  •  guaiFENesin (MUCINEX) 12 hr ER tablet 600 mg, 600 mg, oral, BID, Laure Bishop MD, 600 mg at 02/20/21 0904  •  lidocaine (ASPERCREME) 4 % topical patch 1 patch, 1 patch, Topical, Daily, Galileo Samuel, GUILLEROM, 1 patch at 02/20/21 1044  •  magnesium hydroxide (M.O.M.) 400 mg/5 mL suspension 30 mL, 30 mL, oral, 2x daily PRN, Laure Bishop MD, 30 mL at 02/20/21 0907  •  magnesium oxide (MAG-OX) tablet 400 mg, 400 mg, oral, BID, Laure Bishop MD, 400 mg at 02/20/21 0904  •  melatonin ODT 3 mg, 3 mg, oral, Nightly, Laure Bishop MD, 3 mg at 02/19/21 2133  •  metoprolol tartrate (LOPRESSOR) split tablet 12.5 mg, 12.5 mg, oral, BID, Omrai Spain MD, 12.5 mg at  02/20/21 0904  •  mouth moisturizer (TOOTHETTE) cream, , mucous membrane, TID, Laure Bishop MD, Given at 02/20/21 1717  •  polyethylene glycol (MIRALAX) 17 gram packet 17 g, 17 g, oral, Daily PRN, Omari Spain MD, 17 g at 02/20/21 0907  •  rivaroxaban (XARELTO) tablet 10 mg, 10 mg, oral, Daily with dinner, Laure Bishop MD, 10 mg at 02/20/21 1720  •  senna (SENOKOT) tablet 3 tablet, 3 tablet, oral, Daily before lunch, Isael Lang MD, 3 tablet at 02/20/21 1132  •  sodium chloride tablet 1 g, 1 g, oral, BID with meals, Laure Bishop MD, 1 g at 02/20/21 1720       Labs / Radiology    Lab Results   Component Value Date    WBC 11.77 (H) 02/18/2021    HGB 10.0 (L) 02/18/2021    HCT 30.9 (L) 02/18/2021    MCV 95.4 02/18/2021     02/18/2021     Lab Results   Component Value Date    GLUCOSE 96 02/18/2021    CALCIUM 8.5 (L) 02/18/2021     (L) 02/18/2021    K 3.8 02/18/2021    CO2 26 02/18/2021    CL 94 (L) 02/18/2021    BUN 10 02/18/2021    CREATININE 0.6 (L) 02/18/2021       Assessment and Plan    ASSESSMENT PLAN:  1. 70-year-old right-handed white male with chronic conditions significant for coronary artery disease, hypertension, hyperlipidemia who was admitted to [facility] on 1/5/21 due to Covid 19 pneumonia and worsening respiratory status.  He required intubation on 1/9/21 and had ventilator-dependent respiratory failure.  He was treated with Decadron and Remdesivir.  He had dysphagia requiring Dobbhoff tube feeds.  He was extubated on 1/16/21 to OptiFlow.  On 1/23/21 he was transitioned to high flow nasal cannula.  Hospital course was significant for acute renal insufficiency likely secondary to acute tubular necrosis and he was followed by nephrology.  He had subsequent improvement in renal function.  He also had new fever of 102.8°F on 1/20/21 and 1/21/21.  He was followed by infectious disease.  Blood cultures were negative. CT scan of the chest abdomen pelvis was done with  results showing increasing pulmonary infiltrates/groundglass opacities and consolidations in his bilateral lower lobes.  He was treated empirically with Cefepime and Vancomycin to cover hospital-acquired pneumonia.  He was noted to be lethargic on 1/22/21.  Subsequently patient removed Dobbhoff tube on 1/25/21 and there is question of possible aspiration during that process.  He continued to have intermittent confusion on 1/26/21 was felt to be possible post ICU delirium.  He was followed by neurology.  MRI brain was unremarkable.  EEG showed mild slowing but no seizure activity was noted.  Antibiotics were discontinued after a 5 day course due to lethargy.  Repeat video swallow study was performed for dysphagia and patient was put on a soft diet with nectar thick liquids. On 2/3/21, hemoglobin was 11.2, WBC count 15.1, platelets were 275, BUN was 15, and creatinine was 0.7.  He has been needing assistance for mobility, transfers, self-care postoperatively. He is transferred to [facility] on 2/4/21 for further rehabilitation care.       2. DVT prophylaxis - on subcutaneous Heparin.  On SCDs.  Platelets 296 on 2/5/2021.  Platelets 389 on 2/11/2021.  Platelets 470 on 2/15/2021.     3.  Deconditioning - recent severe Covid 19 pneumonia, ventilator-dependent respiratory failure, dysphagia, multiple medical problems - He had ventilator-dependent respiratory failure.  He was treated with Decadron and Remdesivir.  Continue PT, OT, speech, psychology.  Follow falls precautions, cardiac precautions, aspiration precautions.     4. GI - On Colace, Senokot.  On PRN MiraLAX, MOM, Maalox.      5.  - voiding.  Denies dysuria.  Monitor post void residuals.     6.  Pulmonary - recent severe Covid 19 pneumonia, ventilator-dependent respiratory failure - on Ventolin HFA.  On Mucinex.     7. Pain - on PRN Tylenol.  On scheduled Tylenol for left wrist pain.  On topical Voltaren gel.  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  K pad as needed for pain.     8. Hematology -hemoglobin 12.0, WBC 8.15 on 2/5/2021.  Hemoglobin 10.7, WBC 8.05 on 2/11/2021.  Hemoglobin 12.0, WBC 11.56 on 2/15/2021.     9. CVS - history of coronary artery disease and hypertension.  On Norvasc.  On Lopressor.  On Aspirin.  On Lasix.  On Magnesium oxide.     10.  Hyperlipidemia - on Lopid.     11.  Insomnia - on Melatonin.     12.  Dysphagia -on soft diet with nectar thick liquids.  Free water protocol with ice chips and sips of water per speech therapy.  Speech therapy and nutrition following.     13. Rehabilitation medicine - Continue comprehensive rehabilitation care. Continue PT, OT, speech, psychology.  We will follow falls precautions, cardiac precautions, monitor pulse oximetry in therapy and follow aspiration precautions.  Tolerating therapies per endurance.  Discussed with speech therapy.  Free water protocol ice chips was started.  Denies coughing with meals.He reported some pain in left wrist.  He is not sure if he accidentally banged the left wrist against the side rail of the bed.  Denies history of gout.  Left wrist does not appear warm or tender to touch.  K pad ordered.  We will also order scheduled Tylenol for 7 days.  Discussed results of wrist x-rays with patient which showed moderate degenerative change particularly about the radial aspect of the wrist.  He reports pain is less today.  He ambulated 25 feet with rolling walker with assistance of 2 people with physical therapy.Free water protocol with ice chips and sips of water per speech therapy.  He feels better today.  Left wrist pain is decreased significantly per patient.  Working on endurance with physical therapy.  He required minimal to moderate assistance for transfers.  Working on ADLs with occupational therapy.He feels better today.  Denies increased pain.  Discussed with him about doing incentive spirometry.  We will try to wean off oxygen if possible.Trying to  wean off oxygen as possible.  Tolerating therapies per endurance.  He reports decrease sleep at night.  Usually takes 2 Tylenol PM at night.  He is already on scheduled Tylenol.  Will order 50 mg of Benadryl at bedtime. He reports he slept well with Benadryl last night.  Tolerating therapies per endurance.  Denies left wrist pain today.Video swallow was done today.  Mild oral and pharyngeal dysphagia noted.  Transient penetration of thin liquids is noted.  Speech therapy working with dysphagia.  Tolerating upgraded diet after video swallow.  Noted ENT input regarding his voice.  Vocal cords moving normally per ENT.  Discussed with patient.Tolerating regular thins diet.  Discussed ENT input with him.  He requested podiatric consult which was ordered.He was seen by podiatry earlier today.  Left foot was injected.  He reports left foot feels much better now.     14. Reviewed labs today.  BUN 15, creatinine 0.6 on 2/5/2021.  BUN 16, creatinine 0.7 on 2/11/2021.  BUN 12, creatinine 0.8 on 2/15/2021.               Isael Lang MD  2/20/2021

## 2021-02-21 NOTE — PROGRESS NOTES
[facility] Progress Note          Patient was seen and examined at bedside.    Subjective:    Stable, no O/N events, no new complaints.  VFSS 2/17/2021:  ORAL PHASE: Mild oral phase dysphagia;PHARYNGEAL PHASE: Mild pharyngeal phase dysphagia;ESOPHAGEAL PHASE: Limited assessment of esophagus, however, appears grossly WFL with adequate and timely opening of the UES.  SPEECH PATHOLOGY RECOMMENDATIONS:  1.  Recommend diet advancement to thin liquids with soft solids.  2.  Recommend meal trial of regular solids prior to solid diet advancement.  Check labs in am      Objective   Vital signs in last 24 hours:  Temp:  [36.3 °C (97.3 °F)-36.6 °C (97.8 °F)] 36.3 °C (97.3 °F)  Heart Rate:  [72-74] 74  Resp:  [16-20] 20  BP: (103-114)/(54-60) 103/59    No intake or output data in the 24 hours ending 02/21/21 0932  Intake/Output this shift:  No intake/output data recorded.   Review of Systems:  All other systems reviewed and negative except as noted in the HPI.   Objective      Labs  reviewed his labs thoroughly   Lab Results   Component Value Date    WBC 11.77 (H) 02/18/2021    HGB 10.0 (L) 02/18/2021    HCT 30.9 (L) 02/18/2021    MCV 95.4 02/18/2021     02/18/2021     Lab Results   Component Value Date    GLUCOSE 96 02/18/2021    CALCIUM 8.5 (L) 02/18/2021     (L) 02/18/2021    K 3.8 02/18/2021    CO2 26 02/18/2021    CL 94 (L) 02/18/2021    BUN 10 02/18/2021    CREATININE 0.6 (L) 02/18/2021       Imaging  OSH imaging study reports reviewed:    CXR 2/5/2021:  IMPRESSION:  Bilateral airspace opacities most prominent in the left upper lobe and left  lower lobe likely representing COVID19 pneumonia.         Full Code    Physical Exam:  Head/Ear/Nose/Throat: normocephalic; atraumatic; moisture mouth mm, no oropharyngeal thrush noted.   Eyes: anicteric sclera, EOMI; PERRL.   Neck : supple, no JVD, no carotid bruits appeciated.   Respiratory: no evidence of labored breathing, lung sounds a  little coarse, mild bibasilar diminished BS, no remarkable w/r/c.   Cardiovascular: RRR; normal S1, S2; no m/r/g; no S3 or S4.   Gastrointestinal: soft; NT; BS normal; mildly distended; no CVAT b/l.   Genitourinary: no lim.   Extremities : no c/c/e .   Neurological: AO x 3, fluent speeches, following commands, CNS II-XII grossly intact; no focal neurologic deficits.   Behavior/Emotional: in NAD, appropriate; cooperative.   Skin: clean, dry and intact.     Plan of care was discussed with patient, RN, and PMR attending.     Assessment     CC: S/p severe covid-19 PNA complicated by VDRF, dysphagia, ERNIE, bacterial PNA, and  hospital delirium, physical deconditioning with ADL and ambulatory dysfunction.       70 y.o. male, I PTA, with PMH of HTN, HLD, and CAD, admitted to [facility] on 1/5/2021 for severeCOVID-19 PNA. Worsening resp status intubated on 1/9- extubated 1/16 to OptiFlow. Transition to [facility]NC on 1/23.Eval by nephrology while in ICU due to ERNIE likely secondary to ATN. Creatinine has been improving with excellent urine output. Received Decadron and Remdesivir. Dobhoff tube was placed due to dysphagia. New fever of 102.8 on 1/20 and 1/21. ID was consulted. Bld cx's negative. CT scan of the chest abdomen pelvis was done with results showing increasing pulmonary infiltrates/groundglass opacities and consolidations in his bilateral lower lobes. Started empirically on cefepime and vancomycin to cover hospital-acquired pneumonia. Pt had increased lethargy on 1/22. Pt removed DHT 1/25; required higher O2 afterward - possible aspiration during the process. 1/26 patient continued to have intermittent confusion; ?post ICU delirium. Neurology is following, MRI brain was unremarkable. EEG showed mild slowing but no seizure activity. D/c'd abx after 5 days d/t lethargy. Repeat VFSS performed- now on soft nectar thick liquid diet .  Pt is AA&Ox3 with periods of confusion. Tolerates soft diet with NTL, voiding in urinal, O2 @ 2-3L  via nc. Participating in therapies with marked improvement, functionally, he has residual ADL and ambulatory dysfunction, rec's for acute rehab prior to home with SO. Transferred to [facility] on 2/4/2021.      1.S/p severe covid-19 PNA complicated by VDRF, dysphagia, ERNIE, bacterial PNA, and  hospital delirium, physical deconditioning with ADL and ambulatory dysfunction : inpt acute rehab, gait and balancing training, SLP therapy, nutrition support,  Fall/aspiration precaution, medical management, DVT prophylaxis, dermal defense, f/u with PCP after inpt rehab.     2. Pulm-S/p severe covid-19 PNA complicated by VDRF, s/p possible bacteria PNA,  hypoxemia with mild exertion, atelectasis: monitor SO2, prn NC O2, keep SO2>92%, incentive spirometry, bronchodilator inhaler, mucolytic agent, pulm toileting. Check CXR.      3. Psych-s/p hospital acute delirium, intermittent confusion: his metal status has much improved, monitor his mood and mentation, help his orientation, psychology CBT, avoid unnecessary sedatives, SW support.      4. GI-on soft nectar thick liquid diet, constipation:SLP evaluation to advance his diet as tolerated; provide constipation bowel regimen, keep adequate hydration, timed toilet visits.     5. DVT prophy: SCD, early ambulation, SQ heparin, check LE venous DUS to r/o DVT.       6. HTN, dyslipidemia: cont home HTN meds with holding parameter, orthostatic hypotension precaution, monitor BP . On gemfibrozil, f/u with his PCP.      Mixed significant dyslipidemia: LCL-C 185, HDL-C 30, , h/o HTN, HLD, and CAD, only gemfibrozil is far from adequate, labeled Lipitor allergy, still worth retrial with hydrophilic formula Crestor from low dose, which is absolutely necessary and also guideline recommended    7. Renal-s/p ERNIE recovered, electrolytes imbalance: monitor renal function and volume status, avoid nephrotoxic agents and low BP,  monitor and keep electrolytes balance.     8. - urinary retention:  timed voiding trial, monitor PVR with prn cic, check UA/UCX.     Billing code: [id number]  Diagnoses:  Patient Active Problem List   Diagnosis   • Acute metabolic encephalopathy   • ARDS (adult respiratory distress syndrome) (CMS/HCC)   • Coronary artery disease involving native coronary artery of native heart without angina pectoris   • Hypoxia   • Pneumonia due to COVID-19 virus   • Transaminitis   • Essential hypertension   • Dyslipidemia   • Atelectasis   • Physical deconditioning   • Acute cystitis        Laure Bishop MD  2/21/2021

## 2021-02-21 NOTE — CONSULTS
Denis Green  [id number]  2/21/2021    PODIATRY CONSULT    Pneumonia due to COVID-19 virus [U07.1, J12.82]  Physical deconditioning [R53.81]    Reason for referral: Left foot pain.    HPI : 70-year-old right-handed white male with chronic conditions significant for coronary artery disease, hypertension, hyperlipidemia who was admitted to [place] on 1/5/21 due to Covid 19 pneumonia and worsening respiratory status.  He required intubation on 1/9/21 and had ventilator-dependent respiratory failure.  He was treated with Decadron and Remdesivir.  He had dysphagia requiring Dobbhoff tube feeds.  He was extubated on 1/16/21 to OptiFlow.  On 1/23/21 he was transitioned to high flow nasal cannula.  Hospital course was significant for acute renal insufficiency likely secondary to acute tubular necrosis and he was followed by nephrology.  He had subsequent improvement in renal function.  He also had new fever of 102.8°F on 1/20/21 and 1/21/21.  He was followed by infectious disease.  Blood cultures were negative. CT scan of the chest abdomen pelvis was done with results showing increasing pulmonary infiltrates/groundglass opacities and consolidations in his bilateral lower lobes.  He was treated empirically with Cefepime and Vancomycin to cover hospital-acquired pneumonia.  He was noted to be lethargic on 1/22/21.  Subsequently patient removed Dobbhoff tube on 1/25/21 and there is question of possible aspiration during that process.  He continued to have intermittent confusion on 1/26/21 was felt to be possible post ICU delirium.  He was followed by neurology.  MRI brain was unremarkable.  EEG showed mild slowing but no seizure activity was noted.  Antibiotics were discontinued after a 5 day course due to lethargy.  Repeat video swallow study was performed for dysphagia and patient was put on a soft diet with nectar thick liquids.  He has been needing assistance for mobility, transfers, self-care  postoperatively.  He is transferred to [facility] on 2/4/21 for further rehabilitation care.          Subjective:  Patient was referred by Dr. Lang for metatarsalgia left foot. Patient is complaining of left foot pain x few days.  No history of trauma.          Allergies:   Allergies   Allergen Reactions   • Atorvastatin      Other reaction(s): Unknown Reaction         Allergies List Reviewed:  yes      Medications:   Current Facility-Administered Medications:   •  acetaminophen (TYLENOL) tablet 650 mg, 650 mg, oral, q4h PRN, Omari Spain MD, 650 mg at 02/17/21 1433  •  albuterol HFA (VENTOLIN HFA) 90 mcg/actuation inhaler 2 puff, 2 puff, inhalation, q4h PRN, Laure Bishop MD  •  alum-mag hydroxide-simeth (MAALOX) 200-200-20 mg/5 mL suspension 30 mL, 30 mL, oral, q6h PRN, Laure Bishop MD  •  amLODIPine (NORVASC) tablet 2.5 mg, 2.5 mg, oral, Nightly, Laure Bishop MD, 2.5 mg at 02/17/21 2056  •  aspirin enteric coated tablet 81 mg, 81 mg, oral, Daily, Omari Spain MD, 81 mg at 02/21/21 0910  •  bisacodyL (DULCOLAX) 10 mg suppository 10 mg, 10 mg, rectal, Daily PRN, Isael Lang MD, 10 mg at 02/19/21 2000  •  cefpodoxime (VANTIN) tablet 200 mg, 200 mg, oral, BID, Laure Bishop MD, 200 mg at 02/21/21 0910  •  diphenhydrAMINE (BENADRYL) capsule 50 mg, 50 mg, oral, Nightly, Isael Lang MD, 50 mg at 02/20/21 2124  •  docusate sodium (COLACE) capsule 100 mg, 100 mg, oral, TID, Isael Lang MD, 100 mg at 02/21/21 0909  •  furosemide (LASIX) tablet 20 mg, 20 mg, oral, BID (am, 4p), Laure Bishop MD, 20 mg at 02/21/21 0909  •  gemfibroziL (LOPID) tablet 600 mg, 600 mg, oral, BID AC, Omari Spain MD, 600 mg at 02/21/21 0744  •  guaiFENesin (MUCINEX) 12 hr ER tablet 600 mg, 600 mg, oral, BID, Laure Bishop MD, 600 mg at 02/21/21 0909  •  lidocaine (ASPERCREME) 4 % topical patch 1 patch, 1 patch, Topical, Daily, Galileo Samuel DPM, 1 patch at 02/21/21 0909  •  magnesium  hydroxide (M.O.M.) 400 mg/5 mL suspension 30 mL, 30 mL, oral, 2x daily PRN, Laure Bishop MD, 30 mL at 02/20/21 0907  •  magnesium oxide (MAG-OX) tablet 400 mg, 400 mg, oral, BID, Laure Bishop MD, 400 mg at 02/21/21 0909  •  melatonin ODT 3 mg, 3 mg, oral, Nightly, Laure Bishop MD, 3 mg at 02/20/21 2127  •  metoprolol tartrate (LOPRESSOR) split tablet 12.5 mg, 12.5 mg, oral, BID, Omari Spain MD, 12.5 mg at 02/21/21 0908  •  mouth moisturizer (TOOTHETTE) cream, , mucous membrane, TID, Laure Bishop MD, Given at 02/21/21 0908  •  polyethylene glycol (MIRALAX) 17 gram packet 17 g, 17 g, oral, Daily PRN, Omari Spain MD, 17 g at 02/20/21 0907  •  rivaroxaban (XARELTO) tablet 10 mg, 10 mg, oral, Daily with dinner, Laure Bishop MD, 10 mg at 02/20/21 1720  •  senna (SENOKOT) tablet 3 tablet, 3 tablet, oral, Daily before lunch, Isael Lang MD, 3 tablet at 02/20/21 1132  •  sodium chloride tablet 1 g, 1 g, oral, BID with meals, Laure Bishop MD, 1 g at 02/21/21 0908      Home Medications Reconcile: yes      Current Medication orders reviewed: yes      Medical History:    Active Ambulatory Problems     Diagnosis Date Noted   • Acute metabolic encephalopathy 01/26/2021   • ARDS (adult respiratory distress syndrome) (CMS/[facility]) 01/16/2021   • Coronary artery disease involving native coronary artery of native heart without angina pectoris 02/18/2013   • Hypoxia 01/05/2021   • Pneumonia due to COVID-19 virus 01/16/2021   • Transaminitis 01/05/2021     Resolved Ambulatory Problems     Diagnosis Date Noted   • No Resolved Ambulatory Problems     Past Medical History:   Diagnosis Date   • Coronary artery disease    • Hypertension    • Lipid disorder        Social History     Socioeconomic History   • Marital status:      Spouse name: Dana   • Number of children: 2   • Years of education: 12   • Highest education level: High school graduate   Occupational History   • Occupation: Owned pet stores/farmer    Social Needs   • Financial resource strain: Not on file   • Food insecurity     Worry: Not on file     Inability: Not on file   • Transportation needs     Medical: Not on file     Non-medical: Not on file   Tobacco Use   • Smoking status: Not on file   Substance and Sexual Activity   • Alcohol use: Not on file   • Drug use: Not on file   • Sexual activity: Not on file   Lifestyle   • Physical activity     Days per week: Not on file     Minutes per session: Not on file   • Stress: Not on file   Relationships   • Social connections     Talks on phone: Not on file     Gets together: Not on file     Attends Islam service: Not on file     Active member of club or organization: Not on file     Attends meetings of clubs or organizations: Not on file     Relationship status: Not on file   • Intimate partner violence     Fear of current or ex partner: Not on file     Emotionally abused: Not on file     Physically abused: Not on file     Forced sexual activity: Not on file   Other Topics Concern   • Not on file   Social History Narrative    He reports he lives with his wife.  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  He reports he has 2 children who live nearby.  He reports prior to admission he was independent in everything including driving.  He denies previous psychiatric history.  He denies the use of substances       No past surgical history on file.      Review of Systems - Negative except noted above.                Physical Exam: palpable DP  palpable PT bilateral feet.  Decreased texture, temperature, and turgor bilateral feet.  Decreased digital hair bilateral feet.   Edema bilateral lower extremity Left > right.  Xerotic skin bilateral feet.  No open lesions bilateral.  Elongated, thickened, yellow nails with subungual debris  <6.  Tenderness with palpation of affected toenails.   Mild digital contracture noted toe 2-5 bilateral.   Mild hallux valgus deformity bilateral.  Epicritic sensation  intact bilateral feet.  Left foot dorsal to MC joint with tenderness over EHL and deep peroneal nerve.  No crepitus.  No fluctuance.  No open lesions.        Lab Results   Component Value Date    WBC 11.77 (H) 02/18/2021    HGB 10.0 (L) 02/18/2021    HCT 30.9 (L) 02/18/2021    MCV 95.4 02/18/2021     02/18/2021       Lab Results   Component Value Date    GLUCOSE 96 02/18/2021    CALCIUM 8.5 (L) 02/18/2021     (L) 02/18/2021    K 3.8 02/18/2021    CO2 26 02/18/2021    CL 94 (L) 02/18/2021    BUN 10 02/18/2021    CREATININE 0.6 (L) 02/18/2021       X-ray Chest 2 Views    Result Date: 2/5/2021  IMPRESSION: Bilateral airspace opacities most prominent in the left upper lobe and left lower lobe likely representing COVID19 pneumonia.     X-ray Wrist Left 2 Views    Result Date: 2/8/2021  IMPRESSION: Moderate degenerative change particularly about the radial aspect of the wrist. COMMENT: Two views of the left wrist were performed. We have no prior studies for comparison. No fracture or dislocation is identified. There is moderate joint space narrowing, subchondral sclerosis, and spur formation about the radial aspect of the wrist, particularly at the articulation of the navicular and the trapezium/trapezoid.  There are also mild degenerative changes at the trapezium-first metacarpal joint and lunotriquetral joint.  There may also be mild degenerative spurring at least at the third metacarpophalangeal joint, incompletely imaged on this study. No soft tissue calcifications are seen about the wrist.    Fluoroscopy Video Swallow With Speech    Result Date: 2/17/2021  IMPRESSION: 1.  Mild oral phase dysphagia characterized by mildly delayed A-P transfer and posterior loss of thin liquids. 2.  Mild pharyngeal phase dysphagia characterized by delayed swallow initiation of thin liquids, consistent transient penetration of thin liquids, and mild pharyngeal residue of thin liquids and soft solids. No aspiration present  during this study. Susu Mandujano M.S., CCC-SLP and Lelo Valentin M.S CCC-SLP were present for this examination. Dr. Laura present as overseeing radiologist. The undersigned radiologist agrees only with the radiographic findings. Specific swallowing recommendations are solely the purview of the Speech Pathology Division.       Pathology Results     ** No results found for the last 720 hours. **        Microbiology Results     Procedure Component Value Units Date/Time    Urine culture Urine, Clean Catch [[id number]]  (Abnormal)  (Susceptibility) Collected: 02/15/21 1857    Specimen: Urine, Clean Catch Updated: 02/18/21 0828     Urine Culture **Positive Culture**      >1 x 10^5 CFU/mL Klebsiella (Enterobacter) aerogenes    Urine culture Urine, Clean Catch [[id number]]  (Normal) Collected: 02/04/21 2221    Specimen: Urine, Clean Catch Updated: 02/06/21 1349     Urine Culture No Growth (Limit of detection 1000 cfu/mL)          Temp:  [36.3 °C (97.3 °F)-36.6 °C (97.8 °F)] 36.3 °C (97.3 °F)  Heart Rate:  [72-74] 74  Resp:  [16-20] 20  BP: (103-114)/(54-60) 103/59      Pertinent radiology and labs reviewed      Impression: 70-year-old right-handed white male with chronic conditions significant for coronary artery disease, hypertension, hyperlipidemia admitted for rehab now with onychomycosis, arthralgia and neuritis left foot M-C joint.    Plan: Nails debrided >6.  Following sterile preparation injection of 2mg dexamethasone and 1/4 cc lidocaine left foot.  Check xray.  Lidoderm patch.  Follow-up evaluation 3-5 days.            Galileo Samuel DPM    2/21/2021      11:18 AM

## 2021-02-21 NOTE — PROGRESS NOTES
Patient: Denis Green  Location: [facility] [address]  MRN: [id number]  Today's date: 2/21/2021    History of Present Illness  Denis LOVE is a 70 y.o. male admitted on 2/4/2021 with Pneumonia due to COVID-19 virus. Principal problem is No Principal Problem: There is no principal problem currently on the Problem List. Please update the Problem List and refresh..   Pt admitted to outside hospital on 1/5/2021 for COVID-19 PNA. Worsening resp status intubated on 1/9- extubated 1/16 to OptiFlow. Transition to [facility]NC on 1/23.Eval by nephrology while in ICU due to ERNIE likely secondary to ATN. Creatinine has been improving with excellent urine output. Received Decadron and Remdesivir. Dobhoff tube was placed due to dysphagia. New fever of 102.8 1/20 and 1/21. ID was consulted. Bld cx's negative. CT scan of the chest abdomen pelvis was done with results showing increasing pulmonary infiltrates/groundglass opacities and consolidations in his bilateral lower lobes. Started empirically on cefepime and vancomycin to cover hospital-acquired pneumonia. Pt had increased lethargy on 1/22. Pt removed DHT 1/25; required higher O2 afterward - possible aspiration during the process. 1/26 patient continued to have intermittent confusion; ?post ICU delirium. Neurology is following, MRI brain was unremarkable. EEG showed mild slowing but no seizure activity. D/c'd abx after 5 days d/t lethargy.       Past Medical History  Denis LOVE has a past medical history of Coronary artery disease, Hypertension, and Lipid disorder.    OT Vitals    Date/Time Pulse BP BP Location BP Method Pt Position [facility]   02/21/21 1001 77 108/55 Left upper arm Automatic Sitting VSS      OT Pain    Date/Time Pain Type Rating: Rest Rating: Activity [facility]   02/21/21 1001 Pain Assessment 0 - no pain 0 - no pain VSS          Prior Living Environment      Most Recent Value   Lives With  spouse, child(mindy), adult [adult son lives in home occasionally]  "  Living Arrangements  house   Home Accessibility  stairs to enter home (Group), stairs within home (Group)   Living Environment Comment  Lives with wife and intermittently with one son.  [bed and bath on 2nd ,  no powder room on first]   Number of Stairs, Main Entrance  1   Stair Railings, Main Entrance  none   Location, Patient Bedroom  second floor, must negotiate stairs to access   Location, Bathroom  second floor, must negotiate stairs to access   Bathroom Access Comment  Pt. reports has tub shower and stall shower, uses tub shower primarily, has grab bar in shower, has stanard height toilet c wall on both sides   Number of Stairs, Within Home, Primary  12   Surface of Stairs, Within Home, Primary  hardwood   Stair Railings, Within Home, Primary  railings on both sides of stairs          Prior Level of Function      Most Recent Value   Dominant Hand  right   Ambulation  independent   Transferring  independent   Toileting  independent   Bathing  independent   Dressing  independent   Eating  independent   Communication  understands/communicates without difficulty   Swallowing  swallows foods/liquids without difficulty   Baseline Diet/Method of Nutritional Intake  regular solids, thin liquids   Past History of Dysphagia  No previous reports    Prior Level of Function Comment  Pt is a cattle/,  previously IND for all IADLs.    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         Sit to Stand Transfer    [place], Sit to Stand Transfer  close supervision     Assistive Device  walker, front-wheeled     Comment  w/c - RW         Stand to Sit Transfer    [place], Stand to Sit Transfer  close supervision     Assistive Device  walker, front-wheeled     Comment  RW to w/c         Stand Pivot Transfer    [place], Stand Pivot/Stand Step Transfer  close supervision     Assistive Device  walker, front-wheeled     Comment  ambulatory approach w/c - chair         Safety Issues, Functional Mobility    Comment, Safety Issues/Impairments (Mobility)  OT: Functional mobility with RW and close supervision.  occas cues for posture and walker safety.         Upper Extremity (Therapeutic Exercise)    Exercise Position/Type (UE Therapeutic Exercise)  standing;AROM (active range of motion)     Range of Motion Exercises (Upper Extremity Therapeutic Exercise)  bilateral     Comment (UE Therapeutic Exercise)  UBE at standing level x 2 minutes x 2 with rest periods.        Daily Progress Summary (OT)    Daily Outcome Statement (OT)  Session focused on functional ambulation, standing tolerance/balance, transfer training, and endurance building.  Pt's pulse oximetry averaged around 92-95% on RA with activity.  Pt required rest periods throughout session but no c/o dizziness noted.                              IRF OT Goals      Most Recent Value   Transfer Goal 1   Activity/Assistive Device  toilet at 02/15/2021 1308   [place]  supervision required at 02/15/2021 1308   Time Frame  short-term goal (STG), 5 - 7 days at 02/15/2021 1308   Strategies/Barriers  DME at 02/15/2021 1308   Progress/Outcome  good progress toward goal, goal partially met, goal revised this date at 02/15/2021 1308   Transfer Goal 2   Activity/Assistive Device  toilet at  02/10/2021 1632   [place]  supervision required, set-up required at 02/10/2021 1632   Time Frame  long-term goal (LTG), 3 weeks at 02/10/2021 1632   Strategies/Barriers  DME at 02/10/2021 1632   Transfer Goal 3   Activity/Assistive Device  shower at 02/15/2021 1308   [place]  supervision required at 02/15/2021 1308   Time Frame  short-term goal (STG), 5 - 7 days at 02/15/2021 1308   Strategies/Barriers  DME at 02/15/2021 1308   Progress/Outcome  good progress toward goal, goal partially met, goal revised this date at 02/15/2021 1308   Transfer Goal 4   Activity/Assistive Device  shower at 02/10/2021 1632   [place]  supervision required, set-up required at 02/10/2021 1632   Time Frame  long-term goal (LTG), 3 weeks at 02/10/2021 1632   Strategies/Barriers  DME at 02/10/2021 1632   Bathing Goal 1   Activity/Assistive Device  bathing skills, all at 02/15/2021 1308   [place]  modified independence at 02/15/2021 1308   Time Frame  short-term goal (STG), 5 - 7 days at 02/15/2021 1308   Strategies/Barriers  DME at 02/15/2021 1308   Progress/Outcome  good progress toward goal, goal partially met, goal revised this date at 02/15/2021 1308   Bathing Goal 2   Activity/Assistive Device  bathing skills, all at 02/05/2021 0726   [place]  supervision required at 02/05/2021 0726   Time Frame  long-term goal (LTG), 3 weeks at 02/05/2021 0726   Strategies/Barriers  DME TBD at 02/05/2021 0726   UB Dressing Goal 1   Activity/Assistive Device  upper body dressing at 02/15/2021 1308   [place]  modified independence at 02/15/2021 1308   Time Frame  short-term goal (STG), 5 - 7 days at 02/15/2021 1308   Strategies/Barriers  including set up at 02/15/2021 1308   Progress/Outcome  good progress toward goal, goal partially met, goal revised this date at 02/15/2021 1308   UB Dressing Goal 2   Activity/Assistive Device  upper body dressing at 02/05/2021 0726   [place]  modified independence at 02/05/2021 0726    Time Frame  long-term goal (LTG), 3 weeks at 02/05/2021 0726   LB Dressing Goal 1   Activity/Assistive Device  lower body dressing at 02/15/2021 1308   [place]  modified independence at 02/15/2021 1308   Time Frame  short-term goal (STG), 5 - 7 days at 02/15/2021 1308   Strategies/Barriers  AD including clothing retrieval at 02/15/2021 1308   Progress/Outcome  good progress toward goal, goal partially met, goal revised this date at 02/10/2021 1632   LB Dressing Goal 2   Activity/Assistive Device  lower body dressing at 02/05/2021 0726   [place]  supervision required at 02/05/2021 0726   Time Frame  long-term goal (LTG), 3 weeks at 02/05/2021 0726   Grooming Goal 1   Activity/Assistive Device  grooming skills, all at 02/15/2021 1308   [place]  modified independence at 02/15/2021 1308   Time Frame  short-term goal (STG), 5 - 7 days at 02/15/2021 1308   Strategies/Barriers  standing @ sink at 02/15/2021 1308   Progress/Outcome  good progress toward goal, goal partially met, goal revised this date at 02/15/2021 1308   Grooming Goal 2   Activity/Assistive Device  grooming skills, all at 02/05/2021 0726   [place]  modified independence at 02/05/2021 0726   Time Frame  long-term goal (LTG), 3 weeks at 02/05/2021 0726   Toileting Goal 1   Activity/Assistive Device  toileting skills, all at 02/15/2021 1308   [place]  supervision required at 02/15/2021 1308   Time Frame  short-term goal (STG), 5 - 7 days at 02/15/2021 1308   Strategies/Barriers  DME at 02/15/2021 1308   Progress/Outcome  good progress toward goal, goal partially met, goal revised this date at 02/15/2021 1308   Toileting Goal 2   Activity/Assistive Device  toileting skills, all at 02/10/2021 1632   [place]  supervision required at 02/10/2021 1632   Time Frame  long-term goal (LTG), 3 weeks at 02/10/2021 1632   Strategies/Barriers  DME at 02/10/2021 1632   Progress/Outcome  goal revised this date at 02/10/2021 0482

## 2021-02-21 NOTE — PROGRESS NOTES
Patient: Denis Green  Location: [facility] [address]  MRN: [id number]  Today's date: 2/21/2021    Hospital Course  History of Present Illness  Denis LOVE is a 70 y.o. male admitted on 2/4/2021 with Pneumonia due to COVID-19 virus. Principal problem is No Principal Problem: There is no principal problem currently on the Problem List. Please update the Problem List and refresh..   Pt admitted to outside hospital on 1/5/2021 for COVID-19 PNA. Worsening resp status intubated on 1/9- extubated 1/16 to OptiFlow. Transition to [facility]NC on 1/23.Eval by nephrology while in ICU due to ERNIE likely secondary to ATN. Creatinine has been improving with excellent urine output. Received Decadron and Remdesivir. Dobhoff tube was placed due to dysphagia. New fever of 102.8 1/20 and 1/21. ID was consulted. Bld cx's negative. CT scan of the chest abdomen pelvis was done with results showing increasing pulmonary infiltrates/groundglass opacities and consolidations in his bilateral lower lobes. Started empirically on cefepime and vancomycin to cover hospital-acquired pneumonia. Pt had increased lethargy on 1/22. Pt removed DHT 1/25; required higher O2 afterward - possible aspiration during the process. 1/26 patient continued to have intermittent confusion; ?post ICU delirium. Neurology is following, MRI brain was unremarkable. EEG showed mild slowing but no seizure activity. D/c'd abx after 5 days d/t lethargy.       Past Medical History  Denis LOVE has a past medical history of Coronary artery disease, Hypertension, and Lipid disorder.        Prior Living Environment      Most Recent Value   Lives With  spouse, child(mindy), adult [adult son lives in home occasionally]   Living Arrangements  house   Home Accessibility  stairs to enter home (Group), stairs within home (Group)   Living Environment Comment  Lives with wife and intermittently with one son.  [bed and bath on 2nd ,  no powder room on first]   Number of  Stairs, Main Entrance  1   Stair Railings, Main Entrance  none   Location, Patient Bedroom  second floor, must negotiate stairs to access   Location, Bathroom  second floor, must negotiate stairs to access   Bathroom Access Comment  Pt. reports has tub shower and stall shower, uses tub shower primarily, has grab bar in shower, has stanard height toilet c wall on both sides   Number of Stairs, Within Home, Primary  12   Surface of Stairs, Within Home, Primary  hardwood   Stair Railings, Within Home, Primary  railings on both sides of stairs          Prior Level of Function      Most Recent Value   Dominant Hand  right   Ambulation  independent   Transferring  independent   Toileting  independent   Bathing  independent   Dressing  independent   Eating  independent   Communication  understands/communicates without difficulty   Swallowing  swallows foods/liquids without difficulty   Baseline Diet/Method of Nutritional Intake  regular solids, thin liquids   Past History of Dysphagia  No previous reports    Prior Level of Function Comment  Pt is a cattle/,  previously IND for all IADLs.    Assistive Device/Animal Currently Used at Home  none          Recreational Therapy Evaluation and Treatment - 02/21/21 1030        Session Details    Document Type  daily treatment/progress note     Mode of Treatment  individual therapy;recreational therapy        Time Calculation    Start Time  1030     Stop Time  1100     Time Calculation (min)  30 min        General Information    Patient Profile Reviewed?  yes     General Observations of Patient  direct handoff from OT        Coping/Community Reintegration    Observed Emotional State  calm     Verbalized Emotional State  acceptance        Coping Strategies    Counseling (Coping Strategies)  active listening utilized;decision-making supported;positive reinforcement provided     Comment  brief discussion while amb regarding simple activities for his sons and wife to do at  "the house (card games, game nights). Verbalizing understand of leisure for health and QOL        Therapeutic Interventions    Comment, Therapeutic Intervention  RT: 1) amb short HHD to bathroom, good safety awareness in bathroom; amb in greenhouse on rough surface for 100', steadying assist, cues for upright posture and scapular retraction. 2) STS x2 CL S; standing palmar WB into LUE sorting \"phase ten\" cards by number in numerical order to focus on reaching, CL S standing for 6'        Rec Therapy Clinical Impression    Rehab Potential/Prognosis  good, to achieve stated therapy goals     Daily Outcome Statement  pt did well amb on rough surface in greenhouse while discussing importance of leisure for health and wellness                        Rec Care Plan Goals      Most Recent Value   Community Goal, Recreation   Recreation STG Activity: Community  Participate in simulated community integration to maximize on functional mobility and independence c min A and appropriate AD (at time of CI), in order to return to pre-morbid community involvement.   Recreation STG: Community  minimum assist (75% or less patient effort)   Recreation STG Date Established: Community  02/11/21   Recreation STG Duration: Community  10 days or less   Leisure Goal, Recreation   Recreation STG Activity: Leisure  Participate in chosen therapeutic activity incorporating strength and coordination in B LE's c min A and appropriate AD to promote independence and QOL at discharge.    Recreation STG: Leisure  minimum assist (75% or less patient effort)   Recreation STG Date Established: Leisure  02/11/21   Recreation STG Duration: Leisure  10 days or less   Additional Goal, Recreation   Recreation STG Activity: Additional Goal  Participate in pre-morbid leisure interests at least 2-3x/week to increase affect and coping mechanisms in hospital environment with global pandemic precautions in place.   Recreation STG: Additional  other (see comments) " [2-3x/wk]   Recreation STG Date Established: Additional  02/11/21   Recreation STG Duration: Additional  10 days or less

## 2021-02-22 ENCOUNTER — APPOINTMENT (INPATIENT)
Dept: RADIOLOGY | Facility: REHABILITATION | Age: 71
DRG: 948 | End: 2021-02-22
Attending: PODIATRIST
Payer: COMMERCIAL

## 2021-02-22 ENCOUNTER — APPOINTMENT (INPATIENT)
Dept: PHYSICAL THERAPY | Facility: REHABILITATION | Age: 71
DRG: 948 | End: 2021-02-22
Payer: COMMERCIAL

## 2021-02-22 ENCOUNTER — APPOINTMENT (OUTPATIENT)
Dept: PSYCHOLOGY | Facility: CLINIC | Age: 71
End: 2021-02-22
Attending: PHYSICAL MEDICINE & REHABILITATION
Payer: COMMERCIAL

## 2021-02-22 ENCOUNTER — APPOINTMENT (INPATIENT)
Dept: OCCUPATIONAL THERAPY | Facility: REHABILITATION | Age: 71
DRG: 948 | End: 2021-02-22
Payer: COMMERCIAL

## 2021-02-22 LAB
ANION GAP SERPL CALC-SCNC: 13 MEQ/L (ref 3–15)
BACTERIA #/AREA URNS HPF: ABNORMAL /HPF
BASOPHILS # BLD: 0.13 K/UL (ref 0.01–0.1)
BASOPHILS NFR BLD: 1.2 %
BILIRUB UR QL STRIP.AUTO: NEGATIVE MG/DL
BUN SERPL-MCNC: 15 MG/DL (ref 8–20)
CALCIUM SERPL-MCNC: 9.2 MG/DL (ref 8.9–10.3)
CHLORIDE SERPL-SCNC: 95 MEQ/L (ref 98–109)
CLARITY UR REFRACT.AUTO: ABNORMAL
CO2 SERPL-SCNC: 26 MEQ/L (ref 22–32)
COLOR UR AUTO: ABNORMAL
CREAT SERPL-MCNC: 0.7 MG/DL (ref 0.8–1.3)
DIFFERENTIAL METHOD BLD: ABNORMAL
EOSINOPHIL # BLD: 0.77 K/UL (ref 0.04–0.54)
EOSINOPHIL NFR BLD: 7.1 %
ERYTHROCYTE [DISTWIDTH] IN BLOOD BY AUTOMATED COUNT: 14.8 % (ref 11.6–14.4)
GFR SERPL CREATININE-BSD FRML MDRD: >60 ML/MIN/1.73M*2
GLUCOSE SERPL-MCNC: 104 MG/DL (ref 70–99)
GLUCOSE UR STRIP.AUTO-MCNC: NEGATIVE MG/DL
HCT VFR BLDCO AUTO: 32.7 % (ref 40.1–51)
HGB BLD-MCNC: 10.5 G/DL (ref 13.7–17.5)
HGB UR QL STRIP.AUTO: 3
HYALINE CASTS #/AREA URNS LPF: ABNORMAL /LPF
IMM GRANULOCYTES # BLD AUTO: 0.29 K/UL (ref 0–0.08)
IMM GRANULOCYTES NFR BLD AUTO: 2.7 %
KETONES UR STRIP.AUTO-MCNC: NEGATIVE MG/DL
LEUKOCYTE ESTERASE UR QL STRIP.AUTO: 3
LYMPHOCYTES # BLD: 2.24 K/UL (ref 1.2–3.5)
LYMPHOCYTES NFR BLD: 20.5 %
MCH RBC QN AUTO: 30.9 PG (ref 28–33.2)
MCHC RBC AUTO-ENTMCNC: 32.1 G/DL (ref 32.2–36.5)
MCV RBC AUTO: 96.2 FL (ref 83–98)
MONOCYTES # BLD: 1.23 K/UL (ref 0.3–1)
MONOCYTES NFR BLD: 11.3 %
NEUTROPHILS # BLD: 6.25 K/UL (ref 1.7–7)
NEUTS SEG NFR BLD: 57.2 %
NITRITE UR QL STRIP.AUTO: POSITIVE
NRBC BLD-RTO: 0 %
PDW BLD AUTO: 11 FL (ref 9.4–12.4)
PH UR STRIP.AUTO: 6.5 [PH]
PLATELET # BLD AUTO: 404 K/UL (ref 150–350)
POTASSIUM SERPL-SCNC: 4.2 MEQ/L (ref 3.6–5.1)
PROT UR QL STRIP.AUTO: 2
RBC # BLD AUTO: 3.4 M/UL (ref 4.5–5.8)
RBC #/AREA URNS HPF: ABNORMAL /HPF
SODIUM SERPL-SCNC: 134 MEQ/L (ref 136–144)
SP GR UR REFRACT.AUTO: 1.01
SQUAMOUS #/AREA URNS HPF: ABNORMAL /HPF
UROBILINOGEN UR STRIP-ACNC: 0.2 EU/DL
WBC # BLD AUTO: 10.91 K/UL (ref 3.8–10.5)
WBC #/AREA URNS HPF: ABNORMAL /HPF

## 2021-02-22 PROCEDURE — 97530 THERAPEUTIC ACTIVITIES: CPT | Mod: GO

## 2021-02-22 PROCEDURE — 97110 THERAPEUTIC EXERCISES: CPT | Mod: GO

## 2021-02-22 PROCEDURE — 73630 X-RAY EXAM OF FOOT: CPT | Mod: LT

## 2021-02-22 PROCEDURE — 80048 BASIC METABOLIC PNL TOTAL CA: CPT | Mod: 59 | Performed by: PHYSICAL MEDICINE & REHABILITATION

## 2021-02-22 PROCEDURE — 63700000 HC SELF-ADMINISTRABLE DRUG: Performed by: PODIATRIST

## 2021-02-22 PROCEDURE — 97116 GAIT TRAINING THERAPY: CPT | Mod: GP

## 2021-02-22 PROCEDURE — 63700000 HC SELF-ADMINISTRABLE DRUG: Performed by: SURGERY

## 2021-02-22 PROCEDURE — 97530 THERAPEUTIC ACTIVITIES: CPT | Mod: GP

## 2021-02-22 PROCEDURE — 63700000 HC SELF-ADMINISTRABLE DRUG: Performed by: INTERNAL MEDICINE

## 2021-02-22 PROCEDURE — 81003 URINALYSIS AUTO W/O SCOPE: CPT | Performed by: INTERNAL MEDICINE

## 2021-02-22 PROCEDURE — 97110 THERAPEUTIC EXERCISES: CPT | Mod: GP

## 2021-02-22 PROCEDURE — 90832 PSYTX W PT 30 MINUTES: CPT | Performed by: PSYCHOLOGIST

## 2021-02-22 PROCEDURE — 12800000 HC ROOM AND CARE SEMIPRIVATE REHAB

## 2021-02-22 PROCEDURE — 87086 URINE CULTURE/COLONY COUNT: CPT | Performed by: INTERNAL MEDICINE

## 2021-02-22 PROCEDURE — 36415 COLL VENOUS BLD VENIPUNCTURE: CPT | Performed by: PHYSICAL MEDICINE & REHABILITATION

## 2021-02-22 PROCEDURE — 85025 COMPLETE CBC W/AUTO DIFF WBC: CPT | Performed by: PHYSICAL MEDICINE & REHABILITATION

## 2021-02-22 PROCEDURE — 63700000 HC SELF-ADMINISTRABLE DRUG: Performed by: PHYSICAL MEDICINE & REHABILITATION

## 2021-02-22 PROCEDURE — 63700000 HC SELF-ADMINISTRABLE DRUG: Performed by: UROLOGY

## 2021-02-22 PROCEDURE — 97535 SELF CARE MNGMENT TRAINING: CPT | Mod: GO

## 2021-02-22 RX ORDER — TAMSULOSIN HYDROCHLORIDE 0.4 MG/1
0.4 CAPSULE ORAL DAILY
Status: DISCONTINUED | OUTPATIENT
Start: 2021-02-22 | End: 2021-02-24 | Stop reason: HOSPADM

## 2021-02-22 RX ORDER — CIPROFLOXACIN 250 MG/1
500 TABLET, FILM COATED ORAL EVERY 12 HOURS
Status: DISCONTINUED | OUTPATIENT
Start: 2021-02-22 | End: 2021-02-24 | Stop reason: HOSPADM

## 2021-02-22 RX ORDER — PHENAZOPYRIDINE HYDROCHLORIDE 95 MG/1
95 TABLET ORAL
Status: DISCONTINUED | OUTPATIENT
Start: 2021-02-22 | End: 2021-02-22

## 2021-02-22 RX ADMIN — GEMFIBROZIL 600 MG: 600 TABLET ORAL at 08:19

## 2021-02-22 RX ADMIN — RIVAROXABAN 10 MG: 10 TABLET, FILM COATED ORAL at 17:07

## 2021-02-22 RX ADMIN — AMLODIPINE BESYLATE 2.5 MG: 2.5 TABLET ORAL at 20:59

## 2021-02-22 RX ADMIN — METOPROLOL TARTRATE 12.5 MG: 25 TABLET, FILM COATED ORAL at 20:58

## 2021-02-22 RX ADMIN — PROBIOTIC PRODUCT - TAB 500 MG: TAB at 09:06

## 2021-02-22 RX ADMIN — Medication 3 MG: at 20:58

## 2021-02-22 RX ADMIN — GEMFIBROZIL 600 MG: 600 TABLET ORAL at 17:06

## 2021-02-22 RX ADMIN — SODIUM CHLORIDE 1 G: 1 TABLET ORAL at 08:19

## 2021-02-22 RX ADMIN — DOCUSATE SODIUM 100 MG: 100 CAPSULE, LIQUID FILLED ORAL at 08:19

## 2021-02-22 RX ADMIN — CIPROFLOXACIN 500 MG: 250 TABLET ORAL at 09:40

## 2021-02-22 RX ADMIN — SODIUM CHLORIDE 1 G: 1 TABLET ORAL at 17:07

## 2021-02-22 RX ADMIN — PHENAZOPYRIDINE HYDROCHLORIDE 95 MG: 95 TABLET ORAL at 17:07

## 2021-02-22 RX ADMIN — GUAIFENESIN 600 MG: 600 TABLET ORAL at 20:59

## 2021-02-22 RX ADMIN — PHENAZOPYRIDINE HYDROCHLORIDE 95 MG: 95 TABLET ORAL at 12:21

## 2021-02-22 RX ADMIN — PHENAZOPYRIDINE HYDROCHLORIDE 95 MG: 95 TABLET ORAL at 09:06

## 2021-02-22 RX ADMIN — LIDOCAINE 1 PATCH: 246 PATCH TOPICAL at 08:20

## 2021-02-22 RX ADMIN — FUROSEMIDE 20 MG: 20 TABLET ORAL at 17:07

## 2021-02-22 RX ADMIN — FUROSEMIDE 20 MG: 20 TABLET ORAL at 08:19

## 2021-02-22 RX ADMIN — TAMSULOSIN HYDROCHLORIDE 0.4 MG: 0.4 CAPSULE ORAL at 20:59

## 2021-02-22 RX ADMIN — Medication 400 MG: at 08:19

## 2021-02-22 RX ADMIN — ASPIRIN 81 MG: 81 TABLET ORAL at 08:19

## 2021-02-22 RX ADMIN — DOCUSATE SODIUM 100 MG: 100 CAPSULE, LIQUID FILLED ORAL at 17:06

## 2021-02-22 RX ADMIN — CIPROFLOXACIN 500 MG: 250 TABLET ORAL at 20:58

## 2021-02-22 RX ADMIN — DIPHENHYDRAMINE HYDROCHLORIDE 50 MG: 25 CAPSULE ORAL at 20:58

## 2021-02-22 RX ADMIN — METOPROLOL TARTRATE 12.5 MG: 25 TABLET, FILM COATED ORAL at 08:19

## 2021-02-22 RX ADMIN — PROBIOTIC PRODUCT - TAB 500 MG: TAB at 20:58

## 2021-02-22 RX ADMIN — Medication 400 MG: at 20:59

## 2021-02-22 RX ADMIN — GUAIFENESIN 600 MG: 600 TABLET ORAL at 08:19

## 2021-02-22 ASSESSMENT — COGNITIVE AND FUNCTIONAL STATUS - GENERAL
DELUSIONS: NONE OR AGE APPROPRIATE
EYE_CONTACT: WNL
SPEECH: REGULAR
APPETITE: NO CHANGE
ORIENTATION: FULLY ORIENTED
MOOD: HOPEFUL;MOTIVATED
CONCENTRATION: WNL
INSIGHT: INTACT
RECENT MEMORY: WNL
PSYCHOMOTOR FUNCTIONING: DECREASED
ATTENTION: WNL
EST. PREMORBID INTELLIGENCE: AVERAGE
AFFECT: FULL RANGE
THOUGHT_CONTENT: APPROPRIATE
APPEARANCE: WELL GROOMED
THOUGHT_PROCESS: WNL
AROUSAL LEVEL: ALERT
REMOTE MEMORY: WNL
IMPULSE CONTROL: INTACT
PERCEPTUAL FUNCTION: NORMAL
SLEEP_WAKE_CYCLE: ONSET PROBLEM;EARLY MORNING WAKING;DECREASED
LIBIDO: NON-CONTRIBUTORY

## 2021-02-22 ASSESSMENT — ENCOUNTER SYMPTOMS: NAUSEA: 1

## 2021-02-22 ASSESSMENT — BALANCE ASSESSMENTS: TOTAL SCORE: 30

## 2021-02-22 NOTE — PROGRESS NOTES
02/22/21 1200   Discharge Needs Assessment (IRF)   Concerns to be Addressed discharge planning   Patient/Family Anticipates Transition to home with help/services;home with family   Patient/Family Anticipated Services at Transition durable medical equipment;home health care   Transportation Concerns car, none   Transportation Anticipated family or friend will provide   Assistive Device/Animal Currently Used at Home walker, front-wheeled   Anticipated Changes Related to Illness inability to work   Anticipated Discharge Disposition home with home health services   Offered/Gave Vendor List yes   [facility]   Current Discharge Risk physical impairment   What day is the transport expected? 02/24/21   What time is the transport expected? 1200   Final Note   Final Note   (referral faxed to Kaylyn)

## 2021-02-22 NOTE — PROGRESS NOTES
Patient: Denis Green  Location: [facility] [address]  MRN: [id number]  Today's date: 2/22/2021    History of Present Illness  Denis LOVE is a 70 y.o. male admitted on 2/4/2021 with Pneumonia due to COVID-19 virus. Principal problem is No Principal Problem: There is no principal problem currently on the Problem List. Please update the Problem List and refresh..   Pt admitted to outside hospital on 1/5/2021 for COVID-19 PNA. Worsening resp status intubated on 1/9- extubated 1/16 to OptiFlow. Transition to [facility] on 1/23.Eval by nephrology while in ICU due to ERNIE likely secondary to ATN. Creatinine has been improving with excellent urine output. Received Decadron and Remdesivir. Dobhoff tube was placed due to dysphagia. New fever of 102.8 1/20 and 1/21. ID was consulted. Bld cx's negative. CT scan of the chest abdomen pelvis was done with results showing increasing pulmonary infiltrates/groundglass opacities and consolidations in his bilateral lower lobes. Started empirically on cefepime and vancomycin to cover hospital-acquired pneumonia. Pt had increased lethargy on 1/22. Pt removed DHT 1/25; required higher O2 afterward - possible aspiration during the process. 1/26 patient continued to have intermittent confusion; ?post ICU delirium. Neurology is following, MRI brain was unremarkable. EEG showed mild slowing but no seizure activity. D/c'd abx after 5 days d/t lethargy.       Past Medical History  Denis LOVE has a past medical history of Coronary artery disease, Hypertension, and Lipid disorder.        Prior Living Environment      Most Recent Value   Lives With  spouse, child(mindy), adult [adult son lives in home occasionally]   Living Arrangements  house   Home Accessibility  stairs to enter home (Group), stairs within home (Group)   Living Environment Comment  Lives with wife and intermittently with one son.  [bed and bath on 2nd ,  no powder room on first]   Number of Stairs, Main Entrance   1   Stair Railings, Main Entrance  none   Location, Patient Bedroom  second floor, must negotiate stairs to access   Location, Bathroom  second floor, must negotiate stairs to access   Bathroom Access Comment  Pt. reports has tub shower and stall shower, uses tub shower primarily, has grab bar in shower, has stanard height toilet c wall on both sides   Number of Stairs, Within Home, Primary  12   Surface of Stairs, Within Home, Primary  hardwood   Stair Railings, Within Home, Primary  railings on both sides of stairs          Prior Level of Function      Most Recent Value   Dominant Hand  right   Ambulation  independent   Transferring  independent   Toileting  independent   Bathing  independent   Dressing  independent   Eating  independent   Communication  understands/communicates without difficulty   Swallowing  swallows foods/liquids without difficulty   Baseline Diet/Method of Nutritional Intake  regular solids, thin liquids   Past History of Dysphagia  No previous reports    Prior Level of Function Comment  Pt is a cattle/,  previously IND for all IADLs.    Assistive Device/Animal Currently Used at Home  walker, front-wheeled          IRF PT Evaluation and Treatment - 02/22/21 1417        Time Calculation    Start Time  1400     Stop Time  1430     Time Calculation (min)  30 min        Session Details    Document Type  daily treatment/progress note     Mode of Treatment  individual therapy;physical therapy        Rajan Balance Scale    Sitting to Standing  Able to stand independently using hands     Standing Unsupported  Able to stand 2 minutes with supervision     Sitting with Back Unsupported but Feet Supported on Floor or on a Stool  Able to sit safely and securely for 2 minutes     Standing to Sitting  Sits safely with minimal use of hands     Transfers  Able to transfer with verbal cueing and/or supervision     Standing Unsupported with Eyes Closed  Unable to keep eyes closed 3 seconds but stays  safely     Standing Unsupported with Feet Together  Able to place feet together independently and stand 1 minute with supervision     Reach Forward with Outstretched Arm While Standing  Reaches forward but needs supervision      Object from Floor from a Standing Position  Able to  slipper but needs supervision     Turning to Look Behind Over Left and Right Shoulders While Standing  Turns sideways only but maintains balance     Turn 360 Degrees  Needs close supervision or verbal cueing     Place Alternate Foot on Step or Stool While Standing Unsupported  Able to complete greater than 2 steps needs minimal assist     Standing Unsupported One Foot in Front  Able to take small step independently and hold 30 seconds     Standing on One Leg  Unable to try needs assist to prevent fall     Rajan Balance Score  30        Daily Progress Summary (PT)    Daily Outcome Statement (PT)  Re-evaluated pt on the Rajan Balance Scale today. He demonstrated an 11 pt improvement since first tested demonstrated a significant change (MDC = 6 pt). Pt's score continues to indicate risk of falls (<45/56).                             IRF PT Goals      Most Recent Value   Bed Mobility Goal 1   Activity/Assistive Device  rolling to left, rolling to right, sit to supine/supine to sit at 02/05/2021 1030   [place]  supervision required at 02/05/2021 1030   Time Frame  short-term goal (STG), 1 week at 02/18/2021 0849   Strategies/Barriers  Impaired strength, decreased endurance, safety considerations at 02/18/2021 0849   Progress/Outcome  goal ongoing at 02/18/2021 0849   Bed Mobility Goal 2   Activity/Assistive Device  rolling to left, rolling to right, sit to supine/supine to sit at 02/05/2021 1030   [place]  modified independence at 02/05/2021 1030   Time Frame  long-term goal (LTG), 2 weeks at 02/18/2021 0849   Strategies/Barriers  Impaired strength, decreased endurance, safety considerations at 02/18/2021 0849    Progress/Outcome  goal ongoing at 02/18/2021 0849   Transfer Goal 1   Activity/Assistive Device  sit-to-stand/stand-to-sit at 02/18/2021 0849   [place]  supervision required at 02/18/2021 0849   Time Frame  short-term goal (STG), 1 week at 02/18/2021 0849   Strategies/Barriers  Impaired strength, decreased endurance, safety considerations at 02/18/2021 0849   Progress/Outcome  goal met, goal revised this date at 02/18/2021 0849   Transfer Goal 2   Activity/Assistive Device  sit-to-stand/stand-to-sit, bed-to-chair/chair-to-bed at 02/11/2021 0824   [place]  modified independence at 02/11/2021 0824   Time Frame  long-term goal (LTG), 2 weeks at 02/18/2021 0849   Strategies/Barriers  Impaired strength, decreased endurance, safety considerations at 02/18/2021 0849   Progress/Outcome  goal ongoing at 02/18/2021 0849   Gait/Walking Locomotion Goal 1   Activity/Assistive Device  gait (walking locomotion) at 02/18/2021 0849   Distance  150 feet at 02/18/2021 0849   [place]  minimum assist (75% or more patient effort) at 02/18/2021 0849   Time Frame  short-term goal (STG), 1 week at 02/18/2021 0849   Strategies/Barriers  Impaired strength, decreased endurance, safety considerations at 02/18/2021 0849   Progress/Outcome  goal met, goal revised this date at 02/18/2021 0849   Gait/Walking Locomotion Goal 2   Activity/Assistive Device  gait (walking locomotion) at 02/18/2021 0849   Distance  150 feet at 02/18/2021 0849   [place]  modified independence at 02/18/2021 0849   Time Frame  long-term goal (LTG), 1 week at 02/18/2021 0849   Strategies/Barriers  Impaired strength, decreased endurance, safety considerations at 02/18/2021 0849   Progress/Outcome  goal ongoing, goal revised this date at 02/18/2021 0849   Stairs Goal 1   Activity/Assistive Device  stairs, all skills at 02/18/2021 0849   Number of Stairs  12 at 02/18/2021 0849   [place]  verbal cues required [touching assist] at 02/18/2021 0849    Time Frame  short-term goal (STG), 1 week at 02/18/2021 0849   Progress/Outcome  medical status inhibiting progress, goal revised this date at 02/18/2021 0849   Stairs Goal 2   Activity/Assistive Device  stairs, all skills at 02/11/2021 0824   Number of Stairs  12 at 02/11/2021 0824   [place]  supervision required at 02/11/2021 0824   Time Frame  long-term goal (LTG), 2 weeks at 02/18/2021 0849   Progress/Outcome  goal ongoing at 02/18/2021 0849

## 2021-02-22 NOTE — PLAN OF CARE
Problem: Rehabilitation (IRF) Plan of Care  Goal: Plan of Care Review  Flowsheets (Taken 2/22/2021 8173)  Plan of Care Reviewed With: patient  IRF Plan of Care Review: progress ongoing, continue  Outcome Summary: Pt scored 30/56 on the Rajan Balance Scale indicating risk of falls (<45/56)

## 2021-02-22 NOTE — PROGRESS NOTES
Patient: Denis Green  Location: [facility] [address]  MRN: [id number]  Today's date: 2/22/2021    Attempted to see patient for therapy. Unable due to medical hold. AM Med Hold placed prior to PT session due to abdominal pain and dysuria.

## 2021-02-22 NOTE — PROGRESS NOTES
Patient: Denis Green  Location: [facility] [address]  MRN: [id number]  Today's date: 2/22/2021    History of Present Illness  Denis LOVE is a 70 y.o. male admitted on 2/4/2021 with Pneumonia due to COVID-19 virus. Principal problem is No Principal Problem: There is no principal problem currently on the Problem List. Please update the Problem List and refresh..   Pt admitted to outside hospital on 1/5/2021 for COVID-19 PNA. Worsening resp status intubated on 1/9- extubated 1/16 to OptiFlow. Transition to [facility] on 1/23.Eval by nephrology while in ICU due to ERNIE likely secondary to ATN. Creatinine has been improving with excellent urine output. Received Decadron and Remdesivir. Dobhoff tube was placed due to dysphagia. New fever of 102.8 1/20 and 1/21. ID was consulted. Bld cx's negative. CT scan of the chest abdomen pelvis was done with results showing increasing pulmonary infiltrates/groundglass opacities and consolidations in his bilateral lower lobes. Started empirically on cefepime and vancomycin to cover hospital-acquired pneumonia. Pt had increased lethargy on 1/22. Pt removed DHT 1/25; required higher O2 afterward - possible aspiration during the process. 1/26 patient continued to have intermittent confusion; ?post ICU delirium. Neurology is following, MRI brain was unremarkable. EEG showed mild slowing but no seizure activity. D/c'd abx after 5 days d/t lethargy.       Past Medical History  Denis LOVE has a past medical history of Coronary artery disease, Hypertension, and Lipid disorder.    PT Vitals    Date/Time Pulse HR Source BP BP Location BP Method Pt Position [facility]   02/22/21 1554 88 Monitor 125/72 Left upper arm Automatic Sitting [place]      PT Pain    Date/Time Pain Type Pref Pain Scale Rating: Rest Rating: Activity [facility]   02/22/21 1505 Pain Assessment word (verbal rating pain scale) 0 - no pain 0 - no pain [place]   02/22/21 1554 Pain Assessment word (verbal rating pain scale) 0 - no pain  0 - no pain [place]   02/22/21 1600 Pain Assessment number (Numeric Rating Pain Scale) 0 - no pain 0 - no pain RMF          Prior Living Environment      Most Recent Value   Lives With  spouse, child(mindy), adult [adult son lives in home occasionally]   Living Arrangements  house   Home Accessibility  stairs to enter home (Group), stairs within home (Group)   Living Environment Comment  Lives with wife and intermittently with one son.  [bed and bath on 2nd ,  no powder room on first]   Number of Stairs, Main Entrance  1   Stair Railings, Main Entrance  none   Location, Patient Bedroom  second floor, must negotiate stairs to access   Location, Bathroom  second floor, must negotiate stairs to access   Bathroom Access Comment  Pt. reports has tub shower and stall shower, uses tub shower primarily, has grab bar in shower, has stanard height toilet c wall on both sides   Number of Stairs, Within Home, Primary  12   Surface of Stairs, Within Home, Primary  hardwood   Stair Railings, Within Home, Primary  railings on both sides of stairs          Prior Level of Function      Most Recent Value   Dominant Hand  right   Ambulation  independent   Transferring  independent   Toileting  independent   Bathing  independent   Dressing  independent   Eating  independent   Communication  understands/communicates without difficulty   Swallowing  swallows foods/liquids without difficulty   Baseline Diet/Method of Nutritional Intake  regular solids, thin liquids   Past History of Dysphagia  No previous reports    Prior Level of Function Comment  Pt is a cattle/,  previously IND for all IADLs.    Assistive Device/Animal Currently Used at Home  walker, front-wheeled          IRF PT Evaluation and Treatment - 02/22/21 1527        Time Calculation    Start Time  1500     Stop Time  1600     Time Calculation (min)  60 min        Session Details    Document Type  daily treatment/progress note     Mode of Treatment  physical  therapy;individual therapy        General Information    General Observations of Patient  Pt received supine in bed, agreeable to therapy session. Pt returned to bed at end of session, call bell in reach         Chair to Bed Transfer    [place], Chair to Bed  close supervision     Verbal Cues  hand placement;technique;safety     Assistive Device  wheelchair     Comment  SPT from WC>EOB with close supervision for safety         Sit to Stand Transfer    [place], Sit to Stand Transfer  close supervision     Verbal Cues  safety     Assistive Device  walker, front-wheeled     Comment  from EOB/WC to RW        Stand to Sit Transfer    [place], Stand to Sit Transfer  close supervision     Verbal Cues  safety     Assistive Device  walker, front-wheeled     Comment  to EOB/WC         Stand Pivot Transfer    [place], Stand Pivot/Stand Step Transfer  close supervision     Verbal Cues  safety     Assistive Device  walker, front-wheeled     Comment  via ambulatory approach         Gait Training    [place], Gait  close supervision     Assistive Device  walker, front-wheeled     Distance in Feet  150 feet     Gait Pattern Utilized  step-through     Deviations/Abnormal Patterns (Gait)  base of support, narrow;gait speed decreased;step length decreased     Bilateral Gait Deviations  heel strike decreased     Comment  [id number] with RW and close supervision for safety         Stairs Training    [place], Stairs  touching/steadying assist     Assistive Device  railing     Handrail Location  both sides     Number of Stairs  8     Stair Height  6 inches     Ascending Stairs Technique  step-to-step     Descending Stairs Technique  step-to-step     Comment  touching assist for safety and B hand rail use, min VC for safety         Lower Extremity (Therapeutic Exercise)    Exercise Position/Type (LE Therapeutic Exercise)  supine;standing     General Exercise (LE Therapeutic Exercise)  bilateral     Reps and Sets  "(LE Therapeutic Exercise)  3x10     Comment (LE Therapeutic Exercise)  Pt educated on HEP. Supine ther-ex; bridges 3x10, side-lying ABD 3x10, B SLR 3x10, hip ADD with ball in hook-lying 5\" 3x10. Standing walker marches 3x10, walker squats 3x10         Daily Progress Summary (PT)    Daily Outcome Statement (PT)  Pt educated on HEP, able to safely perform exercises with correct form, min VC for technique throughout. Continued ambulation and stair training performed with rest breaks throughout, educated on energy conservation upon DC home.                             IRF PT Goals      Most Recent Value   Bed Mobility Goal 1   Activity/Assistive Device  rolling to left, rolling to right, sit to supine/supine to sit at 02/05/2021 1030   [place]  supervision required at 02/05/2021 1030   Time Frame  short-term goal (STG), 1 week at 02/18/2021 0849   Strategies/Barriers  Impaired strength, decreased endurance, safety considerations at 02/18/2021 0849   Progress/Outcome  goal ongoing at 02/18/2021 0849   Bed Mobility Goal 2   Activity/Assistive Device  rolling to left, rolling to right, sit to supine/supine to sit at 02/05/2021 1030   [place]  modified independence at 02/05/2021 1030   Time Frame  long-term goal (LTG), 2 weeks at 02/18/2021 0849   Strategies/Barriers  Impaired strength, decreased endurance, safety considerations at 02/18/2021 0849   Progress/Outcome  goal ongoing at 02/18/2021 0849   Transfer Goal 1   Activity/Assistive Device  sit-to-stand/stand-to-sit at 02/18/2021 0849   [place]  supervision required at 02/18/2021 0849   Time Frame  short-term goal (STG), 1 week at 02/18/2021 0849   Strategies/Barriers  Impaired strength, decreased endurance, safety considerations at 02/18/2021 0849   Progress/Outcome  goal met, goal revised this date at 02/18/2021 0849   Transfer Goal 2   Activity/Assistive Device  sit-to-stand/stand-to-sit, bed-to-chair/chair-to-bed at 02/11/2021 0824   [place]  " modified independence at 02/11/2021 0824   Time Frame  long-term goal (LTG), 2 weeks at 02/18/2021 0849   Strategies/Barriers  Impaired strength, decreased endurance, safety considerations at 02/18/2021 0849   Progress/Outcome  goal ongoing at 02/18/2021 0849   Gait/Walking Locomotion Goal 1   Activity/Assistive Device  gait (walking locomotion) at 02/18/2021 0849   Distance  150 feet at 02/18/2021 0849   [place]  minimum assist (75% or more patient effort) at 02/18/2021 0849   Time Frame  short-term goal (STG), 1 week at 02/18/2021 0849   Strategies/Barriers  Impaired strength, decreased endurance, safety considerations at 02/18/2021 0849   Progress/Outcome  goal met, goal revised this date at 02/18/2021 0849   Gait/Walking Locomotion Goal 2   Activity/Assistive Device  gait (walking locomotion) at 02/18/2021 0849   Distance  150 feet at 02/18/2021 0849   [place]  modified independence at 02/18/2021 0849   Time Frame  long-term goal (LTG), 1 week at 02/18/2021 0849   Strategies/Barriers  Impaired strength, decreased endurance, safety considerations at 02/18/2021 0849   Progress/Outcome  goal ongoing, goal revised this date at 02/18/2021 0849   Stairs Goal 1   Activity/Assistive Device  stairs, all skills at 02/18/2021 0849   Number of Stairs  12 at 02/18/2021 0849   [place]  verbal cues required [touching assist] at 02/18/2021 0849   Time Frame  short-term goal (STG), 1 week at 02/18/2021 0849   Progress/Outcome  medical status inhibiting progress, goal revised this date at 02/18/2021 0849   Stairs Goal 2   Activity/Assistive Device  stairs, all skills at 02/11/2021 0824   Number of Stairs  12 at 02/11/2021 0824   [place]  supervision required at 02/11/2021 0824   Time Frame  long-term goal (LTG), 2 weeks at 02/18/2021 0849   Progress/Outcome  goal ongoing at 02/18/2021 0849

## 2021-02-22 NOTE — PROGRESS NOTES
Patient: Denis Green  Location: [facility] [address]  MRN: [id number]  Today's date: 2/22/2021    History of Present Illness  Denis LOVE is a 70 y.o. male admitted on 2/4/2021 with Pneumonia due to COVID-19 virus. Principal problem is No Principal Problem: There is no principal problem currently on the Problem List. Please update the Problem List and refresh..   Pt admitted to outside hospital on 1/5/2021 for COVID-19 PNA. Worsening resp status intubated on 1/9- extubated 1/16 to [facility]. Transition to [facility] on 1/23.Eval by nephrology while in ICU due to ERNIE likely secondary to ATN. Creatinine has been improving with excellent urine output. Received Decadron and Remdesivir. Dobhoff tube was placed due to dysphagia. New fever of 102.8 1/20 and 1/21. ID was consulted. Bld cx's negative. CT scan of the chest abdomen pelvis was done with results showing increasing pulmonary infiltrates/groundglass opacities and consolidations in his bilateral lower lobes. Started empirically on cefepime and vancomycin to cover hospital-acquired pneumonia. Pt had increased lethargy on 1/22. Pt removed DHT 1/25; required higher O2 afterward - possible aspiration during the process. 1/26 patient continued to have intermittent confusion; ?post ICU delirium. Neurology is following, MRI brain was unremarkable. EEG showed mild slowing but no seizure activity. D/c'd abx after 5 days d/t lethargy.       Past Medical History  Denis LOVE has a past medical history of Coronary artery disease, Hypertension, and Lipid disorder.    OT Vitals    Date/Time Pulse SpO2 O2 Therapy BP [facility]   02/22/21 1308 80 97 % None (Room air) 100/50 AEB      OT Pain    Date/Time Pain Type Pref Pain Scale Rating: Rest Who   02/22/21 1308 Pain Assessment number (Numeric Rating Pain Scale) 0 - no pain AEB   02/22/21 1355 Pain Reassessment number (Numeric Rating Pain Scale) 0 - no pain AEB          Prior Living Environment      Most Recent Value  "  Lives With  spouse, child(mindy), adult [adult son lives in home occasionally]   Living Arrangements  house   Home Accessibility  stairs to enter home (Group), stairs within home (Group)   Living Environment Comment  Lives with wife and intermittently with one son.  [bed and bath on 2nd ,  no powder room on first]   Number of Stairs, Main Entrance  1   Stair Railings, Main Entrance  none   Location, Patient Bedroom  second floor, must negotiate stairs to access   Location, Bathroom  second floor, must negotiate stairs to access   Bathroom Access Comment  Pt. reports has tub shower and stall shower, uses tub shower primarily, has grab bar in shower, has stanard height toilet c wall on both sides   Number of Stairs, Within Home, Primary  12   Surface of Stairs, Within Home, Primary  hardwood   Stair Railings, Within Home, Primary  railings on both sides of stairs          Prior Level of Function      Most Recent Value   Dominant Hand  right   Ambulation  independent   Transferring  independent   Toileting  independent   Bathing  independent   Dressing  independent   Eating  independent   Communication  understands/communicates without difficulty   Swallowing  swallows foods/liquids without difficulty   Baseline Diet/Method of Nutritional Intake  regular solids, thin liquids   Past History of Dysphagia  No previous reports    Prior Level of Function Comment  Pt is a cattle/,  previously IND for all IADLs.    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, (+) , enjoys farming and being outside   Patient Goals  \"I want to get back up walking, get stronger, and be self sufficient\"          IRF OT Evaluation and Treatment - 02/22/21 1308        Time Calculation    Start Time  1300     Stop Time  1400     Time Calculation (min)  60 min        Session Details    " Mode of Treatment  occupational therapy;individual therapy        General Information    General Observations of Patient  Pt received seated edge of bed        Bed Mobility    Bed Mobility  bed mobility (all) activities     [place], All Bed Mobility Activities  distant supervision     Comment (Bed Mobility)  hosp bed        Transfers    Transfers  toilet transfer;stand pivot transfer     Comment  hosp bed        Stand Pivot Transfer    [place], Stand Pivot/Stand Step Transfer  close supervision;1 person assist;safety considerations     Verbal Cues  safety     Assistive Device  walker, front-wheeled     Comment  ambulatory approach, level surfaces, benefits from elevated surfaces with hand rails        Toilet Transfer    Transfer Technique  stand pivot     [place], Toilet Transfer  close supervision;1 person assist;safety considerations     Verbal Cues  safety     Assistive Device  walker, front-wheeled     Comment  minor safety deficits, ongoing cues for hand placement during sit to/from stand        Safety Issues, Functional Mobility    Safety Issues Affecting Function (Mobility)  positioning of assistive device;safety precaution awareness     Comment, Safety Issues/Impairments (Mobility)  Safety awareness improving but pt continues to require verbal cues for hand placement during sit to/from stand        Upper Extremity (Therapeutic Exercise)    Range of Motion Exercises (Upper Extremity Therapeutic Exercise)  shoulder abduction/adduction;elbow flexion/extension;wrist flexion/extension     Weight/Resistance (Upper Extremity Therapeutic Exercise)  2 pounds     Reps and Sets (Upper Extremity Therapeutic Exercise)  3 setsx10         Aerobic Exercise    Type (Aerobic Exercise)  arm bike     Time Performed (Aerobic Exercise)  7     Comment (Aerobic Exercise)  seated, anterior        Grooming    Self-Performance  washes, rinses and dries hands     [place]  supervision;1 person assist;safety  considerations     Position  supported standing     Adaptive Equipment  none        Toileting    [place]  supervision;1 person assist;safety considerations     Position  supported sitting;supported standing     Comment  hosp high toilet        Daily Progress Summary (OT)    Symptoms Noted During/After Treatment  none     Progress Toward Functional Goals (OT)  progressing toward functional goals as expected     Daily Outcome Statement (OT)  Pt is presenting with improving tolerance for all functional tasks with good 02 sats during amb w RW and toileting. Patient continues to benefit from seated tasks 2* energy conversation but is now stopping when amb in [place] for breath support and postural correction.     Barriers to Overall Progress (OT)  balance, discomfort, general weakness     Recommendations  continue POC                            IRF OT Goals      Most Recent Value   Transfer Goal 1   Activity/Assistive Device  toilet at 02/15/2021 1308   [place]  supervision required at 02/15/2021 1308   Time Frame  short-term goal (STG), 5 - 7 days at 02/15/2021 1308   Strategies/Barriers  DME at 02/15/2021 1308   Progress/Outcome  good progress toward goal, goal partially met, goal revised this date at 02/15/2021 1308   Transfer Goal 2   Activity/Assistive Device  toilet at 02/10/2021 1632   [place]  supervision required, set-up required at 02/10/2021 1632   Time Frame  long-term goal (LTG), 3 weeks at 02/10/2021 1632   Strategies/Barriers  DME at 02/10/2021 1632   Transfer Goal 3   Activity/Assistive Device  shower at 02/15/2021 1308   [place]  supervision required at 02/15/2021 1308   Time Frame  short-term goal (STG), 5 - 7 days at 02/15/2021 1308   Strategies/Barriers  DME at 02/15/2021 1308   Progress/Outcome  good progress toward goal, goal partially met, goal revised this date at 02/15/2021 1308   Transfer Goal 4   Activity/Assistive Device  shower at 02/10/2021 1632   [place]  supervision  required, set-up required at 02/10/2021 1632   Time Frame  long-term goal (LTG), 3 weeks at 02/10/2021 1632   Strategies/Barriers  DME at 02/10/2021 1632   Bathing Goal 1   Activity/Assistive Device  bathing skills, all at 02/15/2021 1308   [place]  modified independence at 02/15/2021 1308   Time Frame  short-term goal (STG), 5 - 7 days at 02/15/2021 1308   Strategies/Barriers  DME at 02/15/2021 1308   Progress/Outcome  good progress toward goal, goal partially met, goal revised this date at 02/15/2021 1308   Bathing Goal 2   Activity/Assistive Device  bathing skills, all at 02/05/2021 0726   [place]  supervision required at 02/05/2021 0726   Time Frame  long-term goal (LTG), 3 weeks at 02/05/2021 0726   Strategies/Barriers  DME TBD at 02/05/2021 0726   UB Dressing Goal 1   Activity/Assistive Device  upper body dressing at 02/15/2021 1308   [place]  modified independence at 02/15/2021 1308   Time Frame  short-term goal (STG), 5 - 7 days at 02/15/2021 1308   Strategies/Barriers  including set up at 02/15/2021 1308   Progress/Outcome  good progress toward goal, goal partially met, goal revised this date at 02/15/2021 1308   UB Dressing Goal 2   Activity/Assistive Device  upper body dressing at 02/05/2021 0726   [place]  modified independence at 02/05/2021 0726   Time Frame  long-term goal (LTG), 3 weeks at 02/05/2021 0726   LB Dressing Goal 1   Activity/Assistive Device  lower body dressing at 02/15/2021 1308   [place]  modified independence at 02/15/2021 1308   Time Frame  short-term goal (STG), 5 - 7 days at 02/15/2021 1308   Strategies/Barriers  AD including clothing retrieval at 02/15/2021 1308   Progress/Outcome  good progress toward goal, goal partially met, goal revised this date at 02/10/2021 1632   LB Dressing Goal 2   Activity/Assistive Device  lower body dressing at 02/05/2021 0726   [place]  supervision required at 02/05/2021 0726   Time Frame  long-term goal (LTG), 3 weeks at  02/05/2021 0726   Grooming Goal 1   Activity/Assistive Device  grooming skills, all at 02/15/2021 1308   [place]  modified independence at 02/15/2021 1308   Time Frame  short-term goal (STG), 5 - 7 days at 02/15/2021 1308   Strategies/Barriers  standing @ sink at 02/15/2021 1308   Progress/Outcome  good progress toward goal, goal partially met, goal revised this date at 02/15/2021 1308   Grooming Goal 2   Activity/Assistive Device  grooming skills, all at 02/05/2021 0726   [place]  modified independence at 02/05/2021 0726   Time Frame  long-term goal (LTG), 3 weeks at 02/05/2021 0726   Toileting Goal 1   Activity/Assistive Device  toileting skills, all at 02/15/2021 1308   [place]  supervision required at 02/15/2021 1308   Time Frame  short-term goal (STG), 5 - 7 days at 02/15/2021 1308   Strategies/Barriers  DME at 02/15/2021 1308   Progress/Outcome  good progress toward goal, goal partially met, goal revised this date at 02/15/2021 1308   Toileting Goal 2   Activity/Assistive Device  toileting skills, all at 02/10/2021 1632   [place]  supervision required at 02/10/2021 1632   Time Frame  long-term goal (LTG), 3 weeks at 02/10/2021 1632   Strategies/Barriers  DME at 02/10/2021 1632   Progress/Outcome  goal revised this date at 02/10/2021 1632

## 2021-02-22 NOTE — PLAN OF CARE
Problem: Rehabilitation (IRF) Plan of Care  Goal: Plan of Care Review  Flowsheets (Taken 2/22/2021 0706)  Plan of Care Reviewed With: patient  IRF Plan of Care Review: progress ongoing, continue  Outcome Summary: Reviewed HEP with pt, able to demonstrate safe and correct technique, VSS throughout session

## 2021-02-22 NOTE — PROGRESS NOTES
Patient: Denis Green  Location: [facility] [address]  MRN: [id number]  Today's date: 2/22/2021    Attempted to see patient for therapy. Unable due to medical hold. AM med hold 2/2 dysuria/feeling ill, ST will attempt to see pt this afternoon if able.

## 2021-02-22 NOTE — PLAN OF CARE
Problem: Rehabilitation (IRF) Plan of Care  Goal: Plan of Care Review  Flowsheets (Taken 2/22/2021 4610)  Plan of Care Reviewed With: patient  IRF Plan of Care Review: progress ongoing, continue  Outcome Summary: Pt is presenting with improving tolerance for all functional tasks with good 02 sats during amb w RW and toileting. Patient continues to benefit from seated tasks 2* energy conversation but is now stopping when amb in [place] for breath support and postural correction.

## 2021-02-22 NOTE — PROGRESS NOTES
Subjective    Patient seen and examined on rounds.  Chart reviewed.  Events overnight noted.  History reviewed briefly with patient.     CC:  Deficits in mobility, transfers, self-care status post deconditioning, severe Covid 19 pneumonia, ventilator-dependent respiratory failure, dysphagia, multiple medical problems.     HPI:  Mr. Denis Green is a 70-year-old right-handed white male with chronic conditions significant for coronary artery disease, hypertension, hyperlipidemia who was admitted to [facility] on 1/5/21 due to Covid 19 pneumonia and worsening respiratory status.  He required intubation on 1/9/21 and had ventilator-dependent respiratory failure.  He was treated with Decadron and Remdesivir.  He had dysphagia requiring Dobbhoff tube feeds.  He was extubated on 1/16/21 to OptiFlow.  On 1/23/21 he was transitioned to high flow nasal cannula.  Hospital course was significant for acute renal insufficiency likely secondary to acute tubular necrosis and he was followed by nephrology.  He had subsequent improvement in renal function.  He also had new fever of 102.8°F on 1/20/21 and 1/21/21.  He was followed by infectious disease.  Blood cultures were negative. CT scan of the chest abdomen pelvis was done with results showing increasing pulmonary infiltrates/groundglass opacities and consolidations in his bilateral lower lobes.  He was treated empirically with Cefepime and Vancomycin to cover hospital-acquired pneumonia.  He was noted to be lethargic on 1/22/21.  Subsequently patient removed Dobbhoff tube on 1/25/21 and there is question of possible aspiration during that process.  He continued to have intermittent confusion on 1/26/21 was felt to be possible post ICU delirium.  He was followed by neurology.  MRI brain was unremarkable.  EEG showed mild slowing but no seizure activity was noted.  Antibiotics were discontinued after a 5 day course due to lethargy.  Repeat video swallow study was  "performed for dysphagia and patient was put on a soft diet with nectar thick liquids. On 2/3/21, hemoglobin was 11.2, WBC count 15.1, platelets were 275, BUN was 15, and creatinine was 0.7.  He has been needing assistance for mobility, transfers, self-care postoperatively. He is transferred to [facility] on 2/4/21 for further rehabilitation care.       SUBJ: He had urinary urgency and difficulty urinating today.  Recent urine culture showed Klebsiella and Macrobid was switched to Vantin on 2/18/2021.  He reports difficulty voiding today.  Antibiotic was switched to Cipro by internist.  Will consult urology.  Discussed with urologist.  Patient reported he had history of kidney stones requiring lithotripsy and had seen a urologist in the past and will make appointment as outpatient also to see the urologist.    ROS: Denies chest pain or shortness of breath. Other ROS negative. Past, family, social history is unchanged.    Physical Exam      Blood pressure 113/60, pulse 67, temperature 36.3 °C (97.4 °F), temperature source Oral, resp. rate 18, height 1.727 m (5' 8\"), weight 77.1 kg (170 lb), SpO2 97 %.  Body mass index is 25.85 kg/m².    General Appearance: Not in acute distress  Head/Ear/Nose/Throat: Normocephalic; Atraumatic.  On oxygen by nasal cannula.  Eye: EOMI; PERRL.   Neck: No JVD; No Bruits.   Respiratory: Decreased breath sounds at bases.   Cardiovascular: RRR; Normal S1, S2.   Gastrointestinal: Soft; NT; +BS.   Extremities: Bilateral lower extremity edema noted.   Musculoskeletal: Functional active range of motion in both upper and lower extremities.    Neurological: Awake alert oriented to person, had some difficulty naming the place and correct date.. Speech is fluent. Cranial nerve examination does not reveal any gross facial asymmetry. Strength testing about 4/5 strength in both upper extremities.  Bilateral hip flexion is 3+/5.  Bilateral quadriceps are 4/5 with the thighs supported. "  Bilateral ankle dorsi and plantar flexion are 4/5.  He is grossly able to localize touch and position sense in great toes.  Deep tendon reflexes are hypoactive and symmetric bilaterally.  Plantars are flexor.  Coordination is functional upper extremities.  Neurologically stable.  Behavior/Emotional: Appropriate; Cooperative.   Skin: No obvious rashes noted.        Current Facility-Administered Medications:   •  acetaminophen (TYLENOL) tablet 650 mg, 650 mg, oral, q4h PRN, Omari Spain MD, 650 mg at 02/17/21 1433  •  albuterol HFA (VENTOLIN HFA) 90 mcg/actuation inhaler 2 puff, 2 puff, inhalation, q4h PRN, Laure Bishop MD  •  alum-mag hydroxide-simeth (MAALOX) 200-200-20 mg/5 mL suspension 30 mL, 30 mL, oral, q6h PRN, Laure Bishop MD  •  amLODIPine (NORVASC) tablet 2.5 mg, 2.5 mg, oral, Nightly, Laure Bishop MD, 2.5 mg at 02/21/21 2039  •  aspirin enteric coated tablet 81 mg, 81 mg, oral, Daily, Omari Spain MD, 81 mg at 02/22/21 0819  •  bisacodyL (DULCOLAX) 10 mg suppository 10 mg, 10 mg, rectal, Daily PRN, Isael Lang MD, 10 mg at 02/19/21 2000  •  ciprofloxacin (CIPRO) tablet 500 mg, 500 mg, oral, q12h EBER, Laure Bishop MD, 500 mg at 02/22/21 0940  •  diphenhydrAMINE (BENADRYL) capsule 50 mg, 50 mg, oral, Nightly, Isael Lang MD, 50 mg at 02/21/21 2038  •  docusate sodium (COLACE) capsule 100 mg, 100 mg, oral, TID, Isael Lang MD, 100 mg at 02/22/21 0819  •  furosemide (LASIX) tablet 20 mg, 20 mg, oral, BID (am, 4p), Laure Bishop MD, 20 mg at 02/22/21 0819  •  gemfibroziL (LOPID) tablet 600 mg, 600 mg, oral, BID AC, Omari Spain MD, 600 mg at 02/22/21 0819  •  guaiFENesin (MUCINEX) 12 hr ER tablet 600 mg, 600 mg, oral, BID, Laure Bishop MD, 600 mg at 02/22/21 0819  •  lactobacillus acidophilus complex (BACID) 1 billion cell- 250 mg tablet 500 mg, 500 mg, oral, BID, Laure Bishop MD, 500 mg at 02/22/21 0906  •  lidocaine (ASPERCREME) 4 % topical patch 1 patch, 1  patch, Topical, Daily, Galileo Samuel DPM, 1 patch at 02/22/21 0820  •  magnesium hydroxide (M.O.M.) 400 mg/5 mL suspension 30 mL, 30 mL, oral, 2x daily PRN, Laure Bishop MD, 30 mL at 02/20/21 0907  •  magnesium oxide (MAG-OX) tablet 400 mg, 400 mg, oral, BID, Laure Bishop MD, 400 mg at 02/22/21 0819  •  melatonin ODT 3 mg, 3 mg, oral, Nightly, Laure Bishop MD, 3 mg at 02/21/21 2039  •  metoprolol tartrate (LOPRESSOR) split tablet 12.5 mg, 12.5 mg, oral, BID, Omari Spain MD, 12.5 mg at 02/22/21 0819  •  mouth moisturizer (TOOTHETTE) cream, , mucous membrane, TID, Laure Bishop MD, Given at 02/22/21 0820  •  phenazopyridine (PYRIDIUM) tablet 95 mg, 95 mg, oral, TID with meals, Pilo Hansen MD, 95 mg at 02/22/21 0906  •  polyethylene glycol (MIRALAX) 17 gram packet 17 g, 17 g, oral, Daily PRN, Omari Spain MD, 17 g at 02/20/21 0907  •  rivaroxaban (XARELTO) tablet 10 mg, 10 mg, oral, Daily with dinner, Laure Bishop MD, 10 mg at 02/21/21 1652  •  senna (SENOKOT) tablet 3 tablet, 3 tablet, oral, Daily before lunch, Isael Lang MD, 3 tablet at 02/21/21 1215  •  sodium chloride tablet 1 g, 1 g, oral, BID with meals, Laure Bishop MD, 1 g at 02/22/21 0819  •  tamsulosin (FLOMAX) 24 hr ER capsule 0.4 mg, 0.4 mg, oral, Daily, Lacho Monreal MD       Labs / Radiology    Lab Results   Component Value Date    WBC 10.91 (H) 02/22/2021    HGB 10.5 (L) 02/22/2021    HCT 32.7 (L) 02/22/2021    MCV 96.2 02/22/2021     (H) 02/22/2021     Lab Results   Component Value Date    GLUCOSE 104 (H) 02/22/2021    CALCIUM 9.2 02/22/2021     (L) 02/22/2021    K 4.2 02/22/2021    CO2 26 02/22/2021    CL 95 (L) 02/22/2021    BUN 15 02/22/2021    CREATININE 0.7 (L) 02/22/2021       Assessment and Plan    ASSESSMENT PLAN:  1. 70-year-old right-handed white male with chronic conditions significant for coronary artery disease, hypertension, hyperlipidemia who was admitted to [facility]  on 1/5/21 due to Covid 19 pneumonia and worsening respiratory status.  He required intubation on 1/9/21 and had ventilator-dependent respiratory failure.  He was treated with Decadron and Remdesivir.  He had dysphagia requiring Dobbhoff tube feeds.  He was extubated on 1/16/21 to OptiFlow.  On 1/23/21 he was transitioned to high flow nasal cannula.  Hospital course was significant for acute renal insufficiency likely secondary to acute tubular necrosis and he was followed by nephrology.  He had subsequent improvement in renal function.  He also had new fever of 102.8°F on 1/20/21 and 1/21/21.  He was followed by infectious disease.  Blood cultures were negative. CT scan of the chest abdomen pelvis was done with results showing increasing pulmonary infiltrates/groundglass opacities and consolidations in his bilateral lower lobes.  He was treated empirically with Cefepime and Vancomycin to cover hospital-acquired pneumonia.  He was noted to be lethargic on 1/22/21.  Subsequently patient removed Dobbhoff tube on 1/25/21 and there is question of possible aspiration during that process.  He continued to have intermittent confusion on 1/26/21 was felt to be possible post ICU delirium.  He was followed by neurology.  MRI brain was unremarkable.  EEG showed mild slowing but no seizure activity was noted.  Antibiotics were discontinued after a 5 day course due to lethargy.  Repeat video swallow study was performed for dysphagia and patient was put on a soft diet with nectar thick liquids. On 2/3/21, hemoglobin was 11.2, WBC count 15.1, platelets were 275, BUN was 15, and creatinine was 0.7.  He has been needing assistance for mobility, transfers, self-care postoperatively. He is transferred to [facility] on 2/4/21 for further rehabilitation care.       2. DVT prophylaxis - on subcutaneous Heparin.  On SCDs.  Platelets 296 on 2/5/2021.  Platelets 389 on 2/11/2021.  Platelets 470 on 2/15/2021.  Platelets 316 on  2/18/2021.  Platelets 404 on 2/22/2021.     3.  Deconditioning - recent severe Covid 19 pneumonia, ventilator-dependent respiratory failure, dysphagia, multiple medical problems - He had ventilator-dependent respiratory failure.  He was treated with Decadron and Remdesivir.  Continue PT, OT, speech, psychology.  Follow falls precautions, cardiac precautions, aspiration precautions.     4. GI - On Colace, Senokot.  On PRN MiraLAX, MOM, Maalox.      5.  -He had urinary urgency and difficulty urinating today.  Recent urine culture showed Klebsiella and Macrobid was switched to Vantin on 2/18/2021.  He reports difficulty voiding today.  Antibiotic was switched to Cipro by internist.  Will consult urology.  Discussed with urologist.  Patient reported he had history of kidney stones requiring lithotripsy and had seen a urologist in the past and will make appointment as outpatient also to see the urologist.     6.  Pulmonary - recent severe Covid 19 pneumonia, ventilator-dependent respiratory failure - on Ventolin HFA.  On Mucinex.     7. Pain - on PRN Tylenol.  On scheduled Tylenol for left wrist pain.  On topical Voltaren gel.  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  K pad as needed for pain.     8. Hematology -hemoglobin 12.0, WBC 8.15 on 2/5/2021.  Hemoglobin 10.7, WBC 8.05 on 2/11/2021.  Hemoglobin 12.0, WBC 11.56 on 2/15/2021.  Hemoglobin 10.0, WBC 11.77 on 2/18/2021.  Hemoglobin 10.5, WBC 10.91 on 2/22/2021.     9. CVS - history of coronary artery disease and hypertension.  On Norvasc.  On Lopressor.  On Aspirin.  On Lasix.  On Magnesium oxide.     10.  Hyperlipidemia - on Lopid.     11.  Insomnia - on Melatonin.     12.  Dysphagia - Tolerating regular thins diet now.  Speech therapy following.     13. Rehabilitation medicine - Continue comprehensive rehabilitation care. Continue PT, OT, speech, psychology.  We will follow falls precautions, cardiac precautions, monitor pulse oximetry in  therapy and follow aspiration precautions.  Tolerating therapies per endurance.  Discussed with speech therapy.  Free water protocol ice chips was started.  Denies coughing with meals.He reported some pain in left wrist.  He is not sure if he accidentally banged the left wrist against the side rail of the bed.  Denies history of gout.  Left wrist does not appear warm or tender to touch.  K pad ordered.  We will also order scheduled Tylenol for 7 days.  Discussed results of wrist x-rays with patient which showed moderate degenerative change particularly about the radial aspect of the wrist.  He reports pain is less today.  He ambulated 25 feet with rolling walker with assistance of 2 people with physical therapy.Free water protocol with ice chips and sips of water per speech therapy.  He feels better today.  Left wrist pain is decreased significantly per patient.  Working on endurance with physical therapy.  He required minimal to moderate assistance for transfers.  Working on ADLs with occupational therapy.He feels better today.  Denies increased pain.  Discussed with him about doing incentive spirometry.  We will try to wean off oxygen if possible.Trying to wean off oxygen as possible.  Tolerating therapies per endurance.  He reports decrease sleep at night.  Usually takes 2 Tylenol PM at night.  He is already on scheduled Tylenol.  Will order 50 mg of Benadryl at bedtime. He reports he slept well with Benadryl last night.  Tolerating therapies per endurance.  Denies left wrist pain today.Video swallow was done today.  Mild oral and pharyngeal dysphagia noted.  Transient penetration of thin liquids is noted.  Speech therapy working with dysphagia.  Tolerating upgraded diet after video swallow.  Noted ENT input regarding his voice.  Vocal cords moving normally per ENT.  Discussed with patient.Tolerating regular thins diet.  Discussed ENT input with him.  He requested podiatric consult which was ordered.He was seen  by podiatry earlier today.  Left foot was injected.  He reports left foot feels much better now.Tolerating therapies per endurance.  Left foot feels much better today.  Tolerating diet without problems.  Maintaining stable pulse oximetry on room air.He had urinary urgency and difficulty urinating today.  Recent urine culture showed Klebsiella and Macrobid was switched to Vantin on 2/18/2021.  He reports difficulty voiding today.  Antibiotic was switched to Cipro by internist.  Will consult urology.  Discussed with urologist.  Patient reported he had history of kidney stones requiring lithotripsy and had seen a urologist in the past and will make appointment as outpatient also to see the urologist.     14. Reviewed labs today.  BUN 15, creatinine 0.6 on 2/5/2021.  BUN 16, creatinine 0.7 on 2/11/2021.  BUN 12, creatinine 0.8 on 2/15/2021.  BUN 10, creatinine 0.6 on 2/18/2021.  BUN 15, creatinine 0.7 on 2/22/2021.               Isael Lang MD  2/22/2021

## 2021-02-22 NOTE — CONSULTS
Subjective  need help    Reason voidiong dysfunction    Denis Green is a 70 y.o. male who was admitted for Pneumonia due to COVID-19 virus [U07.1, J12.82]  Physical deconditioning [R53.81]. Patient was referred by gwendolyn for management recommendations. Patient is Mr. Denis Green is a 70-year-old right-handed white male with chronic conditions significant for coronary artery disease, hypertension, hyperlipidemia who was admitted to [facility] on 1/5/21 due to Covid 19 pneumonia and worsening respiratory status.  He required intubation on 1/9/21 and had ventilator-dependent respiratory failure.  He was treated with Decadron and Remdesivir.  He had dysphagia requiring Dobbhoff tube feeds.  He was extubated on 1/16/21 to OptiFlow.  On 1/23/21 he was transitioned to high flow nasal cannula.  Hospital course was significant for acute renal insufficiency likely secondary to acute tubular necrosis and he was followed by nephrology.  He had subsequent improvement in renal function.  He also had new fever of 102.8°F on 1/20/21 and 1/21/21.  He was followed by infectious disease.  Blood cultures were negative. CT scan of the chest abdomen pelvis was done with results showing increasing pulmonary infiltrates/groundglass opacities and consolidations in his bilateral lower lobes.  He was treated empirically with Cefepime and Vancomycin to cover hospital-acquired pneumonia.  He was noted to be lethargic on 1/22/21.  Subsequently patient removed Dobbhoff tube on 1/25/21 and there is question of possible aspiration during that process.  He continued to have intermittent confusion on 1/26/21 was felt to be possible post ICU delirium.  He was followed by neurology.  MRI brain was unremarkable.  EEG showed mild slowing but no seizure activity was noted.  Antibiotics were discontinued after a 5 day course due to lethargy.  Repeat video swallow study was performed for dysphagia and patient was put on a soft diet  with nectar thick liquids. On 2/3/21, hemoglobin was 11.2, WBC count 15.1, platelets were 275, BUN was 15, and creatinine was 0.7  Has had uti , voiding dysfunction . Works on cattle. History of kidney stones     Outside records reviewed..    Medical History:   Past Medical History:   Diagnosis Date   • Coronary artery disease    • Hypertension    • Lipid disorder        Surgical History: No past surgical history on file.    Social History:   Social History     Social History Narrative    He reports he lives with his wife.  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  He reports he has 2 children who live nearby.  He reports prior to admission he was independent in everything including driving.  He denies previous psychiatric history.  He denies the use of substances       Family History: No family history on file.    Allergies: Atorvastatin    Current Facility-Administered Medications   Medication Dose Route Frequency Provider Last Rate Last Admin   • acetaminophen (TYLENOL) tablet 650 mg  650 mg oral q4h PRN Omari Spain MD   650 mg at 02/17/21 1433   • albuterol HFA (VENTOLIN HFA) 90 mcg/actuation inhaler 2 puff  2 puff inhalation q4h PRN Laure Bishop MD       • alum-mag hydroxide-simeth (MAALOX) 200-200-20 mg/5 mL suspension 30 mL  30 mL oral q6h PRN Laure Bishop MD       • amLODIPine (NORVASC) tablet 2.5 mg  2.5 mg oral Nightly Laure Bishop MD   2.5 mg at 02/21/21 2039   • aspirin enteric coated tablet 81 mg  81 mg oral Daily Omari Spain MD   81 mg at 02/22/21 0819   • bisacodyL (DULCOLAX) 10 mg suppository 10 mg  10 mg rectal Daily PRN Isael Lang MD   10 mg at 02/19/21 2000   • ciprofloxacin (CIPRO) tablet 500 mg  500 mg oral q12h EBER Laure Bishop MD       • diphenhydrAMINE (BENADRYL) capsule 50 mg  50 mg oral Nightly Isael Lang MD   50 mg at 02/21/21 2038   • docusate sodium (COLACE) capsule 100 mg  100 mg oral TID Isael Lang MD   100 mg at 02/22/21 0819   •  furosemide (LASIX) tablet 20 mg  20 mg oral BID (am, 4p) Laure Bishop MD   20 mg at 02/22/21 0819   • gemfibroziL (LOPID) tablet 600 mg  600 mg oral BID AC Omari Spain MD   600 mg at 02/22/21 0819   • guaiFENesin (MUCINEX) 12 hr ER tablet 600 mg  600 mg oral BID Laure Bishop MD   600 mg at 02/22/21 0819   • lactobacillus acidophilus complex (BACID) 1 billion cell- 250 mg tablet 500 mg  500 mg oral BID Laure Bishop MD   500 mg at 02/22/21 0906   • lidocaine (ASPERCREME) 4 % topical patch 1 patch  1 patch Topical Daily Galileo Samuel DPM   1 patch at 02/22/21 0820   • magnesium hydroxide (M.O.M.) 400 mg/5 mL suspension 30 mL  30 mL oral 2x daily PRN Laure Bishop MD   30 mL at 02/20/21 0907   • magnesium oxide (MAG-OX) tablet 400 mg  400 mg oral BID Laure Bishop MD   400 mg at 02/22/21 0819   • melatonin ODT 3 mg  3 mg oral Nightly Laure Bishop MD   3 mg at 02/21/21 2039   • metoprolol tartrate (LOPRESSOR) split tablet 12.5 mg  12.5 mg oral BID Omari Spain MD   12.5 mg at 02/22/21 0819   • mouth moisturizer (TOOTHETTE) cream   mucous membrane TID Laure Bishop MD   Given at 02/22/21 0820   • phenazopyridine (PYRIDIUM) tablet 95 mg  95 mg oral TID with meals Pilo Hansen MD   95 mg at 02/22/21 0906   • polyethylene glycol (MIRALAX) 17 gram packet 17 g  17 g oral Daily PRN Omari Spain MD   17 g at 02/20/21 0907   • rivaroxaban (XARELTO) tablet 10 mg  10 mg oral Daily with dinner Laure Bishop MD   10 mg at 02/21/21 1652   • senna (SENOKOT) tablet 3 tablet  3 tablet oral Daily before lunch Isael Lang MD   3 tablet at 02/21/21 1215   • sodium chloride tablet 1 g  1 g oral BID with meals Laure Bishop MD   1 g at 02/22/21 3204        Medication List      ASK your doctor about these medications    amLODIPine 10 mg tablet  Commonly known as: NORVASC  Take 10 mg by mouth daily.  Dose: 10 mg     aspirin 81 mg enteric coated tablet  Take 81 mg by mouth daily.  Dose: 81 mg     docusate  sodium 100 mg capsule  Commonly known as: COLACE  Take 100 mg by mouth 2 (two) times a day.  Dose: 100 mg     famotidine 20 mg tablet  Commonly known as: PEPCID  Take 20 mg by mouth 2 (two) times a day.  Dose: 20 mg     heparin (porcine) 5,000 unit/mL syringe  Inject 5,000 Units under the skin 3 (three) times a day.  Dose: 5,000 Units     metoprolol succinate XL 12.5 mg  Commonly known as: TOPROL-XL  Take 12.5 mg by mouth every 12 (twelve) hours.  Dose: 12.5 mg          Review of Systems  All other systems reviewed and negative except as noted in the HPI.    Objective   Labs  I have reviewed the patient's labs.  Current labs are within normal limits.    Imaging  Not applicable      Physicial Exam  afeb vss    General Appearance: Not in acute distress  Head/Ear/Nose/Throat: Normocephalic; Atraumatic.  On oxygen by nasal cannula.  Eye: EOMI; PERRL.   Neck: No JVD; No Bruits.   Chest expansion wnl  Gastrointestinal: Soft; NT; +BS.   [place] - deferred   Extremities: Bilateral lower extremity edema noted.    Musculoskeletal: Functional active range of motion in both upper and lower extremities.    Neurological: Awake alert oriented to person, had some difficulty naming the place and correct date.. Speech is fluent        Assessment   70 y.o. male being consulted for management recommendations     bph  covid 19   Retention  deconditioning      Plan   Cath > 500cc  Will follow  flomax watch hemodynamics  outpt  [place] / psa

## 2021-02-22 NOTE — PROGRESS NOTES
Subjective    Patient seen and examined on rounds.  Chart reviewed.  Events overnight noted.  History reviewed briefly with patient.     CC:  Deficits in mobility, transfers, self-care status post deconditioning, severe Covid 19 pneumonia, ventilator-dependent respiratory failure, dysphagia, multiple medical problems.     HPI:  Mr. Denis Green is a 70-year-old right-handed white male with chronic conditions significant for coronary artery disease, hypertension, hyperlipidemia who was admitted to [facility] on 1/5/21 due to Covid 19 pneumonia and worsening respiratory status.  He required intubation on 1/9/21 and had ventilator-dependent respiratory failure.  He was treated with Decadron and Remdesivir.  He had dysphagia requiring Dobbhoff tube feeds.  He was extubated on 1/16/21 to OptiFlow.  On 1/23/21 he was transitioned to high flow nasal cannula.  Hospital course was significant for acute renal insufficiency likely secondary to acute tubular necrosis and he was followed by nephrology.  He had subsequent improvement in renal function.  He also had new fever of 102.8°F on 1/20/21 and 1/21/21.  He was followed by infectious disease.  Blood cultures were negative. CT scan of the chest abdomen pelvis was done with results showing increasing pulmonary infiltrates/groundglass opacities and consolidations in his bilateral lower lobes.  He was treated empirically with Cefepime and Vancomycin to cover hospital-acquired pneumonia.  He was noted to be lethargic on 1/22/21.  Subsequently patient removed Dobbhoff tube on 1/25/21 and there is question of possible aspiration during that process.  He continued to have intermittent confusion on 1/26/21 was felt to be possible post ICU delirium.  He was followed by neurology.  MRI brain was unremarkable.  EEG showed mild slowing but no seizure activity was noted.  Antibiotics were discontinued after a 5 day course due to lethargy.  Repeat video swallow study was  "performed for dysphagia and patient was put on a soft diet with nectar thick liquids. On 2/3/21, hemoglobin was 11.2, WBC count 15.1, platelets were 275, BUN was 15, and creatinine was 0.7.  He has been needing assistance for mobility, transfers, self-care postoperatively. He is transferred to [facility] on 2/4/21 for further rehabilitation care.       SUBJ: Tolerating therapies per endurance.  Left foot feels much better today.  Tolerating diet without problems.  Maintaining stable pulse oximetry on room air.    ROS: Denies chest pain or shortness of breath. Other ROS negative. Past, family, social history is unchanged.    Physical Exam      Blood pressure 113/63, pulse 66, temperature 36.4 °C (97.5 °F), temperature source Oral, resp. rate 18, height 1.727 m (5' 8\"), weight 77.1 kg (170 lb), SpO2 95 %.  Body mass index is 25.85 kg/m².    General Appearance: Not in acute distress  Head/Ear/Nose/Throat: Normocephalic; Atraumatic.  On oxygen by nasal cannula.  Eye: EOMI; PERRL.   Neck: No JVD; No Bruits.   Respiratory: Decreased breath sounds at bases.   Cardiovascular: RRR; Normal S1, S2.   Gastrointestinal: Soft; NT; +BS.   Extremities: Bilateral lower extremity edema noted.   Musculoskeletal: Functional active range of motion in both upper and lower extremities.    Neurological: Awake alert oriented to person, had some difficulty naming the place and correct date.. Speech is fluent. Cranial nerve examination does not reveal any gross facial asymmetry. Strength testing about 4/5 strength in both upper extremities.  Bilateral hip flexion is 3+/5.  Bilateral quadriceps are 4/5 with the thighs supported.  Bilateral ankle dorsi and plantar flexion are 4/5.  He is grossly able to localize touch and position sense in great toes.  Deep tendon reflexes are hypoactive and symmetric bilaterally.  Plantars are flexor.  Coordination is functional upper extremities.  Neurologically unchanged.  Behavior/Emotional: " Appropriate; Cooperative.   Skin: No obvious rashes noted.        Current Facility-Administered Medications:   •  acetaminophen (TYLENOL) tablet 650 mg, 650 mg, oral, q4h PRN, Omari Spain MD, 650 mg at 02/17/21 1433  •  albuterol HFA (VENTOLIN HFA) 90 mcg/actuation inhaler 2 puff, 2 puff, inhalation, q4h PRN, Laure Bishop MD  •  alum-mag hydroxide-simeth (MAALOX) 200-200-20 mg/5 mL suspension 30 mL, 30 mL, oral, q6h PRN, Laure Bishop MD  •  amLODIPine (NORVASC) tablet 2.5 mg, 2.5 mg, oral, Nightly, Laure Bishop MD, 2.5 mg at 02/17/21 2056  •  aspirin enteric coated tablet 81 mg, 81 mg, oral, Daily, Omari Spain MD, 81 mg at 02/21/21 0910  •  bisacodyL (DULCOLAX) 10 mg suppository 10 mg, 10 mg, rectal, Daily PRN, Isael Lang MD, 10 mg at 02/19/21 2000  •  cefpodoxime (VANTIN) tablet 200 mg, 200 mg, oral, BID, Laure Bishop MD, 200 mg at 02/21/21 0910  •  diphenhydrAMINE (BENADRYL) capsule 50 mg, 50 mg, oral, Nightly, Isael Lang MD, 50 mg at 02/20/21 2124  •  docusate sodium (COLACE) capsule 100 mg, 100 mg, oral, TID, Isael Lang MD, 100 mg at 02/21/21 1651  •  furosemide (LASIX) tablet 20 mg, 20 mg, oral, BID (am, 4p), Laure Bishop MD, 20 mg at 02/21/21 1651  •  gemfibroziL (LOPID) tablet 600 mg, 600 mg, oral, BID AC, Omari Spain MD, 600 mg at 02/21/21 1651  •  guaiFENesin (MUCINEX) 12 hr ER tablet 600 mg, 600 mg, oral, BID, Laure Bishop MD, 600 mg at 02/21/21 0909  •  lidocaine (ASPERCREME) 4 % topical patch 1 patch, 1 patch, Topical, Daily, Galileo Samuel, DPMIKHAIL, 1 patch at 02/21/21 0909  •  magnesium hydroxide (M.O.M.) 400 mg/5 mL suspension 30 mL, 30 mL, oral, 2x daily PRN, Laure Bishop MD, 30 mL at 02/20/21 0907  •  magnesium oxide (MAG-OX) tablet 400 mg, 400 mg, oral, BID, Laure Bishop MD, 400 mg at 02/21/21 0909  •  melatonin ODT 3 mg, 3 mg, oral, Nightly, Laure Bishop MD, 3 mg at 02/20/21 2127  •  metoprolol tartrate (LOPRESSOR) split tablet 12.5 mg, 12.5  mg, oral, BID, Omari Spain MD, 12.5 mg at 02/21/21 0908  •  mouth moisturizer (TOOTHETTE) cream, , mucous membrane, TID, Laure Bishop MD, Given at 02/21/21 1641  •  polyethylene glycol (MIRALAX) 17 gram packet 17 g, 17 g, oral, Daily PRN, Omari Spain MD, 17 g at 02/20/21 0907  •  rivaroxaban (XARELTO) tablet 10 mg, 10 mg, oral, Daily with dinner, Laure Bishop MD, 10 mg at 02/21/21 1652  •  senna (SENOKOT) tablet 3 tablet, 3 tablet, oral, Daily before lunch, Isael Lang MD, 3 tablet at 02/21/21 1215  •  sodium chloride tablet 1 g, 1 g, oral, BID with meals, Laure Bishop MD, 1 g at 02/21/21 1652       Labs / Radiology    Lab Results   Component Value Date    WBC 11.77 (H) 02/18/2021    HGB 10.0 (L) 02/18/2021    HCT 30.9 (L) 02/18/2021    MCV 95.4 02/18/2021     02/18/2021     Lab Results   Component Value Date    GLUCOSE 96 02/18/2021    CALCIUM 8.5 (L) 02/18/2021     (L) 02/18/2021    K 3.8 02/18/2021    CO2 26 02/18/2021    CL 94 (L) 02/18/2021    BUN 10 02/18/2021    CREATININE 0.6 (L) 02/18/2021       Assessment and Plan    ASSESSMENT PLAN:  1. 70-year-old right-handed white male with chronic conditions significant for coronary artery disease, hypertension, hyperlipidemia who was admitted to [facility] on 1/5/21 due to Covid 19 pneumonia and worsening respiratory status.  He required intubation on 1/9/21 and had ventilator-dependent respiratory failure.  He was treated with Decadron and Remdesivir.  He had dysphagia requiring Dobbhoff tube feeds.  He was extubated on 1/16/21 to OptiFlow.  On 1/23/21 he was transitioned to high flow nasal cannula.  Hospital course was significant for acute renal insufficiency likely secondary to acute tubular necrosis and he was followed by nephrology.  He had subsequent improvement in renal function.  He also had new fever of 102.8°F on 1/20/21 and 1/21/21.  He was followed by infectious disease.  Blood cultures were negative. CT  scan of the chest abdomen pelvis was done with results showing increasing pulmonary infiltrates/groundglass opacities and consolidations in his bilateral lower lobes.  He was treated empirically with Cefepime and Vancomycin to cover hospital-acquired pneumonia.  He was noted to be lethargic on 1/22/21.  Subsequently patient removed Dobbhoff tube on 1/25/21 and there is question of possible aspiration during that process.  He continued to have intermittent confusion on 1/26/21 was felt to be possible post ICU delirium.  He was followed by neurology.  MRI brain was unremarkable.  EEG showed mild slowing but no seizure activity was noted.  Antibiotics were discontinued after a 5 day course due to lethargy.  Repeat video swallow study was performed for dysphagia and patient was put on a soft diet with nectar thick liquids. On 2/3/21, hemoglobin was 11.2, WBC count 15.1, platelets were 275, BUN was 15, and creatinine was 0.7.  He has been needing assistance for mobility, transfers, self-care postoperatively. He is transferred to [facility] on 2/4/21 for further rehabilitation care.       2. DVT prophylaxis - on subcutaneous Heparin.  On SCDs.  Platelets 296 on 2/5/2021.  Platelets 389 on 2/11/2021.  Platelets 470 on 2/15/2021.  Platelets 316 on 2/18/2021.     3.  Deconditioning - recent severe Covid 19 pneumonia, ventilator-dependent respiratory failure, dysphagia, multiple medical problems - He had ventilator-dependent respiratory failure.  He was treated with Decadron and Remdesivir.  Continue PT, OT, speech, psychology.  Follow falls precautions, cardiac precautions, aspiration precautions.     4. GI - On Colace, Senokot.  On PRN MiraLAX, MOM, Maalox.      5.  - voiding.  Denies dysuria.  Monitor post void residuals.     6.  Pulmonary - recent severe Covid 19 pneumonia, ventilator-dependent respiratory failure - on Ventolin HFA.  On Mucinex.     7. Pain - on PRN Tylenol.  On scheduled Tylenol for left  wrist pain.  On topical Voltaren gel.  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  K pad as needed for pain.     8. Hematology -hemoglobin 12.0, WBC 8.15 on 2/5/2021.  Hemoglobin 10.7, WBC 8.05 on 2/11/2021.  Hemoglobin 12.0, WBC 11.56 on 2/15/2021.  Hemoglobin 10.0, WBC 11.77 on 2/18/2021.     9. CVS - history of coronary artery disease and hypertension.  On Norvasc.  On Lopressor.  On Aspirin.  On Lasix.  On Magnesium oxide.     10.  Hyperlipidemia - on Lopid.     11.  Insomnia - on Melatonin.     12.  Dysphagia - Tolerating regular thins diet now.  Speech therapy following.     13. Rehabilitation medicine - Continue comprehensive rehabilitation care. Continue PT, OT, speech, psychology.  We will follow falls precautions, cardiac precautions, monitor pulse oximetry in therapy and follow aspiration precautions.  Tolerating therapies per endurance.  Discussed with speech therapy.  Free water protocol ice chips was started.  Denies coughing with meals.He reported some pain in left wrist.  He is not sure if he accidentally banged the left wrist against the side rail of the bed.  Denies history of gout.  Left wrist does not appear warm or tender to touch.  K pad ordered.  We will also order scheduled Tylenol for 7 days.  Discussed results of wrist x-rays with patient which showed moderate degenerative change particularly about the radial aspect of the wrist.  He reports pain is less today.  He ambulated 25 feet with rolling walker with assistance of 2 people with physical therapy.Free water protocol with ice chips and sips of water per speech therapy.  He feels better today.  Left wrist pain is decreased significantly per patient.  Working on endurance with physical therapy.  He required minimal to moderate assistance for transfers.  Working on ADLs with occupational therapy.He feels better today.  Denies increased pain.  Discussed with him about doing incentive spirometry.  We will try to wean off  oxygen if possible.Trying to wean off oxygen as possible.  Tolerating therapies per endurance.  He reports decrease sleep at night.  Usually takes 2 Tylenol PM at night.  He is already on scheduled Tylenol.  Will order 50 mg of Benadryl at bedtime. He reports he slept well with Benadryl last night.  Tolerating therapies per endurance.  Denies left wrist pain today.Video swallow was done today.  Mild oral and pharyngeal dysphagia noted.  Transient penetration of thin liquids is noted.  Speech therapy working with dysphagia.  Tolerating upgraded diet after video swallow.  Noted ENT input regarding his voice.  Vocal cords moving normally per ENT.  Discussed with patient.Tolerating regular thins diet.  Discussed ENT input with him.  He requested podiatric consult which was ordered.He was seen by podiatry earlier today.  Left foot was injected.  He reports left foot feels much better now.Tolerating therapies per endurance.  Left foot feels much better today.  Tolerating diet without problems.  Maintaining stable pulse oximetry on room air.     14. Reviewed labs today.  BUN 15, creatinine 0.6 on 2/5/2021.  BUN 16, creatinine 0.7 on 2/11/2021.  BUN 12, creatinine 0.8 on 2/15/2021.  BUN 10, creatinine 0.6 on 2/18/2021.               Isael Lang MD  2/21/2021

## 2021-02-22 NOTE — PROGRESS NOTES
"Inpatient Psychology Progress Note    Duration: 25 minutes  Denis Green : 1950, a 70 y.o. male, for follow up visit.  Reason:  He has been experiencing Adjustment Issues.    HPI: s/p Covid 19 pneumonia; Deconditioning      Current Evaluation:     Mental Status Evaluation:  Arousal Level: Alert  Appearance: Well Groomed  Speech: Regular  Psychomotor Functioning: Decreased  Eye Contact: WNL  Est. Premorbid Intelligence: Average  Orientation: Fully oriented  Attention: WNL  Concentration: WNL  Recent Memory: WNL  Remote Memory: WNL  Thought Content: Appropriate  Thought Process: WNL  Insight: Intact  Perceptual Function: Normal  Delusions: None or age appropriate  Sleeping: Onset Problem, Early Morning Waking, Decreased  Appetite: No Change(improved)  Libido: Non-Contributory  Affect: Full Range  Mood: Hopeful, Motivated         [place] Suicide Severity Rating Scale:  Not done today      Interventions  No Further Follow Up  Cognitive Behavior Training  Monitoring of Symptoms  Psychoeducation    Psychoeducation provided on:  Other: Decodnditioning     Recommendations:   No Further sessions; Patient pending discharge      Goals    Maximize Adjustment and Acceptance  of Limitations         Visit Diagnosis:   Adjustment disorder with Anxiety    Diagnostic Impression: Mr. Green reports that  discharge is scheduled for Wed. He reports that he  Has made gain in ambulation, strength, improved appetite.  He reports that he is a bit frustrated with urinary problems / reports that discharge \"feels like a milestone which interfered with his sleep.  He has better insight into fatigue and fatigue management.  Discuss concept of care giver anxiety and he reports that he plans to accet help from his wife and son as needed.  Reports mood is very good.  Feels that discharge is \"a milestone.\" Discuss discharge issues.   Discuss emotional reactions and acute stress/ptsd in response to Covid.    Will discharge from " Psychology as his discharge is pending.    Psychological condition iis improvng.  JONATHAN Anne.D

## 2021-02-22 NOTE — PROGRESS NOTES
[facility] Internal Medicine Progress Note          Patient was seen and examined at bedside.    Subjective:    Over night he had bladder spasms, urinary urgency but difficulty to urinate, recent UCX growing klebsiella aerogenes, based on susceptibility switched from Macrobid to vantin on 2/18/2021, but interval, his symptomatic urinary tract infection seems getting worse.  Plan: repeat a UA with microscopy and UCX, switch to cipro 500 mg bid for 5-7 days.   No clinical sepsis.   Talked with RN and PMR attending about his treatment plan.       Objective   Vital signs in last 24 hours:  Temp:  [36.3 °C (97.4 °F)-36.4 °C (97.5 °F)] 36.3 °C (97.4 °F)  Heart Rate:  [66-77] 67  Resp:  [18] 18  BP: (108-125)/(55-66) 113/60    No intake or output data in the 24 hours ending 02/22/21 0914  Intake/Output this shift:  No intake/output data recorded.   Review of Systems:  All other systems reviewed and negative except as noted in the HPI.   Objective      Labs  reviewed his labs thoroughly   Lab Results   Component Value Date    WBC 10.91 (H) 02/22/2021    HGB 10.5 (L) 02/22/2021    HCT 32.7 (L) 02/22/2021    MCV 96.2 02/22/2021     (H) 02/22/2021     Lab Results   Component Value Date    GLUCOSE 104 (H) 02/22/2021    CALCIUM 9.2 02/22/2021     (L) 02/22/2021    K 4.2 02/22/2021    CO2 26 02/22/2021    CL 95 (L) 02/22/2021    BUN 15 02/22/2021    CREATININE 0.7 (L) 02/22/2021       Imaging  OSH imaging study reports reviewed:    CXR 2/5/2021:  IMPRESSION:  Bilateral airspace opacities most prominent in the left upper lobe and left  lower lobe likely representing COVID19 pneumonia.         Full Code    Physical Exam:  Head/Ear/Nose/Throat: normocephalic; atraumatic; moisture mouth mm, no oropharyngeal thrush noted.   Eyes: anicteric sclera, EOMI; PERRL.   Neck : supple, no JVD, no carotid bruits appeciated.   Respiratory: no evidence of labored breathing, lung sounds a little coarse, mild bibasilar  diminished BS, no remarkable w/r/c.   Cardiovascular: RRR; normal S1, S2; no m/r/g; no S3 or S4.   Gastrointestinal: soft; NT; BS normal; mildly distended; no CVAT b/l.   Genitourinary: no lim.   Extremities : no c/c/e .   Neurological: AO x 3, fluent speeches, following commands, CNS II-XII grossly intact; no focal neurologic deficits.   Behavior/Emotional: in NAD, appropriate; cooperative.   Skin: clean, dry and intact.     Plan of care was discussed with patient, RN, and PMR attending.     Assessment     CC: S/p severe covid-19 PNA complicated by VDRF, dysphagia, ERNIE, bacterial PNA, and  hospital delirium, physical deconditioning with ADL and ambulatory dysfunction.       70 y.o. male, I PTA, with PMH of HTN, HLD, and CAD, admitted to [facility] on 1/5/2021 for severeCOVID-19 PNA. Worsening resp status intubated on 1/9- extubated 1/16 to OptiFlow. Transition to [facility]NC on 1/23.Eval by nephrology while in ICU due to ERNIE likely secondary to ATN. Creatinine has been improving with excellent urine output. Received Decadron and Remdesivir. Dobhoff tube was placed due to dysphagia. New fever of 102.8 on 1/20 and 1/21. ID was consulted. Bld cx's negative. CT scan of the chest abdomen pelvis was done with results showing increasing pulmonary infiltrates/groundglass opacities and consolidations in his bilateral lower lobes. Started empirically on cefepime and vancomycin to cover hospital-acquired pneumonia. Pt had increased lethargy on 1/22. Pt removed DHT 1/25; required higher O2 afterward - possible aspiration during the process. 1/26 patient continued to have intermittent confusion; ?post ICU delirium. Neurology is following, MRI brain was unremarkable. EEG showed mild slowing but no seizure activity. D/c'd abx after 5 days d/t lethargy. Repeat VFSS performed- now on soft nectar thick liquid diet .  Pt is AA&Ox3 with periods of confusion. Tolerates soft diet with NTL, voiding in urinal, O2 @ 2-3L via nc. Participating in  therapies with marked improvement, functionally, he has residual ADL and ambulatory dysfunction, rec's for acute rehab prior to home with SO. Transferred to [facility] on 2/4/2021.      1.S/p severe covid-19 PNA complicated by VDRF, dysphagia, ERNIE, bacterial PNA, and  hospital delirium, physical deconditioning with ADL and ambulatory dysfunction : inpt acute rehab, gait and balancing training, SLP therapy, nutrition support,  Fall/aspiration precaution, medical management, DVT prophylaxis, dermal defense, f/u with PCP after inpt rehab.     2. Pulm-S/p severe covid-19 PNA complicated by VDRF, s/p possible bacteria PNA,  hypoxemia with mild exertion, atelectasis: monitor SO2, prn NC O2, keep SO2>92%, incentive spirometry, bronchodilator inhaler, mucolytic agent, pulm toileting. Check CXR.      3. Psych-s/p hospital acute delirium, intermittent confusion: his metal status has much improved, monitor his mood and mentation, help his orientation, psychology CBT, avoid unnecessary sedatives, SW support.      4. GI-on soft nectar thick liquid diet, constipation:SLP evaluation to advance his diet as tolerated; provide constipation bowel regimen, keep adequate hydration, timed toilet visits.     5. DVT prophy: SCD, early ambulation, SQ heparin, check LE venous DUS to r/o DVT.       6. HTN, dyslipidemia: cont home HTN meds with holding parameter, orthostatic hypotension precaution, monitor BP . On gemfibrozil, f/u with his PCP.      Mixed significant dyslipidemia: LCL-C 185, HDL-C 30, , h/o HTN, HLD, and CAD, only gemfibrozil is far from adequate, labeled Lipitor allergy, still worth retrial with hydrophilic formula Crestor from low dose, which is absolutely necessary and also guideline recommended    7. Renal-s/p ERNIE recovered, electrolytes imbalance: monitor renal function and volume status, avoid nephrotoxic agents and low BP,  monitor and keep electrolytes balance.     8. - urinary retention: timed voiding trial, monitor  PVR with prn cic, check UA/UCX.     Billing code: [id number]  Diagnoses:  Patient Active Problem List   Diagnosis   • Acute metabolic encephalopathy   • ARDS (adult respiratory distress syndrome) (CMS/HCC)   • Coronary artery disease involving native coronary artery of native heart without angina pectoris   • Hypoxia   • Pneumonia due to COVID-19 virus   • Transaminitis   • Essential hypertension   • Dyslipidemia   • Atelectasis   • Physical deconditioning   • Acute cystitis        Laure Bishop MD  2/22/2021

## 2021-02-23 ENCOUNTER — APPOINTMENT (INPATIENT)
Dept: OCCUPATIONAL THERAPY | Facility: REHABILITATION | Age: 71
DRG: 948 | End: 2021-02-23
Payer: COMMERCIAL

## 2021-02-23 ENCOUNTER — APPOINTMENT (INPATIENT)
Dept: PHYSICAL THERAPY | Facility: REHABILITATION | Age: 71
DRG: 948 | End: 2021-02-23
Payer: COMMERCIAL

## 2021-02-23 ENCOUNTER — APPOINTMENT (INPATIENT)
Dept: SPEECH THERAPY | Facility: REHABILITATION | Age: 71
DRG: 948 | End: 2021-02-23
Payer: COMMERCIAL

## 2021-02-23 ENCOUNTER — APPOINTMENT (INPATIENT)
Dept: OTHER | Facility: REHABILITATION | Age: 71
DRG: 948 | End: 2021-02-23
Payer: COMMERCIAL

## 2021-02-23 PROBLEM — N13.8 BPH WITH URINARY OBSTRUCTION: Status: ACTIVE | Noted: 2021-02-23

## 2021-02-23 PROBLEM — N40.1 BPH WITH URINARY OBSTRUCTION: Status: ACTIVE | Noted: 2021-02-23

## 2021-02-23 PROCEDURE — 97530 THERAPEUTIC ACTIVITIES: CPT | Mod: GO,59

## 2021-02-23 PROCEDURE — 92507 TX SP LANG VOICE COMM INDIV: CPT | Mod: GN

## 2021-02-23 PROCEDURE — 63700000 HC SELF-ADMINISTRABLE DRUG: Performed by: PODIATRIST

## 2021-02-23 PROCEDURE — 97116 GAIT TRAINING THERAPY: CPT | Mod: GP

## 2021-02-23 PROCEDURE — 97530 THERAPEUTIC ACTIVITIES: CPT | Mod: GP,59

## 2021-02-23 PROCEDURE — 12800000 HC ROOM AND CARE SEMIPRIVATE REHAB

## 2021-02-23 PROCEDURE — 63700000 HC SELF-ADMINISTRABLE DRUG: Performed by: UROLOGY

## 2021-02-23 PROCEDURE — 97535 SELF CARE MNGMENT TRAINING: CPT | Mod: GO

## 2021-02-23 PROCEDURE — 63700000 HC SELF-ADMINISTRABLE DRUG: Performed by: INTERNAL MEDICINE

## 2021-02-23 PROCEDURE — 97530 THERAPEUTIC ACTIVITIES: CPT | Mod: 59

## 2021-02-23 PROCEDURE — 63700000 HC SELF-ADMINISTRABLE DRUG: Performed by: PHYSICAL MEDICINE & REHABILITATION

## 2021-02-23 RX ORDER — DICLOFENAC SODIUM 10 MG/G
2 GEL TOPICAL 3 TIMES DAILY
Status: DISCONTINUED | OUTPATIENT
Start: 2021-02-23 | End: 2021-02-24 | Stop reason: HOSPADM

## 2021-02-23 RX ADMIN — Medication 400 MG: at 21:15

## 2021-02-23 RX ADMIN — ASPIRIN 81 MG: 81 TABLET ORAL at 08:47

## 2021-02-23 RX ADMIN — DICLOFENAC SODIUM 2 G: 10 GEL TOPICAL at 16:41

## 2021-02-23 RX ADMIN — SODIUM CHLORIDE 1 G: 1 TABLET ORAL at 08:46

## 2021-02-23 RX ADMIN — FUROSEMIDE 20 MG: 20 TABLET ORAL at 16:41

## 2021-02-23 RX ADMIN — GEMFIBROZIL 600 MG: 600 TABLET ORAL at 07:57

## 2021-02-23 RX ADMIN — CIPROFLOXACIN 500 MG: 250 TABLET ORAL at 21:15

## 2021-02-23 RX ADMIN — GUAIFENESIN 600 MG: 600 TABLET ORAL at 08:47

## 2021-02-23 RX ADMIN — DOCUSATE SODIUM 100 MG: 100 CAPSULE, LIQUID FILLED ORAL at 21:21

## 2021-02-23 RX ADMIN — PROBIOTIC PRODUCT - TAB 500 MG: TAB at 08:46

## 2021-02-23 RX ADMIN — SENNOSIDES 3 TABLET: 8.6 TABLET, FILM COATED ORAL at 12:05

## 2021-02-23 RX ADMIN — METOPROLOL TARTRATE 12.5 MG: 25 TABLET, FILM COATED ORAL at 21:15

## 2021-02-23 RX ADMIN — POLYETHYLENE GLYCOL 3350 17 G: 17 POWDER, FOR SOLUTION ORAL at 18:06

## 2021-02-23 RX ADMIN — METOPROLOL TARTRATE 12.5 MG: 25 TABLET, FILM COATED ORAL at 08:46

## 2021-02-23 RX ADMIN — Medication 400 MG: at 08:46

## 2021-02-23 RX ADMIN — SODIUM CHLORIDE 1 G: 1 TABLET ORAL at 16:41

## 2021-02-23 RX ADMIN — PROBIOTIC PRODUCT - TAB 500 MG: TAB at 21:15

## 2021-02-23 RX ADMIN — DICLOFENAC SODIUM 2 G: 10 GEL TOPICAL at 21:16

## 2021-02-23 RX ADMIN — DIPHENHYDRAMINE HYDROCHLORIDE 50 MG: 25 CAPSULE ORAL at 21:15

## 2021-02-23 RX ADMIN — RIVAROXABAN 10 MG: 10 TABLET, FILM COATED ORAL at 16:41

## 2021-02-23 RX ADMIN — CIPROFLOXACIN 500 MG: 250 TABLET ORAL at 08:48

## 2021-02-23 RX ADMIN — GUAIFENESIN 600 MG: 600 TABLET ORAL at 21:15

## 2021-02-23 RX ADMIN — Medication 3 MG: at 21:15

## 2021-02-23 RX ADMIN — FUROSEMIDE 20 MG: 20 TABLET ORAL at 08:47

## 2021-02-23 RX ADMIN — TAMSULOSIN HYDROCHLORIDE 0.4 MG: 0.4 CAPSULE ORAL at 21:14

## 2021-02-23 RX ADMIN — DOCUSATE SODIUM 100 MG: 100 CAPSULE, LIQUID FILLED ORAL at 08:47

## 2021-02-23 RX ADMIN — MAGNESIUM HYDROXIDE 30 ML: 400 SUSPENSION ORAL at 18:06

## 2021-02-23 RX ADMIN — LIDOCAINE 1 PATCH: 246 PATCH TOPICAL at 07:57

## 2021-02-23 RX ADMIN — GEMFIBROZIL 600 MG: 600 TABLET ORAL at 16:40

## 2021-02-23 RX ADMIN — DICLOFENAC SODIUM 2 G: 10 GEL TOPICAL at 08:48

## 2021-02-23 RX ADMIN — DOCUSATE SODIUM 100 MG: 100 CAPSULE, LIQUID FILLED ORAL at 16:41

## 2021-02-23 NOTE — PROGRESS NOTES
Patient: Denis Green  Location: [facility] [address]  MRN: [id number]  Today's date: 2/23/2021    History of Present Illness  Denis LOVE is a 70 y.o. male admitted on 2/4/2021 with Pneumonia due to COVID-19 virus. Principal problem is No Principal Problem: There is no principal problem currently on the Problem List. Please update the Problem List and refresh..   Pt admitted to outside hospital on 1/5/2021 for COVID-19 PNA. Worsening resp status intubated on 1/9- extubated 1/16 to [facility]. Transition to [facility] on 1/23.Eval by nephrology while in ICU due to ERNIE likely secondary to ATN. Creatinine has been improving with excellent urine output. Received Decadron and Remdesivir. Dobhoff tube was placed due to dysphagia. New fever of 102.8 1/20 and 1/21. ID was consulted. Bld cx's negative. CT scan of the chest abdomen pelvis was done with results showing increasing pulmonary infiltrates/groundglass opacities and consolidations in his bilateral lower lobes. Started empirically on cefepime and vancomycin to cover hospital-acquired pneumonia. Pt had increased lethargy on 1/22. Pt removed DHT 1/25; required higher O2 afterward - possible aspiration during the process. 1/26 patient continued to have intermittent confusion; ?post ICU delirium. Neurology is following, MRI brain was unremarkable. EEG showed mild slowing but no seizure activity. D/c'd abx after 5 days d/t lethargy.       Past Medical History  Denis LOVE has a past medical history of Coronary artery disease, Hypertension, and Lipid disorder.    SLP Pain    Date/Time Pain Type Pref Pain Scale Side Location Rating: Rest Rating: Rest Rating: Activity Interventions [facility]   02/23/21 1035 Pain Assessment word (verbal rating pain scale) Left wrist -- 0 - no pain 10 - excruciating pain diversional activity provided    02/23/21 1045 Pain Reassessment number (Numeric Rating Pain Scale) -- -- 0 -- -- -- LM   02/23/21 1057 Pain Reassessment word  (verbal rating pain scale) Left wrist -- 0 - no pain 10 - excruciating pain -- RS          Prior Living Environment      Most Recent Value   Lives With  spouse, child(mindy), adult [adult son lives in home occasionally]   Living Arrangements  house   Home Accessibility  stairs to enter home (Group), stairs within home (Group)   Living Environment Comment  Lives with wife and intermittently with one son.  [bed and bath on 2nd ,  no powder room on first]   Number of Stairs, Main Entrance  1   Stair Railings, Main Entrance  none   Location, Patient Bedroom  second floor, must negotiate stairs to access   Location, Bathroom  second floor, must negotiate stairs to access   Bathroom Access Comment  Pt. reports has tub shower and stall shower, uses tub shower primarily, has grab bar in shower, has stanard height toilet c wall on both sides   Number of Stairs, Within Home, Primary  12   Surface of Stairs, Within Home, Primary  hardwood   Stair Railings, Within Home, Primary  railings on both sides of stairs          Prior Level of Function      Most Recent Value   Dominant Hand  right   Ambulation  independent   Transferring  independent   Toileting  independent   Bathing  independent   Dressing  independent   Eating  independent   Communication  understands/communicates without difficulty   Swallowing  swallows foods/liquids without difficulty   Baseline Diet/Method of Nutritional Intake  regular solids, thin liquids   Past History of Dysphagia  No previous reports    Prior Level of Function Comment  Pt is a cattle/,  previously IND for all IADLs.    Assistive Device/Animal Currently Used at Home  walker, front-wheeled          IRF SLP Evaluation and Treatment - 02/23/21 1035        Time Calculation    Start Time  1030     Stop Time  1100     Time Calculation (min)  30 min        Session Details    Document Type  discharge evaluation     Mode of Treatment  speech language pathology;individual therapy        General  "Information    General Observations of Patient  Pleasant and cooperative, pt received in ST office         Cognition/Psychosocial    Executive Function Deficit (Cognition)  information processing;abstract thinking     Comment, Executive Function  Reviewed and discussed discharge plans, reviewed goals met, HEP, STAR and WRAP strategies, and no need for continued skilled ST. Pt provided with written handout re. strategies. He verbalized good understanding. Abstract category exclusion, verbally presented 5-units of information, pt named the excluded item and rationale for excluded item with 100% acc I\"ly. Timely perfromance      Memory Deficit (Cognitive)  immediate recall     Comment, Short Term Memory  Great functional recall of morning events. IND recall of 5-word list verbally presented for category exclusion exericse.         Functional Communication Measures    FCM: Memory  7-->Level 7     FCM: Problem Solving  7-->Level 7     FCM: Spoken Language, Comprehension  7-->Level 7     FCM: Swallowing  7-->Level 7        Daily Progress Summary (SLP)    Daily Outcome Statement (SLP)  Great participation, pt actively engaged in discharge planning and discussion re. goals met. Pt to d/c home tomorrow, no further skilled ST warranted.         Discharge Summary (SLP)    Discharge Summary Statement (SLP)  Steady fxl gains in all domains. Pt presents with greatly improved executive functioning/reasoning skills and STM. He completes mod level reasoning exercises with 100% acc I'ly.  VFSS completed 2/17/21, which revealed, Mild oral phase dysphagia characterized by mildly delayed A-P transfer and posterior loss of thin liquids. 2.Mild pharyngeal phase dysphagia characterized by delayed swallow initiation of thin liquids, consistent transient penetration of thin liquids, and mild pharyngeal residue of thin liquids and soft solids. No aspiration present during this study. Diet has since been advanced to regular solids/thin " liquids, no overt s/s of aspiration. Pt has met all goals and no longer requires skilled ST.                         IRF SLP Goals      Most Recent Value   Verbal Expression Goal 1   Verbal Expression Goal 1  STG: pt will complete mod level verbal expression tasks x 90% c min cues. at 02/06/2021 1405   Time Frame  short-term goal (STG), 1 week at 02/06/2021 1405   Progress/Outcome  goal no longer appropriate at 02/18/2021 0834   Verbal Expression Goal 2   Verbal Expression Goal 2  LTG: pt will complete complex/abstract verbal expression tasks x 90% c independent use of WF strategies. at 02/06/2021 1405   Time Frame  long-term goal (LTG), 3 weeks at 02/06/2021 1405   Progress/Outcome  goal no longer appropriate at 02/18/2021 0834   Oral Nutrition/Hydration Goal 1   Activity  effective/safe/independent, use of swallowing techniques [regular/NTL, no overt s/s of aspiration ] at 02/05/2021 1202   Time Frame  short-term goal (STG), 3 - 5 days at 02/18/2021 0834   Progress/Outcome  goal met at 02/23/2021 1035   Oral Nutrition/Hydration Goal 2   Activity  effective/safe/independent, use of swallowing techniques [regular/thin, no overt s/s of aspiration ] at 02/05/2021 1202   Time Frame  long-term goal (LTG), 3 - 5 days at 02/18/2021 0834   Progress/Outcome  goal met at 02/23/2021 1035   Executive Function Goal 1   Activity  complete, abstract thinking tasks, executive function tasks, organization/sequencing tasks, problem solving/reasoning tasks, other (see comments) [STG,  simple-mod level tasks.] at 02/06/2021 1405   [place]/Accuracy  with minimum, verbal cues/redirection, with additional processing time, with repetition of directions, with 90% accuracy at 02/06/2021 1405   Time Frame  1 week at 02/06/2021 1405   Progress/Outcome  goal met at 02/18/2021 0834   Executive Function Goal 2   Activity  complete, abstract thinking tasks, exhibit impulse control, organization/sequencing tasks, problem solving/reasoning  tasks, other (see comments) [LTG] at 02/06/2021 1405   [place]/Accuracy  independently, with 90% accuracy, other (see comments) [mod-complex tasks.] at 02/06/2021 1405   Time Frame  3 weeks at 02/06/2021 1405   Progress/Outcome  goal met at 02/23/2021 1035   Memory Goal 1   Activity  short-term memory tasks, immediate recall tasks, working memory tasks, recall recent events, other (see comments) [STG,  simple-mod level tasks.] at 02/06/2021 1405   [place]/Accuracy  minimal cues for use of strategies, with 90% accuracy at 02/06/2021 1405   Time Frame  short-term goal (STG), 1 week at 02/06/2021 1405   Progress/Outcome  goal met at 02/18/2021 0834   Memory Goal 2   Activity  short-term memory tasks, immediate recall tasks, working memory tasks, recall recent events, other (see comments) [LTG,  mod complex information.] at 02/06/2021 1405   [place]/Accuracy  independent use of environmental cues/strategies, with 90% accuracy at 02/06/2021 1405   Time Frame  long-term goal (LTG), 1 week at 02/18/2021 0834   Progress/Outcome  goal met at 02/23/2021 1035

## 2021-02-23 NOTE — PROGRESS NOTES
Patient: Denis Green  Location: [facility] [address]  MRN: [id number]  Today's date: 2/23/2021    History of Present Illness  Denis LOVE is a 70 y.o. male admitted on 2/4/2021 with Pneumonia due to COVID-19 virus. Principal problem is No Principal Problem: There is no principal problem currently on the Problem List. Please update the Problem List and refresh..   Pt admitted to outside hospital on 1/5/2021 for COVID-19 PNA. Worsening resp status intubated on 1/9- extubated 1/16 to [facility]. Transition to [facility] on 1/23.Eval by nephrology while in ICU due to ERNIE likely secondary to ATN. Creatinine has been improving with excellent urine output. Received Decadron and Remdesivir. Dobhoff tube was placed due to dysphagia. New fever of 102.8 1/20 and 1/21. ID was consulted. Bld cx's negative. CT scan of the chest abdomen pelvis was done with results showing increasing pulmonary infiltrates/groundglass opacities and consolidations in his bilateral lower lobes. Started empirically on cefepime and vancomycin to cover hospital-acquired pneumonia. Pt had increased lethargy on 1/22. Pt removed DHT 1/25; required higher O2 afterward - possible aspiration during the process. 1/26 patient continued to have intermittent confusion; ?post ICU delirium. Neurology is following, MRI brain was unremarkable. EEG showed mild slowing but no seizure activity. D/c'd abx after 5 days d/t lethargy.       Past Medical History  Denis LOVE has a past medical history of Coronary artery disease, Hypertension, and Lipid disorder.       02/23/21 0808   Pain/Comfort/Sleep   Pain Charting Type Pain Assessment   Preferred Pain Scale number (Numeric Rating Pain Scale)   Pain Body Location - Side Left   Pain Body Location - Orientation distal   Pain Body Location wrist   Pain Rating (0-10): Rest 7   Frequency frequent   Quality sharp;aching   Pain Onset/Duration pt reporting that his L wrist hit bed rail while he was sleeping it bed.  "It wke him\" OT appled ace wrap for addedd support at pt request. Doctor is aware of new onset of pain.        02/23/21 0856   Pain/Comfort/Sleep   Pain Charting Type Pain Reassessment   Preferred Pain Scale number (Numeric Rating Pain Scale)   Pain Body Location - Side Left   Pain Body Location - Orientation generalized   Pain Body Location wrist   Pain Rating (0-10): Rest 4   Nonverbal Indicators of Pain grimace   Pain Management Interventions position adjusted         Prior Living Environment      Most Recent Value   Lives With  spouse, child(mindy), adult [adult son lives in home occasionally]   Living Arrangements  house   Home Accessibility  stairs to enter home (Group), stairs within home (Group)   Living Environment Comment  Lives with wife and intermittently with one son.  [bed and bath on 2nd ,  no powder room on first]   Number of Stairs, Main Entrance  1   Stair Railings, Main Entrance  none   Location, Patient Bedroom  second floor, must negotiate stairs to access   Location, Bathroom  second floor, must negotiate stairs to access   Bathroom Access Comment  Pt. reports has tub shower and stall shower, uses tub shower primarily, has grab bar in shower, has stanard height toilet c wall on both sides   Number of Stairs, Within Home, Primary  12   Surface of Stairs, Within Home, Primary  hardwood   Stair Railings, Within Home, Primary  railings on both sides of stairs          Prior Level of Function      Most Recent Value   Dominant Hand  right   Ambulation  independent   Transferring  independent   Toileting  independent   Bathing  independent   Dressing  independent   Eating  independent   Communication  understands/communicates without difficulty   Swallowing  swallows foods/liquids without difficulty   Baseline Diet/Method of Nutritional Intake  regular solids, thin liquids   Past History of Dysphagia  No previous reports    Prior Level of Function Comment  Pt is a cattle/,  previously IND for " all IADLs.    Assistive Device/Animal Currently Used at Home  walker, front-wheeled               02/23/21 0854   Time Calculation   Start Time 0800   Stop Time 0900   Time Calculation (min) 60 min   Session Details   Document Type discharge evaluation   Mode of Treatment occupational therapy;individual therapy   General Information   General Observations of Patient Pt received long sitting in bed   Limitations/Impairments other (see comments)  (pain l wrist)   Safety Awareness/Health Promotion   Fall Prevention demonstrates fall risk prevention techniques;demonstrates safety awareness related to fall risk;other (see comments)  (does not always impliment strategies)   Safety Awareness/Health Promotion Fall Prevention (Row)   Coping/Community Reintegration   Observed Emotional State accepting;apprehensive;cooperative   Verbalized Emotional State acceptance   Family/Support System   Health Advocacy self-advocacy for health   Coping Strategies   Counseling (Coping Strategies) active listening utilized   Comment instructed in energy conservation, encouraged to allow rest breaks at home   Cognition/Psychosocial   Cognitive Function (Cognitive) WFL;safety deficit   Comment, Cognition Can be impulsive, does not consistently apply safety strategies when ambulating, even with repeated encouragement but generally safe when ambulating short distances. Will need assist with item transport   Range of Motion (ROM)   Range of Motion ROM is WNL;bilateral upper extremities   Range of Motion Comprehensive   Comprehensive Range of Motion ROM is WFL   Comment: Range of Motion new c/o L wrist pain today, doctor is aware, wrapped with ace wrap for stability   Strength (Manual Muscle Testing)   Strength (Manual Muscle Testing) strength is WFL;bilateral upper extremities   Left Upper Extremity Strength shoulder;elbow;wrist;hand   Shoulder, Left (Strength) 4/5   Elbow, Left (Strength) 4+/5   Wrist, Left (Strength) n/t 2* new onset of pain  in wrist    Hand, Left (Strength) 4+/5   Right Upper Extremity Strength shoulder;elbow;wrist;hand   Shoulder, Right (Strength) 4/5   Elbow, Right (Strength) 4+/5   Wrist, Right (Strength) 4+/5   Hand, Right (Strength) 4+/5   Transfers   Transfers shower transfer;toilet transfer;stand pivot transfer   Toilet Transfer   Transfer Technique stand pivot   [place], Toilet Transfer modified independence;safety considerations   Verbal Cues safety   Assistive Device grab bars/safety frame;commode, 3-in-1   Comment elevated or commode over toilet   Shower Transfer   Transfer Technique step over, left entry;step over, right entry   [place], Shower Transfer close supervision   Verbal Cues safety   Assistive Device grab bars/tub rail;shower chair   Comment will have bench and grab bars at home   Basic Activities of Daily Living (BADLs)   Basic Activities of Daily Living bathing;upper body dressing;lower body dressing;grooming;toileting   Bathing   Self-Performance chest;left arm;right arm;abdomen;front perineal area;buttocks;left upper leg;right upper leg;left lower leg, including foot;right lower leg, including foot   [place] modified independence;safety considerations   Position supported sitting   Setup Assistance adaptive equipment setup   Adaptive Equipment long-handled sponge;grab bar/tub rail;shower chair   Upper Body Dressing   Tasks don;pull over garment   Self-Performance threads left arm, shirt;threads right arm, shirt;pulls shirt over head/around back;pulls shirt down/adjusts;obtains clothes   [place] modified independence;safety considerations   Position supported standing   Adaptive Equipment none   Lower Body Dressing   Self-Performance threads left leg, underpants;threads right leg, underpants;pulls underpants up or down;threads left leg, pants/shorts;threads right leg, pants/shorts;pulls pants/shorts up or down;dons/doffs left sock;dons/doffs right sock;dons/doffs left shoe;dons/doffs right  shoe   [place] modified independence;safety considerations   Position supported sitting   Adaptive Equipment dressing stick   [place], Footwear modified independence;safety considerations   Comment seated on bed for distal LEs   Grooming   Self-Performance washes, rinses and dries face;washes, rinses and dries hands;brushes/torres hair;oral care (brushing teeth, cleaning dentures);applies deodorant   [place] modified independence   Position supported sitting;supported standing   Adaptive Equipment none   [place], Oral Hygiene modified independence   Comment Leaning on counter today due to pain in L wrist otherwise can stand @ sink w RW   Toileting   [place] modified independence   Position supported sitting;supported standing   Setup Assistance adaptive equipment setup   Adaptive Equipment commode, 3-in-1;dressing stick;raised toilet seat   Comment Wife had toilet safety frame installed at home, pt demonstrated ability to rise from standard toilet with rails.    Pre-Driving Assessment   Motor Skill Issues Requires walker, decreased response time, increased time to complete basic tasks   Transfer Goal 1   Progress/Outcome goal met   Transfer Goal 2   Progress/Outcome goal met   Transfer Goal 3   Progress/Outcome goal met   Transfer Goal 4   Progress/Outcome goal met   Bathing Goal 1   [place] modified independence   Progress/Outcome goal met   Bathing Goal 2   [place] modified independence   Progress/Outcome goal met   UB Dressing Goal 1   Progress/Outcome goal met   UB Dressing Goal 2   Progress/Outcome goal met   LB Dressing Goal 1   Progress/Outcome goal met   LB Dressing Goal 2   [place] modified independence   Progress/Outcome goal met   Grooming Goal 1   Progress/Outcome goal met   Grooming Goal 2   Progress/Outcome goal met   Toileting Goal 1   [place] modified independence   Progress/Outcome goal met   Toileting Goal 2   [place] modified independence    Progress/Outcome goal met   Daily Progress Summary (OT)   Symptoms Noted During/After Treatment increased pain   Progress Toward Functional Goals (OT) progressing toward functional goals as expected   Daily Outcome Statement (OT) Pt was seen for final performance day ADL assessment to include shower. Patient is now Mod I for basic ADL using walker and supervision for showering using bench with back and grab bars. Pt presented with new onset of wrist pain in L wrist but tolerated session with increased time.    Barriers to Overall Progress (OT) balance, general endurance   Recommendations Pt reports ready for discharge home with wife and homecare assistance.    Discharge Summary (OT)   Outcomes Achieved Upon Discharge (OT) all goals met within established timeframes   Discharge Summary Statement (OT) Pt was seen by Occupational Therapy for assessment and training in use of assistive devices and durable medical equipment during basic activities of ldaily living skills. OT recommending bench with back and grab bars to be used in shower with supervision from wife. Once seated, pt is mod I to complete bathing tasks. Patient is also mod I for grooming standing at the sink post set up, dressing seated for safety and toileting with toilet safety frame/rails to improve safety during sit to/from standing. Patient is using a rolling walker and does not always apply learned strategies for safest use but has good safety awareness and should be safe using his walker within his home. OT strongly encouraged pt not to over do himself once home but to remember and use energy conservation techniques. 02 status has improved with oxigen remaining above 92% on room air during morning shower and dressing assessment. However, pt continues to require rest breaks 2* pain in low back. Patient should refrain from driving until cleared by medical doctor. A temporary parking placcard application has been issued.          IRF OT Goals      Most  Recent Value   Transfer Goal 1   Activity/Assistive Device  toilet at 02/15/2021 1308   [place]  supervision required at 02/15/2021 1308   Time Frame  short-term goal (STG), 5 - 7 days at 02/15/2021 1308   Strategies/Barriers  DME at 02/15/2021 1308   Progress/Outcome  goal met at 02/23/2021 0854   Transfer Goal 2   Activity/Assistive Device  toilet at 02/10/2021 1632   [place]  supervision required, set-up required at 02/10/2021 1632   Time Frame  long-term goal (LTG), 3 weeks at 02/10/2021 1632   Strategies/Barriers  DME at 02/10/2021 1632   Progress/Outcome  goal met at 02/23/2021 0854   Transfer Goal 3   Activity/Assistive Device  shower at 02/15/2021 1308   [place]  supervision required at 02/15/2021 1308   Time Frame  short-term goal (STG), 5 - 7 days at 02/15/2021 1308   Strategies/Barriers  DME at 02/15/2021 1308   Progress/Outcome  goal met at 02/23/2021 0854   Transfer Goal 4   Activity/Assistive Device  shower at 02/10/2021 1632   [place]  supervision required, set-up required at 02/10/2021 1632   Time Frame  long-term goal (LTG), 3 weeks at 02/10/2021 1632   Strategies/Barriers  DME at 02/10/2021 1632   Progress/Outcome  goal met at 02/23/2021 0854   Bathing Goal 1   Activity/Assistive Device  bathing skills, all at 02/15/2021 1308   [place]  modified independence at 02/23/2021 0854   Time Frame  short-term goal (STG), 5 - 7 days at 02/15/2021 1308   Strategies/Barriers  DME at 02/15/2021 1308   Progress/Outcome  goal met at 02/23/2021 0854   Bathing Goal 2   Activity/Assistive Device  bathing skills, all at 02/05/2021 0726   [place]  modified independence at 02/23/2021 0854   Time Frame  long-term goal (LTG), 3 weeks at 02/05/2021 0726   Strategies/Barriers  DME TBD at 02/05/2021 0726   Progress/Outcome  goal met at 02/23/2021 0854   UB Dressing Goal 1   Activity/Assistive Device  upper body dressing at 02/15/2021 1308   [place]  modified independence at 02/15/2021  1308   Time Frame  short-term goal (STG), 5 - 7 days at 02/15/2021 1308   Strategies/Barriers  including set up at 02/15/2021 1308   Progress/Outcome  goal met at 02/23/2021 0854   UB Dressing Goal 2   Activity/Assistive Device  upper body dressing at 02/05/2021 0726   [place]  modified independence at 02/05/2021 0726   Time Frame  long-term goal (LTG), 3 weeks at 02/05/2021 0726   Progress/Outcome  goal met at 02/23/2021 0854   LB Dressing Goal 1   Activity/Assistive Device  lower body dressing at 02/15/2021 1308   [place]  modified independence at 02/15/2021 1308   Time Frame  short-term goal (STG), 5 - 7 days at 02/15/2021 1308   Strategies/Barriers  AD including clothing retrieval at 02/15/2021 1308   Progress/Outcome  goal met at 02/23/2021 0854   LB Dressing Goal 2   Activity/Assistive Device  lower body dressing at 02/05/2021 0726   [place]  modified independence at 02/23/2021 0854   Time Frame  long-term goal (LTG), 3 weeks at 02/05/2021 0726   Progress/Outcome  goal met at 02/23/2021 0854   Grooming Goal 1   Activity/Assistive Device  grooming skills, all at 02/15/2021 1308   [place]  modified independence at 02/15/2021 1308   Time Frame  short-term goal (STG), 5 - 7 days at 02/15/2021 1308   Strategies/Barriers  standing @ sink at 02/15/2021 1308   Progress/Outcome  goal met at 02/23/2021 0854   Grooming Goal 2   Activity/Assistive Device  grooming skills, all at 02/05/2021 0726   [place]  modified independence at 02/05/2021 0726   Time Frame  long-term goal (LTG), 3 weeks at 02/05/2021 0726   Progress/Outcome  goal met at 02/23/2021 0854   Toileting Goal 1   Activity/Assistive Device  toileting skills, all at 02/15/2021 1308   [place]  modified independence at 02/23/2021 0854   Time Frame  short-term goal (STG), 5 - 7 days at 02/15/2021 1308   Strategies/Barriers  DME at 02/15/2021 1308   Progress/Outcome  goal met at 02/23/2021 0854   Toileting Goal 2   Activity/Assistive  Device  toileting skills, all at 02/10/2021 1632   [place]  modified independence at 02/23/2021 0854   Time Frame  long-term goal (LTG), 3 weeks at 02/10/2021 1632   Strategies/Barriers  DME at 02/10/2021 1632   Progress/Outcome  goal met at 02/23/2021 0854

## 2021-02-23 NOTE — PLAN OF CARE
Problem: Rehabilitation (IRF) Plan of Care  Goal: Plan of Care Review  Flowsheets (Taken 2/23/2021 2803)  Plan of Care Reviewed With: patient  IRF Plan of Care Review: progress ongoing, continue  Outcome Summary: Outcome measures re-assessed. B coordination improved. R /pinch strength consistent with initial assessment. L hand limited by pain and ace wrap. Noted increase in BP pre/post session with nursing notified and aware. Asymptomatic. Continue POC

## 2021-02-23 NOTE — PROGRESS NOTES
Subjective    Patient seen and examined on rounds.  Chart reviewed.  Events overnight noted.  History reviewed briefly with patient.     CC:  Deficits in mobility, transfers, self-care status post deconditioning, severe Covid 19 pneumonia, ventilator-dependent respiratory failure, dysphagia, multiple medical problems.     HPI:  Mr. Denis Green is a 70-year-old right-handed white male with chronic conditions significant for coronary artery disease, hypertension, hyperlipidemia who was admitted to [facility] on 1/5/21 due to Covid 19 pneumonia and worsening respiratory status.  He required intubation on 1/9/21 and had ventilator-dependent respiratory failure.  He was treated with Decadron and Remdesivir.  He had dysphagia requiring Dobbhoff tube feeds.  He was extubated on 1/16/21 to [facility].  On 1/23/21 he was transitioned to high flow nasal cannula.  Hospital course was significant for acute renal insufficiency likely secondary to acute tubular necrosis and he was followed by nephrology.  He had subsequent improvement in renal function.  He also had new fever of 102.8°F on 1/20/21 and 1/21/21.  He was followed by infectious disease.  Blood cultures were negative. CT scan of the chest abdomen pelvis was done with results showing increasing pulmonary infiltrates/groundglass opacities and consolidations in his bilateral lower lobes.  He was treated empirically with Cefepime and Vancomycin to cover hospital-acquired pneumonia.  He was noted to be lethargic on 1/22/21.  Subsequently patient removed Dobbhoff tube on 1/25/21 and there is question of possible aspiration during that process.  He continued to have intermittent confusion on 1/26/21 was felt to be possible post ICU delirium.  He was followed by neurology.  MRI brain was unremarkable.  EEG showed mild slowing but no seizure activity was noted.  Antibiotics were discontinued after a 5 day course due to lethargy.  Repeat video swallow study was  "performed for dysphagia and patient was put on a soft diet with nectar thick liquids. On 2/3/21, hemoglobin was 11.2, WBC count 15.1, platelets were 275, BUN was 15, and creatinine was 0.7.  He has been needing assistance for mobility, transfers, self-care postoperatively. He is transferred to [place] [facility] on 2/4/21 for further rehabilitation care.       SUBJ: He is able to void well now.  On Cipro for UTI.  Performance day today.  Feels comfortable with discharge plans to home for tomorrow.    ROS: Denies chest pain or shortness of breath. Other ROS negative. Past, family, social history is unchanged.    Physical Exam      Blood pressure 122/77, pulse 80, temperature 36.6 °C (97.8 °F), temperature source Oral, resp. rate 18, height 1.727 m (5' 8\"), weight 77.1 kg (170 lb), SpO2 96 %.  Body mass index is 25.85 kg/m².    General Appearance: Not in acute distress  Head/Ear/Nose/Throat: Normocephalic; Atraumatic.  On oxygen by nasal cannula.  Eye: EOMI; PERRL.   Neck: No JVD; No Bruits.   Respiratory: Decreased breath sounds at bases.   Cardiovascular: RRR; Normal S1, S2.   Gastrointestinal: Soft; NT; +BS.   Extremities: Bilateral lower extremity edema noted.   Musculoskeletal: Functional active range of motion in both upper and lower extremities.    Neurological: Awake alert oriented to person, had some difficulty naming the place and correct date.. Speech is fluent. Cranial nerve examination does not reveal any gross facial asymmetry. Strength testing about 4/5 strength in both upper extremities.  Bilateral hip flexion is 3+/5.  Bilateral quadriceps are 4/5 with the thighs supported.  Bilateral ankle dorsi and plantar flexion are 4/5.  He is grossly able to localize touch and position sense in great toes.  Deep tendon reflexes are hypoactive and symmetric bilaterally.  Plantars are flexor.  Coordination is functional upper extremities.  Neurologically unchanged  Behavior/Emotional: Appropriate; " Cooperative.   Skin: No obvious rashes noted.        Current Facility-Administered Medications:   •  acetaminophen (TYLENOL) tablet 650 mg, 650 mg, oral, q4h PRN, Omari Spain MD, 650 mg at 02/17/21 1433  •  albuterol HFA (VENTOLIN HFA) 90 mcg/actuation inhaler 2 puff, 2 puff, inhalation, q4h PRN, Laure Bishop MD  •  alum-mag hydroxide-simeth (MAALOX) 200-200-20 mg/5 mL suspension 30 mL, 30 mL, oral, q6h PRN, Laure Bishop MD  •  amLODIPine (NORVASC) tablet 2.5 mg, 2.5 mg, oral, Nightly, Laure Bishop MD, 2.5 mg at 02/22/21 2059  •  aspirin enteric coated tablet 81 mg, 81 mg, oral, Daily, Omari Spain MD, 81 mg at 02/23/21 0847  •  bisacodyL (DULCOLAX) 10 mg suppository 10 mg, 10 mg, rectal, Daily PRN, Isael Lang MD, 10 mg at 02/19/21 2000  •  ciprofloxacin (CIPRO) tablet 500 mg, 500 mg, oral, q12h EBER, Laure Bishop MD, 500 mg at 02/23/21 0848  •  diclofenac sodium (VOLTAREN) 1 % topical gel 2 g, 2 g, Topical, TID, Laure Bishop MD, 2 g at 02/23/21 1641  •  diphenhydrAMINE (BENADRYL) capsule 50 mg, 50 mg, oral, Nightly, Isael Lang MD, 50 mg at 02/22/21 2058  •  docusate sodium (COLACE) capsule 100 mg, 100 mg, oral, TID, Isael Lang MD, 100 mg at 02/23/21 1641  •  furosemide (LASIX) tablet 20 mg, 20 mg, oral, BID (am, 4p), Laure Bishop MD, 20 mg at 02/23/21 1641  •  gemfibroziL (LOPID) tablet 600 mg, 600 mg, oral, BID AC, Omari Spain MD, 600 mg at 02/23/21 1640  •  guaiFENesin (MUCINEX) 12 hr ER tablet 600 mg, 600 mg, oral, BID, Laure Bishop MD, 600 mg at 02/23/21 0847  •  lactobacillus acidophilus complex (BACID) 1 billion cell- 250 mg tablet 500 mg, 500 mg, oral, BID, Laure Bishop MD, 500 mg at 02/23/21 0846  •  lidocaine (ASPERCREME) 4 % topical patch 1 patch, 1 patch, Topical, Daily, Galileo Samuel DPM, 1 patch at 02/23/21 0757  •  magnesium hydroxide (M.O.M.) 400 mg/5 mL suspension 30 mL, 30 mL, oral, 2x daily PRN, Laure Bishop MD, 30 mL at 02/20/21 0907  •   magnesium oxide (MAG-OX) tablet 400 mg, 400 mg, oral, BID, Laure Bishop MD, 400 mg at 02/23/21 0846  •  melatonin ODT 3 mg, 3 mg, oral, Nightly, Laure Bishop MD, 3 mg at 02/22/21 2058  •  metoprolol tartrate (LOPRESSOR) split tablet 12.5 mg, 12.5 mg, oral, BID, Omari Spain MD, 12.5 mg at 02/23/21 0846  •  mouth moisturizer (TOOTHETTE) cream, , mucous membrane, TID, Laure Bishop MD, Given at 02/23/21 1640  •  polyethylene glycol (MIRALAX) 17 gram packet 17 g, 17 g, oral, Daily PRN, Omari Spain MD, 17 g at 02/20/21 0907  •  rivaroxaban (XARELTO) tablet 10 mg, 10 mg, oral, Daily with dinner, Laure Bishop MD, 10 mg at 02/23/21 1641  •  senna (SENOKOT) tablet 3 tablet, 3 tablet, oral, Daily before lunch, Isael Lang MD, 3 tablet at 02/23/21 1205  •  sodium chloride tablet 1 g, 1 g, oral, BID with meals, Laure Bishop MD, 1 g at 02/23/21 1641  •  tamsulosin (FLOMAX) 24 hr ER capsule 0.4 mg, 0.4 mg, oral, Daily, Lacho Monreal MD, 0.4 mg at 02/22/21 2059       Labs / Radiology    Lab Results   Component Value Date    WBC 10.91 (H) 02/22/2021    HGB 10.5 (L) 02/22/2021    HCT 32.7 (L) 02/22/2021    MCV 96.2 02/22/2021     (H) 02/22/2021     Lab Results   Component Value Date    GLUCOSE 104 (H) 02/22/2021    CALCIUM 9.2 02/22/2021     (L) 02/22/2021    K 4.2 02/22/2021    CO2 26 02/22/2021    CL 95 (L) 02/22/2021    BUN 15 02/22/2021    CREATININE 0.7 (L) 02/22/2021       Assessment and Plan    ASSESSMENT PLAN:  1. 70-year-old right-handed white male with chronic conditions significant for coronary artery disease, hypertension, hyperlipidemia who was admitted to [facility] on 1/5/21 due to Covid 19 pneumonia and worsening respiratory status.  He required intubation on 1/9/21 and had ventilator-dependent respiratory failure.  He was treated with Decadron and Remdesivir.  He had dysphagia requiring Dobbhoff tube feeds.  [facility].  On  1/23/21 he was transitioned to high flow nasal cannula.  Hospital course was significant for acute renal insufficiency likely secondary to acute tubular necrosis and he was followed by nephrology.  He had subsequent improvement in renal function.  He also had new fever of 102.8°F on 1/20/21 and 1/21/21.  He was followed by infectious disease.  Blood cultures were negative. CT scan of the chest abdomen pelvis was done with results showing increasing pulmonary infiltrates/groundglass opacities and consolidations in his bilateral lower lobes.  He was treated empirically with Cefepime and Vancomycin to cover hospital-acquired pneumonia.  He was noted to be lethargic on 1/22/21.  Subsequently patient removed Dobbhoff tube on 1/25/21 and there is question of possible aspiration during that process.  He continued to have intermittent confusion on 1/26/21 was felt to be possible post ICU delirium.  He was followed by neurology.  MRI brain was unremarkable.  EEG showed mild slowing but no seizure activity was noted.  Antibiotics were discontinued after a 5 day course due to lethargy.  Repeat video swallow study was performed for dysphagia and patient was put on a soft diet with nectar thick liquids. On 2/3/21, hemoglobin was 11.2, WBC count 15.1, platelets were 275, BUN was 15, and creatinine was 0.7.  He has been needing assistance for mobility, transfers, self-care postoperatively. He is transferred to [place] rehabilitation on 2/4/21 for further rehabilitation care.       2. DVT prophylaxis - on subcutaneous Heparin.  On SCDs.  Platelets 296 on 2/5/2021.  Platelets 389 on 2/11/2021.  Platelets 470 on 2/15/2021.  Platelets 316 on 2/18/2021.  Platelets 404 on 2/22/2021.     3.  Deconditioning - recent severe Covid 19 pneumonia, ventilator-dependent respiratory failure, dysphagia, multiple medical problems - He had ventilator-dependent respiratory failure.  He was treated with Decadron and Remdesivir.  Continue PT, OT,  speech, psychology.  Follow falls precautions, cardiac precautions, aspiration precautions.     4. GI - On Colace, Senokot.  On PRN MiraLAX, MOM, Maalox.      5.  -He had urinary urgency and difficulty urinating today.  Recent urine culture showed Klebsiella and Macrobid was switched to Vantin on 2/18/2021.  He reports difficulty voiding today.  Antibiotic was switched to Cipro by internist.  Will consult urology.  Discussed with urologist.  Patient reported he had history of kidney stones requiring lithotripsy and had seen a urologist in the past and will make appointment as outpatient also to see the urologist.  He is able to void well now.  On Cipro for UTI.  Feels comfortable with discharge plans to home for tomorrow.     6.  Pulmonary - recent severe Covid 19 pneumonia, ventilator-dependent respiratory failure - on Ventolin HFA.  On Mucinex.     7. Pain - on PRN Tylenol.  On scheduled Tylenol for left wrist pain.  On topical Voltaren gel.  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  K pad as needed for pain.     8. Hematology -hemoglobin 12.0, WBC 8.15 on 2/5/2021.  Hemoglobin 10.7, WBC 8.05 on 2/11/2021.  Hemoglobin 12.0, WBC 11.56 on 2/15/2021.  Hemoglobin 10.0, WBC 11.77 on 2/18/2021.  Hemoglobin 10.5, WBC 10.91 on 2/22/2021.     9. CVS - history of coronary artery disease and hypertension.  On Norvasc.  On Lopressor.  On Aspirin.  On Lasix.  On Magnesium oxide.     10.  Hyperlipidemia - on Lopid.     11.  Insomnia - on Melatonin.     12.  Dysphagia - Tolerating regular thins diet now.  Speech therapy following.     13. Rehabilitation medicine - Continue comprehensive rehabilitation care. Continue PT, OT, speech, psychology.  We will follow falls precautions, cardiac precautions, monitor pulse oximetry in therapy and follow aspiration precautions.  Tolerating therapies per endurance.  Discussed with speech therapy.  Free water protocol ice chips was started.  Denies coughing with meals.He  reported some pain in left wrist.  He is not sure if he accidentally banged the left wrist against the side rail of the bed.  Denies history of gout.  Left wrist does not appear warm or tender to touch.  K pad ordered.  We will also order scheduled Tylenol for 7 days.  Discussed results of wrist x-rays with patient which showed moderate degenerative change particularly about the radial aspect of the wrist.  He reports pain is less today.  He ambulated 25 feet with rolling walker with assistance of 2 people with physical therapy.Free water protocol with ice chips and sips of water per speech therapy.  He feels better today.  Left wrist pain is decreased significantly per patient.  Working on endurance with physical therapy.  He required minimal to moderate assistance for transfers.  Working on ADLs with occupational therapy.He feels better today.  Denies increased pain.  Discussed with him about doing incentive spirometry.  We will try to wean off oxygen if possible.Trying to wean off oxygen as possible.  Tolerating therapies per endurance.  He reports decrease sleep at night.  Usually takes 2 Tylenol PM at night.  He is already on scheduled Tylenol.  Will order 50 mg of Benadryl at bedtime. He reports he slept well with Benadryl last night.  Tolerating therapies per endurance.  Denies left wrist pain today.Video swallow was done today.  Mild oral and pharyngeal dysphagia noted.  Transient penetration of thin liquids is noted.  Speech therapy working with dysphagia.  Tolerating upgraded diet after video swallow.  Noted ENT input regarding his voice.  Vocal cords moving normally per ENT.  Discussed with patient.Tolerating regular thins diet.  Discussed ENT input with him.  He requested podiatric consult which was ordered.He was seen by podiatry earlier today.  Left foot was injected.  He reports left foot feels much better now.Tolerating therapies per endurance.  Left foot feels much better today.  Tolerating diet  without problems.  Maintaining stable pulse oximetry on room air.He had urinary urgency and difficulty urinating today.  Recent urine culture showed Klebsiella and Macrobid was switched to Vantin on 2/18/2021.  He reports difficulty voiding today.  Antibiotic was switched to Cipro by internist.  Will consult urology.  Discussed with urologist.  Patient reported he had history of kidney stones requiring lithotripsy and had seen a urologist in the past and will make appointment as outpatient also to see the urologist. He is able to void well now.  On Cipro for UTI.  Performance day today.  Feels comfortable with discharge plans to home for tomorrow.     14. Reviewed labs today.  BUN 15, creatinine 0.6 on 2/5/2021.  BUN 16, creatinine 0.7 on 2/11/2021.  BUN 12, creatinine 0.8 on 2/15/2021.  BUN 10, creatinine 0.6 on 2/18/2021.  BUN 15, creatinine 0.7 on 2/22/2021.               Isael Lang MD  2/23/2021

## 2021-02-23 NOTE — PLAN OF CARE
Problem: Rehabilitation (IRF) Plan of Care  Goal: Plan of Care Review  Flowsheets (Taken 2/23/2021 1151)  Plan of Care Reviewed With: patient  IRF Plan of Care Review: progress ongoing, continue  Outcome Summary: Great participation, pt actively engaged in discharge planning and discussion re. goals met. Pt to d/c home tomorrow, no further skilled ST warranted.

## 2021-02-23 NOTE — PLAN OF CARE
Problem: Rehabilitation (IRF) Plan of Care  Goal: Plan of Care Review  Outcome: Progressing  Flowsheets (Taken 2/23/2021 2912)  Plan of Care Reviewed With: patient  IRF Plan of Care Review: progress ongoing, continue  Outcome Summary: Received pt asleep. Continent of bladder. No c/o pain overnight. Sleeping well.   Plan of Care Review  IRF Plan of Care Review: progress ongoing, continue  Plan of Care Reviewed With: patient  Outcome Summary: Received pt asleep. Continent of bladder. No c/o pain overnight. Sleeping well.

## 2021-02-23 NOTE — PLAN OF CARE
Problem: Rehabilitation (IRF) Plan of Care  Goal: Plan of Care Review  Outcome: Progressing  Flowsheets (Taken 2/23/2021 1610)  Plan of Care Reviewed With:   patient   other (see comments)  Outcome Summary: pt for d/c home tomorrow 2/24 w/ [facility], s/w héctor/Cristela, she advises initial visit will not be until saturday 2/27, Dr. Lang notified.  I left a vm for pts wife, Dana to assess if this is acceptable.  Called pts pharmacy, [facility] [phone] to ask if Xarelto is covered by pts Rx plan.  They will not provide this information unless Rx is electronically submitted.  Notified Dr. Lang.

## 2021-02-23 NOTE — PROGRESS NOTES
[facility] Progress Note          Patient was seen and examined at bedside.    Subjective:  2/22/2021:  Over night he had bladder spasms, urinary urgency but difficulty to urinate, recent UCX growing klebsiella aerogenes, based on susceptibility switched from Macrobid to vantin on 2/18/2021, but interval, his symptomatic urinary tract infection seems getting worse.  Plan: repeat a UA with microscopy and UCX, switch to cipro 500 mg bid for 5-7 days.   No clinical sepsis.   Talked with RN and PMR attending about his treatment plan.       2/23/2021:  Hit his left wrist on bed rail O/N again, no focal erythema or swelling, c/o mild pain, provide topical Voltaren gel. His dysuria has resolved, he feels cipro is working well, pending UCX and susceptibility, plan to d/c home tomorrow, will prepare for it.   Check labs in am, finalize his d/c med list tomorrow.     Objective   Vital signs in last 24 hours:  Temp:  [36.3 °C (97.4 °F)-36.8 °C (98.2 °F)] 36.4 °C (97.6 °F)  Heart Rate:  [66-97] 97  Resp:  [16-18] 16  BP: (100-134)/(50-72) 110/60    No intake or output data in the 24 hours ending 02/23/21 1017  Intake/Output this shift:  No intake/output data recorded.   Review of Systems:  All other systems reviewed and negative except as noted in the HPI.   Objective      Labs  reviewed his labs thoroughly   Lab Results   Component Value Date    WBC 10.91 (H) 02/22/2021    HGB 10.5 (L) 02/22/2021    HCT 32.7 (L) 02/22/2021    MCV 96.2 02/22/2021     (H) 02/22/2021     Lab Results   Component Value Date    GLUCOSE 104 (H) 02/22/2021    CALCIUM 9.2 02/22/2021     (L) 02/22/2021    K 4.2 02/22/2021    CO2 26 02/22/2021    CL 95 (L) 02/22/2021    BUN 15 02/22/2021    CREATININE 0.7 (L) 02/22/2021       Imaging  OSH imaging study reports reviewed:    CXR 2/5/2021:  IMPRESSION:  Bilateral airspace opacities most prominent in the left upper lobe and left  lower lobe likely representing COVID19  pneumonia.         Full Code    Physical Exam:  Head/Ear/Nose/Throat: normocephalic; atraumatic; moisture mouth mm, no oropharyngeal thrush noted.   Eyes: anicteric sclera, EOMI; PERRL.   Neck : supple, no JVD, no carotid bruits appeciated.   Respiratory: no evidence of labored breathing, lung sounds a little coarse, mild bibasilar diminished BS, no remarkable w/r/c.   Cardiovascular: RRR; normal S1, S2; no m/r/g; no S3 or S4.   Gastrointestinal: soft; NT; BS normal; mildly distended; no CVAT b/l.   Genitourinary: no lim.   Extremities : no c/c/e .   Neurological: AO x 3, fluent speeches, following commands, CNS II-XII grossly intact; no focal neurologic deficits.   Behavior/Emotional: in NAD, appropriate; cooperative.   Skin: clean, dry and intact.     Plan of care was discussed with patient, RN, and PMR attending.     Assessment     CC: S/p severe covid-19 PNA complicated by VDRF, dysphagia, ERNIE, bacterial PNA, and  hospital delirium, physical deconditioning with ADL and ambulatory dysfunction.       70 y.o. male, I PTA, with PMH of HTN, HLD, and CAD, admitted to [facility] on 1/5/2021 for severeCOVID-19 PNA. Worsening resp status intubated on 1/9- extubated 1/16 to [facility]. Transition to [facility] on 1/23.Eval by nephrology while in ICU due to ERNIE likely secondary to ATN. Creatinine has been improving with excellent urine output. Received Decadron and Remdesivir. Dobhoff tube was placed due to dysphagia. New fever of 102.8 on 1/20 and 1/21. ID was consulted. Bld cx's negative. CT scan of the chest abdomen pelvis was done with results showing increasing pulmonary infiltrates/groundglass opacities and consolidations in his bilateral lower lobes. Started empirically on cefepime and vancomycin to cover hospital-acquired pneumonia. Pt had increased lethargy on 1/22. Pt removed DHT 1/25; required higher O2 afterward - possible aspiration during the process. 1/26 patient continued to have intermittent confusion; ?post ICU  delirium. Neurology is following, MRI brain was unremarkable. EEG showed mild slowing but no seizure activity. D/c'd abx after 5 days d/t lethargy. Repeat VFSS performed- now on soft nectar thick liquid diet .  Pt is AA&Ox3 with periods of confusion. Tolerates soft diet with NTL, voiding in urinal, O2 @ 2-3L via nc. Participating in therapies with marked improvement, functionally, he has residual ADL and ambulatory dysfunction, rec's for acute rehab prior to home with SO. Transferred to [facility] on 2/4/2021.      1.S/p severe covid-19 PNA complicated by VDRF, dysphagia, ERNIE, bacterial PNA, and  hospital delirium, physical deconditioning with ADL and ambulatory dysfunction : inpt acute rehab, gait and balancing training, SLP therapy, nutrition support,  Fall/aspiration precaution, medical management, DVT prophylaxis, dermal defense, f/u with PCP after inpt rehab.     2. Pulm-S/p severe covid-19 PNA complicated by VDRF, s/p possible bacteria PNA,  hypoxemia with mild exertion, atelectasis: monitor SO2, prn NC O2, keep SO2>92%, incentive spirometry, bronchodilator inhaler, mucolytic agent, pulm toileting. Check CXR.      3. Psych-s/p hospital acute delirium, intermittent confusion: his metal status has much improved, monitor his mood and mentation, help his orientation, psychology CBT, avoid unnecessary sedatives, SW support.      4. GI-on soft nectar thick liquid diet, constipation:SLP evaluation to advance his diet as tolerated; provide constipation bowel regimen, keep adequate hydration, timed toilet visits.     5. DVT prophy: SCD, early ambulation, SQ heparin, check LE venous DUS to r/o DVT.       6. HTN, dyslipidemia: cont home HTN meds with holding parameter, orthostatic hypotension precaution, monitor BP . On gemfibrozil, f/u with his PCP.      Mixed significant dyslipidemia: LCL-C 185, HDL-C 30, , h/o HTN, HLD, and CAD, only gemfibrozil is far from adequate, labeled Lipitor allergy, still worth retrial with  hydrophilic formula Crestor from low dose, which is absolutely necessary and also guideline recommended    7. Renal-s/p ERNIE recovered, electrolytes imbalance: monitor renal function and volume status, avoid nephrotoxic agents and low BP,  monitor and keep electrolytes balance.     8. - urinary retention: timed voiding trial, monitor PVR with prn cic, check UA/UCX.     Billing code: [id number]  Diagnoses:  Patient Active Problem List   Diagnosis   • Acute metabolic encephalopathy   • ARDS (adult respiratory distress syndrome) (CMS/HCC)   • Coronary artery disease involving native coronary artery of native heart without angina pectoris   • Hypoxia   • Pneumonia due to COVID-19 virus   • Transaminitis   • Essential hypertension   • Dyslipidemia   • Atelectasis   • Physical deconditioning   • Acute cystitis        Laure Bishop MD  2/23/2021

## 2021-02-23 NOTE — PROGRESS NOTES
Patient: Denis Green  Location: [facility] [address]  MRN: [id number]  Today's date: 2/23/2021    Hospital Course  History of Present Illness  Denis LOVE is a 70 y.o. male admitted on 2/4/2021 with Pneumonia due to COVID-19 virus. Principal problem is No Principal Problem: There is no principal problem currently on the Problem List. Please update the Problem List and refresh..   Pt admitted to outside hospital on 1/5/2021 for COVID-19 PNA. Worsening resp status intubated on 1/9- extubated 1/16 to [facility]. Transition to [facility] on 1/23.Eval by nephrology while in ICU due to ERNIE likely secondary to ATN. Creatinine has been improving with excellent urine output. Received Decadron and Remdesivir. Dobhoff tube was placed due to dysphagia. New fever of 102.8 1/20 and 1/21. ID was consulted. Bld cx's negative. CT scan of the chest abdomen pelvis was done with results showing increasing pulmonary infiltrates/groundglass opacities and consolidations in his bilateral lower lobes. Started empirically on cefepime and vancomycin to cover hospital-acquired pneumonia. Pt had increased lethargy on 1/22. Pt removed DHT 1/25; required higher O2 afterward - possible aspiration during the process. 1/26 patient continued to have intermittent confusion; ?post ICU delirium. Neurology is following, MRI brain was unremarkable. EEG showed mild slowing but no seizure activity. D/c'd abx after 5 days d/t lethargy.       Past Medical History  Denis LOVE has a past medical history of Coronary artery disease, Hypertension, and Lipid disorder.       02/23/21 1000   Pain/Comfort/Sleep   Pain Charting Type Pain Assessment   Preferred Pain Scale number (Numeric Rating Pain Scale)   Pain Body Location - Side Left   Pain Body Location wrist   Pain Rating (word): Rest 10 - excruciating pain   Pain Rating (word): Activity 10 - excruciating pain   Pain Management Interventions position adjusted;diversional activity provided   Vital  Signs   /60   BP Location Left upper arm   BP Method Manual   Patient Position Lying   Patient Observation   Observations slight redness and warmth in L wrist; pt doffed ace wrap and did not want therapist to re-wrap. Redness went away in hand; Ace wrap in pt's breast pocket.          Prior Living Environment      Most Recent Value   Lives With  spouse, child(mindy), adult [adult son lives in home occasionally]   Living Arrangements  house   Home Accessibility  stairs to enter home (Group), stairs within home (Group)   Living Environment Comment  Lives with wife and intermittently with one son.  [bed and bath on 2nd ,  no powder room on first]   Number of Stairs, Main Entrance  1   Stair Railings, Main Entrance  none   Location, Patient Bedroom  second floor, must negotiate stairs to access   Location, Bathroom  second floor, must negotiate stairs to access   Bathroom Access Comment  Pt. reports has tub shower and stall shower, uses tub shower primarily, has grab bar in shower, has stanard height toilet c wall on both sides   Number of Stairs, Within Home, Primary  12   Surface of Stairs, Within Home, Primary  hardwood   Stair Railings, Within Home, Primary  railings on both sides of stairs          Prior Level of Function      Most Recent Value   Dominant Hand  right   Ambulation  independent   Transferring  independent   Toileting  independent   Bathing  independent   Dressing  independent   Eating  independent   Communication  understands/communicates without difficulty   Swallowing  swallows foods/liquids without difficulty   Baseline Diet/Method of Nutritional Intake  regular solids, thin liquids   Past History of Dysphagia  No previous reports    Prior Level of Function Comment  Pt is a cattle/,  previously IND for all IADLs.    Assistive Device/Animal Currently Used at Home  walker, front-wheeled          Recreational Therapy Evaluation and Treatment - 02/23/21 1000        Session Details     "Document Type  discharge evaluation     Mode of Treatment  individual therapy;recreational therapy        Time Calculation    Start Time  1000     Stop Time  1030     Time Calculation (min)  30 min        General Information    Patient Profile Reviewed?  yes     General Observations of Patient  rec'd supine in bed, ace wrap on L wrist        Coping/Community Reintegration    Observed Emotional State  calm     Verbalized Emotional State  acceptance        Family/Support System    Health Advocacy  self-advocacy for health        Coping Strategies    Counseling (Coping Strategies)  active listening utilized;goal setting facilitated;positive reinforcement provided;verbalization of feelings encouraged;self-responsibility promoted     Therapeutic Recreational Activities (Coping Strategies)  other (see comments)     Quality of Life Promotion (Coping Strategies)  balance between activity/rest encouraged;community involvement promoted;expression of thoughts about present/future encouraged;normalization/integrity of family encouraged;wellness behaviors promoted        Therapeutic Interventions    Comment, Therapeutic Intervention  RT: STS min A x1; pt stood about 2' c min A at hips, leaned BL elbows on table then reporting to \"feel weird, not dizzy but weird.\" Pt seated for break, did not report vision changes; pulled petals from leaves and flowers c right pinch grasp. Asked to be seated for therapeutic activity while discussing hobbies c HRT. Pt pulling leaves and petals off stems c RUE from arrangement at shoulder height to facilitate UE endurance         Rec Therapy Clinical Impression    Daily Outcome Statement  Pt is d/c from RT services on 2/23/21; suzy. POC.           Pain Assessment/Intervention  Pain Charting Type: Pain Assessment           Rec Care Plan Goals      Most Recent Value   Community Goal, Recreation   Recreation STG Activity: Community  Participate in simulated community integration to maximize on " functional mobility and independence c min A and appropriate AD (at time of CI), in order to return to pre-morbid community involvement.   Recreation STG: Community  minimum assist (75% or less patient effort)   Recreation STG Date Established: Community  02/11/21   Recreation STG Duration: Community  10 days or less   Recreation STG Date Reviewed: Community  02/23/21   Recreation STG Outcome: Community  goal no longer appropriate   Leisure Goal, Recreation   Recreation STG Activity: Leisure  Participate in chosen therapeutic activity incorporating strength and coordination in B LE's c min A and appropriate AD to promote independence and QOL at discharge.    Recreation STG: Leisure  minimum assist (75% or less patient effort)   Recreation STG Date Established: Leisure  02/11/21   Recreation STG Duration: Leisure  10 days or less   Recreation STG Date Reviewed: Leisure  02/23/21   Recreation STG Outcome: Leisure  goal met   Additional Goal, Recreation   Recreation STG Activity: Additional Goal  Participate in pre-morbid leisure interests at least 2-3x/week to increase affect and coping mechanisms in hospital environment with global pandemic precautions in place.   Recreation STG: Additional  other (see comments) [2-3x/wk]   Recreation STG Date Established: Additional  02/11/21   Recreation STG Duration: Additional  10 days or less   Recreation STG Date Reviewed: Additional  02/23/21   Recreation STG Outcome: Additional  goal met

## 2021-02-23 NOTE — PROGRESS NOTES
Subjective   better    Interval History: none.     Objective     Vital signs in last 24 hours:  Temp:  [36.3 °C (97.4 °F)-36.8 °C (98.2 °F)] 36.4 °C (97.6 °F)  Heart Rate:  [66-97] 80  Resp:  [16-18] 16  BP: ()/(56-72) 94/56    No intake or output data in the 24 hours ending 02/23/21 1356  Intake/Output this shift:  No intake/output data recorded.    Current Facility-Administered Medications   Medication Dose Route Frequency Provider Last Rate Last Admin   • acetaminophen (TYLENOL) tablet 650 mg  650 mg oral q4h PRN Omari Spain MD   650 mg at 02/17/21 1433   • albuterol HFA (VENTOLIN HFA) 90 mcg/actuation inhaler 2 puff  2 puff inhalation q4h PRN Laure Bishop MD       • alum-mag hydroxide-simeth (MAALOX) 200-200-20 mg/5 mL suspension 30 mL  30 mL oral q6h PRN Laure Bishop MD       • amLODIPine (NORVASC) tablet 2.5 mg  2.5 mg oral Nightly Laure Bishop MD   2.5 mg at 02/22/21 2059   • aspirin enteric coated tablet 81 mg  81 mg oral Daily Omari Spain MD   81 mg at 02/23/21 0847   • bisacodyL (DULCOLAX) 10 mg suppository 10 mg  10 mg rectal Daily PRN Isael Lang MD   10 mg at 02/19/21 2000   • ciprofloxacin (CIPRO) tablet 500 mg  500 mg oral q12h EBER Laure Bishop MD   500 mg at 02/23/21 0848   • diclofenac sodium (VOLTAREN) 1 % topical gel 2 g  2 g Topical TID Laure Bishop MD   2 g at 02/23/21 0848   • diphenhydrAMINE (BENADRYL) capsule 50 mg  50 mg oral Nightly Isael Lang MD   50 mg at 02/22/21 2058   • docusate sodium (COLACE) capsule 100 mg  100 mg oral TID Isael Lang MD   100 mg at 02/23/21 0847   • furosemide (LASIX) tablet 20 mg  20 mg oral BID (am, 4p) Laure Bishop MD   20 mg at 02/23/21 0847   • gemfibroziL (LOPID) tablet 600 mg  600 mg oral BID AC Omari Spain MD   600 mg at 02/23/21 0757   • guaiFENesin (MUCINEX) 12 hr ER tablet 600 mg  600 mg oral BID Laure Bishpo MD   600 mg at 02/23/21 0847   • lactobacillus acidophilus complex (BACID) 1 billion cell-  250 mg tablet 500 mg  500 mg oral BID Laure Bishop MD   500 mg at 02/23/21 0846   • lidocaine (ASPERCREME) 4 % topical patch 1 patch  1 patch Topical Daily Galileo Samuel DPM   1 patch at 02/23/21 0757   • magnesium hydroxide (M.O.M.) 400 mg/5 mL suspension 30 mL  30 mL oral 2x daily PRN Laure Bishop MD   30 mL at 02/20/21 0907   • magnesium oxide (MAG-OX) tablet 400 mg  400 mg oral BID Laure Bishop MD   400 mg at 02/23/21 0846   • melatonin ODT 3 mg  3 mg oral Nightly Laure Bishop MD   3 mg at 02/22/21 2058   • metoprolol tartrate (LOPRESSOR) split tablet 12.5 mg  12.5 mg oral BID Omari Spain MD   12.5 mg at 02/23/21 0846   • mouth moisturizer (TOOTHETTE) cream   mucous membrane TID Laure Bishop MD   Given at 02/23/21 0846   • polyethylene glycol (MIRALAX) 17 gram packet 17 g  17 g oral Daily PRN Omari Spain MD   17 g at 02/20/21 0907   • rivaroxaban (XARELTO) tablet 10 mg  10 mg oral Daily with dinner Laure Bishop MD   10 mg at 02/22/21 1707   • senna (SENOKOT) tablet 3 tablet  3 tablet oral Daily before lunch Isael Lang MD   3 tablet at 02/23/21 1205   • sodium chloride tablet 1 g  1 g oral BID with meals Laure Bishop MD   1 g at 02/23/21 0846   • tamsulosin (FLOMAX) 24 hr ER capsule 0.4 mg  0.4 mg oral Daily Lacho Monreal MD   0.4 mg at 02/22/21 2059       Labs  Results for orders placed or performed during the hospital encounter of 02/04/21   Urine culture Urine, Clean Catch    Specimen: Urine, Clean Catch   Result Value Ref Range    Urine Culture No Growth (Limit of detection 1000 cfu/mL)    Urine culture Urine, Clean Catch    Specimen: Urine, Clean Catch   Result Value Ref Range    Urine Culture **Positive Culture** (A)     Urine Culture       >1 x 10^5 CFU/mL Klebsiella (Enterobacter) aerogenes       Susceptibility    Klebsiella (Enterobacter) aerogenes - AUTOMATED FLORENTIN SUSCEPTIBILITY     Amikacin <=4 Susceptible ug/ml     Ampicillin >16 Resistant ug/ml      Ampicillin + Sulbactam >16 Resistant ug/ml     Cefazolin >32 Resistant ug/ml     Ceftazidime <=0.5 Susceptible ug/ml     Ceftriaxone <=0.5 Susceptible ug/ml     Cefuroxime 16 Intermediate ug/ml     Ciprofloxacin <=0.5 Susceptible ug/ml     Gentamicin 1 Susceptible ug/ml     Levofloxacin <=1 Susceptible ug/ml     Nitrofurantoin 64 Intermediate ug/ml     Tetracycline <=1 Susceptible ug/ml     Tobramycin 1 Susceptible ug/ml     Trimethoprim + Sulfamethoxazole <=0.5 Susceptible ug/ml   Urine culture Urine, Clean Catch    Specimen: Urine, Clean Catch   Result Value Ref Range    Urine Culture **Positive Culture** (A)     Urine Culture       1 x 10^5 CFU/mL Klebsiella (Enterobacter) aerogenes   UA Hold for UC (Macro)   Result Value Ref Range    Color, Urine Yellow Yellow    Clarity, Urine Cloudy (A) Clear    Specific Gravity, Urine 1.016 1.002 - 1.030    pH, Urine 6.0 4.5 - 8.0    Leukocyte Esterase +1 (A) Negative    Nitrite, Urine Negative Negative    Protein, Urine Negative Negative    Glucose, Urine Negative Negative mg/dL    Ketones, Urine Negative Negative mg/dL    Urobilinogen, Urine 1.0 <2.0 EU/dL EU/dL    Bilirubin, Urine Negative Negative mg/dL    Blood, Urine Negative Negative   Basic metabolic panel   Result Value Ref Range    Sodium 134 (L) 136 - 144 mEQ/L    Potassium 4.1 3.6 - 5.1 mEQ/L    Chloride 97 (L) 98 - 109 mEQ/L    CO2 25 22 - 32 mEQ/L    BUN 15 8 - 20 mg/dL    Creatinine 0.6 (L) 0.8 - 1.3 mg/dL    Glucose 96 70 - 99 mg/dL    Calcium 8.8 (L) 8.9 - 10.3 mg/dL    eGFR >60.0 >=60.0 mL/min/1.73m*2    Anion Gap 12 3 - 15 mEQ/L   CBC and differential   Result Value Ref Range    WBC 8.15 3.80 - 10.50 K/uL    RBC 3.90 (L) 4.50 - 5.80 M/uL    Hemoglobin 12.0 (L) 13.7 - 17.5 g/dL    Hematocrit 37.4 (L) 40.1 - 51.0 %    MCV 95.9 83.0 - 98.0 fL    MCH 30.8 28.0 - 33.2 pg    MCHC 32.1 (L) 32.2 - 36.5 g/dL    RDW 14.0 11.6 - 14.4 %    Platelets 296 150 - 350 K/uL    MPV 12.0 9.4 - 12.4 fL    Differential Type Auto      nRBC 0.0 <=0.0 %    Immature Granulocytes 1.3 %    Neutrophils 55.8 %    Lymphocytes 22.2 %    Monocytes 12.1 %    Eosinophils 7.6 %    Basophils 1.0 %    Immature Granulocytes, Absolute 0.11 (H) 0.00 - 0.08 K/uL    Neutrophils, Absolute 4.54 1.70 - 7.00 K/uL    Lymphocytes, Absolute 1.81 1.20 - 3.50 K/uL    Monocytes, Absolute 0.99 0.30 - 1.00 K/uL    Eosinophils, Absolute 0.62 (H) 0.04 - 0.54 K/uL    Basophils, Absolute 0.08 0.01 - 0.10 K/uL   Hepatic function panel   Result Value Ref Range    Albumin 2.8 (L) 3.4 - 5.0 g/dL    Bilirubin, Total 0.7 0.3 - 1.2 mg/dL    Bilirubin, Direct 0.2 <=0.4 mg/dL    Alkaline Phosphatase 124 35 - 126 IU/L    AST (SGOT) 20 15 - 41 IU/L    ALT (SGPT) 59 16 - 63 IU/L    Total Protein 6.8 6.0 - 8.2 g/dL   UA Microscopic   Result Value Ref Range    RBC, Urine 0 TO 4 0 TO 4 /HPF    WBC, Urine 4 TO 10 (A) 0 TO 3 /HPF    Squamous Epithelial None Seen None Seen /hpf    Hyaline Cast 0 TO 2 (A) None Seen /lpf    Bacteria, Urine None Seen None Seen /HPF    Calcium Oxalate Crystals Rare (A) None Seen /hpf   Magnesium   Result Value Ref Range    Magnesium 2.0 1.8 - 2.5 mg/dL   B-type natriuretic peptide   Result Value Ref Range    BNP 25 <=100 pg/mL   Lipid panel   Result Value Ref Range    Triglycerides 269 (H) 30 - 149 mg/dL    Cholesterol 269 (H) <=200 mg/dL    HDL 30 (L) >=45 mg/dL    LDL Calculated 185 (H) <=100 mg/dL    Non-HDL, Calculated 239 mg/dL    RISK 9.0 (H) <=5.0   Basic metabolic panel   Result Value Ref Range    Sodium 133 (L) 136 - 144 mEQ/L    Potassium 4.0 3.6 - 5.1 mEQ/L    Chloride 94 (L) 98 - 109 mEQ/L    CO2 25 22 - 32 mEQ/L    BUN 16 8 - 20 mg/dL    Creatinine 0.6 (L) 0.8 - 1.3 mg/dL    Glucose 98 70 - 99 mg/dL    Calcium 8.9 8.9 - 10.3 mg/dL    eGFR >60.0 >=60.0 mL/min/1.73m*2    Anion Gap 14 3 - 15 mEQ/L   CBC and differential   Result Value Ref Range    WBC 7.42 3.80 - 10.50 K/uL    RBC 3.59 (L) 4.50 - 5.80 M/uL    Hemoglobin 11.0 (L) 13.7 - 17.5 g/dL     Hematocrit 34.7 (L) 40.1 - 51.0 %    MCV 96.7 83.0 - 98.0 fL    MCH 30.6 28.0 - 33.2 pg    MCHC 31.7 (L) 32.2 - 36.5 g/dL    RDW 14.0 11.6 - 14.4 %    Platelets 353 (H) 150 - 350 K/uL    MPV 11.6 9.4 - 12.4 fL    Differential Type Auto     nRBC 0.0 <=0.0 %    Immature Granulocytes 3.4 %    Neutrophils 45.8 %    Lymphocytes 30.1 %    Monocytes 12.9 %    Eosinophils 6.9 %    Basophils 0.9 %    Immature Granulocytes, Absolute 0.25 (H) 0.00 - 0.08 K/uL    Neutrophils, Absolute 3.40 1.70 - 7.00 K/uL    Lymphocytes, Absolute 2.23 1.20 - 3.50 K/uL    Monocytes, Absolute 0.96 0.30 - 1.00 K/uL    Eosinophils, Absolute 0.51 0.04 - 0.54 K/uL    Basophils, Absolute 0.07 0.01 - 0.10 K/uL   Basic metabolic panel   Result Value Ref Range    Sodium 132 (L) 136 - 144 mEQ/L    Potassium 4.2 3.6 - 5.1 mEQ/L    Chloride 92 (L) 98 - 109 mEQ/L    CO2 27 22 - 32 mEQ/L    BUN 16 8 - 20 mg/dL    Creatinine 0.7 (L) 0.8 - 1.3 mg/dL    Glucose 94 70 - 99 mg/dL    Calcium 8.8 (L) 8.9 - 10.3 mg/dL    eGFR >60.0 >=60.0 mL/min/1.73m*2    Anion Gap 13 3 - 15 mEQ/L   CBC and differential   Result Value Ref Range    WBC 8.05 3.80 - 10.50 K/uL    RBC 3.48 (L) 4.50 - 5.80 M/uL    Hemoglobin 10.7 (L) 13.7 - 17.5 g/dL    Hematocrit 33.3 (L) 40.1 - 51.0 %    MCV 95.7 83.0 - 98.0 fL    MCH 30.7 28.0 - 33.2 pg    MCHC 32.1 (L) 32.2 - 36.5 g/dL    RDW 14.1 11.6 - 14.4 %    Platelets 389 (H) 150 - 350 K/uL    MPV 11.4 9.4 - 12.4 fL    Differential Type Auto     nRBC 0.0 <=0.0 %    Immature Granulocytes 4.2 %    Neutrophils 50.5 %    Lymphocytes 27.7 %    Monocytes 11.7 %    Eosinophils 4.8 %    Basophils 1.1 %    Immature Granulocytes, Absolute 0.34 (H) 0.00 - 0.08 K/uL    Neutrophils, Absolute 4.06 1.70 - 7.00 K/uL    Lymphocytes, Absolute 2.23 1.20 - 3.50 K/uL    Monocytes, Absolute 0.94 0.30 - 1.00 K/uL    Eosinophils, Absolute 0.39 0.04 - 0.54 K/uL    Basophils, Absolute 0.09 0.01 - 0.10 K/uL   Basic metabolic panel   Result Value Ref Range    Sodium 133  (L) 136 - 144 mEQ/L    Potassium 3.9 3.6 - 5.1 mEQ/L    Chloride 91 (L) 98 - 109 mEQ/L    CO2 26 22 - 32 mEQ/L    BUN 12 8 - 20 mg/dL    Creatinine 0.8 0.8 - 1.3 mg/dL    Glucose 82 70 - 99 mg/dL    Calcium 8.9 8.9 - 10.3 mg/dL    eGFR >60.0 >=60.0 mL/min/1.73m*2    Anion Gap 16 (H) 3 - 15 mEQ/L   CBC and differential   Result Value Ref Range    WBC 11.56 (H) 3.80 - 10.50 K/uL    RBC 3.94 (L) 4.50 - 5.80 M/uL    Hemoglobin 12.0 (L) 13.7 - 17.5 g/dL    Hematocrit 38.3 (L) 40.1 - 51.0 %    MCV 97.2 83.0 - 98.0 fL    MCH 30.5 28.0 - 33.2 pg    MCHC 31.3 (L) 32.2 - 36.5 g/dL    RDW 14.4 11.6 - 14.4 %    Platelets 470 (H) 150 - 350 K/uL    MPV 11.1 9.4 - 12.4 fL    Differential Type Auto     nRBC 0.0 <=0.0 %    Immature Granulocytes 2.1 %    Neutrophils 55.5 %    Lymphocytes 28.1 %    Monocytes 9.9 %    Eosinophils 3.4 %    Basophils 1.0 %    Immature Granulocytes, Absolute 0.24 (H) 0.00 - 0.08 K/uL    Neutrophils, Absolute 6.41 1.70 - 7.00 K/uL    Lymphocytes, Absolute 3.25 1.20 - 3.50 K/uL    Monocytes, Absolute 1.15 (H) 0.30 - 1.00 K/uL    Eosinophils, Absolute 0.39 0.04 - 0.54 K/uL    Basophils, Absolute 0.12 (H) 0.01 - 0.10 K/uL   UA Hold for UC (Macro)   Result Value Ref Range    Color, Urine Yellow Yellow    Clarity, Urine Turbid (A) Clear    Specific Gravity, Urine 1.015 1.002 - 1.030    pH, Urine 6.5 4.5 - 8.0    Leukocyte Esterase +3 (A) Negative    Nitrite, Urine Positive (A) Negative    Protein, Urine +1 (A) Negative    Glucose, Urine Negative Negative mg/dL    Ketones, Urine Negative Negative mg/dL    Urobilinogen, Urine 0.2 <2.0 EU/dL EU/dL    Bilirubin, Urine Negative Negative mg/dL    Blood, Urine +1 (A) Negative   UA Microscopic   Result Value Ref Range    RBC, Urine 5 TO 9 (A) 0 TO 4 /HPF    WBC, Urine Too Numerous To Count (A) 0 TO 3 /HPF    Squamous Epithelial None Seen None Seen /hpf    Hyaline Cast 0 TO 2 (A) None Seen /lpf    Bacteria, Urine +3 (A) None Seen /HPF   Basic metabolic panel   Result  Value Ref Range    Sodium 133 (L) 136 - 144 mEQ/L    Potassium 3.8 3.6 - 5.1 mEQ/L    Chloride 94 (L) 98 - 109 mEQ/L    CO2 26 22 - 32 mEQ/L    BUN 10 8 - 20 mg/dL    Creatinine 0.6 (L) 0.8 - 1.3 mg/dL    Glucose 96 70 - 99 mg/dL    Calcium 8.5 (L) 8.9 - 10.3 mg/dL    eGFR >60.0 >=60.0 mL/min/1.73m*2    Anion Gap 13 3 - 15 mEQ/L   CBC and differential   Result Value Ref Range    WBC 11.77 (H) 3.80 - 10.50 K/uL    RBC 3.24 (L) 4.50 - 5.80 M/uL    Hemoglobin 10.0 (L) 13.7 - 17.5 g/dL    Hematocrit 30.9 (L) 40.1 - 51.0 %    MCV 95.4 83.0 - 98.0 fL    MCH 30.9 28.0 - 33.2 pg    MCHC 32.4 32.2 - 36.5 g/dL    RDW 14.6 (H) 11.6 - 14.4 %    Platelets 316 150 - 350 K/uL    MPV 11.0 9.4 - 12.4 fL    Differential Type Auto     nRBC 0.0 <=0.0 %    Immature Granulocytes 2.0 %    Neutrophils 65.3 %    Lymphocytes 16.6 %    Monocytes 13.2 %    Eosinophils 2.3 %    Basophils 0.6 %    Immature Granulocytes, Absolute 0.23 (H) 0.00 - 0.08 K/uL    Neutrophils, Absolute 7.70 (H) 1.70 - 7.00 K/uL    Lymphocytes, Absolute 1.95 1.20 - 3.50 K/uL    Monocytes, Absolute 1.55 (H) 0.30 - 1.00 K/uL    Eosinophils, Absolute 0.27 0.04 - 0.54 K/uL    Basophils, Absolute 0.07 0.01 - 0.10 K/uL   Basic metabolic panel   Result Value Ref Range    Sodium 134 (L) 136 - 144 mEQ/L    Potassium 4.2 3.6 - 5.1 mEQ/L    Chloride 95 (L) 98 - 109 mEQ/L    CO2 26 22 - 32 mEQ/L    BUN 15 8 - 20 mg/dL    Creatinine 0.7 (L) 0.8 - 1.3 mg/dL    Glucose 104 (H) 70 - 99 mg/dL    Calcium 9.2 8.9 - 10.3 mg/dL    eGFR >60.0 >=60.0 mL/min/1.73m*2    Anion Gap 13 3 - 15 mEQ/L   CBC and differential   Result Value Ref Range    WBC 10.91 (H) 3.80 - 10.50 K/uL    RBC 3.40 (L) 4.50 - 5.80 M/uL    Hemoglobin 10.5 (L) 13.7 - 17.5 g/dL    Hematocrit 32.7 (L) 40.1 - 51.0 %    MCV 96.2 83.0 - 98.0 fL    MCH 30.9 28.0 - 33.2 pg    MCHC 32.1 (L) 32.2 - 36.5 g/dL    RDW 14.8 (H) 11.6 - 14.4 %    Platelets 404 (H) 150 - 350 K/uL    MPV 11.0 9.4 - 12.4 fL    Differential Type Auto      nRBC 0.0 <=0.0 %    Immature Granulocytes 2.7 %    Neutrophils 57.2 %    Lymphocytes 20.5 %    Monocytes 11.3 %    Eosinophils 7.1 %    Basophils 1.2 %    Immature Granulocytes, Absolute 0.29 (H) 0.00 - 0.08 K/uL    Neutrophils, Absolute 6.25 1.70 - 7.00 K/uL    Lymphocytes, Absolute 2.24 1.20 - 3.50 K/uL    Monocytes, Absolute 1.23 (H) 0.30 - 1.00 K/uL    Eosinophils, Absolute 0.77 (H) 0.04 - 0.54 K/uL    Basophils, Absolute 0.13 (H) 0.01 - 0.10 K/uL   UA Macroscopic   Result Value Ref Range    Color, Urine Calista (A) Yellow    Clarity, Urine Turbid (A) Clear    Specific Gravity, Urine 1.015 1.002 - 1.030    pH, Urine 6.5 4.5 - 8.0    Leukocyte Esterase +3 (A) Negative    Nitrite, Urine Positive (A) Negative    Protein, Urine +2 (A) Negative    Glucose, Urine Negative Negative mg/dL    Ketones, Urine Negative Negative mg/dL    Urobilinogen, Urine 0.2 <2.0 EU/dL EU/dL    Bilirubin, Urine Negative Negative mg/dL    Blood, Urine +3 (A) Negative   UA Microscopic   Result Value Ref Range    RBC, Urine Too Numerous To Count (A) 0 TO 4 /HPF    WBC, Urine Too Numerous To Count (A) 0 TO 3 /HPF    Squamous Epithelial None Seen None Seen /hpf    Hyaline Casts None Seen None Seen /lpf    Bacteria, Urine Rare (A) None Seen /HPF   POCT Glucose   Result Value Ref Range    POCT Bedside Glucose 115 (H) 70 - 99 mg/dL    POC Test POC    POCT Glucose   Result Value Ref Range    POCT Bedside Glucose 102 (H) 70 - 99 mg/dL    POC Test POC        Imaging  Not applicable    VTE Assessment: TBD    Physical Exam:  Physical Exam  Vitals signs and nursing note reviewed.   Constitutional:       Appearance: Normal appearance.   HENT:      Head: Normocephalic.      Nose: Nose normal.   Pulmonary:      Effort: Pulmonary effort is normal.   Abdominal:      General: Abdomen is flat.   Skin:     General: Skin is warm.   Neurological:      Mental Status: He is alert and oriented to person, place, and time.   Psychiatric:         Behavior: Behavior  normal.         Acute cystitis  Assessment & Plan  Check cx and advise  Patient with voiding dysfunction complicating issue       BPH with urinary obstruction   Overview Signed 2/23/2021  1:55 PM by Lacho Monreal MD    Cath for pvr > 500cc,  flomax qhs , follow pvr           Retention - will follow, cath flomax outpt  f/u    Problem List     Acute metabolic encephalopathy    ARDS (adult respiratory distress syndrome) (CMS/HCC)    Coronary artery disease involving native coronary artery of native heart without angina pectoris    Overview Signed 2/1/2021  1:33 AM by Agnes Morelos, RN     Last Assessment & Plan:   Continue with current meds         Hypoxia    Pneumonia due to COVID-19 virus    Transaminitis    Essential hypertension    Dyslipidemia    Atelectasis    Physical deconditioning    Acute cystitis    BPH with urinary obstruction    Overview Signed 2/23/2021  1:55 PM by Lacho Monreal MD     Cath for pvr > 500cc,  flomax qhs , follow pvr

## 2021-02-23 NOTE — PROGRESS NOTES
Patient: Denis Geren  Location: [facility] [address]  MRN: [id number]  Today's date: 2/23/2021    History of Present Illness  Denis LOVE is a 70 y.o. male admitted on 2/4/2021 with Pneumonia due to COVID-19 virus. Principal problem is No Principal Problem: There is no principal problem currently on the Problem List. Please update the Problem List and refresh..   Pt admitted to outside hospital on 1/5/2021 for COVID-19 PNA. Worsening resp status intubated on 1/9- extubated 1/16 to [facility]. Transition to [facility] on 1/23.Eval by nephrology while in ICU due to ERNIE likely secondary to ATN. Creatinine has been improving with excellent urine output. Received Decadron and Remdesivir. Dobhoff tube was placed due to dysphagia. New fever of 102.8 1/20 and 1/21. ID was consulted. Bld cx's negative. CT scan of the chest abdomen pelvis was done with results showing increasing pulmonary infiltrates/groundglass opacities and consolidations in his bilateral lower lobes. Started empirically on cefepime and vancomycin to cover hospital-acquired pneumonia. Pt had increased lethargy on 1/22. Pt removed DHT 1/25; required higher O2 afterward - possible aspiration during the process. 1/26 patient continued to have intermittent confusion; ?post ICU delirium. Neurology is following, MRI brain was unremarkable. EEG showed mild slowing but no seizure activity. D/c'd abx after 5 days d/t lethargy.       Past Medical History  Denis LOVE has a past medical history of Coronary artery disease, Hypertension, and Lipid disorder.    PT Vitals    Date/Time Pulse HR Source Resp SpO2 Pt Activity O2 Therapy BP BP Location BP Method Pt Position Who   02/23/21 1501 77 Monitor -- -- -- -- 120/76 Right upper arm Automatic Sitting CINDY   02/23/21 1558 79 Monitor 18 96 % At rest None (Room air) 126/65 Left upper arm Automatic Lying ABS   02/23/21 1559 80 Monitor -- -- -- -- 122/77 Left upper arm Automatic Sitting CINDY      PT Pain     Date/Time Pain Type Rating: [facility]   02/23/21 1501 Pain Assessment 0 CINDY   02/23/21 1559 Pain Reassessment 0 CINDY          Prior Living Environment      Most Recent Value   Lives With  spouse, child(mindy), adult [adult son lives in home occasionally]   Living Arrangements  house   Home Accessibility  stairs to enter home (Group), stairs within home (Group)   Living Environment Comment  Lives with wife and intermittently with one son.  [bed and bath on 2nd ,  no powder room on first]   Number of Stairs, Main Entrance  1   Stair Railings, Main Entrance  none   Location, Patient Bedroom  second floor, must negotiate stairs to access   Location, Bathroom  second floor, must negotiate stairs to access   Bathroom Access Comment  Pt. reports has tub shower and stall shower, uses tub shower primarily, has grab bar in shower, has stanard height toilet c wall on both sides   Number of Stairs, Within Home, Primary  12   Surface of Stairs, Within Home, Primary  hardwood   Stair Railings, Within Home, Primary  railings on both sides of stairs          Prior Level of Function      Most Recent Value   Dominant Hand  right   Ambulation  independent   Transferring  independent   Toileting  independent   Bathing  independent   Dressing  independent   Eating  independent   Communication  understands/communicates without difficulty   Swallowing  swallows foods/liquids without difficulty   Baseline Diet/Method of Nutritional Intake  regular solids, thin liquids   Past History of Dysphagia  No previous reports    Prior Level of Function Comment  Pt is a cattle/,  previously IND for all IADLs.    Assistive Device/Animal Currently Used at Home  walker, front-wheeled          IRF PT Evaluation and Treatment - 02/23/21 1501        Time Calculation    Start Time  1500     Stop Time  1600     Time Calculation (min)  60 min        Session Details    Document Type  discharge evaluation     Mode of Treatment  individual  therapy;physical therapy        Sensory Assessment (Somatosensory)    Left LE Sensory Assessment  light touch localization;light touch awareness;proprioception;intact    5/5 proprioception at great toe    Right LE Sensory Assessment  light touch awareness;light touch localization;proprioception;intact    5/5 proprioception at great toe       Range of Motion (ROM)    Range of Motion  ROM is WFL        Strength (Manual Muscle Testing)    Left Lower Extremity Strength  hip;knee;ankle     Hip, Left (Strength)  Flexion: 4/5, Extension: 4/5, Abduction/Adduction: 3+/5, IR/ER: 3+/5     Knee, Left (Strength)  Flexion: 4/5, Extension: 4/5     Ankle, Left (Strength)  DF: 3+/5, PF: 4/5, Inversion: 3/5, Eversion: 3+/5     Right Lower Extremity Strength  hip;knee;ankle     Hip, Right (Strength)  Flexion: 4/5, Extension: 4/5, Abduction/Adduction: 3+/5, IR/ER: 3+/5     Knee, Right (Strength)  Flexion: 4/5, Extension: 4/5     Ankle, Right (Strength)  DF: 3+/5, PF: 4/5, Inversion: 3/5, Eversion: 3+/5        Bed Mobility    [place], Roll Left  modified independence     [place], Roll Right  modified independence     [place], Supine to Sit  modified independence     [place], Sit to Supine  modified independence     Comment (Bed Mobility)  Performed in bed with HOB flat        Transfers    Transfers  stand pivot transfer;car transfer        Sit to Stand Transfer    [place], Sit to Stand Transfer  modified independence     Assistive Device  walker, front-wheeled     Comment  from  to         Stand to Sit Transfer    [place], Stand to Sit Transfer  modified independence     Assistive Device  walker, front-wheeled     Comment  to WC from RW        Stand Pivot Transfer    [place], Stand Pivot/Stand Step Transfer  modified independence     Assistive Device  walker, front-wheeled     Comment  via ambulatory approach to/from  with RW        Car Transfer    Transfer Technique  stand pivot      "[place], Car Transfer  set up     Verbal Cues  safety     Assistive Device  walker, front-wheeled     Comment  via ambulatory approach to/from simulated car with set up assist for opening/closing door and RW management        Gait Training    [place], Gait  modified independence     Assistive Device  walker, front-wheeled     Distance in Feet  200 feet     Gait Pattern Utilized  step-through     Deviations/Abnormal Patterns (Gait)  base of support, narrow;gait speed decreased;step length decreased     Bilateral Gait Deviations  heel strike decreased     Advanced Gait Activity  curb negotiation;sloped surfaces;rough/uneven surfaces     [place], Picking Up Object  modified independence    performed from standing with RW and grabber    Comment  Mod I with use of RW on indoor level surfaces        Curb Negotiation (Mobility)    [place]  close supervision     Assistive Device  walker, front-wheeled     Curb Height  6 inches     Comment  6\" + 2\" curb step up/down with Cl-S         Rough/Uneven Surface Gait Skills (Mobility)    [place]  supervision     Assistive Device  walker, front-wheeled     Distance in Feet  50 feet     Comment  Indoor rough carpet with use of RW.         Sloped Surface Gait Skills (Mobility)    [place]  close supervision     Comment  5' indoor simulated ramp with Cl-S for safety        Stairs Training    [place], Stairs  supervision     Assistive Device  railing     Handrail Location  both sides     Number of Stairs  12     Stair Height  7 inches     Ascending Stairs Technique  step-to-step     Descending Stairs Technique  step-to-step     Comment  Supervision for safety. Recommending seated rest break at top and bottom of stairs        Wheelchair Mobility/Management    Comment, Functional Mobility  WC mobility is not functional goal for D/C        Balance    Static Sitting Balance  WFL     Dynamic Sitting Balance  WFL     Sit to Stand Dynamic Balance  WFL     " Static Standing Balance  WFL     Dynamic Standing Balance  mild impairment     Balance Test Results  Timed Up and Go Test (TUG)        Timed Up and Go Test    Trial One: Timed Up and Go Test  18.83     Trial Two: Timed Up and Go Test  18.03     Trial Three: Timed Up and Go Test  17.35     Mean of 3 Trials: Timed Up and Go Test  18.07     Comment, Timed Up and Go Test  Performed with RW and Supervision     Results, Timed Up and Go Test (Balance)  Score indicates falls risk (>13.5 seconds)        Lower Extremity (Therapeutic Exercise)    Comment (LE Therapeutic Exercise)  PT provided patient with HEP on 2/22. Pt reported no questions regarding HEP today.         Discharge Summary (PT)    Outcomes Achieved/Progress Made Upon Discharge (PT)  all goals met within established timeframes     Transfer to Another Level of Care or Facility (PT)  patient to receive therapy via home health     Discharge Summary Statement (PT)  Pt is a 69 y/o male initially admitted to an outside facility on 1/5. Dx'd with Covid 19 PNA. Intubated 1/9. Extubated 1/16. Hospital complications to include hospital acquired PNA, increased B pulmonary infiltrates requiring Abx , lethargy and confusion. Pt presenting with decreased activity tolerance/ SOLIS and O2 desaturation with minimal activity, decreased balance, decreased B LE strength. He presented to [facility] on 2/4/21 for inpatient rehabilitation. He has progressed from a dependent assist level for transfers/ambulation to Mod I on indoor level surfaces with use of a RW. He continues to require Supervision to Close Supervision for stair training, curbs, ramps, and uneven surfaces. He continues to demonstrate deficits with endurance, strength, and balance as noted with a score of 30/56 on the Rajan Balance Scale and 18.07 seconds on the TUG. His wife was present on 2/19 for training and demonstrated ability to provide appropriate assistance. He owns a RW and will require no further DME at this time. He  will benefit from ongoing home health PT to maximize functional recovery and independence.                             IRF PT Goals      Most Recent Value   Bed Mobility Goal 1   Activity/Assistive Device  rolling to left, rolling to right, sit to supine/supine to sit at 02/05/2021 1030   [place]  supervision required at 02/05/2021 1030   Time Frame  short-term goal (STG), 1 week at 02/18/2021 0849   Strategies/Barriers  Impaired strength, decreased endurance, safety considerations at 02/18/2021 0849   Progress/Outcome  goal met at 02/23/2021 1501   Bed Mobility Goal 2   Activity/Assistive Device  rolling to left, rolling to right, sit to supine/supine to sit at 02/05/2021 1030   [place]  modified independence at 02/05/2021 1030   Time Frame  long-term goal (LTG), 2 weeks at 02/18/2021 0849   Strategies/Barriers  Impaired strength, decreased endurance, safety considerations at 02/18/2021 0849   Progress/Outcome  goal met at 02/23/2021 1501   Transfer Goal 1   Activity/Assistive Device  sit-to-stand/stand-to-sit at 02/18/2021 0849   [place]  supervision required at 02/18/2021 0849   Time Frame  short-term goal (STG), 1 week at 02/18/2021 0849   Strategies/Barriers  Impaired strength, decreased endurance, safety considerations at 02/18/2021 0849   Progress/Outcome  goal met at 02/23/2021 1501   Transfer Goal 2   Activity/Assistive Device  sit-to-stand/stand-to-sit, bed-to-chair/chair-to-bed at 02/11/2021 0824   [place]  modified independence at 02/11/2021 0824   Time Frame  long-term goal (LTG), 2 weeks at 02/18/2021 0849   Strategies/Barriers  Impaired strength, decreased endurance, safety considerations at 02/18/2021 0849   Progress/Outcome  goal met at 02/23/2021 1501   Gait/Walking Locomotion Goal 1   Activity/Assistive Device  gait (walking locomotion) at 02/18/2021 0849   Distance  150 feet at 02/18/2021 0849   [place]  minimum assist (75% or more patient effort) at 02/18/2021 0849    Time Frame  short-term goal (STG), 1 week at 02/18/2021 0849   Strategies/Barriers  Impaired strength, decreased endurance, safety considerations at 02/18/2021 0849   Progress/Outcome  goal met at 02/23/2021 1501   Gait/Walking Locomotion Goal 2   Activity/Assistive Device  gait (walking locomotion) at 02/18/2021 0849   Distance  150 feet at 02/18/2021 0849   [place]  modified independence at 02/18/2021 0849   Time Frame  long-term goal (LTG), 1 week at 02/18/2021 0849   Strategies/Barriers  Impaired strength, decreased endurance, safety considerations at 02/18/2021 0849   Progress/Outcome  goal met at 02/23/2021 1501   Stairs Goal 1   Activity/Assistive Device  stairs, all skills at 02/18/2021 0849   Number of Stairs  12 at 02/18/2021 0849   [place]  verbal cues required [touching assist] at 02/18/2021 0849   Time Frame  short-term goal (STG), 1 week at 02/18/2021 0849   Progress/Outcome  goal met at 02/23/2021 1501   Stairs Goal 2   Activity/Assistive Device  stairs, all skills at 02/11/2021 0824   Number of Stairs  12 at 02/11/2021 0824   [place]  supervision required at 02/11/2021 0824   Time Frame  long-term goal (LTG), 2 weeks at 02/18/2021 0849   Progress/Outcome  goal met at 02/23/2021 1501

## 2021-02-23 NOTE — PLAN OF CARE
Problem: Rehabilitation (IRF) Plan of Care  Goal: Plan of Care Review  Flowsheets (Taken 2/23/2021 1218)  Plan of Care Reviewed With: patient  IRF Plan of Care Review:   outcomes achieved   discharge pending  Outcome Summary: Pt is d/c from inpatient RT services on 2/23/21. Pt is recommended to participate in healthy and appropriate recreational activities post discharge via incorporating passive/active activities that promotes holistically improving pt's overall physical, mental and social well-being in order to return to maximum independence within pre-morbid leisure interests, community and family life. Pt recommended to be provided close supervision c RW as AD when integrated into the community setting upon d/c. Pt would benefit from con’t standing and seated rest breaks when in the community & continuous improvement in the following areas: dynamic and static balance and energy conservation.

## 2021-02-23 NOTE — PLAN OF CARE
Problem: Rehabilitation (IRF) Plan of Care  Goal: Plan of Care Review  Flowsheets (Taken 2/23/2021 1353)  Plan of Care Reviewed With: patient  IRF Plan of Care Review: progress ongoing, continue  Outcome Summary: Plan for discharge to home. Patient acheived all PT goals.

## 2021-02-23 NOTE — PROGRESS NOTES
Patient: Denis Green  Location: [facility] [address]  MRN: [id number]  Today's date: 2/23/2021    History of Present Illness  Denis LOVE is a 70 y.o. male admitted on 2/4/2021 with Pneumonia due to COVID-19 virus. Principal problem is No Principal Problem: There is no principal problem currently on the Problem List. Please update the Problem List and refresh..   Pt admitted to outside hospital on 1/5/2021 for COVID-19 PNA. Worsening resp status intubated on 1/9- extubated 1/16 to OptiFlow. Transition to [facility]NC on 1/23.Eval by nephrology while in ICU due to ERNIE likely secondary to ATN. Creatinine has been improving with excellent urine output. Received Decadron and Remdesivir. Dobhoff tube was placed due to dysphagia. New fever of 102.8 1/20 and 1/21. ID was consulted. Bld cx's negative. CT scan of the chest abdomen pelvis was done with results showing increasing pulmonary infiltrates/groundglass opacities and consolidations in his bilateral lower lobes. Started empirically on cefepime and vancomycin to cover hospital-acquired pneumonia. Pt had increased lethargy on 1/22. Pt removed DHT 1/25; required higher O2 afterward - possible aspiration during the process. 1/26 patient continued to have intermittent confusion; ?post ICU delirium. Neurology is following, MRI brain was unremarkable. EEG showed mild slowing but no seizure activity. D/c'd abx after 5 days d/t lethargy.       Past Medical History  Denis LOVE has a past medical history of Coronary artery disease, Hypertension, and Lipid disorder.    OT Vitals    Date/Time Pulse HR Source BP BP Location BP Method Pt Position Observations [facility]   02/23/21 1333 80 Monitor 94/56 Right upper arm Automatic Sitting Asymptomatic LM   02/23/21 1356 84 Monitor 151/70 Right upper arm Automatic Sitting Asymptomatic; nursing notified LM      OT Pain    Date/Time Pain Type Pref Pain Scale Side Location Rating: Rest Interventions [facility]   02/23/21 1333 Pain  "Assessment number (Numeric Rating Pain Scale) Left foot 5 diversional activity provided LM   02/23/21 1356 Pain Reassessment number (Numeric Rating Pain Scale) Left foot 5 position adjusted LM          Prior Living Environment      Most Recent Value   Lives With  spouse, child(mindy), adult [adult son lives in home occasionally]   Living Arrangements  house   Home Accessibility  stairs to enter home (Group), stairs within home (Group)   Living Environment Comment  Lives with wife and intermittently with one son.  [bed and bath on 2nd ,  no powder room on first]   Number of Stairs, Main Entrance  1   Stair Railings, Main Entrance  none   Location, Patient Bedroom  second floor, must negotiate stairs to access   Location, Bathroom  second floor, must negotiate stairs to access   Bathroom Access Comment  Pt. reports has tub shower and stall shower, uses tub shower primarily, has grab bar in shower, has stanard height toilet c wall on both sides   Number of Stairs, Within Home, Primary  12   Surface of Stairs, Within Home, Primary  hardwood   Stair Railings, Within Home, Primary  railings on both sides of stairs          Prior Level of Function      Most Recent Value   Dominant Hand  right   Ambulation  independent   Transferring  independent   Toileting  independent   Bathing  independent   Dressing  independent   Eating  independent   Communication  understands/communicates without difficulty   Swallowing  swallows foods/liquids without difficulty   Baseline Diet/Method of Nutritional Intake  regular solids, thin liquids   Past History of Dysphagia  No previous reports    Prior Level of Function Comment  Pt is a cattle/,  previously IND for all IADLs.    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  yes     General Observations of Patient  Recieved supine in bed; ace wrap on L wrist        Hand  Strength Testing    Comment, Left Hand  Attempted L hand  strength, however limited by pain and wrist wrap     Right Hand, Setting 2  25, 22, 25     Comment, Left Hand  Attempted L hand pinch strength, however limited by wrist pain and wrap     Right Hand: Tip (Pincer) Pinch Strength  12     Right Hand: Lateral (Key) Pinch Strength  13     Right Hand: Three Point (Chandan) Pinch Strength  12        Bed Mobility    [place], Supine to Sit  modified independence     [place], Sit to Supine  modified independence     Comment (Bed Mobility)  Recieved in bed and requested return to bed at end of session        Bed to Chair Transfer    [place], Bed to Chair  modified independence     Assistive Device  walker, front-wheeled        Chair to Bed Transfer    [place], Chair to Bed  modified independence     Assistive Device  walker, front-wheeled        Sit to Stand Transfer    [place], Sit to Stand Transfer  modified independence     Assistive Device  walker, front-wheeled        Stand to Sit Transfer    [place], Stand to Sit Transfer  modified independence     Assistive Device  walker, front-wheeled        Safety Issues, Functional Mobility    Comment, Safety Issues/Impairments (Mobility)  OT: ADRIAN [place] RW for short household distance.        Motor Skills    Results, 9 Hole Peg Test of Fine Motor Coordination  BOX AND BLOCKS: 49 blocks/1 min right hand; 49  blocks/1 min left hand. 9 HOLE PEG " TEST: 21.4 seconds right hand; 30.9s left hand (limited by L wrist wrap)        Daily Progress Summary (OT)    Daily Outcome Statement (OT)  Outcome measures re-assessed. B coordination improved. R /pinch strength consistent with initial assessment. L hand limited by pain and ace wrap. Noted increase in BP pre/post session with nursing notified and aware. Asymptomatic. Continue POC                            IRF OT Goals      Most Recent Value   Transfer Goal 1   Activity/Assistive Device  toilet at 02/15/2021 1308   [place]  supervision required at 02/15/2021 1308   Time Frame  short-term goal (STG), 5 - 7 days at 02/15/2021 1308   Strategies/Barriers  DME at 02/15/2021 1308   Progress/Outcome  good progress toward goal, goal partially met, goal revised this date at 02/15/2021 1308   Transfer Goal 2   Activity/Assistive Device  toilet at 02/10/2021 1632   [place]  supervision required, set-up required at 02/10/2021 1632   Time Frame  long-term goal (LTG), 3 weeks at 02/10/2021 1632   Strategies/Barriers  DME at 02/10/2021 1632   Transfer Goal 3   Activity/Assistive Device  shower at 02/15/2021 1308   [place]  supervision required at 02/15/2021 1308   Time Frame  short-term goal (STG), 5 - 7 days at 02/15/2021 1308   Strategies/Barriers  DME at 02/15/2021 1308   Progress/Outcome  good progress toward goal, goal partially met, goal revised this date at 02/15/2021 1308   Transfer Goal 4   Activity/Assistive Device  shower at 02/10/2021 1632   [place]  supervision required, set-up required at 02/10/2021 1632   Time Frame  long-term goal (LTG), 3 weeks at 02/10/2021 1632   Strategies/Barriers  DME at 02/10/2021 1632   Bathing Goal 1   Activity/Assistive Device  bathing skills, all at 02/15/2021 1308   [place]  modified independence at 02/15/2021 1308   Time Frame  short-term goal (STG), 5 - 7 days at 02/15/2021 1308   Strategies/Barriers  DME at 02/15/2021 1308   Progress/Outcome  good  progress toward goal, goal partially met, goal revised this date at 02/15/2021 1308   Bathing Goal 2   Activity/Assistive Device  bathing skills, all at 02/05/2021 0726   [place]  supervision required at 02/05/2021 0726   Time Frame  long-term goal (LTG), 3 weeks at 02/05/2021 0726   Strategies/Barriers  DME TBD at 02/05/2021 0726   UB Dressing Goal 1   Activity/Assistive Device  upper body dressing at 02/15/2021 1308   [place]  modified independence at 02/15/2021 1308   Time Frame  short-term goal (STG), 5 - 7 days at 02/15/2021 1308   Strategies/Barriers  including set up at 02/15/2021 1308   Progress/Outcome  good progress toward goal, goal partially met, goal revised this date at 02/15/2021 1308   UB Dressing Goal 2   Activity/Assistive Device  upper body dressing at 02/05/2021 0726   [place]  modified independence at 02/05/2021 0726   Time Frame  long-term goal (LTG), 3 weeks at 02/05/2021 0726   LB Dressing Goal 1   Activity/Assistive Device  lower body dressing at 02/15/2021 1308   [place]  modified independence at 02/15/2021 1308   Time Frame  short-term goal (STG), 5 - 7 days at 02/15/2021 1308   Strategies/Barriers  AD including clothing retrieval at 02/15/2021 1308   Progress/Outcome  good progress toward goal, goal partially met, goal revised this date at 02/10/2021 1632   LB Dressing Goal 2   Activity/Assistive Device  lower body dressing at 02/05/2021 0726   [place]  supervision required at 02/05/2021 0726   Time Frame  long-term goal (LTG), 3 weeks at 02/05/2021 0726   Grooming Goal 1   Activity/Assistive Device  grooming skills, all at 02/15/2021 1308   [place]  modified independence at 02/15/2021 1308   Time Frame  short-term goal (STG), 5 - 7 days at 02/15/2021 1308   Strategies/Barriers  standing @ sink at 02/15/2021 1308   Progress/Outcome  good progress toward goal, goal partially met, goal revised this date at 02/15/2021 1308   Grooming Goal 2   Activity/Assistive  Device  grooming skills, all at 02/05/2021 0726   [place]  modified independence at 02/05/2021 0726   Time Frame  long-term goal (LTG), 3 weeks at 02/05/2021 0726   Toileting Goal 1   Activity/Assistive Device  toileting skills, all at 02/15/2021 1308   [place]  supervision required at 02/15/2021 1308   Time Frame  short-term goal (STG), 5 - 7 days at 02/15/2021 1308   Strategies/Barriers  DME at 02/15/2021 1308   Progress/Outcome  good progress toward goal, goal partially met, goal revised this date at 02/15/2021 1308   Toileting Goal 2   Activity/Assistive Device  toileting skills, all at 02/10/2021 1632   [place]  supervision required at 02/10/2021 1632   Time Frame  long-term goal (LTG), 3 weeks at 02/10/2021 1632   Strategies/Barriers  DME at 02/10/2021 1632   Progress/Outcome  goal revised this date at 02/10/2021 1632

## 2021-02-24 VITALS
BODY MASS INDEX: 25.76 KG/M2 | DIASTOLIC BLOOD PRESSURE: 56 MMHG | SYSTOLIC BLOOD PRESSURE: 115 MMHG | OXYGEN SATURATION: 97 % | TEMPERATURE: 98.1 F | WEIGHT: 170 LBS | HEIGHT: 68 IN | HEART RATE: 107 BPM | RESPIRATION RATE: 20 BRPM

## 2021-02-24 LAB
ANION GAP SERPL CALC-SCNC: 12 MEQ/L (ref 3–15)
BACTERIA UR CULT: ABNORMAL
BACTERIA UR CULT: ABNORMAL
BASOPHILS # BLD: 0.11 K/UL (ref 0.01–0.1)
BASOPHILS NFR BLD: 1 %
BUN SERPL-MCNC: 15 MG/DL (ref 8–20)
CALCIUM SERPL-MCNC: 9 MG/DL (ref 8.9–10.3)
CHLORIDE SERPL-SCNC: 100 MEQ/L (ref 98–109)
CO2 SERPL-SCNC: 26 MEQ/L (ref 22–32)
CREAT SERPL-MCNC: 0.7 MG/DL (ref 0.8–1.3)
DIFFERENTIAL METHOD BLD: ABNORMAL
EOSINOPHIL # BLD: 0.73 K/UL (ref 0.04–0.54)
EOSINOPHIL NFR BLD: 6.9 %
ERYTHROCYTE [DISTWIDTH] IN BLOOD BY AUTOMATED COUNT: 14.8 % (ref 11.6–14.4)
GFR SERPL CREATININE-BSD FRML MDRD: >60 ML/MIN/1.73M*2
GLUCOSE SERPL-MCNC: 99 MG/DL (ref 70–99)
HCT VFR BLDCO AUTO: 28.7 % (ref 40.1–51)
HGB BLD-MCNC: 9.2 G/DL (ref 13.7–17.5)
IMM GRANULOCYTES # BLD AUTO: 0.27 K/UL (ref 0–0.08)
IMM GRANULOCYTES NFR BLD AUTO: 2.5 %
LYMPHOCYTES # BLD: 2.24 K/UL (ref 1.2–3.5)
LYMPHOCYTES NFR BLD: 21.1 %
MAGNESIUM SERPL-MCNC: 2 MG/DL (ref 1.8–2.5)
MCH RBC QN AUTO: 30.9 PG (ref 28–33.2)
MCHC RBC AUTO-ENTMCNC: 32.1 G/DL (ref 32.2–36.5)
MCV RBC AUTO: 96.3 FL (ref 83–98)
MONOCYTES # BLD: 1.37 K/UL (ref 0.3–1)
MONOCYTES NFR BLD: 12.9 %
NEUTROPHILS # BLD: 5.9 K/UL (ref 1.7–7)
NEUTS SEG NFR BLD: 55.6 %
NRBC BLD-RTO: 0 %
PDW BLD AUTO: 10.7 FL (ref 9.4–12.4)
PLATELET # BLD AUTO: 382 K/UL (ref 150–350)
POTASSIUM SERPL-SCNC: 4.6 MEQ/L (ref 3.6–5.1)
RBC # BLD AUTO: 2.98 M/UL (ref 4.5–5.8)
SODIUM SERPL-SCNC: 138 MEQ/L (ref 136–144)
WBC # BLD AUTO: 10.62 K/UL (ref 3.8–10.5)

## 2021-02-24 PROCEDURE — 63700000 HC SELF-ADMINISTRABLE DRUG: Performed by: PHYSICAL MEDICINE & REHABILITATION

## 2021-02-24 PROCEDURE — 36415 COLL VENOUS BLD VENIPUNCTURE: CPT | Performed by: INTERNAL MEDICINE

## 2021-02-24 PROCEDURE — 80048 BASIC METABOLIC PNL TOTAL CA: CPT | Performed by: INTERNAL MEDICINE

## 2021-02-24 PROCEDURE — 63700000 HC SELF-ADMINISTRABLE DRUG: Performed by: INTERNAL MEDICINE

## 2021-02-24 PROCEDURE — 83735 ASSAY OF MAGNESIUM: CPT | Performed by: INTERNAL MEDICINE

## 2021-02-24 PROCEDURE — 85025 COMPLETE CBC W/AUTO DIFF WBC: CPT | Performed by: INTERNAL MEDICINE

## 2021-02-24 PROCEDURE — 63700000 HC SELF-ADMINISTRABLE DRUG: Performed by: PODIATRIST

## 2021-02-24 RX ORDER — DICLOFENAC SODIUM 10 MG/G
2 GEL TOPICAL 3 TIMES DAILY
Qty: 50 G | Refills: 0 | Status: SHIPPED | OUTPATIENT
Start: 2021-02-24 | End: 2021-03-03

## 2021-02-24 RX ORDER — GUAIFENESIN 600 MG/1
600 TABLET, EXTENDED RELEASE ORAL 2 TIMES DAILY
Qty: 20 TABLET | Refills: 0 | COMMUNITY
Start: 2021-02-24 | End: 2021-03-06

## 2021-02-24 RX ORDER — CIPROFLOXACIN 500 MG/1
500 TABLET ORAL EVERY 12 HOURS
Qty: 11 TABLET | Refills: 0 | Status: SHIPPED | OUTPATIENT
Start: 2021-02-24 | End: 2021-03-02

## 2021-02-24 RX ORDER — GEMFIBROZIL 600 MG/1
600 TABLET, FILM COATED ORAL
Qty: 60 TABLET | Refills: 0 | COMMUNITY
Start: 2021-02-24

## 2021-02-24 RX ORDER — AMLODIPINE BESYLATE 2.5 MG/1
2.5 TABLET ORAL NIGHTLY
Qty: 30 TABLET | Refills: 0 | Status: SHIPPED | OUTPATIENT
Start: 2021-02-24 | End: 2021-03-26

## 2021-02-24 RX ORDER — IBUPROFEN/PSEUDOEPHEDRINE HCL 200MG-30MG
3 TABLET ORAL NIGHTLY
Qty: 30 TABLET | Refills: 0 | COMMUNITY
Start: 2021-02-24 | End: 2021-03-26

## 2021-02-24 RX ORDER — TAMSULOSIN HYDROCHLORIDE 0.4 MG/1
0.4 CAPSULE ORAL DAILY
Qty: 30 CAPSULE | Refills: 0 | Status: SHIPPED | OUTPATIENT
Start: 2021-02-24 | End: 2021-03-26

## 2021-02-24 RX ORDER — DIPHENHYDRAMINE HCL 50 MG
50 CAPSULE ORAL NIGHTLY
Qty: 79 CAPSULE | Refills: 0 | COMMUNITY
Start: 2021-02-24

## 2021-02-24 RX ORDER — SENNOSIDES 8.6 MG/1
2 TABLET ORAL
Qty: 60 TABLET | Refills: 0 | COMMUNITY
Start: 2021-02-24 | End: 2021-03-26

## 2021-02-24 RX ORDER — ACETAMINOPHEN 325 MG/1
650 TABLET ORAL EVERY 6 HOURS PRN
COMMUNITY
Start: 2021-02-24 | End: 2021-03-26

## 2021-02-24 RX ORDER — ALBUTEROL SULFATE 90 UG/1
2 INHALANT RESPIRATORY (INHALATION) EVERY 4 HOURS PRN
Qty: 18 G | Refills: 1 | Status: SHIPPED | OUTPATIENT
Start: 2021-02-24 | End: 2021-03-26

## 2021-02-24 RX ADMIN — LIDOCAINE 1 PATCH: 246 PATCH TOPICAL at 08:08

## 2021-02-24 RX ADMIN — FUROSEMIDE 20 MG: 20 TABLET ORAL at 08:08

## 2021-02-24 RX ADMIN — CIPROFLOXACIN 500 MG: 250 TABLET ORAL at 08:07

## 2021-02-24 RX ADMIN — GEMFIBROZIL 600 MG: 600 TABLET ORAL at 08:06

## 2021-02-24 RX ADMIN — METOPROLOL TARTRATE 12.5 MG: 25 TABLET, FILM COATED ORAL at 08:07

## 2021-02-24 RX ADMIN — DOCUSATE SODIUM 100 MG: 100 CAPSULE, LIQUID FILLED ORAL at 08:07

## 2021-02-24 RX ADMIN — DICLOFENAC SODIUM 2 G: 10 GEL TOPICAL at 08:08

## 2021-02-24 RX ADMIN — GUAIFENESIN 600 MG: 600 TABLET ORAL at 08:05

## 2021-02-24 RX ADMIN — SENNOSIDES 3 TABLET: 8.6 TABLET, FILM COATED ORAL at 11:00

## 2021-02-24 RX ADMIN — ASPIRIN 81 MG: 81 TABLET ORAL at 08:06

## 2021-02-24 RX ADMIN — SODIUM CHLORIDE 1 G: 1 TABLET ORAL at 08:06

## 2021-02-24 RX ADMIN — Medication 400 MG: at 08:05

## 2021-02-24 RX ADMIN — PROBIOTIC PRODUCT - TAB 500 MG: TAB at 08:06

## 2021-02-24 NOTE — PROGRESS NOTES
02/22/21 1200   Discharge Needs Assessment (IRF)   Concerns to be Addressed discharge planning   Patient/Family Anticipates Transition to home with help/services;home with family   Patient/Family Anticipated Services at Transition durable medical equipment;home health care   Transportation Concerns car, none   Transportation Anticipated family or friend will provide   Assistive Device/Animal Currently Used at Home walker, front-wheeled   Anticipated Changes Related to Illness inability to work   Anticipated Discharge Disposition home with home health services   Offered/Gave Vendor List yes   [facility]   Current Discharge Risk physical impairment   What day is the transport expected? 02/24/21   What time is the transport expected? 1200   Plan   Patient/Family in Agreement with Plan yes   Discharge Review for Designated Lay Caregiver Designated Lay Caregiver available   Final Note   Final Note   (referral faxed to [facility])

## 2021-02-24 NOTE — PROGRESS NOTES
[facility] Progress Note          Patient was seen and examined at bedside.    Subjective:  Clinically he remains stable, pleasant, in NAD; He has finished his inpt acute rehab with good functional recovery, plan to d/c home today, provided with no new complaints or concerns, no O/N acute events, reviewed his am labs, noted UCX collected on 2/22/2021 growing ESBL Klebsiella, sensitive to cipro, continue to finish a 7 day course of cipro 500 mg bid, recommended him to f/u with his PCP in 5-7 days to repeat a CBC, spent 35 min to prepare for his d/c home today.    Objective   Vital signs in last 24 hours:  Temp:  [36.5 °C (97.7 °F)-36.7 °C (98.1 °F)] 36.7 °C (98.1 °F)  Heart Rate:  [] 107  Resp:  [18-20] 20  BP: ()/(56-77) 115/56    No intake or output data in the 24 hours ending 02/24/21 0821  Intake/Output this shift:  No intake/output data recorded.   Review of Systems:  All other systems reviewed and negative except as noted in the HPI.   Objective      Labs  reviewed his labs thoroughly   Lab Results   Component Value Date    WBC 10.62 (H) 02/24/2021    HGB 9.2 (L) 02/24/2021    HCT 28.7 (L) 02/24/2021    MCV 96.3 02/24/2021     (H) 02/24/2021     Lab Results   Component Value Date    GLUCOSE 99 02/24/2021    CALCIUM 9.0 02/24/2021     02/24/2021    K 4.6 02/24/2021    CO2 26 02/24/2021     02/24/2021    BUN 15 02/24/2021    CREATININE 0.7 (L) 02/24/2021       Imaging  OSH imaging study reports reviewed:    CXR 2/5/2021:  IMPRESSION:  Bilateral airspace opacities most prominent in the left upper lobe and left  lower lobe likely representing COVID19 pneumonia.         Full Code    Physical Exam:  Head/Ear/Nose/Throat: normocephalic; atraumatic; moisture mouth mm, no oropharyngeal thrush noted.   Eyes: anicteric sclera, EOMI; PERRL.   Neck : supple, no JVD, no carotid bruits appeciated.   Respiratory: no evidence of labored breathing, lung sounds a little  coarse, mild bibasilar diminished BS, no remarkable w/r/c.   Cardiovascular: RRR; normal S1, S2; no m/r/g; no S3 or S4.   Gastrointestinal: soft; NT; BS normal; mildly distended; no CVAT b/l.   Genitourinary: no lim.   Extremities : no c/c/e .   Neurological: AO x 3, fluent speeches, following commands, CNS II-XII grossly intact; no focal neurologic deficits.   Behavior/Emotional: in NAD, appropriate; cooperative.   Skin: clean, dry and intact.     Plan of care was discussed with patient, RN, and PMR attending.     Assessment     CC: S/p severe covid-19 PNA complicated by VDRF, dysphagia, ERNIE, bacterial PNA, and  hospital delirium, physical deconditioning with ADL and ambulatory dysfunction.       70 y.o. male, I PTA, with PMH of HTN, HLD, and CAD, admitted [facility]21 for severeCOVID-19 PNA. Worsening resp status intubated on 1/9- extubated 1/16 to OptiFlow. Transition to [facility]NC on 1/23.Eval by nephrology while in ICU due to ERNIE likely secondary to ATN. Creatinine has been improving with excellent urine output. Received Decadron and Remdesivir. Dobhoff tube was placed due to dysphagia. New fever of 102.8 on 1/20 and 1/21. ID was consulted. Bld cx's negative. CT scan of the chest abdomen pelvis was done with results showing increasing pulmonary infiltrates/groundglass opacities and consolidations in his bilateral lower lobes. Started empirically on cefepime and vancomycin to cover hospital-acquired pneumonia. Pt had increased lethargy on 1/22. Pt removed DHT 1/25; required higher O2 afterward - possible aspiration during the process. 1/26 patient continued to have intermittent confusion; ?post ICU delirium. Neurology is following, MRI brain was unremarkable. EEG showed mild slowing but no seizure activity. D/c'd abx after 5 days d/t lethargy. Repeat VFSS performed- now on soft nectar thick liquid diet .  Pt is AA&Ox3 with periods of confusion. Tolerates soft diet with NTL, voiding in urinal, O2 @ 2-3L via nc.  Participating in therapies with marked improvement, functionally, he has residual ADL and ambulatory dysfunction, rec's for acute rehab prior to home with SO. Transferred to [facility] on 2/4/2021.      1.S/p severe covid-19 PNA complicated by VDRF, dysphagia, ERNIE, bacterial PNA, and  hospital delirium, physical deconditioning with ADL and ambulatory dysfunction : inpt acute rehab, gait and balancing training, SLP therapy, nutrition support,  Fall/aspiration precaution, medical management, DVT prophylaxis, dermal defense, f/u with PCP after inpt rehab.     2. Pulm-S/p severe covid-19 PNA complicated by VDRF, s/p possible bacteria PNA,  hypoxemia with mild exertion, atelectasis: monitor SO2, prn NC O2, keep SO2>92%, incentive spirometry, bronchodilator inhaler, mucolytic agent, pulm toileting. Check CXR.      3. Psych-s/p hospital acute delirium, intermittent confusion: his metal status has much improved, monitor his mood and mentation, help his orientation, psychology CBT, avoid unnecessary sedatives, SW support.      4. GI-on soft nectar thick liquid diet, constipation:SLP evaluation to advance his diet as tolerated; provide constipation bowel regimen, keep adequate hydration, timed toilet visits.     5. DVT prophy: SCD, early ambulation, SQ heparin, check LE venous DUS to r/o DVT.       6. HTN, dyslipidemia: cont home HTN meds with holding parameter, orthostatic hypotension precaution, monitor BP . On gemfibrozil, f/u with his PCP.      Mixed significant dyslipidemia: LCL-C 185, HDL-C 30, , h/o HTN, HLD, and CAD, only gemfibrozil is far from adequate, labeled Lipitor allergy, still worth retrial with hydrophilic formula Crestor from low dose, which is absolutely necessary and also guideline recommended    7. Renal-s/p ERNIE recovered, electrolytes imbalance: monitor renal function and volume status, avoid nephrotoxic agents and low BP,  monitor and keep electrolytes balance.     8. - urinary retention: timed  voiding trial, monitor PVR with prn cic, check UA/UCX.     Billing code: [id number]  Diagnoses:  Patient Active Problem List   Diagnosis   • Acute metabolic encephalopathy   • ARDS (adult respiratory distress syndrome) (CMS/HCC)   • Coronary artery disease involving native coronary artery of native heart without angina pectoris   • Hypoxia   • Pneumonia due to COVID-19 virus   • Transaminitis   • Essential hypertension   • Dyslipidemia   • Atelectasis   • Physical deconditioning   • Acute cystitis   • BPH with urinary obstruction        Laure Bishop MD  2/24/2021

## 2021-02-24 NOTE — PROGRESS NOTES
02/24/21 0800   Plan   Plan home w/ HC   Patient/Family in Agreement with Plan yes   Discharge Review for Designated Lay Caregiver Designated Lay Caregiver available   s/w pt wife, she is aware and agreeable to d/c today.  [facility] will inez appt w/ pt on Saturday.  Wife, saloni is agreeable to this start of service.  Cm made wife aware that pts pharmacy would not advise price of Xarelto until they receive electronic Rx from Dr. Bishop.      Addendum:  Dr. Lang notified & aware/agreeable that HC will not start until 2/27.

## 2021-02-24 NOTE — PROGRESS NOTES
Subjective    Patient seen and examined on rounds.  Chart reviewed.  Events overnight noted.  History reviewed briefly with patient.     CC:  Deficits in mobility, transfers, self-care status post deconditioning, severe Covid 19 pneumonia, ventilator-dependent respiratory failure, dysphagia, multiple medical problems.     HPI:  Mr. Denis Green is a 70-year-old right-handed white male with chronic conditions significant for coronary artery disease, hypertension, hyperlipidemia who was admitted to [facility] on 1/5/21 due to Covid 19 pneumonia and worsening respiratory status.  He required intubation on 1/9/21 and had ventilator-dependent respiratory failure.  He was treated with Decadron and Remdesivir.  He had dysphagia requiring Dobbhoff tube feeds.  He was extubated on 1/16/21 to OptiFlow.  On 1/23/21 he was transitioned to high flow nasal cannula.  Hospital course was significant for acute renal insufficiency likely secondary to acute tubular necrosis and he was followed by nephrology.  He had subsequent improvement in renal function.  He also had new fever of 102.8°F on 1/20/21 and 1/21/21.  He was followed by infectious disease.  Blood cultures were negative. CT scan of the chest abdomen pelvis was done with results showing increasing pulmonary infiltrates/groundglass opacities and consolidations in his bilateral lower lobes.  He was treated empirically with Cefepime and Vancomycin to cover hospital-acquired pneumonia.  He was noted to be lethargic on 1/22/21.  Subsequently patient removed Dobbhoff tube on 1/25/21 and there is question of possible aspiration during that process.  He continued to have intermittent confusion on 1/26/21 was felt to be possible post ICU delirium.  He was followed by neurology.  MRI brain was unremarkable.  EEG showed mild slowing but no seizure activity was noted.  Antibiotics were discontinued after a 5 day course due to lethargy.  Repeat video swallow study was  "performed for dysphagia and patient was put on a soft diet with nectar thick liquids. On 2/3/21, hemoglobin was 11.2, WBC count 15.1, platelets were 275, BUN was 15, and creatinine was 0.7.  He has been needing assistance for mobility, transfers, self-care postoperatively. He is transferred to [facility] on 2/4/21 for further rehabilitation care.       SUBJ: Feels fine. For discharge today. No C/O today. Appreciative of care here.  Discussed follow-up instructions with him.    ROS: Denies chest pain or shortness of breath. Other ROS negative. Past, family, social history is unchanged.    Physical Exam      Blood pressure (!) 115/56, pulse (!) 107, temperature 36.7 °C (98.1 °F), temperature source Oral, resp. rate 20, height 1.727 m (5' 8\"), weight 77.1 kg (170 lb), SpO2 97 %.  Body mass index is 25.85 kg/m².    General Appearance: Not in acute distress  Head/Ear/Nose/Throat: Normocephalic; Atraumatic.  On oxygen by nasal cannula.  Eye: EOMI; PERRL.   Neck: No JVD; No Bruits.   Respiratory: Decreased breath sounds at bases.   Cardiovascular: RRR; Normal S1, S2.   Gastrointestinal: Soft; NT; +BS.   Extremities: Bilateral lower extremity edema noted.   Musculoskeletal: Functional active range of motion in both upper and lower extremities.    Neurological: Awake alert oriented to person, had some difficulty naming the place and correct date.. Speech is fluent. Cranial nerve examination does not reveal any gross facial asymmetry. Strength testing about 4/5 strength in both upper extremities.  Bilateral hip flexion is 3+/5.  Bilateral quadriceps are 4/5 with the thighs supported.  Bilateral ankle dorsi and plantar flexion are 4/5.  He is grossly able to localize touch and position sense in great toes.  Deep tendon reflexes are hypoactive and symmetric bilaterally.  Plantars are flexor.  Coordination is functional upper extremities.  Neurologically stable.  Behavior/Emotional: Appropriate; Cooperative. "   Skin: No obvious rashes noted.        Current Facility-Administered Medications:   •  acetaminophen (TYLENOL) tablet 650 mg, 650 mg, oral, q4h PRN, Omari Spain MD, 650 mg at 02/17/21 1433  •  albuterol HFA (VENTOLIN HFA) 90 mcg/actuation inhaler 2 puff, 2 puff, inhalation, q4h PRN, Laure Bishop MD  •  alum-mag hydroxide-simeth (MAALOX) 200-200-20 mg/5 mL suspension 30 mL, 30 mL, oral, q6h PRN, Laure Bishop MD  •  amLODIPine (NORVASC) tablet 2.5 mg, 2.5 mg, oral, Nightly, Laure Bishop MD, 2.5 mg at 02/22/21 2059  •  aspirin enteric coated tablet 81 mg, 81 mg, oral, Daily, Omari Spain MD, 81 mg at 02/24/21 0806  •  bisacodyL (DULCOLAX) 10 mg suppository 10 mg, 10 mg, rectal, Daily PRN, Isael Lang MD, 10 mg at 02/19/21 2000  •  ciprofloxacin (CIPRO) tablet 500 mg, 500 mg, oral, q12h EBER, Laure Bishop MD, 500 mg at 02/24/21 0807  •  diclofenac sodium (VOLTAREN) 1 % topical gel 2 g, 2 g, Topical, TID, Laure Bishop MD, 2 g at 02/24/21 0808  •  diphenhydrAMINE (BENADRYL) capsule 50 mg, 50 mg, oral, Nightly, Isael Lang MD, 50 mg at 02/23/21 2115  •  docusate sodium (COLACE) capsule 100 mg, 100 mg, oral, TID, Isael Lang MD, 100 mg at 02/24/21 0807  •  furosemide (LASIX) tablet 20 mg, 20 mg, oral, BID (am, 4p), Laure Bishop MD, 20 mg at 02/24/21 0808  •  gemfibroziL (LOPID) tablet 600 mg, 600 mg, oral, BID AC, Omari Spain MD, 600 mg at 02/24/21 0806  •  guaiFENesin (MUCINEX) 12 hr ER tablet 600 mg, 600 mg, oral, BID, Laure Bishop MD, 600 mg at 02/24/21 0805  •  lactobacillus acidophilus complex (BACID) 1 billion cell- 250 mg tablet 500 mg, 500 mg, oral, BID, Laure Bishop MD, 500 mg at 02/24/21 0806  •  lidocaine (ASPERCREME) 4 % topical patch 1 patch, 1 patch, Topical, Daily, Galileo Samuel DPM, 1 patch at 02/24/21 0808  •  magnesium hydroxide (M.O.M.) 400 mg/5 mL suspension 30 mL, 30 mL, oral, 2x daily PRN, Laure Bishop MD, 30 mL at 02/23/21 1806  •  magnesium  oxide (MAG-OX) tablet 400 mg, 400 mg, oral, BID, Laure Bishop MD, 400 mg at 02/24/21 0805  •  melatonin ODT 3 mg, 3 mg, oral, Nightly, Laure Bishop MD, 3 mg at 02/23/21 2115  •  metoprolol tartrate (LOPRESSOR) split tablet 12.5 mg, 12.5 mg, oral, BID, Omari Spain MD, 12.5 mg at 02/24/21 0807  •  mouth moisturizer (TOOTHETTE) cream, , mucous membrane, TID, Laure Bishop MD, Given at 02/24/21 0809  •  polyethylene glycol (MIRALAX) 17 gram packet 17 g, 17 g, oral, Daily PRN, Omari Spain MD, 17 g at 02/23/21 1806  •  rivaroxaban (XARELTO) tablet 10 mg, 10 mg, oral, Daily with dinner, Laure Bishop MD, 10 mg at 02/23/21 1641  •  senna (SENOKOT) tablet 3 tablet, 3 tablet, oral, Daily before lunch, Isael Lang MD, 3 tablet at 02/23/21 1205  •  sodium chloride tablet 1 g, 1 g, oral, BID with meals, Laure Bishop MD, 1 g at 02/24/21 0806  •  tamsulosin (FLOMAX) 24 hr ER capsule 0.4 mg, 0.4 mg, oral, Daily, Lacho Monreal MD, 0.4 mg at 02/23/21 2114       Labs / Radiology    Lab Results   Component Value Date    WBC 10.62 (H) 02/24/2021    HGB 9.2 (L) 02/24/2021    HCT 28.7 (L) 02/24/2021    MCV 96.3 02/24/2021     (H) 02/24/2021     Lab Results   Component Value Date    GLUCOSE 99 02/24/2021    CALCIUM 9.0 02/24/2021     02/24/2021    K 4.6 02/24/2021    CO2 26 02/24/2021     02/24/2021    BUN 15 02/24/2021    CREATININE 0.7 (L) 02/24/2021       Assessment and Plan    ASSESSMENT PLAN:  1. 70-year-old right-handed white male with chronic conditions significant for coronary artery disease, hypertension, hyperlipidemia who was admitted to [facility] on 1/5/21 due to Covid 19 pneumonia and worsening respiratory status.  He required intubation on 1/9/21 and had ventilator-dependent respiratory failure.  He was treated with Decadron and Remdesivir.  He had dysphagia requiring Dobbhoff tube feeds.  He was extubated on 1/16/21 to OptiFlow.  On 1/23/21 he was transitioned  to high flow nasal cannula.  Hospital course was significant for acute renal insufficiency likely secondary to acute tubular necrosis and he was followed by nephrology.  He had subsequent improvement in renal function.  He also had new fever of 102.8°F on 1/20/21 and 1/21/21.  He was followed by infectious disease.  Blood cultures were negative. CT scan of the chest abdomen pelvis was done with results showing increasing pulmonary infiltrates/groundglass opacities and consolidations in his bilateral lower lobes.  He was treated empirically with Cefepime and Vancomycin to cover hospital-acquired pneumonia.  He was noted to be lethargic on 1/22/21.  Subsequently patient removed Dobbhoff tube on 1/25/21 and there is question of possible aspiration during that process.  He continued to have intermittent confusion on 1/26/21 was felt to be possible post ICU delirium.  He was followed by neurology.  MRI brain was unremarkable.  EEG showed mild slowing but no seizure activity was noted.  Antibiotics were discontinued after a 5 day course due to lethargy.  Repeat video swallow study was performed for dysphagia and patient was put on a soft diet with nectar thick liquids. On 2/3/21, hemoglobin was 11.2, WBC count 15.1, platelets were 275, BUN was 15, and creatinine was 0.7.  He has been needing assistance for mobility, transfers, self-care postoperatively. He is transferred to [facility] on 2/4/21 for further rehabilitation care.       2. DVT prophylaxis - on subcutaneous Heparin.  On SCDs.  Platelets 296 on 2/5/2021.  Platelets 389 on 2/11/2021.  Platelets 470 on 2/15/2021.  Platelets 316 on 2/18/2021.  Platelets 404 on 2/22/2021.     3.  Deconditioning - recent severe Covid 19 pneumonia, ventilator-dependent respiratory failure, dysphagia, multiple medical problems - He had ventilator-dependent respiratory failure.  He was treated with Decadron and Remdesivir.  Continue PT, OT, speech, psychology.  Follow  falls precautions, cardiac precautions, aspiration precautions.     4. GI - On Colace, Senokot.  On PRN MiraLAX, MOM, Maalox.      5.  -He had urinary urgency and difficulty urinating today.  Recent urine culture showed Klebsiella and Macrobid was switched to Vantin on 2/18/2021.  He reports difficulty voiding today.  Antibiotic was switched to Cipro by internist.  Will consult urology.  Discussed with urologist.  Patient reported he had history of kidney stones requiring lithotripsy and had seen a urologist in the past and will make appointment as outpatient also to see the urologist.  He is able to void well now.  On Cipro for UTI.  Feels comfortable with discharge plans to home for tomorrow.     6.  Pulmonary - recent severe Covid 19 pneumonia, ventilator-dependent respiratory failure - on Ventolin HFA.  On Mucinex.     7. Pain - on PRN Tylenol.  On scheduled Tylenol for left wrist pain.  On topical Voltaren gel.  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  K pad as needed for pain.     8. Hematology -hemoglobin 12.0, WBC 8.15 on 2/5/2021.  Hemoglobin 10.7, WBC 8.05 on 2/11/2021.  Hemoglobin 12.0, WBC 11.56 on 2/15/2021.  Hemoglobin 10.0, WBC 11.77 on 2/18/2021.  Hemoglobin 10.5, WBC 10.91 on 2/22/2021.     9. CVS - history of coronary artery disease and hypertension.  On Norvasc.  On Lopressor.  On Aspirin.  On Lasix.  On Magnesium oxide.     10.  Hyperlipidemia - on Lopid.     11.  Insomnia - on Melatonin.     12.  Dysphagia - Tolerating regular thins diet now.  Speech therapy following.     13. Rehabilitation medicine - Continue comprehensive rehabilitation care. Continue PT, OT, speech, psychology.  We will follow falls precautions, cardiac precautions, monitor pulse oximetry in therapy and follow aspiration precautions.  Tolerating therapies per endurance.  Discussed with speech therapy.  Free water protocol ice chips was started.  Denies coughing with meals.He reported some pain in left  wrist.  He is not sure if he accidentally banged the left wrist against the side rail of the bed.  Denies history of gout.  Left wrist does not appear warm or tender to touch.  K pad ordered.  We will also order scheduled Tylenol for 7 days.  Discussed results of wrist x-rays with patient which showed moderate degenerative change particularly about the radial aspect of the wrist.  He reports pain is less today.  He ambulated 25 feet with rolling walker with assistance of 2 people with physical therapy.Free water protocol with ice chips and sips of water per speech therapy.  He feels better today.  Left wrist pain is decreased significantly per patient.  Working on endurance with physical therapy.  He required minimal to moderate assistance for transfers.  Working on ADLs with occupational therapy.He feels better today.  Denies increased pain.  Discussed with him about doing incentive spirometry.  We will try to wean off oxygen if possible.Trying to wean off oxygen as possible.  Tolerating therapies per endurance.  He reports decrease sleep at night.  Usually takes 2 Tylenol PM at night.  He is already on scheduled Tylenol.  Will order 50 mg of Benadryl at bedtime. He reports he slept well with Benadryl last night.  Tolerating therapies per endurance.  Denies left wrist pain today.Video swallow was done today.  Mild oral and pharyngeal dysphagia noted.  Transient penetration of thin liquids is noted.  Speech therapy working with dysphagia.  Tolerating upgraded diet after video swallow.  Noted ENT input regarding his voice.  Vocal cords moving normally per ENT.  Discussed with patient.Tolerating regular thins diet.  Discussed ENT input with him.  He requested podiatric consult which was ordered.He was seen by podiatry earlier today.  Left foot was injected.  He reports left foot feels much better now.Tolerating therapies per endurance.  Left foot feels much better today.  Tolerating diet without problems.  Maintaining  stable pulse oximetry on room air.He had urinary urgency and difficulty urinating today.  Recent urine culture showed Klebsiella and Macrobid was switched to Vantin on 2/18/2021.  He reports difficulty voiding today.  Antibiotic was switched to Cipro by internist.  Will consult urology.  Discussed with urologist.  Patient reported he had history of kidney stones requiring lithotripsy and had seen a urologist in the past and will make appointment as outpatient also to see the urologist. He is able to void well now.  On Cipro for UTI.  Feels fine. For discharge today. No C/O today. Appreciative of care here.  Discussed follow-up instructions with him.     14. Reviewed labs today.  BUN 15, creatinine 0.6 on 2/5/2021.  BUN 16, creatinine 0.7 on 2/11/2021.  BUN 12, creatinine 0.8 on 2/15/2021.  BUN 10, creatinine 0.6 on 2/18/2021.  BUN 15, creatinine 0.7 on 2/22/2021.     15.  Discharge to home today, 2/24/2021. Discussed discharge plans. Discharge summary will be completed. Follow up with PCP, cardiologist, pulmonary physician, urologist, podiatry, infectious disease physician as needed and other appropriate physicians. Further therapies set up after discharge. Discharge instructions on discharge form.            Isael Lang MD  2/24/2021

## 2021-02-24 NOTE — PROGRESS NOTES
02/22/21 1200   Discharge Needs Assessment (IRF)   Concerns to be Addressed discharge planning   Patient/Family Anticipates Transition to home with help/services;home with family   Patient/Family Anticipated Services at Transition durable medical equipment;home health care   Transportation Concerns car, none   Transportation Anticipated family or friend will provide   Assistive Device/Animal Currently Used at Home walker, front-wheeled   Anticipated Changes Related to Illness inability to work   Anticipated Discharge Disposition home with home health services   Offered/Gave Vendor List yes   [facility]   Current Discharge Risk physical impairment   What day is the transport expected? 02/24/21   What time is the transport expected? 1200   Plan   Patient/Family in Agreement with Plan yes   Discharge Review for Designated Lay Caregiver Designated Lay Caregiver available   Final Note   Final Note   (referral faxed to Kaylyn)

## 2021-02-24 NOTE — DISCHARGE SUMMARY
Rehab Discharge Summary      Admitting Provider: Isael Lang MD  Discharge Provider: Isael Lang MD  Primary Care Physician at Discharge: Unable, To Obtain Pcp None     Admission Date: 2/4/2021     Discharge Date: 2/24/2021    Discharge Disposition  Home    Code Status at Discharge: Full Code    Discharge Medications     Medication List      START taking these medications    acetaminophen 325 mg tablet  Commonly known as: TYLENOL  Take 2 tablets (650 mg total) by mouth every 6 (six) hours as needed for mild pain.  Dose: 650 mg     albuterol HFA 90 mcg/actuation inhaler  Commonly known as: VENTOLIN HFA  Inhale 2 puffs every 4 (four) hours as needed for wheezing or shortness of breath.  Dose: 2 puff     ciprofloxacin 500 mg tablet  Commonly known as: CIPRO  Take 1 tablet (500 mg total) by mouth every 12 (twelve) hours for 11 doses Indications: inflammation of prostate gland, urinary tract infection.  Dose: 500 mg     diclofenac sodium 1 % topical gel  Commonly known as: VOLTAREN  Apply 2 g topically 3 (three) times a day for 7 days. 2 g apply to left wrist painful area tid for 7 days  Dose: 2 g     diphenhydrAMINE 50 mg capsule  Commonly known as: BENADRYL  Take 1 capsule (50 mg total) by mouth nightly.  Dose: 50 mg     gemfibroziL 600 mg tablet  Commonly known as: LOPID  Take 1 tablet (600 mg total) by mouth 2 (two) times a day before breakfast and dinner.  Dose: 600 mg     guaiFENesin 600 mg 12 hr tablet  Commonly known as: MUCINEX  Take 1 tablet (600 mg total) by mouth 2 (two) times a day for 10 days.  Dose: 600 mg     lactobacillus acidophilus complex 1 billion cell- 250 mg tablet  Commonly known as: BACID  Take 2 tablets (500 mg total) by mouth 2 (two) times a day for 11 doses. Take when on cipro 500 mg bid.  Dose: 500 mg     melatonin ODT  Take 1 tablet (3 mg total) by mouth nightly.  Dose: 3 mg     metoprolol tartrate 12.5 mg  Commonly known as: LOPRESSOR  Take 1 split tablet (12.5 mg total) by  mouth 2 (two) times a day.  Dose: 12.5 mg     rivaroxaban 10 mg tablet  Commonly known as: XARELTO  Take 1 tablet (10 mg total) by mouth daily with dinner for 27 doses Indications: s/p covid PNA, ambulatory dysfunction.  Dose: 10 mg     senna 8.6 mg tablet  Commonly known as: SENOKOT  Take 2 tablets by mouth daily before lunch.  Dose: 2 tablet     tamsulosin 0.4 mg capsule  Commonly known as: FLOMAX  Take 1 capsule (0.4 mg total) by mouth daily.  Dose: 0.4 mg        CHANGE how you take these medications    amLODIPine 2.5 mg tablet  Commonly known as: NORVASC  Take 1 tablet (2.5 mg total) by mouth nightly.  Dose: 2.5 mg  What changed:   · medication strength  · how much to take  · when to take this        CONTINUE taking these medications    aspirin 81 mg enteric coated tablet  Take 81 mg by mouth daily.  Dose: 81 mg     docusate sodium 100 mg capsule  Commonly known as: COLACE  Take 100 mg by mouth 2 (two) times a day.  Dose: 100 mg     famotidine 20 mg tablet  Commonly known as: PEPCID  Take 20 mg by mouth 2 (two) times a day.  Dose: 20 mg        STOP taking these medications    heparin (porcine) 5,000 unit/mL syringe     metoprolol succinate XL 12.5 mg  Commonly known as: TOPROL-XL              Reason for Hospitalization    Deficits in mobility, transfers, self-care status post deconditioning, severe Covid 19 pneumonia, ventilator-dependent respiratory failure, dysphagia, multiple medical problems.    History of Present Illness    Mr. Denis Green is a 70-year-old right-handed white male with chronic conditions significant for coronary artery disease, hypertension, hyperlipidemia who was admitted to [facility] on 1/5/21 due to Covid 19 pneumonia and worsening respiratory status.  He required intubation on 1/9/21 and had ventilator-dependent respiratory failure.  He was treated with Decadron and Remdesivir.  He had dysphagia requiring Dobbhoff tube feeds.  He was extubated on 1/16/21 to OptiFlow.  On  1/23/21 he was transitioned to high flow nasal cannula.  Hospital course was significant for acute renal insufficiency likely secondary to acute tubular necrosis and he was followed by nephrology.  He had subsequent improvement in renal function.  He also had new fever of 102.8°F on 1/20/21 and 1/21/21.  He was followed by infectious disease.  Blood cultures were negative. CT scan of the chest abdomen pelvis was done with results showing increasing pulmonary infiltrates/groundglass opacities and consolidations in his bilateral lower lobes.  He was treated empirically with Cefepime and Vancomycin to cover hospital-acquired pneumonia.  He was noted to be lethargic on 1/22/21.  Subsequently patient removed Dobbhoff tube on 1/25/21 and there is question of possible aspiration during that process.  He continued to have intermittent confusion on 1/26/21 was felt to be possible post ICU delirium.  He was followed by neurology.  MRI brain was unremarkable.  EEG showed mild slowing but no seizure activity was noted.  Antibiotics were discontinued after a 5 day course due to lethargy.  Repeat video swallow study was performed for dysphagia and patient was put on a soft diet with nectar thick liquids. On 2/3/21, hemoglobin was 11.2, WBC count 15.1, platelets were 275, BUN was 15, and creatinine was 0.7.  He has been needing assistance for mobility, transfers, self-care postoperatively. He is transferred to [facility] on 2/4/21 for further rehabilitation care.      Pertinent Physical Findings on Admission    The patient had stable vital signs.  General Appearance: Not in acute distress  Head/Ear/Nose/Throat: Normocephalic; Atraumatic.  On oxygen by nasal cannula.  Eye: EOMI; PERRL.   Neck: No JVD; No Bruits.   Respiratory: Decreased breath sounds at bases.   Cardiovascular: RRR; Normal S1, S2.   Gastrointestinal: Soft; NT; +BS.   Extremities: Bilateral lower extremity edema noted.   Musculoskeletal: Functional  active range of motion in both upper and lower extremities.   Neurological: Awake alert oriented to person, had some difficulty naming the place and correct date.. Speech is fluent. Cranial nerve examination does not reveal any gross facial asymmetry. Strength testing about 4/5 strength in both upper extremities.  Bilateral hip flexion is 3+/5.  Bilateral quadriceps are 4/5 with the thighs supported.  Bilateral ankle dorsi and plantar flexion are 4/5.  He is grossly able to localize touch and position sense in great toes.  Deep tendon reflexes are hypoactive and symmetric bilaterally.  Plantars are flexor.  Coordination is functional upper extremities.  Neurologically stable.  Behavior/Emotional: Appropriate; Cooperative.   Skin: No obvious rashes noted.         Hospital Course    1. The patient was admitted to [facility] for a comprehensive inpatient rehabilitation program to include physical therapy, occupational therapy, speech therapy, rehabilitation nursing, case management evaluation.  Dr. Bishop was consulted to follow from an internal medicine standpoint.     2. DVT prophylaxis - on subcutaneous Heparin.  On SCDs.  Platelets 296 on 2/5/2021.  Platelets 389 on 2/11/2021.  Platelets 470 on 2/15/2021.  Platelets 316 on 2/18/2021.  Platelets 404 on 2/22/2021.     3.  Deconditioning - recent severe Covid 19 pneumonia, ventilator-dependent respiratory failure, dysphagia, multiple medical problems - He had ventilator-dependent respiratory failure.  He was treated with Decadron and Remdesivir.  Continue PT, OT, speech, psychology.  Follow falls precautions, cardiac precautions, aspiration precautions.     4. GI - On Colace, Senokot.  On PRN MiraLAX, MOM, Maalox.      5.  -He had urinary urgency and difficulty urinating today.  Recent urine culture showed Klebsiella and Macrobid was switched to Vantin on 2/18/2021.  He reports difficulty voiding today.  Antibiotic was switched to Cipro by internist.   Will consult urology.  Discussed with urologist.  Patient reported he had history of kidney stones requiring lithotripsy and had seen a urologist in the past and will make appointment as outpatient also to see the urologist.  He is able to void well now.  On Cipro for UTI.  Feels comfortable with discharge plans to home for tomorrow.     6.  Pulmonary - recent severe Covid 19 pneumonia, ventilator-dependent respiratory failure - on Ventolin HFA.  On Mucinex.     7. Pain - on PRN Tylenol.  On scheduled Tylenol for left wrist pain.  On topical Voltaren gel.  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  K pad as needed for pain.     8. Hematology -hemoglobin 12.0, WBC 8.15 on 2/5/2021.  Hemoglobin 10.7, WBC 8.05 on 2/11/2021.  Hemoglobin 12.0, WBC 11.56 on 2/15/2021.  Hemoglobin 10.0, WBC 11.77 on 2/18/2021.  Hemoglobin 10.5, WBC 10.91 on 2/22/2021.     9. CVS - history of coronary artery disease and hypertension.  On Norvasc.  On Lopressor.  On Aspirin.  On Lasix.  On Magnesium oxide.     10.  Hyperlipidemia - on Lopid.     11.  Insomnia - on Melatonin.     12.  Dysphagia - Tolerating regular thins diet now.  Speech therapy following.     13. Rehabilitation medicine - Continue comprehensive rehabilitation care. Continue PT, OT, speech, psychology.  We will follow falls precautions, cardiac precautions, monitor pulse oximetry in therapy and follow aspiration precautions.  Tolerating therapies per endurance.  Discussed with speech therapy.  Free water protocol ice chips was started.  Denies coughing with meals.He reported some pain in left wrist.  He is not sure if he accidentally banged the left wrist against the side rail of the bed.  Denies history of gout.  Left wrist does not appear warm or tender to touch.  K pad ordered.  We will also order scheduled Tylenol for 7 days.  Discussed results of wrist x-rays with patient which showed moderate degenerative change particularly about the radial aspect  of the wrist.  He reports pain is less today.  He ambulated 25 feet with rolling walker with assistance of 2 people with physical therapy.Free water protocol with ice chips and sips of water per speech therapy.  He feels better today.  Left wrist pain is decreased significantly per patient.  Working on endurance with physical therapy.  He required minimal to moderate assistance for transfers.  Working on ADLs with occupational therapy.He feels better today.  Denies increased pain.  Discussed with him about doing incentive spirometry.  We will try to wean off oxygen if possible.Trying to wean off oxygen as possible.  Tolerating therapies per endurance.  He reports decrease sleep at night.  Usually takes 2 Tylenol PM at night.  He is already on scheduled Tylenol.  Will order 50 mg of Benadryl at bedtime. He reports he slept well with Benadryl last night.  Tolerating therapies per endurance.  Denies left wrist pain today.Video swallow was done today.  Mild oral and pharyngeal dysphagia noted.  Transient penetration of thin liquids is noted.  Speech therapy working with dysphagia.  Tolerating upgraded diet after video swallow.  Noted ENT input regarding his voice.  Vocal cords moving normally per ENT.  Discussed with patient.Tolerating regular thins diet.  Discussed ENT input with him.  He requested podiatric consult which was ordered.He was seen by podiatry earlier today.  Left foot was injected.  He reports left foot feels much better now.Tolerating therapies per endurance.  Left foot feels much better today.  Tolerating diet without problems.  Maintaining stable pulse oximetry on room air.He had urinary urgency and difficulty urinating today.  Recent urine culture showed Klebsiella and Macrobid was switched to Vantin on 2/18/2021.  He reports difficulty voiding today.  Antibiotic was switched to Cipro by internist.  Will consult urology.  Discussed with urologist.  Patient reported he had history of kidney stones  requiring lithotripsy and had seen a urologist in the past and will make appointment as outpatient also to see the urologist. He is able to void well now.  On Cipro for UTI.  Feels fine. For discharge today. No C/O today. Appreciative of care here.  Discussed follow-up instructions with him.     14. Reviewed labs today.  BUN 15, creatinine 0.6 on 2/5/2021.  BUN 16, creatinine 0.7 on 2/11/2021.  BUN 12, creatinine 0.8 on 2/15/2021.  BUN 10, creatinine 0.6 on 2/18/2021.  BUN 15, creatinine 0.7 on 2/22/2021.     15.  Discharge to home today, 2/24/2021. Discussed discharge plans. Discharge summary will be completed. Follow up with PCP, cardiologist, pulmonary physician, urologist, podiatry, infectious disease physician as needed and other appropriate physicians. Further therapies set up after discharge. Discharge instructions on discharge form.      16. Otherwise he tolerated therapies well and made good progress in his functional status. He had no further medical complications and was subsequently discharged to home. He will need close follow-up with his PCP, cardiologist, pulmonary physician, urologist, podiatry, infectious disease physician as needed and other appropriate physicians and should continue further therapies after discharge from [facility].

## 2021-02-24 NOTE — PROGRESS NOTES
Subjective   better    Interval History: urinating    Objective     Vital signs in last 24 hours:  Temp:  [36.5 °C (97.7 °F)-36.7 °C (98.1 °F)] 36.7 °C (98.1 °F)  Heart Rate:  [] 107  Resp:  [18-20] 20  BP: (107-151)/(56-77) 115/56    No intake or output data in the 24 hours ending 02/24/21 1350  Intake/Output this shift:  No intake/output data recorded.    No current facility-administered medications for this encounter.      Current Outpatient Medications   Medication Sig Dispense Refill   • acetaminophen (TYLENOL) 325 mg tablet Take 2 tablets (650 mg total) by mouth every 6 (six) hours as needed for mild pain.     • albuterol HFA (VENTOLIN HFA) 90 mcg/actuation inhaler Inhale 2 puffs every 4 (four) hours as needed for wheezing or shortness of breath. 18 g 1   • amLODIPine (NORVASC) 2.5 mg tablet Take 1 tablet (2.5 mg total) by mouth nightly. 30 tablet 0   • aspirin 81 mg enteric coated tablet Take 81 mg by mouth daily.     • ciprofloxacin (CIPRO) 500 mg tablet Take 1 tablet (500 mg total) by mouth every 12 (twelve) hours for 11 doses Indications: inflammation of prostate gland, urinary tract infection. 11 tablet 0   • diclofenac sodium (VOLTAREN) 1 % topical gel Apply 2 g topically 3 (three) times a day for 7 days. 2 g apply to left wrist painful area tid for 7 days 50 g 0   • diphenhydrAMINE (BENADRYL) 50 mg capsule Take 1 capsule (50 mg total) by mouth nightly. 79 capsule 0   • docusate sodium (COLACE) 100 mg capsule Take 100 mg by mouth 2 (two) times a day.     • famotidine (PEPCID) 20 mg tablet Take 20 mg by mouth 2 (two) times a day.     • gemfibroziL (LOPID) 600 mg tablet Take 1 tablet (600 mg total) by mouth 2 (two) times a day before breakfast and dinner. 60 tablet 0   • guaiFENesin (MUCINEX) 600 mg 12 hr tablet Take 1 tablet (600 mg total) by mouth 2 (two) times a day for 10 days. 20 tablet 0   • lactobacillus acidophilus complex (BACID) 1 billion cell- 250 mg tablet Take 2 tablets (500 mg total) by  mouth 2 (two) times a day for 11 doses. Take when on cipro 500 mg bid. 22 tablet 0   • melatonin ODT Take 1 tablet (3 mg total) by mouth nightly. 30 tablet 0   • metoprolol tartrate (LOPRESSOR) 12.5 mg Take 1 split tablet (12.5 mg total) by mouth 2 (two) times a day. 60 split tablet 0   • rivaroxaban (XARELTO) 10 mg tablet Take 1 tablet (10 mg total) by mouth daily with dinner for 27 doses Indications: s/p covid PNA, ambulatory dysfunction. 27 tablet 0   • senna (SENOKOT) 8.6 mg tablet Take 2 tablets by mouth daily before lunch. 60 tablet 0   • tamsulosin (FLOMAX) 0.4 mg capsule Take 1 capsule (0.4 mg total) by mouth daily. 30 capsule 0       Labs  Results for orders placed or performed during the hospital encounter of 02/04/21   Urine culture Urine, Clean Catch    Specimen: Urine, Clean Catch   Result Value Ref Range    Urine Culture No Growth (Limit of detection 1000 cfu/mL)    Urine culture Urine, Clean Catch    Specimen: Urine, Clean Catch   Result Value Ref Range    Urine Culture **Positive Culture** (A)     Urine Culture       >1 x 10^5 CFU/mL Klebsiella (Enterobacter) aerogenes       Susceptibility    Klebsiella (Enterobacter) aerogenes - AUTOMATED FLORENTIN SUSCEPTIBILITY     Amikacin <=4 Susceptible ug/ml     Ampicillin >16 Resistant ug/ml     Ampicillin + Sulbactam >16 Resistant ug/ml     Cefazolin >32 Resistant ug/ml     Ceftazidime <=0.5 Susceptible ug/ml     Ceftriaxone <=0.5 Susceptible ug/ml     Cefuroxime 16 Intermediate ug/ml     Ciprofloxacin <=0.5 Susceptible ug/ml     Gentamicin 1 Susceptible ug/ml     Levofloxacin <=1 Susceptible ug/ml     Nitrofurantoin 64 Intermediate ug/ml     Tetracycline <=1 Susceptible ug/ml     Tobramycin 1 Susceptible ug/ml     Trimethoprim + Sulfamethoxazole <=0.5 Susceptible ug/ml   Urine culture Urine, Clean Catch    Specimen: Urine, Clean Catch   Result Value Ref Range    Urine Culture **Positive Culture** (A)     Urine Culture       1 x 10^5 CFU/mL Klebsiella  (Enterobacter) aerogenes       Susceptibility    Klebsiella (Enterobacter) aerogenes - AUTOMATED FLORENTIN SUSCEPTIBILITY     Amikacin <=4 Susceptible ug/ml     Ampicillin >16 Resistant ug/ml     Ampicillin + Sulbactam >16 Resistant ug/ml     Cefazolin >32 Resistant ug/ml     Ceftazidime >16 Resistant ug/ml     Ceftriaxone >32 Resistant ug/ml     Cefuroxime >16 Resistant ug/ml     Ciprofloxacin <=0.5 Susceptible ug/ml     Gentamicin 2 Susceptible ug/ml     Levofloxacin <=1 Susceptible ug/ml     Nitrofurantoin >64 Resistant ug/ml     Tetracycline 2 Susceptible ug/ml     Tobramycin 2 Susceptible ug/ml     Trimethoprim + Sulfamethoxazole <=0.5 Susceptible ug/ml   UA Hold for UC (Macro)   Result Value Ref Range    Color, Urine Yellow Yellow    Clarity, Urine Cloudy (A) Clear    Specific Gravity, Urine 1.016 1.002 - 1.030    pH, Urine 6.0 4.5 - 8.0    Leukocyte Esterase +1 (A) Negative    Nitrite, Urine Negative Negative    Protein, Urine Negative Negative    Glucose, Urine Negative Negative mg/dL    Ketones, Urine Negative Negative mg/dL    Urobilinogen, Urine 1.0 <2.0 EU/dL EU/dL    Bilirubin, Urine Negative Negative mg/dL    Blood, Urine Negative Negative   Basic metabolic panel   Result Value Ref Range    Sodium 134 (L) 136 - 144 mEQ/L    Potassium 4.1 3.6 - 5.1 mEQ/L    Chloride 97 (L) 98 - 109 mEQ/L    CO2 25 22 - 32 mEQ/L    BUN 15 8 - 20 mg/dL    Creatinine 0.6 (L) 0.8 - 1.3 mg/dL    Glucose 96 70 - 99 mg/dL    Calcium 8.8 (L) 8.9 - 10.3 mg/dL    eGFR >60.0 >=60.0 mL/min/1.73m*2    Anion Gap 12 3 - 15 mEQ/L   CBC and differential   Result Value Ref Range    WBC 8.15 3.80 - 10.50 K/uL    RBC 3.90 (L) 4.50 - 5.80 M/uL    Hemoglobin 12.0 (L) 13.7 - 17.5 g/dL    Hematocrit 37.4 (L) 40.1 - 51.0 %    MCV 95.9 83.0 - 98.0 fL    MCH 30.8 28.0 - 33.2 pg    MCHC 32.1 (L) 32.2 - 36.5 g/dL    RDW 14.0 11.6 - 14.4 %    Platelets 296 150 - 350 K/uL    MPV 12.0 9.4 - 12.4 fL    Differential Type Auto     nRBC 0.0 <=0.0 %    Immature  Granulocytes 1.3 %    Neutrophils 55.8 %    Lymphocytes 22.2 %    Monocytes 12.1 %    Eosinophils 7.6 %    Basophils 1.0 %    Immature Granulocytes, Absolute 0.11 (H) 0.00 - 0.08 K/uL    Neutrophils, Absolute 4.54 1.70 - 7.00 K/uL    Lymphocytes, Absolute 1.81 1.20 - 3.50 K/uL    Monocytes, Absolute 0.99 0.30 - 1.00 K/uL    Eosinophils, Absolute 0.62 (H) 0.04 - 0.54 K/uL    Basophils, Absolute 0.08 0.01 - 0.10 K/uL   Hepatic function panel   Result Value Ref Range    Albumin 2.8 (L) 3.4 - 5.0 g/dL    Bilirubin, Total 0.7 0.3 - 1.2 mg/dL    Bilirubin, Direct 0.2 <=0.4 mg/dL    Alkaline Phosphatase 124 35 - 126 IU/L    AST (SGOT) 20 15 - 41 IU/L    ALT (SGPT) 59 16 - 63 IU/L    Total Protein 6.8 6.0 - 8.2 g/dL   UA Microscopic   Result Value Ref Range    RBC, Urine 0 TO 4 0 TO 4 /HPF    WBC, Urine 4 TO 10 (A) 0 TO 3 /HPF    Squamous Epithelial None Seen None Seen /hpf    Hyaline Cast 0 TO 2 (A) None Seen /lpf    Bacteria, Urine None Seen None Seen /HPF    Calcium Oxalate Crystals Rare (A) None Seen /hpf   Magnesium   Result Value Ref Range    Magnesium 2.0 1.8 - 2.5 mg/dL   B-type natriuretic peptide   Result Value Ref Range    BNP 25 <=100 pg/mL   Lipid panel   Result Value Ref Range    Triglycerides 269 (H) 30 - 149 mg/dL    Cholesterol 269 (H) <=200 mg/dL    HDL 30 (L) >=45 mg/dL    LDL Calculated 185 (H) <=100 mg/dL    Non-HDL, Calculated 239 mg/dL    RISK 9.0 (H) <=5.0   Basic metabolic panel   Result Value Ref Range    Sodium 133 (L) 136 - 144 mEQ/L    Potassium 4.0 3.6 - 5.1 mEQ/L    Chloride 94 (L) 98 - 109 mEQ/L    CO2 25 22 - 32 mEQ/L    BUN 16 8 - 20 mg/dL    Creatinine 0.6 (L) 0.8 - 1.3 mg/dL    Glucose 98 70 - 99 mg/dL    Calcium 8.9 8.9 - 10.3 mg/dL    eGFR >60.0 >=60.0 mL/min/1.73m*2    Anion Gap 14 3 - 15 mEQ/L   CBC and differential   Result Value Ref Range    WBC 7.42 3.80 - 10.50 K/uL    RBC 3.59 (L) 4.50 - 5.80 M/uL    Hemoglobin 11.0 (L) 13.7 - 17.5 g/dL    Hematocrit 34.7 (L) 40.1 - 51.0 %    MCV  96.7 83.0 - 98.0 fL    MCH 30.6 28.0 - 33.2 pg    MCHC 31.7 (L) 32.2 - 36.5 g/dL    RDW 14.0 11.6 - 14.4 %    Platelets 353 (H) 150 - 350 K/uL    MPV 11.6 9.4 - 12.4 fL    Differential Type Auto     nRBC 0.0 <=0.0 %    Immature Granulocytes 3.4 %    Neutrophils 45.8 %    Lymphocytes 30.1 %    Monocytes 12.9 %    Eosinophils 6.9 %    Basophils 0.9 %    Immature Granulocytes, Absolute 0.25 (H) 0.00 - 0.08 K/uL    Neutrophils, Absolute 3.40 1.70 - 7.00 K/uL    Lymphocytes, Absolute 2.23 1.20 - 3.50 K/uL    Monocytes, Absolute 0.96 0.30 - 1.00 K/uL    Eosinophils, Absolute 0.51 0.04 - 0.54 K/uL    Basophils, Absolute 0.07 0.01 - 0.10 K/uL   Basic metabolic panel   Result Value Ref Range    Sodium 132 (L) 136 - 144 mEQ/L    Potassium 4.2 3.6 - 5.1 mEQ/L    Chloride 92 (L) 98 - 109 mEQ/L    CO2 27 22 - 32 mEQ/L    BUN 16 8 - 20 mg/dL    Creatinine 0.7 (L) 0.8 - 1.3 mg/dL    Glucose 94 70 - 99 mg/dL    Calcium 8.8 (L) 8.9 - 10.3 mg/dL    eGFR >60.0 >=60.0 mL/min/1.73m*2    Anion Gap 13 3 - 15 mEQ/L   CBC and differential   Result Value Ref Range    WBC 8.05 3.80 - 10.50 K/uL    RBC 3.48 (L) 4.50 - 5.80 M/uL    Hemoglobin 10.7 (L) 13.7 - 17.5 g/dL    Hematocrit 33.3 (L) 40.1 - 51.0 %    MCV 95.7 83.0 - 98.0 fL    MCH 30.7 28.0 - 33.2 pg    MCHC 32.1 (L) 32.2 - 36.5 g/dL    RDW 14.1 11.6 - 14.4 %    Platelets 389 (H) 150 - 350 K/uL    MPV 11.4 9.4 - 12.4 fL    Differential Type Auto     nRBC 0.0 <=0.0 %    Immature Granulocytes 4.2 %    Neutrophils 50.5 %    Lymphocytes 27.7 %    Monocytes 11.7 %    Eosinophils 4.8 %    Basophils 1.1 %    Immature Granulocytes, Absolute 0.34 (H) 0.00 - 0.08 K/uL    Neutrophils, Absolute 4.06 1.70 - 7.00 K/uL    Lymphocytes, Absolute 2.23 1.20 - 3.50 K/uL    Monocytes, Absolute 0.94 0.30 - 1.00 K/uL    Eosinophils, Absolute 0.39 0.04 - 0.54 K/uL    Basophils, Absolute 0.09 0.01 - 0.10 K/uL   Basic metabolic panel   Result Value Ref Range    Sodium 133 (L) 136 - 144 mEQ/L    Potassium 3.9 3.6  - 5.1 mEQ/L    Chloride 91 (L) 98 - 109 mEQ/L    CO2 26 22 - 32 mEQ/L    BUN 12 8 - 20 mg/dL    Creatinine 0.8 0.8 - 1.3 mg/dL    Glucose 82 70 - 99 mg/dL    Calcium 8.9 8.9 - 10.3 mg/dL    eGFR >60.0 >=60.0 mL/min/1.73m*2    Anion Gap 16 (H) 3 - 15 mEQ/L   CBC and differential   Result Value Ref Range    WBC 11.56 (H) 3.80 - 10.50 K/uL    RBC 3.94 (L) 4.50 - 5.80 M/uL    Hemoglobin 12.0 (L) 13.7 - 17.5 g/dL    Hematocrit 38.3 (L) 40.1 - 51.0 %    MCV 97.2 83.0 - 98.0 fL    MCH 30.5 28.0 - 33.2 pg    MCHC 31.3 (L) 32.2 - 36.5 g/dL    RDW 14.4 11.6 - 14.4 %    Platelets 470 (H) 150 - 350 K/uL    MPV 11.1 9.4 - 12.4 fL    Differential Type Auto     nRBC 0.0 <=0.0 %    Immature Granulocytes 2.1 %    Neutrophils 55.5 %    Lymphocytes 28.1 %    Monocytes 9.9 %    Eosinophils 3.4 %    Basophils 1.0 %    Immature Granulocytes, Absolute 0.24 (H) 0.00 - 0.08 K/uL    Neutrophils, Absolute 6.41 1.70 - 7.00 K/uL    Lymphocytes, Absolute 3.25 1.20 - 3.50 K/uL    Monocytes, Absolute 1.15 (H) 0.30 - 1.00 K/uL    Eosinophils, Absolute 0.39 0.04 - 0.54 K/uL    Basophils, Absolute 0.12 (H) 0.01 - 0.10 K/uL   UA Hold for UC (Macro)   Result Value Ref Range    Color, Urine Yellow Yellow    Clarity, Urine Turbid (A) Clear    Specific Gravity, Urine 1.015 1.002 - 1.030    pH, Urine 6.5 4.5 - 8.0    Leukocyte Esterase +3 (A) Negative    Nitrite, Urine Positive (A) Negative    Protein, Urine +1 (A) Negative    Glucose, Urine Negative Negative mg/dL    Ketones, Urine Negative Negative mg/dL    Urobilinogen, Urine 0.2 <2.0 EU/dL EU/dL    Bilirubin, Urine Negative Negative mg/dL    Blood, Urine +1 (A) Negative   UA Microscopic   Result Value Ref Range    RBC, Urine 5 TO 9 (A) 0 TO 4 /HPF    WBC, Urine Too Numerous To Count (A) 0 TO 3 /HPF    Squamous Epithelial None Seen None Seen /hpf    Hyaline Cast 0 TO 2 (A) None Seen /lpf    Bacteria, Urine +3 (A) None Seen /HPF   Basic metabolic panel   Result Value Ref Range    Sodium 133 (L) 136 - 144  mEQ/L    Potassium 3.8 3.6 - 5.1 mEQ/L    Chloride 94 (L) 98 - 109 mEQ/L    CO2 26 22 - 32 mEQ/L    BUN 10 8 - 20 mg/dL    Creatinine 0.6 (L) 0.8 - 1.3 mg/dL    Glucose 96 70 - 99 mg/dL    Calcium 8.5 (L) 8.9 - 10.3 mg/dL    eGFR >60.0 >=60.0 mL/min/1.73m*2    Anion Gap 13 3 - 15 mEQ/L   CBC and differential   Result Value Ref Range    WBC 11.77 (H) 3.80 - 10.50 K/uL    RBC 3.24 (L) 4.50 - 5.80 M/uL    Hemoglobin 10.0 (L) 13.7 - 17.5 g/dL    Hematocrit 30.9 (L) 40.1 - 51.0 %    MCV 95.4 83.0 - 98.0 fL    MCH 30.9 28.0 - 33.2 pg    MCHC 32.4 32.2 - 36.5 g/dL    RDW 14.6 (H) 11.6 - 14.4 %    Platelets 316 150 - 350 K/uL    MPV 11.0 9.4 - 12.4 fL    Differential Type Auto     nRBC 0.0 <=0.0 %    Immature Granulocytes 2.0 %    Neutrophils 65.3 %    Lymphocytes 16.6 %    Monocytes 13.2 %    Eosinophils 2.3 %    Basophils 0.6 %    Immature Granulocytes, Absolute 0.23 (H) 0.00 - 0.08 K/uL    Neutrophils, Absolute 7.70 (H) 1.70 - 7.00 K/uL    Lymphocytes, Absolute 1.95 1.20 - 3.50 K/uL    Monocytes, Absolute 1.55 (H) 0.30 - 1.00 K/uL    Eosinophils, Absolute 0.27 0.04 - 0.54 K/uL    Basophils, Absolute 0.07 0.01 - 0.10 K/uL   Basic metabolic panel   Result Value Ref Range    Sodium 134 (L) 136 - 144 mEQ/L    Potassium 4.2 3.6 - 5.1 mEQ/L    Chloride 95 (L) 98 - 109 mEQ/L    CO2 26 22 - 32 mEQ/L    BUN 15 8 - 20 mg/dL    Creatinine 0.7 (L) 0.8 - 1.3 mg/dL    Glucose 104 (H) 70 - 99 mg/dL    Calcium 9.2 8.9 - 10.3 mg/dL    eGFR >60.0 >=60.0 mL/min/1.73m*2    Anion Gap 13 3 - 15 mEQ/L   CBC and differential   Result Value Ref Range    WBC 10.91 (H) 3.80 - 10.50 K/uL    RBC 3.40 (L) 4.50 - 5.80 M/uL    Hemoglobin 10.5 (L) 13.7 - 17.5 g/dL    Hematocrit 32.7 (L) 40.1 - 51.0 %    MCV 96.2 83.0 - 98.0 fL    MCH 30.9 28.0 - 33.2 pg    MCHC 32.1 (L) 32.2 - 36.5 g/dL    RDW 14.8 (H) 11.6 - 14.4 %    Platelets 404 (H) 150 - 350 K/uL    MPV 11.0 9.4 - 12.4 fL    Differential Type Auto     nRBC 0.0 <=0.0 %    Immature Granulocytes 2.7 %     Neutrophils 57.2 %    Lymphocytes 20.5 %    Monocytes 11.3 %    Eosinophils 7.1 %    Basophils 1.2 %    Immature Granulocytes, Absolute 0.29 (H) 0.00 - 0.08 K/uL    Neutrophils, Absolute 6.25 1.70 - 7.00 K/uL    Lymphocytes, Absolute 2.24 1.20 - 3.50 K/uL    Monocytes, Absolute 1.23 (H) 0.30 - 1.00 K/uL    Eosinophils, Absolute 0.77 (H) 0.04 - 0.54 K/uL    Basophils, Absolute 0.13 (H) 0.01 - 0.10 K/uL   UA Macroscopic   Result Value Ref Range    Color, Urine Calista (A) Yellow    Clarity, Urine Turbid (A) Clear    Specific Gravity, Urine 1.015 1.002 - 1.030    pH, Urine 6.5 4.5 - 8.0    Leukocyte Esterase +3 (A) Negative    Nitrite, Urine Positive (A) Negative    Protein, Urine +2 (A) Negative    Glucose, Urine Negative Negative mg/dL    Ketones, Urine Negative Negative mg/dL    Urobilinogen, Urine 0.2 <2.0 EU/dL EU/dL    Bilirubin, Urine Negative Negative mg/dL    Blood, Urine +3 (A) Negative   UA Microscopic   Result Value Ref Range    RBC, Urine Too Numerous To Count (A) 0 TO 4 /HPF    WBC, Urine Too Numerous To Count (A) 0 TO 3 /HPF    Squamous Epithelial None Seen None Seen /hpf    Hyaline Casts None Seen None Seen /lpf    Bacteria, Urine Rare (A) None Seen /HPF   Basic metabolic panel   Result Value Ref Range    Sodium 138 136 - 144 mEQ/L    Potassium 4.6 3.6 - 5.1 mEQ/L    Chloride 100 98 - 109 mEQ/L    CO2 26 22 - 32 mEQ/L    BUN 15 8 - 20 mg/dL    Creatinine 0.7 (L) 0.8 - 1.3 mg/dL    Glucose 99 70 - 99 mg/dL    Calcium 9.0 8.9 - 10.3 mg/dL    eGFR >60.0 >=60.0 mL/min/1.73m*2    Anion Gap 12 3 - 15 mEQ/L   Magnesium   Result Value Ref Range    Magnesium 2.0 1.8 - 2.5 mg/dL   CBC and Differential   Result Value Ref Range    WBC 10.62 (H) 3.80 - 10.50 K/uL    RBC 2.98 (L) 4.50 - 5.80 M/uL    Hemoglobin 9.2 (L) 13.7 - 17.5 g/dL    Hematocrit 28.7 (L) 40.1 - 51.0 %    MCV 96.3 83.0 - 98.0 fL    MCH 30.9 28.0 - 33.2 pg    MCHC 32.1 (L) 32.2 - 36.5 g/dL    RDW 14.8 (H) 11.6 - 14.4 %    Platelets 382 (H) 150 -  350 K/uL    MPV 10.7 9.4 - 12.4 fL    Differential Type Auto     nRBC 0.0 <=0.0 %    Immature Granulocytes 2.5 %    Neutrophils 55.6 %    Lymphocytes 21.1 %    Monocytes 12.9 %    Eosinophils 6.9 %    Basophils 1.0 %    Immature Granulocytes, Absolute 0.27 (H) 0.00 - 0.08 K/uL    Neutrophils, Absolute 5.90 1.70 - 7.00 K/uL    Lymphocytes, Absolute 2.24 1.20 - 3.50 K/uL    Monocytes, Absolute 1.37 (H) 0.30 - 1.00 K/uL    Eosinophils, Absolute 0.73 (H) 0.04 - 0.54 K/uL    Basophils, Absolute 0.11 (H) 0.01 - 0.10 K/uL   POCT Glucose   Result Value Ref Range    POCT Bedside Glucose 115 (H) 70 - 99 mg/dL    POC Test POC    POCT Glucose   Result Value Ref Range    POCT Bedside Glucose 102 (H) 70 - 99 mg/dL    POC Test POC        Imaging  Not applicable    VTE Assessment: TBD    Physical Exam:  Physical Exam  Vitals signs and nursing note reviewed.   Constitutional:       Appearance: Normal appearance.   HENT:      Head: Normocephalic.      Nose: Nose normal.      Mouth/Throat:      Mouth: Mucous membranes are moist.   Pulmonary:      Effort: Pulmonary effort is normal.   Abdominal:      General: Abdomen is flat.   Skin:     General: Skin is warm.   Neurological:      Mental Status: He is alert and oriented to person, place, and time.   Psychiatric:         Behavior: Behavior normal.         Acute cystitis  Assessment & Plan  Check cx and advise  Patient with voiding dysfunction complicating issue     BPH with urinary obstruction   Overview Signed 2/23/2021  1:55 PM by Lacho Monreal MD    Cath for pvr > 500cc,  flomax qhs , follow pvr f/u primary care           Retention - will follow, cath flomax outpt  f/u    Problem List     Acute metabolic encephalopathy    ARDS (adult respiratory distress syndrome) (CMS/HCC)    Coronary artery disease involving native coronary artery of native heart without angina pectoris    Overview Signed 2/1/2021  1:33 AM by Agnes Morelos RN     Last Assessment & Plan:    Continue with current meds         Hypoxia    Pneumonia due to COVID-19 virus    Transaminitis    Essential hypertension    Dyslipidemia    Atelectasis    Physical deconditioning    Acute cystitis    BPH with urinary obstruction    Overview Signed 2/23/2021  1:55 PM by Lacho Monreal MD     Cath for pvr > 500cc,  flomax qhs , follow pvr

## 2021-02-24 NOTE — PLAN OF CARE
Problem: Rehabilitation (IRF) Plan of Care  Goal: Plan of Care Review  Outcome: Progressing  Flowsheets (Taken 2/24/2021 1029)  Plan of Care Reviewed With: patient  IRF Plan of Care Review: progress ongoing, continue  Outcome Summary: Patient received awake and dressed and in bed. Patient ambulating independently and able to make needs known. No c/o pain. Took all AM meds as ordered. Patient to be discharged this AM. All belongings packed and documentation completed. Discharge order in place.   Plan of Care Review  IRF Plan of Care Review: progress ongoing, continue  Plan of Care Reviewed With: patient  Outcome Summary: Patient received awake and dressed and in bed. Patient ambulating independently and able to make needs known. No c/o pain. Took all AM meds as ordered. Patient to be discharged this AM. All belongings packed and documentation completed. Discharge order in place.

## 2021-02-24 NOTE — PLAN OF CARE
Problem: Rehabilitation (IRF) Plan of Care  Goal: Plan of Care Review  Outcome: Progressing  Flowsheets  Taken 2/24/2021 0428 by Linsey Ramesh, RN  Outcome Summary: aox3. no c/o pain or discomfort. meds taken as ordered. pt to D/C today. mod i in room. no apparent distress noted  Taken 2/23/2021 1619 by David Roach PT  Plan of Care Reviewed With: patient  IRF Plan of Care Review: progress ongoing, continue   Plan of Care Review  Outcome Summary: aox3. no c/o pain or discomfort. meds taken as ordered. pt to D/C today. mod i in room. no apparent distress noted

## 2021-02-24 NOTE — DISCHARGE INSTRUCTIONS
1. Familial hypercholesterolemia, please f/u with your PCP and ideally f/u with a dyslipidemia specialist as outpt for a treatment plan .    2. ESBL Klebsiella UTI, continue to finish 7 day course of ciprofloxacin as prescribed, please follow up with your PCP in 5-7 days after discharge from rehab, repeat a CBC at that time.

## 2021-02-24 NOTE — PROGRESS NOTES
02/22/21 1200   Discharge Needs Assessment (IRF)   Concerns to be Addressed discharge planning   Patient/Family Anticipates Transition to home with help/services;home with family   Patient/Family Anticipated Services at Transition durable medical equipment;home health care   Transportation Concerns car, none   Transportation Anticipated family or friend will provide   Assistive Device/Animal Currently Used at Home walker, front-wheeled   Anticipated Changes Related to Illness inability to work   Anticipated Discharge Disposition home with home health services   Offered/Gave Vendor List yes   [facility]   Current Discharge Risk physical impairment   What day is the transport expected? 02/24/21   What time is the transport expected? 1200   Plan   Patient/Family in Agreement with Plan yes   Discharge Review for Designated Lay Caregiver Designated Lay Caregiver available   Final Note   Final Note   (referral faxed to [facility])

## 2022-09-14 ENCOUNTER — HOSPITAL ENCOUNTER (OUTPATIENT)
Dept: RADIOLOGY | Facility: HOSPITAL | Age: 72
Discharge: HOME | End: 2022-09-14
Attending: ORTHOPAEDIC SURGERY
Payer: COMMERCIAL

## 2022-09-14 VITALS
HEART RATE: 62 BPM | SYSTOLIC BLOOD PRESSURE: 166 MMHG | BODY MASS INDEX: 32.13 KG/M2 | WEIGHT: 212 LBS | HEIGHT: 68 IN | DIASTOLIC BLOOD PRESSURE: 66 MMHG

## 2022-09-14 DIAGNOSIS — Z47.1 AFTERCARE FOLLOWING RIGHT HIP JOINT REPLACEMENT SURGERY: ICD-10-CM

## 2022-09-14 DIAGNOSIS — Z01.812 PRE-OPERATIVE LABORATORY EXAMINATION: ICD-10-CM

## 2022-09-14 DIAGNOSIS — Z96.641 AFTERCARE FOLLOWING RIGHT HIP JOINT REPLACEMENT SURGERY: ICD-10-CM

## 2022-09-14 DIAGNOSIS — M25.551 PAIN IN RIGHT HIP: ICD-10-CM

## 2022-09-14 DIAGNOSIS — E11.9 TYPE 2 DIABETES MELLITUS WITHOUT COMPLICATION, WITHOUT LONG-TERM CURRENT USE OF INSULIN (HCC): ICD-10-CM

## 2022-09-14 DIAGNOSIS — M16.11 PRIMARY OSTEOARTHRITIS OF RIGHT HIP: Primary | ICD-10-CM

## 2022-09-14 DIAGNOSIS — Z01.810 PRE-OPERATIVE CARDIOVASCULAR EXAMINATION: ICD-10-CM

## 2022-09-14 PROCEDURE — 73502 X-RAY EXAM HIP UNI 2-3 VIEWS: CPT

## 2022-09-14 PROCEDURE — 99204 OFFICE O/P NEW MOD 45 MIN: CPT | Performed by: ORTHOPAEDIC SURGERY

## 2022-09-14 RX ORDER — ASCORBIC ACID 500 MG
500 TABLET ORAL 2 TIMES DAILY
Qty: 60 TABLET | Refills: 0 | Status: SHIPPED | OUTPATIENT
Start: 2022-09-14

## 2022-09-14 RX ORDER — ENOXAPARIN SODIUM 100 MG/ML
40 INJECTION SUBCUTANEOUS DAILY
Qty: 11.2 ML | Refills: 0 | Status: SHIPPED | OUTPATIENT
Start: 2022-09-14 | End: 2022-10-12

## 2022-09-14 RX ORDER — CLOPIDOGREL BISULFATE 75 MG/1
75 TABLET ORAL DAILY
COMMUNITY
Start: 2022-07-13

## 2022-09-14 RX ORDER — FAMOTIDINE 20 MG/1
20 TABLET, FILM COATED ORAL 2 TIMES DAILY
COMMUNITY

## 2022-09-14 RX ORDER — EZETIMIBE 10 MG/1
10 TABLET ORAL DAILY
COMMUNITY
Start: 2022-09-01

## 2022-09-14 RX ORDER — CHLORHEXIDINE GLUCONATE 0.12 MG/ML
15 RINSE ORAL ONCE
OUTPATIENT
Start: 2022-09-14 | End: 2022-09-14

## 2022-09-14 RX ORDER — GABAPENTIN 300 MG/1
300 CAPSULE ORAL ONCE
OUTPATIENT
Start: 2022-09-14 | End: 2022-09-14

## 2022-09-14 RX ORDER — FOLIC ACID 1 MG/1
1 TABLET ORAL DAILY
Qty: 30 TABLET | Refills: 0 | Status: SHIPPED | OUTPATIENT
Start: 2022-09-14

## 2022-09-14 RX ORDER — FERROUS SULFATE TAB EC 324 MG (65 MG FE EQUIVALENT) 324 (65 FE) MG
TABLET DELAYED RESPONSE ORAL
Qty: 30 TABLET | Refills: 0 | Status: SHIPPED | OUTPATIENT
Start: 2022-09-14

## 2022-09-14 RX ORDER — ASPIRIN 81 MG/1
81 TABLET ORAL DAILY
COMMUNITY

## 2022-09-14 RX ORDER — PSEUDOEPHEDRINE HCL 30 MG
100 TABLET ORAL 2 TIMES DAILY
COMMUNITY

## 2022-09-14 RX ORDER — SODIUM CHLORIDE, SODIUM LACTATE, POTASSIUM CHLORIDE, CALCIUM CHLORIDE 600; 310; 30; 20 MG/100ML; MG/100ML; MG/100ML; MG/100ML
125 INJECTION, SOLUTION INTRAVENOUS CONTINUOUS
OUTPATIENT
Start: 2022-09-14

## 2022-09-14 RX ORDER — ACETAMINOPHEN 325 MG/1
975 TABLET ORAL ONCE
OUTPATIENT
Start: 2022-09-14 | End: 2022-09-14

## 2022-09-14 RX ORDER — CEFAZOLIN SODIUM 2 G/50ML
2000 SOLUTION INTRAVENOUS ONCE
OUTPATIENT
Start: 2022-09-14 | End: 2022-09-14

## 2022-09-14 RX ORDER — GEMFIBROZIL 600 MG/1
600 TABLET, FILM COATED ORAL 2 TIMES DAILY
COMMUNITY
Start: 2022-07-13

## 2022-09-14 RX ORDER — LISINOPRIL 10 MG/1
10 TABLET ORAL DAILY
COMMUNITY
Start: 2022-07-13

## 2022-09-14 NOTE — PROGRESS NOTES
71 y o male who works as a [employer] presents for evaluation  During the [place] crisis he describes atraumatic onset rapid worsening weight-bearing pain in the right hip  He was hospitalized with COVID and been considered a long haul her for symptoms, he received pulse dose of steroids during his hospitalization  He began to experience right hip right groin pain  Been treated with activity modification, therapy, injections all without significant relief of his ongoing pain  He describes pain level right hip joint, the pain is made worse bearing weight, pain increases with increased activities  He currently uses arms severe and unrelenting to describes current right hip and groin pain    Review of Systems  Review of systems negative unless otherwise specified in HPI    Past Medical History  Past Medical History:   Diagnosis Date    History of COVID-19     HLD (hyperlipidemia)     Hypertension     Stomach disorder        Past Surgical History  Past Surgical History:   Procedure Laterality Date    APPENDECTOMY      CARDIAC SURGERY         Current Medications  Current Outpatient Medications on File Prior to Visit   Medication Sig Dispense Refill    aspirin (ECOTRIN LOW STRENGTH) 81 mg EC tablet Take 81 mg by mouth daily      clopidogrel (PLAVIX) 75 mg tablet Take 75 mg by mouth daily      Docusate Sodium (DSS) 100 MG CAPS Take 100 mg by mouth 2 (two) times a day      ezetimibe (ZETIA) 10 mg tablet Take 10 mg by mouth daily      lisinopril (ZESTRIL) 10 mg tablet Take 10 mg by mouth daily      famotidine (PEPCID) 20 mg tablet Take 20 mg by mouth 2 (two) times a day (Patient not taking: Reported on 9/14/2022)      gemfibrozil (LOPID) 600 mg tablet Take 600 mg by mouth 2 (two) times a day (Patient not taking: Reported on 9/14/2022)       No current facility-administered medications on file prior to visit         Recent Labs (HCT,HGB,PT,INR,ESR,CRP,GLU,HgA1C)  0   Lab Value Date/Time    HGBA1C 5 7 (H) 08/30/2022 1010    HGBA1C 7 6 (H) 01/16/2021 0400         Physical exam  · General: Awake, Alert, Oriented  · Eyes: Pupils equal, round and reactive to light  · Heart: regular rate and rhythm  · Lungs: No audible wheezing  · Abdomen: soft  Examination confirms alert male, no distress  He rises slowly from a seated position  Soft tissue envelope overlying the right hip is intact  Right hip has significant restriction of forced internal rotation was flexed 90°  This recreates groin pain  There is very little if any right thigh atrophy  There is no right knee effusion  The foot and toes strongly dorsiflex on the right  There is slight shortening right side when compared to the left    Imaging  I personally reviewed x-rays right hip my interpretation is as follows: Moderate to advanced osteoarthritis seen within the right hip joint  Included in this film are ovoid femoral head and a shallow right acetabulum    Procedure  None indicated or performed today    Assessment/Plan:   72 y o male who was osteoarthritis right hip which is severe in presentation, and causes symptoms are severe and unrelenting  All diagnoses were discussed with the patient  Treatment options including risks, benefits, alternatives discussed in detail  I think elective right total hip replacement surgery is indicated  Following review risks and benefits, consent was that the above-mentioned surgery  No guarantees were given    Office follow-up as a postoperative patient is my recommendation

## 2024-10-10 NOTE — PLAN OF CARE
Problem: Rehabilitation (IRF) Plan of Care  Goal: Plan of Care Review  Outcome: Progressing  Flowsheets (Taken 2/4/2021 2208)  Plan of Care Reviewed With: patient  IRF Plan of Care Review: progress ongoing, continue  Outcome Summary: Pt. admitted from [facility] via transport, s/p COVID PNA, AA&Ox3, cooperative, in no acute distress, dyspneic on exertion, 3L NC, VSS, breath sounds diminished throughout, continent of bladder, using urinal , , no BM, colace and miralax given, soft NTL diet, pain managed with tylenol, SCDs on, fall/safety precautions maintained, bed alarm activated, call bell in reach, oriented to room.      None

## 2024-12-24 NOTE — PROGRESS NOTES
Patient: Denis Green  Location: [facility] [address]  MRN: [id number]  Today's date: 2/12/2021    History of Present Illness  Denis LOVE is a 70 y.o. male admitted on 2/4/2021 with Pneumonia due to COVID-19 virus. Principal problem is No Principal Problem: There is no principal problem currently on the Problem List. Please update the Problem List and refresh..   Pt admitted to outside hospital on 1/5/2021 for COVID-19 PNA. Worsening resp status intubated on 1/9- extubated 1/16 to [facility]. Transition to [facility] on 1/23.Eval by nephrology while in ICU due to ERNIE likely secondary to ATN. Creatinine has been improving with excellent urine output. Received Decadron and Remdesivir. Dobhoff tube was placed due to dysphagia. New fever of 102.8 1/20 and 1/21. ID was consulted. Bld cx's negative. CT scan of the chest abdomen pelvis was done with results showing increasing pulmonary infiltrates/groundglass opacities and consolidations in his bilateral lower lobes. Started empirically on cefepime and vancomycin to cover hospital-acquired pneumonia. Pt had increased lethargy on 1/22. Pt removed DHT 1/25; required higher O2 afterward - possible aspiration during the process. 1/26 patient continued to have intermittent confusion; ?post ICU delirium. Neurology is following, MRI brain was unremarkable. EEG showed mild slowing but no seizure activity. D/c'd abx after 5 days d/t lethargy.       Past Medical History  Denis LOVE has a past medical history of Coronary artery disease, Hypertension, and Lipid disorder.    SLP Pain    Date/Time Pain Type Pref Pain Scale Orientation Location Rating: Rest Who   02/12/21 1008 Pain Assessment word (verbal rating pain scale) lower back 2 - mild pain RS   02/12/21 1027 Pain Reassessment word (verbal rating pain scale) lower back 6 - moderate-severe pain RS          Prior Living Environment      Most Recent Value   Lives With  spouse, child(mindy), adult [adult son lives in  home occasionally]   Living Arrangements  house   Home Accessibility  stairs to enter home (Group), stairs within home (Group)   Living Environment Comment  Lives with wife and intermittently with one son.  [bed and bath on 2nd ,  no powder room on first]   Number of Stairs, Main Entrance  1   Stair Railings, Main Entrance  none   Location, Patient Bedroom  second floor, must negotiate stairs to access   Location, Bathroom  second floor, must negotiate stairs to access   Bathroom Access Comment  Pt. reports has tub shower and stall shower, uses tub shower primarily, has grab bar in shower, has stanard height toilet c wall on both sides   Number of Stairs, Within Home, Primary  12   Surface of Stairs, Within Home, Primary  hardwood   Stair Railings, Within Home, Primary  railings on both sides of stairs          Prior Level of Function      Most Recent Value   Dominant Hand  right   Ambulation  independent   Transferring  independent   Toileting  independent   Bathing  independent   Dressing  independent   Eating  independent   Communication  understands/communicates without difficulty   Swallowing  swallows foods/liquids without difficulty   Baseline Diet/Method of Nutritional Intake  regular solids, thin liquids   Past History of Dysphagia  No previous reports    Prior Level of Function Comment  Pt is a cattle/,  previously IND for all IADLs.    Assistive Device/Animal Currently Used at Home  none          IRF SLP Evaluation and Treatment - 02/12/21 1008        Time Calculation    Start Time  1000     Stop Time  1030     Time Calculation (min)  30 min        Session Details    Document Type  daily treatment/progress note     Mode of Treatment  speech language pathology;individual therapy        General Information    General Observations of Patient  Pleasant and cooperative, pt received in handoff from PT.         Swallowing Intervention    Dysphagia/Swallowing Interventions  pharyngeal therapeutic  exercise program     Pharyngeal Therapeutic Exercise Program  effortful phonation of eee;laryngeal adduction exercises     Comment, Swallowing Interventions  Extensive education provided re. FWP, pt continues to present with c/o poor staff follow-through with protocol, pt educated on self-advocacy. Completed pharyngeeal strengthening/airway protection exericses of effortful /ee/ and focus on breath support with sustained /ah/ pt sustained /ah/ for an average of 3.5 seconds over consecutive trials.         Daily Progress Summary (SLP)    Daily Outcome Statement (SLP)  Pt moderately furstrated with staff follow-through of FWP. Extensive education provided again this date to both staff and patient, signage re-posted in pt room for clarity. Pt verbalized understanding. Airway protection exericses completed and encouraged for HEP.                        IRF SLP Goals      Most Recent Value   Verbal Expression Goal 1   Verbal Expression Goal 1  STG: pt will complete mod level verbal expression tasks x 90% c min cues. at 02/06/2021 1405   Time Frame  short-term goal (STG), 1 week at 02/06/2021 1405   Verbal Expression Goal 2   Verbal Expression Goal 2  LTG: pt will complete complex/abstract verbal expression tasks x 90% c independent use of WF strategies. at 02/06/2021 1405   Time Frame  long-term goal (LTG), 3 weeks at 02/06/2021 1405   Oral Nutrition/Hydration Goal 1   Activity  effective/safe/independent, use of swallowing techniques [regular/NTL, no overt s/s of aspiration ] at 02/05/2021 1202   Time Frame  short-term goal (STG), 1 week at 02/05/2021 1202   Oral Nutrition/Hydration Goal 2   Activity  effective/safe/independent, use of swallowing techniques [regular/thin, no overt s/s of aspiration ] at 02/05/2021 1202   Time Frame  long-term goal (LTG), 3 weeks at 02/05/2021 1202   Executive Function Goal 1   Activity  complete, abstract thinking tasks, executive function tasks, organization/sequencing tasks, problem  solving/reasoning tasks, other (see comments) [STG,  simple-mod level tasks.] at 02/06/2021 1405   [place]/Accuracy  with minimum, verbal cues/redirection, with additional processing time, with repetition of directions, with 90% accuracy at 02/06/2021 1405   Time Frame  1 week at 02/06/2021 1405   Executive Function Goal 2   Activity  complete, abstract thinking tasks, exhibit impulse control, organization/sequencing tasks, problem solving/reasoning tasks, other (see comments) [LTG] at 02/06/2021 1405   [place]/Accuracy  independently, with 90% accuracy, other (see comments) [mod-complex tasks.] at 02/06/2021 1405   Time Frame  3 weeks at 02/06/2021 1405   Memory Goal 1   Activity  short-term memory tasks, immediate recall tasks, working memory tasks, recall recent events, other (see comments) [STG,  simple-mod level tasks.] at 02/06/2021 1405   [place]/Accuracy  minimal cues for use of strategies, with 90% accuracy at 02/06/2021 1405   Time Frame  short-term goal (STG), 1 week at 02/06/2021 1405   Memory Goal 2   Activity  short-term memory tasks, immediate recall tasks, working memory tasks, recall recent events, other (see comments) [LTG,  mod complex information.] at 02/06/2021 1405   [place]/Accuracy  independent use of environmental cues/strategies, with 90% accuracy at 02/06/2021 1405   Time Frame  long-term goal (LTG), 3 weeks at 02/06/2021 1405         No indicators present